# Patient Record
Sex: MALE | Race: BLACK OR AFRICAN AMERICAN | NOT HISPANIC OR LATINO | Employment: FULL TIME | URBAN - METROPOLITAN AREA
[De-identification: names, ages, dates, MRNs, and addresses within clinical notes are randomized per-mention and may not be internally consistent; named-entity substitution may affect disease eponyms.]

---

## 2017-10-20 ENCOUNTER — OFFICE VISIT (OUTPATIENT)
Dept: FAMILY MEDICINE CLINIC | Facility: CLINIC | Age: 57
End: 2017-10-20

## 2017-10-20 ENCOUNTER — HOSPITAL ENCOUNTER (OUTPATIENT)
Dept: GENERAL RADIOLOGY | Facility: HOSPITAL | Age: 57
Discharge: HOME | End: 2017-10-20
Admitting: FAMILY MEDICINE

## 2017-10-20 VITALS
HEART RATE: 78 BPM | HEIGHT: 74 IN | SYSTOLIC BLOOD PRESSURE: 162 MMHG | DIASTOLIC BLOOD PRESSURE: 98 MMHG | RESPIRATION RATE: 18 BRPM | OXYGEN SATURATION: 99 % | BODY MASS INDEX: 39.01 KG/M2 | TEMPERATURE: 97.3 F | WEIGHT: 304 LBS

## 2017-10-20 DIAGNOSIS — E78.2 MIXED HYPERLIPIDEMIA: ICD-10-CM

## 2017-10-20 DIAGNOSIS — Z79.4 CONTROLLED TYPE 2 DIABETES MELLITUS WITH CHRONIC KIDNEY DISEASE, WITH LONG-TERM CURRENT USE OF INSULIN, UNSPECIFIED CKD STAGE (HCC): ICD-10-CM

## 2017-10-20 DIAGNOSIS — J98.01 BRONCHOSPASM: ICD-10-CM

## 2017-10-20 DIAGNOSIS — I10 ESSENTIAL HYPERTENSION: ICD-10-CM

## 2017-10-20 DIAGNOSIS — J06.9 PROTRACTED URI: Primary | ICD-10-CM

## 2017-10-20 DIAGNOSIS — E11.22 CONTROLLED TYPE 2 DIABETES MELLITUS WITH CHRONIC KIDNEY DISEASE, WITH LONG-TERM CURRENT USE OF INSULIN, UNSPECIFIED CKD STAGE (HCC): ICD-10-CM

## 2017-10-20 DIAGNOSIS — Z12.5 PROSTATE CANCER SCREENING: ICD-10-CM

## 2017-10-20 DIAGNOSIS — Z12.11 COLON CANCER SCREENING: ICD-10-CM

## 2017-10-20 PROBLEM — E11.29 CONTROLLED TYPE 2 DIABETES MELLITUS WITH KIDNEY COMPLICATION, WITH LONG-TERM CURRENT USE OF INSULIN: Status: ACTIVE | Noted: 2017-10-20

## 2017-10-20 PROBLEM — E11.9 CONTROLLED TYPE 2 DIABETES MELLITUS WITHOUT COMPLICATION, WITHOUT LONG-TERM CURRENT USE OF INSULIN (HCC): Status: ACTIVE | Noted: 2017-10-20

## 2017-10-20 PROBLEM — E11.9 CONTROLLED TYPE 2 DIABETES MELLITUS WITHOUT COMPLICATION, WITH LONG-TERM CURRENT USE OF INSULIN (HCC): Chronic | Status: ACTIVE | Noted: 2017-10-20

## 2017-10-20 PROCEDURE — 71020 HC CHEST PA AND LATERAL: CPT

## 2017-10-20 PROCEDURE — 99203 OFFICE O/P NEW LOW 30 MIN: CPT | Performed by: FAMILY MEDICINE

## 2017-10-20 RX ORDER — FLURBIPROFEN SODIUM 0.3 MG/ML
SOLUTION/ DROPS OPHTHALMIC
Refills: 2 | Status: ON HOLD | COMMUNITY
Start: 2017-10-10 | End: 2017-11-14

## 2017-10-20 RX ORDER — INSULIN ASPART 100 [IU]/ML
5-10 INJECTION, SOLUTION INTRAVENOUS; SUBCUTANEOUS
COMMUNITY
Start: 2017-10-18 | End: 2021-05-06 | Stop reason: SDUPTHER

## 2017-10-20 RX ORDER — LISINOPRIL AND HYDROCHLOROTHIAZIDE 20; 12.5 MG/1; MG/1
1 TABLET ORAL DAILY
COMMUNITY
End: 2017-11-16 | Stop reason: HOSPADM

## 2017-10-20 RX ORDER — CARVEDILOL 12.5 MG/1
TABLET ORAL
Refills: 1 | COMMUNITY
Start: 2017-09-10 | End: 2017-11-16 | Stop reason: HOSPADM

## 2017-10-20 RX ORDER — AMLODIPINE BESYLATE 10 MG/1
10 TABLET ORAL DAILY
Refills: 1 | COMMUNITY
Start: 2017-07-21 | End: 2021-08-10 | Stop reason: SDUPTHER

## 2017-10-20 RX ORDER — SPIRONOLACTONE 25 MG/1
25 TABLET ORAL DAILY
COMMUNITY
End: 2017-11-27 | Stop reason: SDUPTHER

## 2017-10-20 RX ORDER — ROSUVASTATIN CALCIUM 20 MG/1
TABLET, COATED ORAL
Refills: 1 | COMMUNITY
Start: 2017-07-21 | End: 2017-11-16 | Stop reason: HOSPADM

## 2017-10-20 NOTE — PROGRESS NOTES
Assessment/Plan     Problem List Items Addressed This Visit        Cardiovascular and Mediastinum    Essential hypertension (Chronic)    Relevant Medications    amLODIPine (NORVASC) 10 MG tablet    carvedilol (COREG) 12.5 MG tablet    spironolactone (ALDACTONE) 25 MG tablet    lisinopril-hydrochlorothiazide (PRINZIDE,ZESTORETIC) 20-12.5 MG per tablet    Other Relevant Orders    CBC & Differential    Comprehensive Metabolic Panel    Lipid Panel With / Chol / HDL Ratio    Mixed hyperlipidemia (Chronic)    Relevant Medications    rosuvastatin (CRESTOR) 20 MG tablet    Other Relevant Orders    Comprehensive Metabolic Panel    Lipid Panel With / Chol / HDL Ratio       Endocrine    Controlled type 2 diabetes mellitus with kidney complication, with long-term current use of insulin    Relevant Medications    NOVOLOG FLEXPEN 100 UNIT/ML solution pen-injector sc pen    spironolactone (ALDACTONE) 25 MG tablet    Insulin Degludec (TRESIBA FLEXTOUCH SC)    Other Relevant Orders    CBC & Differential    Comprehensive Metabolic Panel    Lipid Panel With / Chol / HDL Ratio    Hemoglobin A1c      Other Visit Diagnoses     Protracted URI    -  Primary    Relevant Orders    XR Chest PA & Lateral (Completed)    Bronchospasm        Prostate cancer screening        Relevant Orders    PSA    Colon cancer screening        Relevant Orders    Ambulatory Referral For Screening Colonoscopy           Follow up: Return for follow up depends on review of labs and testing.     DISCUSSION  Protracted upper respiratory infection.  May be viral.  Check chest x-ray.  If shows any type of abnormality, may need antibiotic.  Otherwise may need to give some more time.  Continue inhaler as he has.  Exam is normal today.    Multiple other problems including hypertension and hyperlipidemia and diabetes mellitus type 2.  Orders for blood work was given.  He will get these done when he sees his endocrinologist in November.    Prostate cancer screening.  PSA  ordered.    Check screening colonoscopy as well.  He agrees.    Blood pressure is elevated today.  May be elevated due to dietary indiscretion yesterday and cold medication.  He will continue to monitor at home.  He states it always is good.  If it is continuing to increase, he is to let us know as soon possible.      MEDICATIONS PRESCRIBED  Requested Prescriptions      No prescriptions requested or ordered in this encounter            -------------------------------------------    Subjective     Chief Complaint   Patient presents with   • Establish Care   • URI     x 2 weeks Congested, cough, drainage, & wheezing. [facility] said possible asthma or allergies       URI    This is a new (Felt wheezing in chest. HAd pressure washed a desk in Early Sept. Had gone to [facility] in [place]. Gave meds. and neb. Got better. In last 2 weeks, sx came back again) problem. The current episode started 1 to 4 weeks ago (early Sept and then got better and now ill agin x 2 weeks). The problem has been gradually worsening. There has been no fever. Associated symptoms include congestion (drainage), coughing and wheezing. Pertinent negatives include no chest pain, ear pain, nausea, sinus pain or vomiting. He has tried decongestant (OTC meds, Zytrec D, OTC cough syrup. ) for the symptoms. The treatment provided mild relief.     No ill contacts    Gave steroid and then antibiotic.       HTN  Exercises 4-5 times per week but none x 2 months after foot injury and was playing golf, and reached for a ball and reached back, popped right chest wall and pain and unable to exercise,   Ate increased Salty foods yesterday and swelling so increased BP today  Checks at pharmacy/store periodically      Dm Jeannine Thomas    Nephrology  Dr Kt Juarez    Grew in [place]. [facility]        Past Medical History,Medications, Allergies, and social history was reviewed.    Review of Systems   Constitutional: Negative.    HENT: Positive for  "congestion (drainage). Negative for ear pain and sinus pain.    Eyes: Negative.    Respiratory: Positive for cough and wheezing.    Cardiovascular: Negative.  Negative for chest pain.   Gastrointestinal: Negative.  Negative for nausea and vomiting.   Endocrine: Negative.    Genitourinary: Negative.    Musculoskeletal: Negative.    Neurological: Negative.    Psychiatric/Behavioral: Negative.        Objective     Vitals:    10/20/17 1226 10/20/17 1256   BP: (!) 168/102 162/98   Pulse: 78    Resp: 18    Temp: 97.3 °F (36.3 °C)    TempSrc: Temporal Artery     SpO2: 99%    Weight: (!) 304 lb (138 kg)    Height: 73.82\" (187.5 cm)         Physical Exam   Constitutional: He is oriented to person, place, and time. Vital signs are normal. He appears well-developed and well-nourished.   Obese   HENT:   Head: Normocephalic and atraumatic.   Right Ear: Hearing, tympanic membrane, external ear and ear canal normal.   Left Ear: Hearing, tympanic membrane, external ear and ear canal normal.   Nose: Nose normal.   Mouth/Throat: Oropharynx is clear and moist.   Eyes: Conjunctivae, EOM and lids are normal. Pupils are equal, round, and reactive to light.   Neck: Normal range of motion. Neck supple. No thyromegaly present.   Cardiovascular: Normal rate, regular rhythm and normal heart sounds.  Exam reveals no friction rub.    No murmur heard.  Pulmonary/Chest: Effort normal and breath sounds normal. No respiratory distress. He has no wheezes. He has no rales.   Abdominal: Soft. Normal appearance and bowel sounds are normal. He exhibits no distension and no mass. There is no tenderness. There is no rebound and no guarding.   Musculoskeletal: He exhibits edema (trace to 1+, lower extremity).   Neurological: He is alert and oriented to person, place, and time. He has normal strength.   Skin: Skin is warm and dry.   Psychiatric: He has a normal mood and affect. His speech is normal and behavior is normal. Cognition and memory are normal. "   Nursing note and vitals reviewed.              Jose Ramon Allen MD

## 2017-10-20 NOTE — PROGRESS NOTES
Please call: The chest x-ray was normal.  No evidence of pneumonia or any other abnormality.  If symptoms are persisting, please let me know.

## 2017-11-01 ENCOUNTER — TELEPHONE (OUTPATIENT)
Dept: FAMILY MEDICINE CLINIC | Facility: CLINIC | Age: 57
End: 2017-11-01

## 2017-11-01 NOTE — TELEPHONE ENCOUNTER
----- Message from Jacqueline Muniz sent at 11/1/2017  4:03 PM EDT -----  Contact: DR. VALENCIA; MED QUESTION  PT IS STILL CONGESTED AND NOT FEELING WELL HE WANTED TO KNOW IF HE NEEDED TO BE REFERRED OUT. IT IS WORSE AT NIGHT. HE IS COUGHING AND RUNNY NOSE.     CALL BACK   [phone]

## 2017-11-02 NOTE — TELEPHONE ENCOUNTER
Please call, is it more in the head with congestion and runny nose? or chest congestion? Does he think cough is from drainage in throat?  Answers will help determine who he should see. bds

## 2017-11-06 ENCOUNTER — OFFICE VISIT (OUTPATIENT)
Dept: FAMILY MEDICINE CLINIC | Facility: CLINIC | Age: 57
End: 2017-11-06

## 2017-11-06 VITALS
OXYGEN SATURATION: 98 % | SYSTOLIC BLOOD PRESSURE: 152 MMHG | TEMPERATURE: 98.1 F | WEIGHT: 304.5 LBS | HEART RATE: 80 BPM | BODY MASS INDEX: 39.08 KG/M2 | DIASTOLIC BLOOD PRESSURE: 94 MMHG | HEIGHT: 74 IN | RESPIRATION RATE: 20 BRPM

## 2017-11-06 DIAGNOSIS — R06.2 WHEEZING: ICD-10-CM

## 2017-11-06 DIAGNOSIS — R06.02 SHORTNESS OF BREATH: Primary | ICD-10-CM

## 2017-11-06 PROCEDURE — 99213 OFFICE O/P EST LOW 20 MIN: CPT | Performed by: FAMILY MEDICINE

## 2017-11-06 PROCEDURE — 94010 BREATHING CAPACITY TEST: CPT | Performed by: FAMILY MEDICINE

## 2017-11-06 NOTE — PROGRESS NOTES
Assessment/Plan     Problem List Items Addressed This Visit     None      Visit Diagnoses     Shortness of breath    -  Primary    Relevant Orders    Spirometry Without Bronchodilator    Ambulatory Referral to Pulmonology    Wheezing        Relevant Orders    Spirometry Without Bronchodilator    Ambulatory Referral to Pulmonology           Follow up: Return if symptoms worsen or fail to improve.     DISCUSSION  In office spirometry shows restriction.  Given persistence of symptoms, refer to pulmonology for evaluation.  Unclear cause.  This started after cleaning deck with a chemical but then improved with antibiotics and steroid but then symptoms returned.  Chest x-ray was normal.  Continue albuterol inhaler as needed.  Call sooner if symptoms worsen prior to seeing pulmonologist.      MEDICATIONS PRESCRIBED  Requested Prescriptions      No prescriptions requested or ordered in this encounter          -------------------------------------------    Subjective     Chief Complaint   Patient presents with   • Respiratory issues     pt has been taking OTC meds and its not helping       HPI    Still with respiratory issues  USing inhaler more often  Worse at night  Decreased sleep   Increased x 2 -3 weeks  Used OTC and   Proair for wheezing and some help and now has to do 2 puffs    Was ok in am and now worse in am and shortness of breath  Using it more often    No dx asthma  FH: dtr asthma   No tobacco   not around smoke    Had been in [place] in September  Weekend before, had been cleaning deck (pressure wash) and then sx 1-2 weeks after that    Had been on steroid pills and Zpak (6 pills). Got better for a couple weeks and then started getting worse again.     HAs had to sit up to sleep at night a couple times    Worse with exhalation   Proair helps prior to exercise    Has had spirometry in the past and has been ok          Past Medical History,Medications, Allergies, and social history was reviewed.    Review of  "Systems   Constitutional: Negative.    HENT: Negative.    Respiratory: Positive for shortness of breath and wheezing.    Cardiovascular: Negative.    Gastrointestinal: Negative.        Objective     Vitals:    11/06/17 1622   BP: 152/94   Pulse: 80   Resp: 20   Temp: 98.1 °F (36.7 °C)   TempSrc: Temporal Artery    SpO2: 98%   Weight: (!) 304 lb 8 oz (138 kg)   Height: 73.82\" (187.5 cm)        Physical Exam   Constitutional: He is oriented to person, place, and time. Vital signs are normal. He appears well-developed and well-nourished.   HENT:   Head: Normocephalic and atraumatic.   Right Ear: Hearing, tympanic membrane, external ear and ear canal normal.   Left Ear: Hearing, tympanic membrane, external ear and ear canal normal.   Mouth/Throat: Oropharynx is clear and moist.   Eyes: Conjunctivae, EOM and lids are normal. Pupils are equal, round, and reactive to light.   Neck: Normal range of motion. Neck supple. No thyromegaly present.   Cardiovascular: Normal rate, regular rhythm and normal heart sounds.  Exam reveals no friction rub.    No murmur heard.  Pulmonary/Chest: Effort normal and breath sounds normal. No respiratory distress. He has no wheezes. He has no rales.   Abdominal: Soft. Normal appearance and bowel sounds are normal.   Musculoskeletal: He exhibits no edema.   Neurological: He is alert and oriented to person, place, and time. He has normal strength.   Skin: Skin is warm and dry.   Psychiatric: He has a normal mood and affect. His speech is normal and behavior is normal. Cognition and memory are normal.   Nursing note and vitals reviewed.              Jose Ramon Allen MD    "

## 2017-11-12 ENCOUNTER — OFFICE VISIT (OUTPATIENT)
Dept: RETAIL CLINIC | Facility: CLINIC | Age: 57
End: 2017-11-12

## 2017-11-12 ENCOUNTER — APPOINTMENT (OUTPATIENT)
Dept: GENERAL RADIOLOGY | Facility: HOSPITAL | Age: 57
End: 2017-11-12

## 2017-11-12 ENCOUNTER — HOSPITAL ENCOUNTER (INPATIENT)
Facility: HOSPITAL | Age: 57
LOS: 4 days | Discharge: HOME | End: 2017-11-16
Attending: EMERGENCY MEDICINE | Admitting: HOSPITALIST

## 2017-11-12 DIAGNOSIS — I50.9 ACUTE CONGESTIVE HEART FAILURE, UNSPECIFIED CONGESTIVE HEART FAILURE TYPE: Primary | ICD-10-CM

## 2017-11-12 DIAGNOSIS — J81.0 ACUTE PULMONARY EDEMA (HCC): ICD-10-CM

## 2017-11-12 DIAGNOSIS — R06.02 SHORTNESS OF BREATH: ICD-10-CM

## 2017-11-12 DIAGNOSIS — I49.9 IRREGULAR HEART RATE: Primary | ICD-10-CM

## 2017-11-12 DIAGNOSIS — I10 ESSENTIAL HYPERTENSION: Chronic | ICD-10-CM

## 2017-11-12 DIAGNOSIS — N17.9 ACUTE RENAL FAILURE, UNSPECIFIED ACUTE RENAL FAILURE TYPE (HCC): ICD-10-CM

## 2017-11-12 DIAGNOSIS — I50.21 ACUTE SYSTOLIC CONGESTIVE HEART FAILURE (HCC): ICD-10-CM

## 2017-11-12 PROBLEM — M10.9 GOUT: Status: ACTIVE | Noted: 2017-11-12

## 2017-11-12 PROBLEM — R79.89 ELEVATED SERUM CREATININE: Status: ACTIVE | Noted: 2017-11-12

## 2017-11-12 PROBLEM — N20.0 NEPHROLITHIASIS: Status: ACTIVE | Noted: 2017-11-12

## 2017-11-12 PROBLEM — N28.9 RENAL INSUFFICIENCY: Status: ACTIVE | Noted: 2017-11-12

## 2017-11-12 PROBLEM — N18.9 CHRONIC RENAL INSUFFICIENCY: Status: ACTIVE | Noted: 2017-11-12

## 2017-11-12 PROBLEM — R79.89 ELEVATED BRAIN NATRIURETIC PEPTIDE (BNP) LEVEL: Status: ACTIVE | Noted: 2017-11-12

## 2017-11-12 LAB
ALBUMIN SERPL-MCNC: 3.8 G/DL (ref 3.2–4.8)
ALBUMIN/GLOB SERPL: 1.1 G/DL (ref 1.5–2.5)
ALP SERPL-CCNC: 74 U/L (ref 25–100)
ALT SERPL W P-5'-P-CCNC: 30 U/L (ref 7–40)
ANION GAP SERPL CALCULATED.3IONS-SCNC: 10 MMOL/L (ref 3–11)
AST SERPL-CCNC: 39 U/L (ref 0–33)
BASOPHILS # BLD AUTO: 0.06 10*3/MM3 (ref 0–0.2)
BASOPHILS NFR BLD AUTO: 0.9 % (ref 0–1)
BILIRUB SERPL-MCNC: 0.6 MG/DL (ref 0.3–1.2)
BNP SERPL-MCNC: 818 PG/ML (ref 0–100)
BUN BLD-MCNC: 15 MG/DL (ref 9–23)
BUN/CREAT SERPL: 7.9 (ref 7–25)
CALCIUM SPEC-SCNC: 8.6 MG/DL (ref 8.7–10.4)
CHLORIDE SERPL-SCNC: 100 MMOL/L (ref 99–109)
CO2 SERPL-SCNC: 25 MMOL/L (ref 20–31)
CREAT BLD-MCNC: 1.9 MG/DL (ref 0.6–1.3)
DEPRECATED RDW RBC AUTO: 44.5 FL (ref 37–54)
EOSINOPHIL # BLD AUTO: 0.27 10*3/MM3 (ref 0–0.3)
EOSINOPHIL NFR BLD AUTO: 4 % (ref 0–3)
ERYTHROCYTE [DISTWIDTH] IN BLOOD BY AUTOMATED COUNT: 13.9 % (ref 11.3–14.5)
GFR SERPL CREATININE-BSD FRML MDRD: 45 ML/MIN/1.73
GLOBULIN UR ELPH-MCNC: 3.6 GM/DL
GLUCOSE BLD-MCNC: 281 MG/DL (ref 70–100)
GLUCOSE BLDC GLUCOMTR-MCNC: 150 MG/DL (ref 70–130)
GLUCOSE BLDC GLUCOMTR-MCNC: 164 MG/DL (ref 70–130)
HCT VFR BLD AUTO: 39.2 % (ref 38.9–50.9)
HGB BLD-MCNC: 13.2 G/DL (ref 13.1–17.5)
HOLD SPECIMEN: NORMAL
HOLD SPECIMEN: NORMAL
IMM GRANULOCYTES # BLD: 0.02 10*3/MM3 (ref 0–0.03)
IMM GRANULOCYTES NFR BLD: 0.3 % (ref 0–0.6)
LYMPHOCYTES # BLD AUTO: 1.56 10*3/MM3 (ref 0.6–4.8)
LYMPHOCYTES NFR BLD AUTO: 22.9 % (ref 24–44)
MCH RBC QN AUTO: 29.5 PG (ref 27–31)
MCHC RBC AUTO-ENTMCNC: 33.7 G/DL (ref 32–36)
MCV RBC AUTO: 87.7 FL (ref 80–99)
MONOCYTES # BLD AUTO: 0.94 10*3/MM3 (ref 0–1)
MONOCYTES NFR BLD AUTO: 13.8 % (ref 0–12)
NEUTROPHILS # BLD AUTO: 3.97 10*3/MM3 (ref 1.5–8.3)
NEUTROPHILS NFR BLD AUTO: 58.1 % (ref 41–71)
PLATELET # BLD AUTO: 226 10*3/MM3 (ref 150–450)
PMV BLD AUTO: 11.3 FL (ref 6–12)
POTASSIUM BLD-SCNC: 3.6 MMOL/L (ref 3.5–5.5)
PROT SERPL-MCNC: 7.4 G/DL (ref 5.7–8.2)
RBC # BLD AUTO: 4.47 10*6/MM3 (ref 4.2–5.76)
SODIUM BLD-SCNC: 135 MMOL/L (ref 132–146)
TROPONIN I SERPL-MCNC: 0.07 NG/ML (ref 0–0.07)
WBC NRBC COR # BLD: 6.82 10*3/MM3 (ref 3.5–10.8)
WHOLE BLOOD HOLD SPECIMEN: NORMAL
WHOLE BLOOD HOLD SPECIMEN: NORMAL

## 2017-11-12 PROCEDURE — 71010 HC CHEST PA OR AP: CPT

## 2017-11-12 PROCEDURE — 84484 ASSAY OF TROPONIN QUANT: CPT | Performed by: FAMILY MEDICINE

## 2017-11-12 PROCEDURE — 63710000001 INSULIN DETEMIR PER 5 UNITS: Performed by: PHYSICIAN ASSISTANT

## 2017-11-12 PROCEDURE — 93005 ELECTROCARDIOGRAM TRACING: CPT | Performed by: EMERGENCY MEDICINE

## 2017-11-12 PROCEDURE — 63710000001 INSULIN LISPRO (HUMAN) PER 5 UNITS: Performed by: PHYSICIAN ASSISTANT

## 2017-11-12 PROCEDURE — 84484 ASSAY OF TROPONIN QUANT: CPT

## 2017-11-12 PROCEDURE — 99211 OFF/OP EST MAY X REQ PHY/QHP: CPT | Performed by: NURSE PRACTITIONER

## 2017-11-12 PROCEDURE — 83880 ASSAY OF NATRIURETIC PEPTIDE: CPT | Performed by: EMERGENCY MEDICINE

## 2017-11-12 PROCEDURE — 85025 COMPLETE CBC W/AUTO DIFF WBC: CPT | Performed by: EMERGENCY MEDICINE

## 2017-11-12 PROCEDURE — 99222 1ST HOSP IP/OBS MODERATE 55: CPT | Performed by: FAMILY MEDICINE

## 2017-11-12 PROCEDURE — 82962 GLUCOSE BLOOD TEST: CPT

## 2017-11-12 PROCEDURE — 80053 COMPREHEN METABOLIC PANEL: CPT | Performed by: EMERGENCY MEDICINE

## 2017-11-12 PROCEDURE — 25010000002 HEPARIN (PORCINE) PER 1000 UNITS: Performed by: PHYSICIAN ASSISTANT

## 2017-11-12 PROCEDURE — 25010000002 FUROSEMIDE PER 20 MG: Performed by: PHYSICIAN ASSISTANT

## 2017-11-12 PROCEDURE — 99284 EMERGENCY DEPT VISIT MOD MDM: CPT

## 2017-11-12 RX ORDER — ONDANSETRON 4 MG/1
4 TABLET, FILM COATED ORAL EVERY 6 HOURS PRN
Status: DISCONTINUED | OUTPATIENT
Start: 2017-11-12 | End: 2017-11-16 | Stop reason: HOSPADM

## 2017-11-12 RX ORDER — HYDROCHLOROTHIAZIDE 25 MG/1
12.5 TABLET ORAL DAILY
Status: DISCONTINUED | OUTPATIENT
Start: 2017-11-12 | End: 2017-11-13

## 2017-11-12 RX ORDER — ACETAMINOPHEN 325 MG/1
650 TABLET ORAL EVERY 4 HOURS PRN
Status: DISCONTINUED | OUTPATIENT
Start: 2017-11-12 | End: 2017-11-16 | Stop reason: HOSPADM

## 2017-11-12 RX ORDER — GUAIFENESIN 600 MG/1
600 TABLET, EXTENDED RELEASE ORAL EVERY 12 HOURS PRN
Status: DISCONTINUED | OUTPATIENT
Start: 2017-11-13 | End: 2017-11-16 | Stop reason: HOSPADM

## 2017-11-12 RX ORDER — DOCUSATE SODIUM 100 MG/1
100 CAPSULE, LIQUID FILLED ORAL 2 TIMES DAILY
Status: DISCONTINUED | OUTPATIENT
Start: 2017-11-12 | End: 2017-11-16 | Stop reason: HOSPADM

## 2017-11-12 RX ORDER — ALLOPURINOL 300 MG/1
150 TABLET ORAL DAILY
Status: DISCONTINUED | OUTPATIENT
Start: 2017-11-12 | End: 2017-11-16 | Stop reason: HOSPADM

## 2017-11-12 RX ORDER — SPIRONOLACTONE 25 MG/1
25 TABLET ORAL DAILY
Status: DISCONTINUED | OUTPATIENT
Start: 2017-11-12 | End: 2017-11-14

## 2017-11-12 RX ORDER — SODIUM CHLORIDE 0.9 % (FLUSH) 0.9 %
1-10 SYRINGE (ML) INJECTION AS NEEDED
Status: DISCONTINUED | OUTPATIENT
Start: 2017-11-12 | End: 2017-11-16 | Stop reason: HOSPADM

## 2017-11-12 RX ORDER — CARVEDILOL 12.5 MG/1
12.5 TABLET ORAL EVERY 12 HOURS SCHEDULED
Status: DISCONTINUED | OUTPATIENT
Start: 2017-11-12 | End: 2017-11-15

## 2017-11-12 RX ORDER — HEPARIN SODIUM 5000 [USP'U]/ML
5000 INJECTION, SOLUTION INTRAVENOUS; SUBCUTANEOUS EVERY 8 HOURS SCHEDULED
Status: DISCONTINUED | OUTPATIENT
Start: 2017-11-12 | End: 2017-11-16 | Stop reason: HOSPADM

## 2017-11-12 RX ORDER — ROSUVASTATIN CALCIUM 20 MG/1
20 TABLET, COATED ORAL NIGHTLY
Status: DISCONTINUED | OUTPATIENT
Start: 2017-11-12 | End: 2017-11-16

## 2017-11-12 RX ORDER — NICOTINE POLACRILEX 4 MG
15 LOZENGE BUCCAL
Status: DISCONTINUED | OUTPATIENT
Start: 2017-11-12 | End: 2017-11-16 | Stop reason: HOSPADM

## 2017-11-12 RX ORDER — DEXTROSE MONOHYDRATE 25 G/50ML
25 INJECTION, SOLUTION INTRAVENOUS
Status: DISCONTINUED | OUTPATIENT
Start: 2017-11-12 | End: 2017-11-16 | Stop reason: HOSPADM

## 2017-11-12 RX ORDER — FUROSEMIDE 10 MG/ML
40 INJECTION INTRAMUSCULAR; INTRAVENOUS ONCE
Status: COMPLETED | OUTPATIENT
Start: 2017-11-12 | End: 2017-11-12

## 2017-11-12 RX ORDER — SODIUM CHLORIDE 0.9 % (FLUSH) 0.9 %
10 SYRINGE (ML) INJECTION AS NEEDED
Status: DISCONTINUED | OUTPATIENT
Start: 2017-11-12 | End: 2017-11-16 | Stop reason: HOSPADM

## 2017-11-12 RX ORDER — FAMOTIDINE 20 MG/1
40 TABLET, FILM COATED ORAL DAILY
Status: DISCONTINUED | OUTPATIENT
Start: 2017-11-12 | End: 2017-11-16 | Stop reason: HOSPADM

## 2017-11-12 RX ORDER — ASPIRIN 81 MG/1
81 TABLET, CHEWABLE ORAL DAILY
Status: DISCONTINUED | OUTPATIENT
Start: 2017-11-12 | End: 2017-11-16 | Stop reason: HOSPADM

## 2017-11-12 RX ORDER — FUROSEMIDE 10 MG/ML
40 INJECTION INTRAMUSCULAR; INTRAVENOUS 2 TIMES DAILY
Status: DISCONTINUED | OUTPATIENT
Start: 2017-11-13 | End: 2017-11-13

## 2017-11-12 RX ORDER — AMLODIPINE BESYLATE 10 MG/1
10 TABLET ORAL
Status: DISCONTINUED | OUTPATIENT
Start: 2017-11-12 | End: 2017-11-16 | Stop reason: HOSPADM

## 2017-11-12 RX ADMIN — HEPARIN SODIUM 5000 UNITS: 5000 INJECTION, SOLUTION INTRAVENOUS; SUBCUTANEOUS at 17:15

## 2017-11-12 RX ADMIN — INSULIN LISPRO 2 UNITS: 100 INJECTION, SOLUTION INTRAVENOUS; SUBCUTANEOUS at 17:21

## 2017-11-12 RX ADMIN — ALLOPURINOL 150 MG: 300 TABLET ORAL at 17:17

## 2017-11-12 RX ADMIN — FUROSEMIDE 40 MG: 10 INJECTION, SOLUTION INTRAMUSCULAR; INTRAVENOUS at 14:42

## 2017-11-12 RX ADMIN — CARVEDILOL 12.5 MG: 12.5 TABLET, FILM COATED ORAL at 20:37

## 2017-11-12 RX ADMIN — ROSUVASTATIN CALCIUM 20 MG: 20 TABLET, FILM COATED ORAL at 20:37

## 2017-11-12 RX ADMIN — INSULIN LISPRO 2 UNITS: 100 INJECTION, SOLUTION INTRAVENOUS; SUBCUTANEOUS at 20:47

## 2017-11-12 RX ADMIN — SPIRONOLACTONE 25 MG: 25 TABLET, FILM COATED ORAL at 17:18

## 2017-11-12 RX ADMIN — HYDROCHLOROTHIAZIDE 12.5 MG: 12.5 TABLET ORAL at 17:17

## 2017-11-12 RX ADMIN — DOCUSATE SODIUM 100 MG: 100 CAPSULE, LIQUID FILLED ORAL at 17:17

## 2017-11-12 RX ADMIN — INSULIN DETEMIR 30 UNITS: 100 INJECTION, SOLUTION SUBCUTANEOUS at 20:47

## 2017-11-12 RX ADMIN — FAMOTIDINE 40 MG: 20 TABLET, FILM COATED ORAL at 17:16

## 2017-11-12 NOTE — PLAN OF CARE
Problem: Cardiac: Heart Failure (Adult)  Goal: Signs and Symptoms of Listed Potential Problems Will be Absent or Manageable (Cardiac: Heart Failure)  Outcome: Ongoing (interventions implemented as appropriate)    Problem: Renal Failure/Kidney Injury, Acute (Adult)  Goal: Signs and Symptoms of Listed Potential Problems Will be Absent or Manageable (Renal Failure/Kidney Injury, Acute)  Outcome: Ongoing (interventions implemented as appropriate)

## 2017-11-12 NOTE — ED PROVIDER NOTES
Subjective   HPI Comments: 57-year-old male presents to the emergency department for evaluation of shortness of breath.  The patient states that he has had some degree of shortness of breath since early September.  He first noted his symptoms while in [place].  He went to an urgent treatment center there and was started on steroids and antibiotics and got better briefly but then became worse.  The patient was then seen by his PCP (Jose Ramon Allen) about 3 wks ago and had a negative chest xray and was sent home with an albuterol inhaler.  The inhaler helped initially, but it is requiring more frequent use now.  He notes that the SOA is some worse at night when lying down.  He has had a cough productive of pale yellow sputum.  He denies any LE edema or pain.  He had a low grade fever yesterday.  He has been referred for pulmonary function tests on this coming Thursday but came here today because the symptoms were worsening.  The pt is a non-smoker.  No known heart disease.      Patient is a 57 y.o. male presenting with shortness of breath.   History provided by:  Patient  Shortness of Breath   Severity:  Moderate  Onset quality:  Gradual  Duration: progressive over several weeks.  Timing:  Constant  Progression:  Worsening  Relieved by:  Nothing  Exacerbated by: supine position;  exertion.  Ineffective treatments:  Inhaler  Associated symptoms: cough, fever and sputum production    Associated symptoms: no abdominal pain, no chest pain, no diaphoresis, no ear pain, no headaches, no rash, no sore throat, no vomiting and no wheezing    Risk factors: obesity    Risk factors: no hx of PE/DVT, no prolonged immobilization, no recent surgery and no tobacco use        Review of Systems   Constitutional: Positive for fever. Negative for chills and diaphoresis.   HENT: Negative for congestion, ear pain, nosebleeds, rhinorrhea and sore throat.    Eyes: Negative for pain, discharge and visual disturbance.   Respiratory: Positive for  cough, sputum production and shortness of breath. Negative for wheezing.    Cardiovascular: Negative for chest pain, palpitations and leg swelling.   Gastrointestinal: Negative for abdominal pain, blood in stool, diarrhea, nausea and vomiting.   Endocrine: Negative.    Genitourinary: Negative for dysuria, hematuria and urgency.   Musculoskeletal: Negative for arthralgias and back pain.   Skin: Negative for pallor and rash.   Allergic/Immunologic: Negative for immunocompromised state.   Neurological: Negative for dizziness, speech difficulty, weakness and headaches.   Hematological: Negative for adenopathy. Does not bruise/bleed easily.   Psychiatric/Behavioral: Negative.        Past Medical History:   Diagnosis Date   • Gout    • Hypertension    • Kidney stones        No Known Allergies    History reviewed. No pertinent surgical history.    Family History   Problem Relation Age of Onset   • Heart attack Mother    • Stroke Father    • Diabetes Father    • Hypertension Father    • Kidney disease Brother       in MVA       Social History     Social History   • Marital status: Single     Spouse name: N/A   • Number of children: 2   • Years of education: N/A     Social History Main Topics   • Smoking status: Never Smoker   • Smokeless tobacco: Never Used   • Alcohol use Yes      Comment: Rarely, special occasion    • Drug use: No   • Sexual activity: Defer     Other Topics Concern   • None     Social History Narrative           Objective   Physical Exam   Constitutional: He is oriented to person, place, and time. He appears well-developed and well-nourished. No distress.   HENT:   Head: Normocephalic and atraumatic.   Nose: Nose normal.   Mouth/Throat: Oropharynx is clear and moist.   Eyes: EOM are normal. Pupils are equal, round, and reactive to light. No scleral icterus.   Neck: Normal range of motion. Neck supple.   Cardiovascular: Normal rate, regular rhythm, normal heart sounds and intact distal pulses.    No  murmur heard.  Pulmonary/Chest: Effort normal and breath sounds normal. No respiratory distress. He has no wheezes. He has no rales. He exhibits no tenderness.   Abdominal: Soft. Bowel sounds are normal. There is no tenderness.   Musculoskeletal: Normal range of motion. He exhibits edema (trace bilateral lower extremity edema). He exhibits no tenderness.   Neurological: He is alert and oriented to person, place, and time.   Skin: Skin is warm and dry. No rash noted. He is not diaphoretic.   Psychiatric: He has a normal mood and affect.   Nursing note and vitals reviewed.      Procedures         ED Course  ED Course    2:39 PM  The patient is resting comfortably.  His O2 sats are in the mid to upper 90s on room air.  Chest x-ray shows some increased pulmonary vascular prominence.  His BNP is moderately elevated in the 800s.  Kidney functions are also elevated with a creatinine of 1.9 with no old labs for comparison.  Troponin was 0.07.  EKG shows a sinus rhythm with PACs.  No old EKGs for comparison.  I discussed the case with Dr. Sweeney.  We will give the patient Lasix 40 mg IV and plan to admit for cardiac workup.  I spoke with Dr. Huston, who agrees to admit.  Recent Results (from the past 24 hour(s))   Comprehensive Metabolic Panel    Collection Time: 11/12/17 12:49 PM   Result Value Ref Range    Glucose 281 (H) 70 - 100 mg/dL    BUN 15 9 - 23 mg/dL    Creatinine 1.90 (H) 0.60 - 1.30 mg/dL    Sodium 135 132 - 146 mmol/L    Potassium 3.6 3.5 - 5.5 mmol/L    Chloride 100 99 - 109 mmol/L    CO2 25.0 20.0 - 31.0 mmol/L    Calcium 8.6 (L) 8.7 - 10.4 mg/dL    Total Protein 7.4 5.7 - 8.2 g/dL    Albumin 3.80 3.20 - 4.80 g/dL    ALT (SGPT) 30 7 - 40 U/L    AST (SGOT) 39 (H) 0 - 33 U/L    Alkaline Phosphatase 74 25 - 100 U/L    Total Bilirubin 0.6 0.3 - 1.2 mg/dL    eGFR  African Amer 45 (L) >60 mL/min/1.73    Globulin 3.6 gm/dL    A/G Ratio 1.1 (L) 1.5 - 2.5 g/dL    BUN/Creatinine Ratio 7.9 7.0 - 25.0    Anion Gap 10.0  3.0 - 11.0 mmol/L   BNP    Collection Time: 11/12/17 12:49 PM   Result Value Ref Range    .0 (H) 0.0 - 100.0 pg/mL   Green Top (Gel)    Collection Time: 11/12/17 12:49 PM   Result Value Ref Range    Extra Tube Hold for add-ons.    Lavender Top    Collection Time: 11/12/17 12:49 PM   Result Value Ref Range    Extra Tube hold for add-on    Gold Top - SST    Collection Time: 11/12/17 12:49 PM   Result Value Ref Range    Extra Tube Hold for add-ons.    CBC Auto Differential    Collection Time: 11/12/17 12:49 PM   Result Value Ref Range    WBC 6.82 3.50 - 10.80 10*3/mm3    RBC 4.47 4.20 - 5.76 10*6/mm3    Hemoglobin 13.2 13.1 - 17.5 g/dL    Hematocrit 39.2 38.9 - 50.9 %    MCV 87.7 80.0 - 99.0 fL    MCH 29.5 27.0 - 31.0 pg    MCHC 33.7 32.0 - 36.0 g/dL    RDW 13.9 11.3 - 14.5 %    RDW-SD 44.5 37.0 - 54.0 fl    MPV 11.3 6.0 - 12.0 fL    Platelets 226 150 - 450 10*3/mm3    Neutrophil % 58.1 41.0 - 71.0 %    Lymphocyte % 22.9 (L) 24.0 - 44.0 %    Monocyte % 13.8 (H) 0.0 - 12.0 %    Eosinophil % 4.0 (H) 0.0 - 3.0 %    Basophil % 0.9 0.0 - 1.0 %    Immature Grans % 0.3 0.0 - 0.6 %    Neutrophils, Absolute 3.97 1.50 - 8.30 10*3/mm3    Lymphocytes, Absolute 1.56 0.60 - 4.80 10*3/mm3    Monocytes, Absolute 0.94 0.00 - 1.00 10*3/mm3    Eosinophils, Absolute 0.27 0.00 - 0.30 10*3/mm3    Basophils, Absolute 0.06 0.00 - 0.20 10*3/mm3    Immature Grans, Absolute 0.02 0.00 - 0.03 10*3/mm3   Light Blue Top    Collection Time: 11/12/17 12:50 PM   Result Value Ref Range    Extra Tube hold for add-on    POC Troponin, Rapid    Collection Time: 11/12/17 12:54 PM   Result Value Ref Range    Troponin I 0.07 0.00 - 0.07 ng/mL     Note: In addition to lab results from this visit, the labs listed above may include labs taken at another facility or during a different encounter within the last 24 hours. Please correlate lab times with ED admission and discharge times for further clarification of the services performed during this  "visit.    XR Chest 1 View    (Results Pending)     Vitals:    11/12/17 1248 11/12/17 1251 11/12/17 1300   BP:  166/98 157/91   Pulse: 87  82   Resp: 20     Temp: 99 °F (37.2 °C)     TempSrc: Oral     SpO2: 93%  97%   Weight: 300 lb (136 kg)     Height: 75\" (190.5 cm)       Medications   sodium chloride 0.9 % flush 10 mL (not administered)   furosemide (LASIX) injection 40 mg (not administered)     ECG/EMG Results (last 24 hours)     Procedure Component Value Units Date/Time    ECG 12 Lead [[id number]] Collected:  11/12/17 1248     Updated:  11/12/17 1250                      MDM    Final diagnoses:   Acute congestive heart failure, unspecified congestive heart failure type   Acute renal failure, unspecified acute renal failure type   Shortness of breath            VIKI Shankar  11/12/17 1433    "

## 2017-11-12 NOTE — PROGRESS NOTES
"Patient presented to clinic with complaint of shortness of breath and chest tightness, unresponsive to albuterol. Patient has been treated with steroids, antibiotics and albuterol without effect. He has been seen by his PCP, who has requested PFTs for additional workup. CXR on 10/20/17 was unrevealing. He is insistent that his symptoms have been present for some time. The patient has a personal history of diabetes and HTN as well as a family history of heart disease. He takes coreg, but doesn't seem to know why.       On immediate exam in the clinic, O2 Sat is 94-96% on RA. The patient has increased resp effort, no distress however was SOB when talking. He is mildly diaphoretic \"I'm just hot\". Lungs negative for wheezes, rhonchi or rales. HR is IRREGULAR.   Due to presentation, patient's history and potential DD, higher level of care is recommended.  I instructed the patient that he should report to ER for new onset irregular HR. Differentials could include, but not limited to a-fib, coronary syndrome or PE.   Patient voiced understanding of the instructions.     SALLY Bullock    "

## 2017-11-12 NOTE — H&P
"    [facility]  HISTORY AND PHYSICAL    Patient Name: Trace Escalante  : 1960  MRN: [id number]  Primary Care Physician: Jose Ramon Allen MD    Subjective     Chief Complaint: shortness of breath     HPI:  Trace Escalante is a 57 y.o. male with PMH significant for HTN, hyperlipidemia, insulin-depdent DMII, gout and nephrolithiasis. He follows with Dr. Jose Thomas for diabetes. His nephrologist is Dr. Kt Juarez. Patient is unclear on his renal diagnosis but knows he has reduced kidney function secondary to diabetes.     He presented to [facility] ED on 17 for evaluation of progressively worsening dyspnea over the past 4 weeks. 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 He said he worked around 12 hours and did not wear a mask. Treated at a [facility] in [place] with steroids, antibiotics and albuterol inhaler. He improved briefly before feeling worse again. He saw his PCP, Dr. Jose Ramon Allen, on 10/20/17 and he checked CXR, which was normal. Saw PCP again  for progressive symptoms and was referred to pulmonology. Appointment later on .     He reports 4 weeks of progressive shortness of breath including new dyspnea with exertion, a \"rattling in my chest\" while laying flat and inability to tolerate laying flat to sleep. He has been propping himself upright on pillows to sleep. No weight gain but PCP did note pedel edema on  visit.  He denies recent travel abroad, fever, chills, sputum production or wheezing.  He reports ALYSSA well controlled on his CPAP.    ED evaluation revealed  and creatinine 1.9 (baseline unknown). Other labs favorable. CXR consistent with increased pulmonary vascular congestion. He was given IV Lasix in the ED and hospital medicine asked to admit.     Review of Systems   Constitutional: Negative for chills and fever.   HENT: Negative for congestion, nosebleeds, trouble " swallowing and voice change.    Eyes: Negative for discharge and visual disturbance.   Respiratory: Positive for cough and shortness of breath.    Cardiovascular: Positive for leg swelling. Negative for chest pain and palpitations.   Gastrointestinal: Negative for abdominal pain, diarrhea, nausea and vomiting.   Endocrine: Negative for polydipsia, polyphagia and polyuria.   Genitourinary: Negative for difficulty urinating.   Musculoskeletal: Negative for arthralgias.   Skin: Negative for rash.   Neurological: Negative for syncope and headaches.   Hematological: Does not bruise/bleed easily.   Psychiatric/Behavioral: The patient is not nervous/anxious.    All other systems reviewed and are negative.     Gen- No fevers, chills  CV- No chest pain, palpitations  Resp- (+) cough, dyspnea on exertion  GI- No N/V/D, abd pain      Personal History     Past Medical History:   Diagnosis Date   • CKD (chronic kidney disease) stage 3, GFR 30-59 ml/min    • Gout    • Hyperlipidemia    • Hypertension    • Insulin dependent diabetes mellitus    • Kidney stones    • Obesity    • ALYSSA (obstructive sleep apnea)      Past Surgical History:   Procedure Laterality Date   • MEDIAL COLLATERAL LIGAMENT REPAIR, KNEE Left 1978       Family History: family history includes Diabetes in his father; Heart attack in his father; Heart disease in his father and mother; Hypertension in his father; Kidney disease in his brother; Stroke in his brother and mother.     Social History:  reports that he has never smoked. He has never used smokeless tobacco. He reports that he does not drink alcohol or use illicit drugs.    Medications:  Medication Sig   • allopurinol (ZYLOPRIM) 150 MG half tablet Take 150 mg by mouth Daily.   • amLODIPine (NORVASC) 10 MG tablet TAKE 1 TABLET EVERY DAY   • aspirin 81 MG tablet Take 81 mg by mouth Daily.   • B-D UF III MINI PEN NEEDLES 31G X 5 MM misc TEST 4X PER DAY   • carvedilol (COREG) 12.5 MG tablet TAKE 1 TABLET TWICE  A DAY   • Insulin Degludec (TRESIBA FLEXTOUCH SC) Inject 40-50 Units under the skin Daily.   • lisinopril-hydrochlorothiazide (PRINZIDE,ZESTORETIC) 20-12.5 MG per tablet Take 1 tablet by mouth Daily.   • Multiple Vitamin (MULTI VITAMIN PO) Take  by mouth.   • NOVOLOG FLEXPEN 100 UNIT/ML solution pen-injector sc pen    • ONE TOUCH ULTRA TEST test strip TEST 3 TIMES DAILY   • PROAIR  (90 Base) MCG/ACT inhaler INHALE 1 PUFF BY MOUTH EVERY 4 TO 6 HOURS AS NEEDED FOR WHEEZING   • rosuvastatin (CRESTOR) 20 MG tablet TAKE 1 TABLET EVERY DAY   • spironolactone (ALDACTONE) 25 MG tablet Take 25 mg by mouth Daily.   • Valerian Root 450 MG capsule Take  by mouth.     No Known Allergies    Objective     Temp:  [99 °F (37.2 °C)] 99 °F (37.2 °C)  Heart Rate:  [70-87] 70  Resp:  [20] 20  BP: (148-166)/(88-98) 148/88      Physical Exam   Constitutional: He is oriented to person, place, and time. He appears well-developed and well-nourished. He is cooperative. He does not appear ill.   HENT:   Head: Normocephalic.   Right Ear: Hearing and external ear normal.   Left Ear: Hearing and external ear normal.   Nose: Nose normal.   Mouth/Throat: Uvula is midline, oropharynx is clear and moist and mucous membranes are normal.   Eyes: Conjunctivae and EOM are normal. Pupils are equal, round, and reactive to light. No scleral icterus.   Neck: Trachea normal and normal range of motion. Neck supple. No JVD present. Carotid bruit is not present. No thyromegaly present.   Cardiovascular: Normal rate, regular rhythm, normal heart sounds and intact distal pulses.    1+ BLE edema   Pulmonary/Chest: Effort normal. He has rales.   Abdominal: Soft. Bowel sounds are normal. There is no hepatosplenomegaly. There is no tenderness.   Musculoskeletal: Normal range of motion.   Lymphadenopathy:     He has no cervical adenopathy.   Neurological: He is alert and oriented to person, place, and time. He has normal strength and normal reflexes. No sensory  deficit. Gait normal.   Skin: Skin is warm and dry.   Psychiatric: He has a normal mood and affect. His speech is normal and behavior is normal. Judgment and thought content normal. Cognition and memory are normal.   Nursing note and vitals reviewed.     Constitutional: Obese AA male. NAD, awake, alert and conversant.   Eyes: PERRLA, sclerae anicteric, no conjunctival injection  HENT: NCAT, mucous membranes moist  Neck: Supple, no thyromegaly, no lymphadenopathy, trachea midline  Respiratory: Clear to auscultation bilaterally, nonlabored respirations   Cardiovascular: RRR, no murmurs, rubs, or gallops, palpable pedal pulses bilaterally  Gastrointestinal: Positive bowel sounds, soft, nontender, nondistended  Musculoskeletal: 1+ non-pitting edema to BLEs without clubbing or cyanosis to extremities  Psychiatric: Appropriate affect, cooperative  Neurologic: Oriented x 3, strength symmetric in all extremities, Cranial Nerves grossly intact to confrontation, speech clear  Skin: No rashes    Results Reviewed:    Lab Results (last 24 hours)     Procedure Component Value Units Date/Time    CBC Auto Differential [[id number]]  (Abnormal) Collected:  11/12/17 1249    Specimen:  Blood Updated:  11/12/17 1304     WBC 6.82 10*3/mm3      RBC 4.47 10*6/mm3      Hemoglobin 13.2 g/dL      Hematocrit 39.2 %      MCV 87.7 fL      MCH 29.5 pg      MCHC 33.7 g/dL      RDW 13.9 %      RDW-SD 44.5 fl      MPV 11.3 fL      Platelets 226 10*3/mm3      Neutrophil % 58.1 %      Lymphocyte % 22.9 (L) %      Monocyte % 13.8 (H) %      Eosinophil % 4.0 (H) %      Basophil % 0.9 %      Immature Grans % 0.3 %      Neutrophils, Absolute 3.97 10*3/mm3      Lymphocytes, Absolute 1.56 10*3/mm3      Monocytes, Absolute 0.94 10*3/mm3      Eosinophils, Absolute 0.27 10*3/mm3      Basophils, Absolute 0.06 10*3/mm3      Immature Grans, Absolute 0.02 10*3/mm3     POC Troponin, Rapid [[id number]]  (Normal) Collected:  11/12/17 1254    Specimen:  Blood Updated:   11/12/17 1310     Troponin I 0.07 ng/mL       Serial Number: [id number]  [id number]       Comprehensive Metabolic Panel [[id number]]  (Abnormal) Collected:  11/12/17 1249    Specimen:  Blood Updated:  11/12/17 1329     Glucose 281 (H) mg/dL      BUN 15 mg/dL      Creatinine 1.90 (H) mg/dL      Sodium 135 mmol/L      Potassium 3.6 mmol/L      Chloride 100 mmol/L      CO2 25.0 mmol/L      Calcium 8.6 (L) mg/dL      Total Protein 7.4 g/dL      Albumin 3.80 g/dL      ALT (SGPT) 30 U/L      AST (SGOT) 39 (H) U/L      Alkaline Phosphatase 74 U/L      Total Bilirubin 0.6 mg/dL      eGFR   Amer 45 (L) mL/min/1.73      Globulin 3.6 gm/dL      A/G Ratio 1.1 (L) g/dL      BUN/Creatinine Ratio 7.9     Anion Gap 10.0 mmol/L     BNP [[id number]]  (Abnormal) Collected:  11/12/17 1249    Specimen:  Blood Updated:  11/12/17 1341     .0 (H) pg/mL         Imaging Results (last 24 hours)     Procedure Component Value Units Date/Time    XR Chest 1 View [[id number]] Updated:  11/12/17 1321        I have personally reviewed and interpreted available lab data dated 11/12/2017.  I have personally reviewed cxr imaging reports available and dated 11/12/2017.  I have personally reviewed ECG report dated 11/12/2017.  I have discussed the patient and tests with the ED provider.  I have personally reviewed and summarized old records.    Assessment / Plan      Principal Problem:    Acute pulmonary edema  Active Problems:    Controlled type 2 diabetes mellitus with kidney complication, with long-term current use of insulin    Essential hypertension    Mixed hyperlipidemia    Gout    Elevated brain natriuretic peptide (BNP) level    Elevated serum creatinine    Chronic renal insufficiency    Mr. Trace Escalante is a 56yo male with a history of HTN, hyperlipidemia, ALYSSA, CKD3 and insulin-dependent DMII. He presented to [facility] ED on 11/12/17 for evaluation of progressive dyspnea and edema. CXR revealed pulmonary edema, BNP elevated at 818.  Creatinine elevated at 1.9 (baseline unknown). Hospital medicine will admit.     Acute pulmonary edema with elevated BNP  - Concerning for volume overload secondary to heart failure (risk factors reviewed)  - Monitor on telemetry  - Continue diuresis with Lasix   - Trend troponin  - I/O's  - RN to acquire an ECG with any reported chest pain  - Order/check ECHO   - Consult cardiology    Elevated creatinine, baseline unknown  - Creatinine 1.9 today   - Patient follows with nephrologist, Dr. Kt Juarez, appears to have some level of chronic renal insufficiency  - Request records tomorrow   - AM labs    HTN  - Known history of HTN x10-15 years, his nephrologist manages BP meds   - Resume home BP meds, hold ACEI until baseline creatinine better established     Insulin-dependent DMII  - Check AM A1c  - At home, uses 45 units basal insulin QHS and 5-15 units of prandial insulin TID  - Checks blood sugars 3-4 times per days  - Endocrinologist is Dr. Jose Thomas   - Diabetic diet    Hyperlipidemia, continue home meds   History of gout, continue home meds    DVT prophylaxis: Heparin, teds, scuds  CODE STATUS:  FULL     Admission Status:  I believe this patient meets LEXINPT.    Jeane Ledesma PA-C   11/12/17   2:59 PM    I believe this patient meets INPATIENT status due to the need for care which can only be reasonably provided in an hospital setting such as aggressive/expedited ancillary services and/or consultation services and the necessity for IV medications, close physician monitoring and/or the possible need for procedures.  In such, I feel patient’s risk for adverse outcomes and need for care warrant INPATIENT evaluation and predict the patient’s care encounter to likely last beyond 2 midnights.  I have seen and examined the patient, performing an independent face-to-face diagnostic evaluation with plan of care reviewed and developed with the advanced practice clinician (APC). I have addended and  modified the above history of present illness, physical examination, and assessment and plan to reflect my findings and impressions. See above for further detailed assessment and plan developed with APC which I have reviewed and/or edited.    Donovan Huston MD  11/12/17  10:59 PM

## 2017-11-13 ENCOUNTER — APPOINTMENT (OUTPATIENT)
Dept: GENERAL RADIOLOGY | Facility: HOSPITAL | Age: 57
End: 2017-11-13

## 2017-11-13 ENCOUNTER — APPOINTMENT (OUTPATIENT)
Dept: CARDIOLOGY | Facility: HOSPITAL | Age: 57
End: 2017-11-13

## 2017-11-13 PROBLEM — E66.811 OBESITY, CLASS I, BMI 30-34.9: Status: ACTIVE | Noted: 2017-11-13

## 2017-11-13 PROBLEM — E66.9 OBESITY, CLASS I, BMI 30-34.9: Status: ACTIVE | Noted: 2017-11-13

## 2017-11-13 LAB
ANION GAP SERPL CALCULATED.3IONS-SCNC: 6 MMOL/L (ref 3–11)
ARTICHOKE IGE QN: 95 MG/DL (ref 0–130)
BH CV ECHO MEAS - AI DEC SLOPE: 205 CM/SEC^2
BH CV ECHO MEAS - AI MAX PG: 73.9 MMHG
BH CV ECHO MEAS - AI MAX VEL: 429.3 CM/SEC
BH CV ECHO MEAS - AI P1/2T: 613.4 MSEC
BH CV ECHO MEAS - AO MAX PG (FULL): 5 MMHG
BH CV ECHO MEAS - AO MAX PG: 8 MMHG
BH CV ECHO MEAS - AO MEAN PG (FULL): 3 MMHG
BH CV ECHO MEAS - AO MEAN PG: 5 MMHG
BH CV ECHO MEAS - AO ROOT AREA (BSA CORRECTED): 1.2
BH CV ECHO MEAS - AO ROOT AREA: 8 CM^2
BH CV ECHO MEAS - AO ROOT DIAM: 3.2 CM
BH CV ECHO MEAS - AO V2 MAX: 140 CM/SEC
BH CV ECHO MEAS - AO V2 MEAN: 99.5 CM/SEC
BH CV ECHO MEAS - AO V2 VTI: 28.1 CM
BH CV ECHO MEAS - AVA(I,A): 2.7 CM^2
BH CV ECHO MEAS - AVA(I,D): 2.7 CM^2
BH CV ECHO MEAS - AVA(V,A): 2.6 CM^2
BH CV ECHO MEAS - AVA(V,D): 2.6 CM^2
BH CV ECHO MEAS - BSA(HAYCOCK): 2.8 M^2
BH CV ECHO MEAS - BSA: 2.6 M^2
BH CV ECHO MEAS - BZI_BMI: 38 KILOGRAMS/M^2
BH CV ECHO MEAS - BZI_METRIC_HEIGHT: 190.5 CM
BH CV ECHO MEAS - BZI_METRIC_WEIGHT: 137.9 KG
BH CV ECHO MEAS - CONTRAST EF (2CH): 34.2 ML/M^2
BH CV ECHO MEAS - CONTRAST EF 4CH: 39.9 ML/M^2
BH CV ECHO MEAS - EDV(CUBED): 169.1 ML
BH CV ECHO MEAS - EDV(MOD-SP2): 155 ML
BH CV ECHO MEAS - EDV(MOD-SP4): 238 ML
BH CV ECHO MEAS - EDV(TEICH): 149.3 ML
BH CV ECHO MEAS - EF(CUBED): 43.1 %
BH CV ECHO MEAS - EF(MOD-SP2): 34.2 %
BH CV ECHO MEAS - EF(MOD-SP4): 39.9 %
BH CV ECHO MEAS - EF(TEICH): 35.3 %
BH CV ECHO MEAS - ESV(CUBED): 96.3 ML
BH CV ECHO MEAS - ESV(MOD-SP2): 102 ML
BH CV ECHO MEAS - ESV(MOD-SP4): 143 ML
BH CV ECHO MEAS - ESV(TEICH): 96.5 ML
BH CV ECHO MEAS - FS: 17.1 %
BH CV ECHO MEAS - IVS/LVPW: 1.1
BH CV ECHO MEAS - IVSD: 1.4 CM
BH CV ECHO MEAS - LA DIMENSION: 5 CM
BH CV ECHO MEAS - LA/AO: 1.6
BH CV ECHO MEAS - LAT PEAK E' VEL: 4.3 CM/SEC
BH CV ECHO MEAS - LV DIASTOLIC VOL/BSA (35-75): 90.8 ML/M^2
BH CV ECHO MEAS - LV MASS(C)D: 314.5 GRAMS
BH CV ECHO MEAS - LV MASS(C)DI: 119.9 GRAMS/M^2
BH CV ECHO MEAS - LV MAX PG: 3 MMHG
BH CV ECHO MEAS - LV MEAN PG: 2 MMHG
BH CV ECHO MEAS - LV SYSTOLIC VOL/BSA (12-30): 54.5 ML/M^2
BH CV ECHO MEAS - LV V1 MAX: 86.9 CM/SEC
BH CV ECHO MEAS - LV V1 MEAN: 60.7 CM/SEC
BH CV ECHO MEAS - LV V1 VTI: 18.5 CM
BH CV ECHO MEAS - LVIDD: 5.5 CM
BH CV ECHO MEAS - LVIDS: 4.6 CM
BH CV ECHO MEAS - LVLD AP2: 8.6 CM
BH CV ECHO MEAS - LVLD AP4: 9.4 CM
BH CV ECHO MEAS - LVLS AP2: 8.3 CM
BH CV ECHO MEAS - LVLS AP4: 8.1 CM
BH CV ECHO MEAS - LVOT AREA (M): 4.2 CM^2
BH CV ECHO MEAS - LVOT AREA: 4.2 CM^2
BH CV ECHO MEAS - LVOT DIAM: 2.3 CM
BH CV ECHO MEAS - LVPWD: 1.2 CM
BH CV ECHO MEAS - MED PEAK E' VEL: 3.48 CM/SEC
BH CV ECHO MEAS - MV A MAX VEL: 27.5 CM/SEC
BH CV ECHO MEAS - MV DEC TIME: 0.19 SEC
BH CV ECHO MEAS - MV E MAX VEL: 65.7 CM/SEC
BH CV ECHO MEAS - MV E/A: 2.4
BH CV ECHO MEAS - PA ACC SLOPE: 571 CM/SEC^2
BH CV ECHO MEAS - PA ACC TIME: 0.12 SEC
BH CV ECHO MEAS - PA PR(ACCEL): 25.5 MMHG
BH CV ECHO MEAS - PI END-D VEL: 181 CM/SEC
BH CV ECHO MEAS - PULM DIAS VEL: 54 CM/SEC
BH CV ECHO MEAS - PULM S/D: 0.64
BH CV ECHO MEAS - PULM SYS VEL: 34.5 CM/SEC
BH CV ECHO MEAS - SI(AO): 86.2 ML/M^2
BH CV ECHO MEAS - SI(CUBED): 27.8 ML/M^2
BH CV ECHO MEAS - SI(LVOT): 29.3 ML/M^2
BH CV ECHO MEAS - SI(MOD-SP2): 20.2 ML/M^2
BH CV ECHO MEAS - SI(MOD-SP4): 36.2 ML/M^2
BH CV ECHO MEAS - SI(TEICH): 20.1 ML/M^2
BH CV ECHO MEAS - SV(AO): 226 ML
BH CV ECHO MEAS - SV(CUBED): 72.8 ML
BH CV ECHO MEAS - SV(LVOT): 76.9 ML
BH CV ECHO MEAS - SV(MOD-SP2): 53 ML
BH CV ECHO MEAS - SV(MOD-SP4): 95 ML
BH CV ECHO MEAS - SV(TEICH): 52.8 ML
BH CV ECHO MEAS - TAPSE (>1.6): 2.1 CM2
BH CV VAS BP LEFT ARM: NORMAL MMHG
BH CV XLRA - RV BASE: 4.8 CM
BH CV XLRA - RV LENGTH: 7.3 CM
BH CV XLRA - RV MID: 5.1 CM
BH CV XLRA - TDI S': 12 CM/SEC
BNP SERPL-MCNC: 445 PG/ML (ref 0–100)
BUN BLD-MCNC: 19 MG/DL (ref 9–23)
BUN/CREAT SERPL: 10 (ref 7–25)
CALCIUM SPEC-SCNC: 8.2 MG/DL (ref 8.7–10.4)
CHLORIDE SERPL-SCNC: 103 MMOL/L (ref 99–109)
CHOLEST SERPL-MCNC: 154 MG/DL (ref 0–200)
CO2 SERPL-SCNC: 28 MMOL/L (ref 20–31)
CREAT BLD-MCNC: 1.9 MG/DL (ref 0.6–1.3)
DEPRECATED RDW RBC AUTO: 43.6 FL (ref 37–54)
E/E' RATIO: 17.1
ERYTHROCYTE [DISTWIDTH] IN BLOOD BY AUTOMATED COUNT: 13.7 % (ref 11.3–14.5)
GFR SERPL CREATININE-BSD FRML MDRD: 45 ML/MIN/1.73
GLUCOSE BLD-MCNC: 95 MG/DL (ref 70–100)
GLUCOSE BLDC GLUCOMTR-MCNC: 101 MG/DL (ref 70–130)
GLUCOSE BLDC GLUCOMTR-MCNC: 129 MG/DL (ref 70–130)
GLUCOSE BLDC GLUCOMTR-MCNC: 150 MG/DL (ref 70–130)
GLUCOSE BLDC GLUCOMTR-MCNC: 191 MG/DL (ref 70–130)
HBA1C MFR BLD: 8.3 % (ref 4.8–5.6)
HCT VFR BLD AUTO: 35.2 % (ref 38.9–50.9)
HDLC SERPL-MCNC: 40 MG/DL (ref 40–60)
HGB BLD-MCNC: 11.6 G/DL (ref 13.1–17.5)
LEFT ATRIUM VOLUME INDEX: 34.4 ML/M2
MAGNESIUM SERPL-MCNC: 1.7 MG/DL (ref 1.3–2.7)
MCH RBC QN AUTO: 28.6 PG (ref 27–31)
MCHC RBC AUTO-ENTMCNC: 33 G/DL (ref 32–36)
MCV RBC AUTO: 86.7 FL (ref 80–99)
PHOSPHATE SERPL-MCNC: 4.1 MG/DL (ref 2.4–5.1)
PLATELET # BLD AUTO: 223 10*3/MM3 (ref 150–450)
PMV BLD AUTO: 11.4 FL (ref 6–12)
POTASSIUM BLD-SCNC: 3.1 MMOL/L (ref 3.5–5.5)
RBC # BLD AUTO: 4.06 10*6/MM3 (ref 4.2–5.76)
SODIUM BLD-SCNC: 137 MMOL/L (ref 132–146)
TRIGL SERPL-MCNC: 78 MG/DL (ref 0–150)
TROPONIN I SERPL-MCNC: 0.09 NG/ML
TSH SERPL DL<=0.05 MIU/L-ACNC: 1.15 MIU/ML (ref 0.35–5.35)
WBC NRBC COR # BLD: 6.19 10*3/MM3 (ref 3.5–10.8)

## 2017-11-13 PROCEDURE — 80061 LIPID PANEL: CPT | Performed by: PHYSICIAN ASSISTANT

## 2017-11-13 PROCEDURE — 71010 HC CHEST PA OR AP: CPT

## 2017-11-13 PROCEDURE — 25010000002 FUROSEMIDE PER 20 MG: Performed by: PHYSICIAN ASSISTANT

## 2017-11-13 PROCEDURE — 84443 ASSAY THYROID STIM HORMONE: CPT | Performed by: PHYSICIAN ASSISTANT

## 2017-11-13 PROCEDURE — 83880 ASSAY OF NATRIURETIC PEPTIDE: CPT | Performed by: INTERNAL MEDICINE

## 2017-11-13 PROCEDURE — 25010000002 HEPARIN (PORCINE) PER 1000 UNITS: Performed by: PHYSICIAN ASSISTANT

## 2017-11-13 PROCEDURE — G0108 DIAB MANAGE TRN  PER INDIV: HCPCS | Performed by: REGISTERED NURSE

## 2017-11-13 PROCEDURE — 25010000002 SULFUR HEXAFLUORIDE MICROSPH 60.7-25 MG RECONSTITUTED SUSPENSION: Performed by: INTERNAL MEDICINE

## 2017-11-13 PROCEDURE — 83735 ASSAY OF MAGNESIUM: CPT | Performed by: PHYSICIAN ASSISTANT

## 2017-11-13 PROCEDURE — 99232 SBSQ HOSP IP/OBS MODERATE 35: CPT | Performed by: INTERNAL MEDICINE

## 2017-11-13 PROCEDURE — 93306 TTE W/DOPPLER COMPLETE: CPT

## 2017-11-13 PROCEDURE — 99254 IP/OBS CNSLTJ NEW/EST MOD 60: CPT | Performed by: INTERNAL MEDICINE

## 2017-11-13 PROCEDURE — 85027 COMPLETE CBC AUTOMATED: CPT | Performed by: PHYSICIAN ASSISTANT

## 2017-11-13 PROCEDURE — 84100 ASSAY OF PHOSPHORUS: CPT | Performed by: PHYSICIAN ASSISTANT

## 2017-11-13 PROCEDURE — 80048 BASIC METABOLIC PNL TOTAL CA: CPT | Performed by: PHYSICIAN ASSISTANT

## 2017-11-13 PROCEDURE — 82962 GLUCOSE BLOOD TEST: CPT

## 2017-11-13 PROCEDURE — 63710000001 INSULIN DETEMIR PER 5 UNITS: Performed by: INTERNAL MEDICINE

## 2017-11-13 PROCEDURE — 93306 TTE W/DOPPLER COMPLETE: CPT | Performed by: INTERNAL MEDICINE

## 2017-11-13 PROCEDURE — 83036 HEMOGLOBIN GLYCOSYLATED A1C: CPT | Performed by: PHYSICIAN ASSISTANT

## 2017-11-13 RX ORDER — POTASSIUM CHLORIDE 750 MG/1
40 CAPSULE, EXTENDED RELEASE ORAL AS NEEDED
Status: DISCONTINUED | OUTPATIENT
Start: 2017-11-13 | End: 2017-11-16

## 2017-11-13 RX ORDER — FUROSEMIDE 10 MG/ML
40 INJECTION INTRAMUSCULAR; INTRAVENOUS DAILY
Status: DISCONTINUED | OUTPATIENT
Start: 2017-11-14 | End: 2017-11-14

## 2017-11-13 RX ORDER — HYDRALAZINE HYDROCHLORIDE 50 MG/1
25 TABLET, FILM COATED ORAL EVERY 8 HOURS SCHEDULED
Status: DISCONTINUED | OUTPATIENT
Start: 2017-11-13 | End: 2017-11-14

## 2017-11-13 RX ORDER — POTASSIUM CHLORIDE 7.45 MG/ML
10 INJECTION INTRAVENOUS
Status: DISCONTINUED | OUTPATIENT
Start: 2017-11-13 | End: 2017-11-16

## 2017-11-13 RX ORDER — POTASSIUM CHLORIDE 1.5 G/1.77G
40 POWDER, FOR SOLUTION ORAL AS NEEDED
Status: DISCONTINUED | OUTPATIENT
Start: 2017-11-13 | End: 2017-11-14 | Stop reason: ALTCHOICE

## 2017-11-13 RX ADMIN — HEPARIN SODIUM 5000 UNITS: 5000 INJECTION, SOLUTION INTRAVENOUS; SUBCUTANEOUS at 00:12

## 2017-11-13 RX ADMIN — POTASSIUM CHLORIDE 40 MEQ: 750 CAPSULE, EXTENDED RELEASE ORAL at 14:13

## 2017-11-13 RX ADMIN — CARVEDILOL 12.5 MG: 12.5 TABLET, FILM COATED ORAL at 21:00

## 2017-11-13 RX ADMIN — SPIRONOLACTONE 25 MG: 25 TABLET, FILM COATED ORAL at 08:52

## 2017-11-13 RX ADMIN — SULFUR HEXAFLUORIDE 5 ML: KIT at 11:58

## 2017-11-13 RX ADMIN — FAMOTIDINE 40 MG: 20 TABLET, FILM COATED ORAL at 08:52

## 2017-11-13 RX ADMIN — INSULIN LISPRO 2 UNITS: 100 INJECTION, SOLUTION INTRAVENOUS; SUBCUTANEOUS at 18:12

## 2017-11-13 RX ADMIN — DOCUSATE SODIUM 100 MG: 100 CAPSULE, LIQUID FILLED ORAL at 08:53

## 2017-11-13 RX ADMIN — FUROSEMIDE 40 MG: 10 INJECTION, SOLUTION INTRAMUSCULAR; INTRAVENOUS at 09:06

## 2017-11-13 RX ADMIN — HYDRALAZINE HYDROCHLORIDE 25 MG: 50 TABLET, FILM COATED ORAL at 14:12

## 2017-11-13 RX ADMIN — GUAIFENESIN 600 MG: 600 TABLET, EXTENDED RELEASE ORAL at 21:00

## 2017-11-13 RX ADMIN — ROSUVASTATIN CALCIUM 20 MG: 20 TABLET, FILM COATED ORAL at 21:00

## 2017-11-13 RX ADMIN — CARVEDILOL 12.5 MG: 12.5 TABLET, FILM COATED ORAL at 08:53

## 2017-11-13 RX ADMIN — AMLODIPINE BESYLATE 10 MG: 10 TABLET ORAL at 08:53

## 2017-11-13 RX ADMIN — ASPIRIN 81 MG CHEWABLE TABLET 81 MG: 81 TABLET CHEWABLE at 08:53

## 2017-11-13 RX ADMIN — HYDRALAZINE HYDROCHLORIDE 25 MG: 50 TABLET, FILM COATED ORAL at 21:00

## 2017-11-13 RX ADMIN — POTASSIUM CHLORIDE 40 MEQ: 750 CAPSULE, EXTENDED RELEASE ORAL at 18:14

## 2017-11-13 RX ADMIN — DOCUSATE SODIUM 100 MG: 100 CAPSULE, LIQUID FILLED ORAL at 18:14

## 2017-11-13 RX ADMIN — ALLOPURINOL 150 MG: 300 TABLET ORAL at 08:53

## 2017-11-13 RX ADMIN — HEPARIN SODIUM 5000 UNITS: 5000 INJECTION, SOLUTION INTRAVENOUS; SUBCUTANEOUS at 20:59

## 2017-11-13 RX ADMIN — HEPARIN SODIUM 5000 UNITS: 5000 INJECTION, SOLUTION INTRAVENOUS; SUBCUTANEOUS at 14:12

## 2017-11-13 RX ADMIN — HYDROCHLOROTHIAZIDE 12.5 MG: 12.5 TABLET ORAL at 08:53

## 2017-11-13 RX ADMIN — INSULIN DETEMIR 20 UNITS: 100 INJECTION, SOLUTION SUBCUTANEOUS at 21:01

## 2017-11-13 RX ADMIN — HEPARIN SODIUM 5000 UNITS: 5000 INJECTION, SOLUTION INTRAVENOUS; SUBCUTANEOUS at 09:06

## 2017-11-13 RX ADMIN — GUAIFENESIN 600 MG: 600 TABLET, EXTENDED RELEASE ORAL at 00:13

## 2017-11-13 RX ADMIN — POTASSIUM CHLORIDE 40 MEQ: 750 CAPSULE, EXTENDED RELEASE ORAL at 10:18

## 2017-11-13 NOTE — PLAN OF CARE
Problem: Cardiac: Heart Failure (Adult)  Goal: Signs and Symptoms of Listed Potential Problems Will be Absent or Manageable (Cardiac: Heart Failure)  Outcome: Ongoing (interventions implemented as appropriate)    Problem: Renal Failure/Kidney Injury, Acute (Adult)  Goal: Signs and Symptoms of Listed Potential Problems Will be Absent or Manageable (Renal Failure/Kidney Injury, Acute)  Outcome: Ongoing (interventions implemented as appropriate)    Problem: Patient Care Overview (Adult)  Goal: Plan of Care Review  Outcome: Ongoing (interventions implemented as appropriate)  Goal: Adult Individualization and Mutuality  Outcome: Ongoing (interventions implemented as appropriate)  Goal: Discharge Needs Assessment  Outcome: Ongoing (interventions implemented as appropriate)

## 2017-11-13 NOTE — PAYOR COMM NOTE
"Trace Schmidt (57 y.o. Male)    Haleigh Norman RN  [phone]  Fax [phone]     Date of Birth Social Security Number Address Home Phone MRN    1960  [facility] [place] [address] [phone] [id number]    Mandaeism Marital Status          [facility] Single       Admission Date Admission Type Admitting Provider Attending Provider Department, Room/Bed    17 Emergency Jude Dorsey MD Sloan, Walker E, MD [facility] 3F, S320/1    Discharge Date Discharge Disposition Discharge Destination                      Attending Provider: Jude Dorsey MD     Allergies:  No Known Allergies    Isolation:  None   Infection:  None   Code Status:  FULL    Ht:  75\" (190.5 cm)   Wt:  304 lb 9.6 oz (138 kg)    Admission Cmt:  None   Principal Problem:  Acute pulmonary edema [J81.0]                 Active Insurance as of 2017     Primary Coverage     Payor Plan Insurance Group Employer/Plan Group    [employer] [place] [id number]     Payor Plan Address Payor Plan Phone Number Effective From Effective To    [address] [address] [phone] 2015     [place] [address]       Subscriber Name Subscriber Birth Date Member ID       TRACE SCHMIDT 1960 [id number]                 Emergency Contacts      (Rel.) Home Phone Work Phone Mobile Phone    Roxana Schmidt (Sister) [phone] -- --               History & Physical      Donovan Huston MD at 2017  2:59 PM              [facility] Medicine Services  HISTORY AND PHYSICAL    Patient Name: Trace Schmidt  : 1960  MRN: [id number]  Primary Care Physician: Jose Ramon Allen MD    Subjective     Chief Complaint: shortness of breath     HPI:  Trace Schmidt is a 57 y.o. male with PMH significant for HTN, hyperlipidemia, insulin-depdent DMII, gout and nephrolithiasis. He follows with Dr. Jose Thomas for diabetes. His nephrologist is Dr. Kt Juarez. " "Patient is unclear on his renal diagnosis but knows he has reduced kidney function secondary to diabetes.     He presented to [facility] ED on 11/12/17 for evaluation of progressively worsening dyspnea over the past 4 weeks. 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 He said he worked around 12 hours and did not wear a mask. Treated at a [facility] in [place] with steroids, antibiotics and albuterol inhaler. He improved briefly before feeling worse again. He saw his PCP, Dr. Jose Ramon Allen, on 10/20/17 and he checked CXR, which was normal. Saw PCP again 11/6 for progressive symptoms and was referred to pulmonology. Appointment later on 11/16.     He reports 4 weeks of progressive shortness of breath including new dyspnea with exertion, a \"rattling in my chest\" while laying flat and inability to tolerate laying flat to sleep. He has been propping himself upright on pillows to sleep. No weight gain but PCP did note pedel edema on 11/6 visit.  He denies recent travel abroad, fever, chills, sputum production or wheezing.  He reports ALYSSA well controlled on his CPAP.    ED evaluation revealed  and creatinine 1.9 (baseline unknown). Other labs favorable. CXR consistent with increased pulmonary vascular congestion. He was given IV Lasix in the ED and hospital medicine asked to admit.     Review of Systems   Constitutional: Negative for chills and fever.   HENT: Negative for congestion, nosebleeds, trouble swallowing and voice change.    Eyes: Negative for discharge and visual disturbance.   Respiratory: Positive for cough and shortness of breath.    Cardiovascular: Positive for leg swelling. Negative for chest pain and palpitations.   Gastrointestinal: Negative for abdominal pain, diarrhea, nausea and vomiting.   Endocrine: Negative for polydipsia, polyphagia and polyuria.   Genitourinary: Negative for difficulty urinating.   Musculoskeletal: Negative for arthralgias. "   Skin: Negative for rash.   Neurological: Negative for syncope and headaches.   Hematological: Does not bruise/bleed easily.   Psychiatric/Behavioral: The patient is not nervous/anxious.    All other systems reviewed and are negative.     Gen- No fevers, chills  CV- No chest pain, palpitations  Resp- (+) cough, dyspnea on exertion  GI- No N/V/D, abd pain      Personal History     Past Medical History:   Diagnosis Date   • CKD (chronic kidney disease) stage 3, GFR 30-59 ml/min    • Gout    • Hyperlipidemia    • Hypertension    • Insulin dependent diabetes mellitus    • Kidney stones    • Obesity    • ALYSSA (obstructive sleep apnea)      Past Surgical History:   Procedure Laterality Date   • MEDIAL COLLATERAL LIGAMENT REPAIR, KNEE Left 1978       Family History: family history includes Diabetes in his father; Heart attack in his father; Heart disease in his father and mother; Hypertension in his father; Kidney disease in his brother; Stroke in his brother and mother.     Social History:  reports that he has never smoked. He has never used smokeless tobacco. He reports that he does not drink alcohol or use illicit drugs.    Medications:  Medication Sig   • allopurinol (ZYLOPRIM) 150 MG half tablet Take 150 mg by mouth Daily.   • amLODIPine (NORVASC) 10 MG tablet TAKE 1 TABLET EVERY DAY   • aspirin 81 MG tablet Take 81 mg by mouth Daily.   • B-D UF III MINI PEN NEEDLES 31G X 5 MM misc TEST 4X PER DAY   • carvedilol (COREG) 12.5 MG tablet TAKE 1 TABLET TWICE A DAY   • Insulin Degludec (TRESIBA FLEXTOUCH SC) Inject 40-50 Units under the skin Daily.   • lisinopril-hydrochlorothiazide (PRINZIDE,ZESTORETIC) 20-12.5 MG per tablet Take 1 tablet by mouth Daily.   • Multiple Vitamin (MULTI VITAMIN PO) Take  by mouth.   • NOVOLOG FLEXPEN 100 UNIT/ML solution pen-injector sc pen    • ONE TOUCH ULTRA TEST test strip TEST 3 TIMES DAILY   • PROAIR  (90 Base) MCG/ACT inhaler INHALE 1 PUFF BY MOUTH EVERY 4 TO 6 HOURS AS NEEDED  FOR WHEEZING   • rosuvastatin (CRESTOR) 20 MG tablet TAKE 1 TABLET EVERY DAY   • spironolactone (ALDACTONE) 25 MG tablet Take 25 mg by mouth Daily.   • Valerian Root 450 MG capsule Take  by mouth.     No Known Allergies    Objective     Temp:  [99 °F (37.2 °C)] 99 °F (37.2 °C)  Heart Rate:  [70-87] 70  Resp:  [20] 20  BP: (148-166)/(88-98) 148/88      Physical Exam   Constitutional: He is oriented to person, place, and time. He appears well-developed and well-nourished. He is cooperative. He does not appear ill.   HENT:   Head: Normocephalic.   Right Ear: Hearing and external ear normal.   Left Ear: Hearing and external ear normal.   Nose: Nose normal.   Mouth/Throat: Uvula is midline, oropharynx is clear and moist and mucous membranes are normal.   Eyes: Conjunctivae and EOM are normal. Pupils are equal, round, and reactive to light. No scleral icterus.   Neck: Trachea normal and normal range of motion. Neck supple. No JVD present. Carotid bruit is not present. No thyromegaly present.   Cardiovascular: Normal rate, regular rhythm, normal heart sounds and intact distal pulses.    1+ BLE edema   Pulmonary/Chest: Effort normal. He has rales.   Abdominal: Soft. Bowel sounds are normal. There is no hepatosplenomegaly. There is no tenderness.   Musculoskeletal: Normal range of motion.   Lymphadenopathy:     He has no cervical adenopathy.   Neurological: He is alert and oriented to person, place, and time. He has normal strength and normal reflexes. No sensory deficit. Gait normal.   Skin: Skin is warm and dry.   Psychiatric: He has a normal mood and affect. His speech is normal and behavior is normal. Judgment and thought content normal. Cognition and memory are normal.   Nursing note and vitals reviewed.     Constitutional: Obese AA male. NAD, awake, alert and conversant.   Eyes: PERRLA, sclerae anicteric, no conjunctival injection  HENT: NCAT, mucous membranes moist  Neck: Supple, no thyromegaly, no lymphadenopathy,  trachea midline  Respiratory: Clear to auscultation bilaterally, nonlabored respirations   Cardiovascular: RRR, no murmurs, rubs, or gallops, palpable pedal pulses bilaterally  Gastrointestinal: Positive bowel sounds, soft, nontender, nondistended  Musculoskeletal: 1+ non-pitting edema to BLEs without clubbing or cyanosis to extremities  Psychiatric: Appropriate affect, cooperative  Neurologic: Oriented x 3, strength symmetric in all extremities, Cranial Nerves grossly intact to confrontation, speech clear  Skin: No rashes    Results Reviewed:    Lab Results (last 24 hours)     Procedure Component Value Units Date/Time    CBC Auto Differential [[id number]]  (Abnormal) Collected:  11/12/17 1249    Specimen:  Blood Updated:  11/12/17 1304     WBC 6.82 10*3/mm3      RBC 4.47 10*6/mm3      Hemoglobin 13.2 g/dL      Hematocrit 39.2 %      MCV 87.7 fL      MCH 29.5 pg      MCHC 33.7 g/dL      RDW 13.9 %      RDW-SD 44.5 fl      MPV 11.3 fL      Platelets 226 10*3/mm3      Neutrophil % 58.1 %      Lymphocyte % 22.9 (L) %      Monocyte % 13.8 (H) %      Eosinophil % 4.0 (H) %      Basophil % 0.9 %      Immature Grans % 0.3 %      Neutrophils, Absolute 3.97 10*3/mm3      Lymphocytes, Absolute 1.56 10*3/mm3      Monocytes, Absolute 0.94 10*3/mm3      Eosinophils, Absolute 0.27 10*3/mm3      Basophils, Absolute 0.06 10*3/mm3      Immature Grans, Absolute 0.02 10*3/mm3     POC Troponin, Rapid [[id number]]  (Normal) Collected:  11/12/17 1254    Specimen:  Blood Updated:  11/12/17 1310     Troponin I 0.07 ng/mL       Serial Number: [id number]  [id number]       Comprehensive Metabolic Panel [[id number]]  (Abnormal) Collected:  11/12/17 1249    Specimen:  Blood Updated:  11/12/17 1329     Glucose 281 (H) mg/dL      BUN 15 mg/dL      Creatinine 1.90 (H) mg/dL      Sodium 135 mmol/L      Potassium 3.6 mmol/L      Chloride 100 mmol/L      CO2 25.0 mmol/L      Calcium 8.6 (L) mg/dL      Total Protein 7.4 g/dL      Albumin 3.80 g/dL       ALT (SGPT) 30 U/L      AST (SGOT) 39 (H) U/L      Alkaline Phosphatase 74 U/L      Total Bilirubin 0.6 mg/dL      eGFR   Amer 45 (L) mL/min/1.73      Globulin 3.6 gm/dL      A/G Ratio 1.1 (L) g/dL      BUN/Creatinine Ratio 7.9     Anion Gap 10.0 mmol/L     BNP [[id number]]  (Abnormal) Collected:  11/12/17 1249    Specimen:  Blood Updated:  11/12/17 1341     .0 (H) pg/mL         Imaging Results (last 24 hours)     Procedure Component Value Units Date/Time    XR Chest 1 View [[id number]] Updated:  11/12/17 1321        I have personally reviewed and interpreted available lab data dated 11/12/2017.  I have personally reviewed cxr imaging reports available and dated 11/12/2017.  I have personally reviewed ECG report dated 11/12/2017.  I have discussed the patient and tests with the ED provider.  I have personally reviewed and summarized old records.    Assessment / Plan      Principal Problem:    Acute pulmonary edema  Active Problems:    Controlled type 2 diabetes mellitus with kidney complication, with long-term current use of insulin    Essential hypertension    Mixed hyperlipidemia    Gout    Elevated brain natriuretic peptide (BNP) level    Elevated serum creatinine    Chronic renal insufficiency    Mr. Trace Escalante is a 58yo male with a history of HTN, hyperlipidemia, ALYSSA, CKD3 and insulin-dependent DMII. He presented to [facility] ED on 11/12/17 for evaluation of progressive dyspnea and edema. CXR revealed pulmonary edema, BNP elevated at 818. Creatinine elevated at 1.9 (baseline unknown). Hospital medicine will admit.     Acute pulmonary edema with elevated BNP  - Concerning for volume overload secondary to heart failure (risk factors reviewed)  - Monitor on telemetry  - Continue diuresis with Lasix   - Trend troponin  - I/O's  - RN to acquire an ECG with any reported chest pain  - Order/check ECHO   - Consult cardiology    Elevated creatinine, baseline unknown  - Creatinine 1.9 today   - Patient  follows with nephrologist, Dr. Kt Juarez, appears to have some level of chronic renal insufficiency  - Request records tomorrow   - AM labs    HTN  - Known history of HTN x10-15 years, his nephrologist manages BP meds   - Resume home BP meds, hold ACEI until baseline creatinine better established     Insulin-dependent DMII  - Check AM A1c  - At home, uses 45 units basal insulin QHS and 5-15 units of prandial insulin TID  - Checks blood sugars 3-4 times per days  - Endocrinologist is Dr. Jose Thomas   - Diabetic diet    Hyperlipidemia, continue home meds   History of gout, continue home meds    DVT prophylaxis: Heparin, teds, scuds  CODE STATUS:  FULL     Admission Status:  I believe this patient meets LEXINPT.    Jeane Ledesma PA-C   11/12/17   2:59 PM    I believe this patient meets INPATIENT status due to the need for care which can only be reasonably provided in an hospital setting such as aggressive/expedited ancillary services and/or consultation services and the necessity for IV medications, close physician monitoring and/or the possible need for procedures.  In such, I feel patient’s risk for adverse outcomes and need for care warrant INPATIENT evaluation and predict the patient’s care encounter to likely last beyond 2 midnights.  I have seen and examined the patient, performing an independent face-to-face diagnostic evaluation with plan of care reviewed and developed with the advanced practice clinician (APC). I have addended and modified the above history of present illness, physical examination, and assessment and plan to reflect my findings and impressions. See above for further detailed assessment and plan developed with APC which I have reviewed and/or edited.    Donovan Huston MD  11/12/17  10:59 PM           Electronically signed by Donovan Huston MD at 11/12/2017 10:59 PM        Hospital Medications (active)       Dose Frequency Start End    acetaminophen (TYLENOL) tablet 650 mg 650  mg Every 4 Hours PRN 11/12/2017     Sig - Route: Take 2 tablets by mouth Every 4 (Four) Hours As Needed for Mild Pain . - Oral    allopurinol (ZYLOPRIM) tablet 150 mg 150 mg Daily 11/12/2017     Sig - Route: Take 0.5 tablets by mouth Daily. - Oral    amLODIPine (NORVASC) tablet 10 mg 10 mg Every 24 Hours Scheduled 11/12/2017     Sig - Route: Take 1 tablet by mouth Daily. - Oral    aspirin chewable tablet 81 mg 81 mg Daily 11/12/2017     Sig - Route: Chew 1 tablet Daily. - Oral    carvedilol (COREG) tablet 12.5 mg 12.5 mg Every 12 Hours Scheduled 11/12/2017     Sig - Route: Take 1 tablet by mouth Every 12 (Twelve) Hours. - Oral    dextrose (D50W) solution 25 g 25 g Every 15 Minutes PRN 11/12/2017     Sig - Route: Infuse 50 mL into a venous catheter Every 15 (Fifteen) Minutes As Needed for Low Blood Sugar (Blood Sugar Less Than 70, Patient Has IV Access - Unresponsive, NPO or Unable To Safely Swallow). - Intravenous    dextrose (GLUTOSE) oral gel 15 g 15 g Every 15 Minutes PRN 11/12/2017     Sig - Route: Take 15 g by mouth Every 15 (Fifteen) Minutes As Needed for Low Blood Sugar (Blood Sugar Less Than 70, Patient Alert, Is Not NPO & Can Safely Swallow). - Oral    docusate sodium (COLACE) capsule 100 mg 100 mg 2 Times Daily 11/12/2017     Sig - Route: Take 1 capsule by mouth 2 (Two) Times a Day. - Oral    famotidine (PEPCID) tablet 40 mg 40 mg Daily 11/12/2017     Sig - Route: Take 2 tablets by mouth Daily. - Oral    furosemide (LASIX) injection 40 mg 40 mg Once 11/12/2017 11/12/2017    Sig - Route: Infuse 4 mL into a venous catheter 1 (One) Time. - Intravenous    furosemide (LASIX) injection 40 mg 40 mg 2 Times Daily 11/13/2017     Sig - Route: Infuse 4 mL into a venous catheter 2 (Two) Times a Day. - Intravenous    glucagon (GLUCAGEN) injection 1 mg 1 mg Every 15 Minutes PRN 11/12/2017     Sig - Route: Inject 1 mg under the skin Every 15 (Fifteen) Minutes As Needed (Blood Glucose Less Than 70 - Patient Without IV  "Access - Unresponsive, NPO or Unable To Safely Swallow). - Subcutaneous    guaiFENesin (MUCINEX) 12 hr tablet 600 mg 600 mg Every 12 Hours PRN 11/13/2017     Sig - Route: Take 1 tablet by mouth Every 12 (Twelve) Hours As Needed for Cough. - Oral    heparin (porcine) 5000 UNIT/ML injection 5,000 Units 5,000 Units Every 8 Hours Scheduled 11/12/2017     Sig - Route: Inject 1 mL under the skin Every 8 (Eight) Hours. - Subcutaneous    hydrALAZINE (APRESOLINE) tablet 25 mg 25 mg Every 8 Hours Scheduled 11/13/2017     Sig - Route: Take 0.5 tablets by mouth Every 8 (Eight) Hours. - Oral    insulin detemir (LEVEMIR) injection 30 Units 30 Units Nightly 11/12/2017     Sig - Route: Inject 30 Units under the skin Every Night. - Subcutaneous    insulin lispro (humaLOG) injection 0-7 Units 0-7 Units 4 Times Daily Before Meals & Nightly 11/12/2017     Sig - Route: Inject 0-7 Units under the skin 4 (Four) Times a Day Before Meals & at Bedtime. - Subcutaneous    insulin lispro (humaLOG) injection 5 Units 5 Units 3 Times Daily With Meals 11/12/2017     Sig - Route: Inject 5 Units under the skin 3 (Three) Times a Day With Meals. - Subcutaneous    ondansetron (ZOFRAN) tablet 4 mg 4 mg Every 6 Hours PRN 11/12/2017     Sig - Route: Take 1 tablet by mouth Every 6 (Six) Hours As Needed for Nausea or Vomiting. - Oral    potassium chloride (KLOR-CON) packet 40 mEq 40 mEq As Needed 11/13/2017     Sig - Route: Take 40 mEq by mouth As Needed (potassium replacement, see admin instructions). - Oral    Linked Group 1:  \"Or\" Linked Group Details        potassium chloride (MICRO-K) CR capsule 40 mEq 40 mEq As Needed 11/13/2017     Sig - Route: Take 4 capsules by mouth As Needed (potassium replacement.  see admin instructions). - Oral    Linked Group 1:  \"Or\" Linked Group Details        potassium chloride 10 mEq in 100 mL IVPB 10 mEq Every 1 Hour PRN 11/13/2017     Sig - Route: Infuse 100 mL into a venous catheter Every 1 (One) Hour As Needed " "(potassium protocol PERIPHERAL - see admin instructions). - Intravenous    Linked Group 1:  \"Or\" Linked Group Details        rosuvastatin (CRESTOR) tablet 20 mg 20 mg Nightly 11/12/2017     Sig - Route: Take 1 tablet by mouth Every Night. - Oral    sodium chloride 0.9 % flush 1-10 mL 1-10 mL As Needed 11/12/2017     Sig - Route: Infuse 1-10 mL into a venous catheter As Needed for Line Care. - Intravenous    sodium chloride 0.9 % flush 10 mL 10 mL As Needed 11/12/2017     Sig - Route: Infuse 10 mL into a venous catheter As Needed for Line Care. - Intravenous    Cosign for Ordering: Accepted by Yair Sweeney MD on 11/12/2017  3:18 PM    spironolactone (ALDACTONE) tablet 25 mg 25 mg Daily 11/12/2017     Sig - Route: Take 1 tablet by mouth Daily. - Oral    hydrochlorothiazide (HYDRODIURIL) tablet 12.5 mg (Discontinued) 12.5 mg Daily 11/12/2017 11/13/2017    Sig - Route: Take 0.5 tablets by mouth Daily. - Oral          Physician Progress Notes (last 72 hours) (Notes from 11/10/2017 11:57 AM through 11/13/2017 11:57 AM)     No notes of this type exist for this encounter.           Consult Notes (last 72 hours) (Notes from 11/10/2017 11:58 AM through 11/13/2017 11:58 AM)      Afshin Parekh MD at 11/13/2017  9:02 AM  Version 1 of 1     Consult Orders:    1. Inpatient Consult to Cardiology [[id number]] ordered by Donovan Huston MD at 11/12/17 2253                  Trace Escalante  [id number]  1960   LOS: 1 day   Patient Care Team:  PHYSICIAN: Jose Ramon Allen MD   ENDOCRINOLOGIST: Jose Delaney MD  NEPHROLOGIST: Kt Juarez MD    Mr. escalante is a 57-year-old single -American male from [facility]    Chief Complaint:  SOB    Problem List:  1. Congestive heart failure  1. BHL 11/12/17  with CCS class II chest discomfort/NYHA class III dyspnea  2. Echocardiogram 11/13/17; pending  2. Hypertension  3. Hyperlipidemia  4. Type 2 diabetes mellitus; uncontrolled, hemoglobin " A1c was 8.3%  5. Obstructive sleep apnea; compliant with CPAP  6. Moderate obesity; BMI 38  7. Chronic kidney disease; stage III  8. Murmur  9. Hypokalemia  10. Nephrolithiasis  11. Gout  12. Surgical history; MCL repair (right) 1978    No Known Allergies  Prescriptions Prior to Admission   Medication Sig Dispense Refill Last Dose   • allopurinol (ZYLOPRIM) 150 MG half tablet Take 150 mg by mouth Daily.   Taking   • amLODIPine (NORVASC) 10 MG tablet TAKE 1 TABLET EVERY DAY  1 Taking   • aspirin 81 MG tablet Take 81 mg by mouth Daily.   Taking   • B-D UF III MINI PEN NEEDLES 31G X 5 MM misc TEST 4X PER DAY  2 Taking   • carvedilol (COREG) 12.5 MG tablet TAKE 1 TABLET TWICE A DAY  1 Taking   • Insulin Degludec (TRESIBA FLEXTOUCH SC) Inject 40-50 Units under the skin Daily.   Taking   • lisinopril-hydrochlorothiazide (PRINZIDE,ZESTORETIC) 20-12.5 MG per tablet Take 1 tablet by mouth Daily.   Taking   • Multiple Vitamin (MULTI VITAMIN PO) Take  by mouth.   Taking   • NOVOLOG FLEXPEN 100 UNIT/ML solution pen-injector sc pen    Taking   • ONE TOUCH ULTRA TEST test strip TEST 3 TIMES DAILY  3 Taking   • PROAIR  (90 Base) MCG/ACT inhaler INHALE 1 PUFF BY MOUTH EVERY 4 TO 6 HOURS AS NEEDED FOR WHEEZING  0 Taking   • rosuvastatin (CRESTOR) 20 MG tablet TAKE 1 TABLET EVERY DAY  1 Taking   • spironolactone (ALDACTONE) 25 MG tablet Take 25 mg by mouth Daily.   Taking   • Valerian Root 450 MG capsule Take  by mouth.   Taking     Scheduled Meds:  allopurinol 150 mg Oral Daily   amLODIPine 10 mg Oral Q24H   aspirin 81 mg Oral Daily   carvedilol 12.5 mg Oral Q12H   docusate sodium 100 mg Oral BID   famotidine 40 mg Oral Daily   furosemide 40 mg Intravenous BID   heparin (porcine) 5,000 Units Subcutaneous Q8H   hydrochlorothiazide 12.5 mg Oral Daily   insulin detemir 30 Units Subcutaneous Nightly   insulin lispro 0-7 Units Subcutaneous 4x Daily AC & at Bedtime   insulin lispro 5 Units Subcutaneous TID With Meals   rosuvastatin  "20 mg Oral Nightly   spironolactone 25 mg Oral Daily          History of Present Illness:    57-year-old  male with past medical history of hypertension, hyperlipidemia, type 2 diabetes mellitus, and kidney dysfunction (CKD stage III) secondary to diabetes presents to [facility] ED 11/12/17 for worsening dyspnea over the past 4 weeks.  In September he was pressure washing his deck with chemicals and felt dyspneic.  He was not wearing a mask at the time and was [facility] [place] with some steroids and antibiotics, and an albuterol inhaler.  He briefly improved and began feeling worse again.  Chest x-ray on 10/20/17 was normal and he saw his primary care physician 11/6/17 for worsening dyspnea and was referred to pulmonology.  He has a appointment scheduled on 11/16/17.  He feels \"rattling in my chest\" while lying flat and has orthopnea.  He denies any chills, wheezing, but over the weekend developed a low-grade fever and some yellow sputum production.  He has known obstructive sleep apnea and is compliant with CPAP.  On admission BNP was 818 and creatinine 1.9.  He is a nonsmoker.  He denies any anginal type chest pain, palpitations, arrhythmias, presyncope, syncope, CVAs, TIAs, seizures, DVTs, PEs, GERD, prior stress tests, MIs, heart catheterizations, heart monitors.  He has a known murmur since birth.  His mother and his father both passed away around age 50; his mother from an MI in his dad from the stroke.  Apparently other family members have had MIs and strokes early as well.  Patient states that he is used to doing Bridget multiple times per week and generally can do this without difficulties but he has noticed the past week that his breathing has been a lot more difficult and he has gained a couple pounds.    Cardiac risk factors: advanced age (older than 55 for men, 65 for women), diabetes mellitus, dyslipidemia, hypertension, male gender and obesity (BMI >= 30 " "kg/m2).    Social History     Social History   • Marital status: Single     Spouse name: N/A   • Number of children: 2   • Years of education: College     Occupational History   •  [employer]     Social History Main Topics   • Smoking status: Never Smoker   • Smokeless tobacco: Never Used   • Alcohol use No   • Drug use: No   • Sexual activity: Yes     Partners: Female     Other Topics Concern   • Not on file     Social History Narrative   • No narrative on file     Family History   Problem Relation Age of Onset   • Heart disease Mother    • Stroke Mother    • Diabetes Father    • Hypertension Father    • Heart disease Father    • Heart attack Father    • Kidney disease Brother    • Stroke Brother        Review of Systems  Pertinent items are noted in HPI and problem list.     Objective:      ObjectivePhysical Exam  /95  Pulse 82  Temp 99 °F (37.2 °C) (Oral)   Resp 20  Ht 75\" (190.5 cm)  Wt (!) 304 lb 9.6 oz (138 kg)  SpO2 95%  BMI 38.07 kg/m2  Last 2 weights    11/12/17  1500 11/13/17  0500   Weight: (!) 304 lb 4 oz (138 kg) (!) 304 lb 9.6 oz (138 kg)     Body mass index is 38.07 kg/(m^2).    Intake/Output Summary (Last 24 hours) at 11/13/17 0902  Last data filed at 11/13/17 0715   Gross per 24 hour   Intake              600 ml   Output             3550 ml   Net            -2950 ml       General Appearance:  Alert, cooperative, no distress, appears stated age   Head:  Normocephalic, without obvious abnormality, atraumatic   Eyes:  PERRL, conjunctiva/corneas clear, EOM's intact, fundi benign, both eyes   Throat: Lips, mucosa, and tongue normal; teeth and gums normal   Neck: Supple, symmetrical, trachea midline, no adenopathy, thyroid: not enlarged, symmetric, no tenderness/mass/nodules, no carotid bruit or JVD   Lungs:   Clear to auscultation bilaterally, respirations unlabored   Heart:  Regular rate and rhythm with PACs, S1, S2 normal, grade 2/6 murmur, rub or gallop   Abdomen:   Soft, non-tender, " no masses, no organomegaly, bowel sounds audible x4   Extremities: Trace edema, normal range of motion   Pulses: 2+ and symmetric   Skin: Skin color, texture, turgor normal, no rashes or lesions   Neurologic: Normal       Cardiographics  · EK17;Sinus rhythm with premature atrial complexes  Otherwise normal ECG    Imaging  · Chest x-ray: 17;Prominence of the pulmonary vascularity with no acute  parenchymal disease    Lab Review     Results from last 7 days  Lab Units 17  0518 17  1249   SODIUM mmol/L 137 135   POTASSIUM mmol/L 3.1* 3.6   CHLORIDE mmol/L 103 100   CO2 mmol/L 28.0 25.0   BUN mg/dL 19 15   CREATININE mg/dL 1.90* 1.90*   GLUCOSE mg/dL 95 281*   CALCIUM mg/dL 8.2* 8.6*       Results from last 7 days  Lab Units 17  0518 17  1249   WBC 10*3/mm3 6.19 6.82   HEMOGLOBIN g/dL 11.6* 13.2   HEMATOCRIT % 35.2* 39.2   PLATELETS 10*3/mm3 223 226       Results from last 7 days  Lab Units 17  0518   CHOLESTEROL mg/dL 154   TRIGLYCERIDES mg/dL 78   HDL CHOL mg/dL 40   LDL 95    Results from last 7 days  Lab Units 17  0519   HEMOGLOBIN A1C % 8.30*       Results from last 7 days  Lab Units 17  2358   TROPONIN I ng/mL 0.087*         Assessment:    Patient with CHF exacerbation in the setting of uncontrolled type 2 diabetes mellitus, borderline elevated troponin, murmur, and marginal renal function. Excellent diuresis. He will need eventual MPS versus LHC but his renal function is marginal currently. We will get echocardiogram results and further medical decision making to be made after results are available.   Assessment      Plan:   1.  Potassium replacement  2.  Echocardiogram   3. Strict I/O, daily weights  4. IV Lasix 40mg bid  5. BMP, BNP, troponin, chest x ray in morning  6. LHC versus Stress test; needs improvement in renal function first and echo results  7. Hydralazine 25mg q8    Over the last few weeks the patient's been having some shortness of breath and  classic symptoms of CHF with orthopnea.  He exercises 5-6 days a week and prior to September he had no symptoms of dyspnea or chest pain.  He has a heart murmur has frequent ectopy he also has bilateral 7 cardiac risk factors.  2-D echo shows and then decide whether or not we should proceed with stress pet versus cardiac catheter    Plan Scribed for Afshin Parekh MD by SALLY Edmondson. 11/13/2017  9:14 AM       Electronically signed by Afshin Parekh MD at 11/13/2017 10:07 AM

## 2017-11-13 NOTE — CONSULTS
"Diabetes Education  Assessment/Teaching    Patient Name:  Trace Escalante  YOB: 1960  MRN: [id number]  Admit Date:  11/12/2017      Assessment Date:  11/13/2017       Most Recent Value    General Information      Referral From:  A1c, Blood glucose, MD order    Height  6' 3\" (1.905 m)    Height Method  Stated    Weight  (!)  304 lb (138 kg)    Weight Method  Bed scale    Pregnancy Assessment     Diabetes History     What type of diabetes do you have?  Type 2    Length of Diabetes Diagnosis  10 + years    Current DM knowledge  excellent    Have you had diabetes education/teaching in the past?  yes    When and where was your diabetes education?  [facility] times    Do you test your blood sugar at home?  yes    Frequency of checks  3 x day    Who performs the test?  self    Have you had low blood sugar? (<70mg/dl)  no    Have you had high blood sugar? (>140mg/dl)  yes    How would you rate your diabetes control?  fair [stated his problem is controlling diet. Stated never misses his insulin and test frequently. Goes to gym 4-5 x wk]    How often do you check your feet?  daily    Education Preferences     Nutrition Information     When was the last time you saw a dietician?  several yrs ago    Are you currently following a special meal plan?  occasionally    Assessment Topics     Healthy Eating - Assessment  Needs education    Being Active - Assessment  Competent    Taking Medication - Assessment  Competent    Problem Solving - Assessment  Needs education    Reducing Risk - Assessment  Needs education    Healthy Coping - Assessment  Needs education    Monitoring - Assessment  Competent    DM Goals                Most Recent Value    DM Education Needs     Meter  Has own    Meter Type  One Touch    Frequency of Testing  3 times a day    Blood Glucose Target  -- [very aware of ADA suggested targets for glucoses and A1C]    Medication  Insulin, Pen    Reducing Risks  A1C testing, Eye exam, Dental exam, " "Foot care, Immunizations, Cardiovascular, Neuropathy, Retinopathy, Lipids, Blood pressure    Physical Activity  Classes    Physical Activity Frequency  Regularly    Healthy Coping  Appropriate    Discharge Plan  Home    Motivation  Strong, Engaged    Teaching Method  Explanation, Discussion, Handouts, Teach back    Patient Response  Verbalized understanding            Other Comments:  Patient was seen for review of type 2 diabetes home management. Discussed and taught patient about type 2 diabetes self-management, risk factors, and importance of blood glucose control to reduce complications. Target blood glucose readings and A1c goals per ADA were reviewed. Reviewed with patient current A1c and discussed its significance. Signs, symptoms and treatment of hyperglycemia and hypoglycemia were discussed. Lifestyle changes such as physical activity with MD approval and healthy eating were encouraged.  Patient said he does fsbs 3 x day and was aware of glucose goals. Patient was encouraged to keep record of blood glucose readings to take to follow up appointment with PCP. he has had education several times Op and answered questions very well. He said \"my problem is food.\"  Stated he just can't resist good food. He is especially concerned this time a year because, \"I just can't resist it.\"    Electronically signed by:  Ale Riojas RN  11/13/17 3:19 PM  "

## 2017-11-13 NOTE — PROGRESS NOTES
"Adult Nutrition  Assessment/PES    Patient Name:  Trace Escalante  YOB: 1960  MRN: [id number]  Admit Date:  11/12/2017    Assessment Date:  11/13/2017              Reason for Assessment       11/13/17 1250    Reason for Assessment    Reason For Assessment/Visit identified at risk by screening criteria    Identified At Risk By Screening Criteria other (see comments)   new dx CHF/PAF    Time Spent (min) 20    Cardiac HTN;Dyslipidemia;Other (comment)   ?CHF    Endocrine DM Type 2    Fluid Status Volume overload    Pulmonary/Critical Care Obstructive sleep apnea;Pulmonary edema    Renal DILAN;CKD    Other diagnosis ECG planned 11/13 r/t CHF              Nutrition/Diet History       11/13/17 1251    Nutrition/Diet History    Reported/Observed By Patient    Other Patient and family member present at time of visit. Patient states he is interested in diet education materials if his ECG determines he has CHF.            Anthropometrics       11/13/17 1252    Anthropometrics    Height 190.5 cm (75\")    Weight (!)  138 kg (304 lb 9.6 oz)    Ideal Body Weight (IBW)    Ideal Body Weight (IBW), Male (kg) 90.45    % Ideal Body Weight 153.07    Body Mass Index (BMI)    BMI (kg/m2) 38.15            Labs/Tests/Procedures/Meds       11/13/17 1253    Labs/Tests/Procedures/Meds    Labs/Tests Review Reviewed;K+    Medication Review Reviewed, pertinent                Nutrition Prescription Ordered       11/13/17 1253    Nutrition Prescription PO    Current PO Diet Regular    Common Modifiers Cardiac;Consistent Carbohydrate              Problem/Interventions:        Problem 1       11/13/17 1253    Nutrition Diagnoses Problem 1    Problem 1 Knowledge Deficit    Etiology (related to) Medical Diagnosis    Cardiac Other (comment)   ?CHF    Signs/Symptoms (evidenced by) Reported  Information Deficit                    Intervention Goal       11/13/17 1253    Intervention Goal    General Nutrition support treatment;Provide " information regarding MNT for treatment/condition            Nutrition Intervention       11/13/17 1254    Nutrition Intervention    RD/Tech Action Care plan reviewd;Follow Tx progress              Education/Evaluation       11/13/17 1254    Education    Education Will Instruct as appropriate   RD will provide nutrition education r/t CHF if 11/13 ECG confirms dx    Monitor/Evaluation    Monitor Per protocol        Electronically signed by:  Miri Cook RD  11/13/17 12:55 PM

## 2017-11-13 NOTE — PROGRESS NOTES
[facility] Medicine Services  PROGRESS NOTE    Patient Name: Trace Escalante  : 1960  MRN: [id number]    Date of Admission: 2017  Length of Stay: 1  Primary Care Physician: Jose Ramon Allen MD    Subjective   Subjective     CC:  dyspnea    HPI:  Breathing feels better, edema improved. denies fever or chills.     Review of Systems   Constitutional: Negative for chills and fever.   HENT: Negative.    Eyes: Negative.    Respiratory: Positive for shortness of breath. Negative for cough.    Cardiovascular: Positive for leg swelling. Negative for chest pain.   Gastrointestinal: Negative.    Endocrine: Negative.    Genitourinary: Negative.    Musculoskeletal: Negative.    Neurological: Negative.    Psychiatric/Behavioral: Negative.          Otherwise ROS is negative except as mentioned in the HPI.    Objective   Objective     Vital Signs:   Temp:  [98.8 °F (37.1 °C)-99 °F (37.2 °C)] 99 °F (37.2 °C)  Heart Rate:  [69-82] 82  Resp:  [20] 20  BP: (129-163)/() 151/95        Physical Exam:  Constitutional -no acute distress, non toxic, sitting up in chair  HEENT-NCAT, mucous membranes moist  CV-RRR, S1 S2 normal, no m/r/g  Resp-grossly clear bilaterally  Abd-soft, non-tender, non-distended, normo active bowel sounds; obese  Ext-No lower extremity cyanosis, clubbing but trace edema LE bilat  Neuro-alert and oriented x 3, speech clear, moves all extremities   Psych-normal affect   Skin- No rash on exposed UE or LE bilaterally      Results Reviewed:  I have personally reviewed current lab, radiology, and data and agree.      Results from last 7 days  Lab Units 17  0518 17  1249   WBC 10*3/mm3 6.19 6.82   HEMOGLOBIN g/dL 11.6* 13.2   HEMATOCRIT % 35.2* 39.2   PLATELETS 10*3/mm3 223 226       Results from last 7 days  Lab Units 17  0518 17  2358 17  1249   SODIUM mmol/L 137  --  135   POTASSIUM mmol/L 3.1*  --  3.6   CHLORIDE mmol/L 103  --  100   CO2  mmol/L 28.0  --  25.0   BUN mg/dL 19  --  15   CREATININE mg/dL 1.90*  --  1.90*   GLUCOSE mg/dL 95  --  281*   CALCIUM mg/dL 8.2*  --  8.6*   ALT (SGPT) U/L  --   --  30   AST (SGOT) U/L  --   --  39*   TROPONIN I ng/mL  --  0.087*  --      BNP   Date Value Ref Range Status   11/13/2017 445.0 (H) 0.0 - 100.0 pg/mL Final     No results found for: PHART    Microbiology Results Abnormal     None          Imaging Results (last 24 hours)     Procedure Component Value Units Date/Time    XR Chest 1 View [[id number]] Collected:  11/12/17 1538     Updated:  11/13/17 0901    Narrative:          EXAMINATION: XR CHEST 1 VW - 11/12/2017     INDICATION: Shortness of air.     COMPARISON: 10/20/2017.     FINDINGS: Portable chest reveals the heart to be enlarged. Slight  prominence of the pulmonary vascularity. No focal parenchymal  opacification is present. No pleural effusion or pneumothorax.  Degenerative change is seen within the spine.  No acute parenchymal disease.           Impression:       Prominence of the pulmonary vascularity with no acute  parenchymal disease.     DICTATED:     11/12/2017  EDITED:         11/12/2017           This report was finalized on 11/13/2017 8:59 AM by Dr. Unique Huber MD.       XR Chest 1 View [[id number]] Updated:  11/13/17 1326               allopurinol 150 mg Oral Daily   amLODIPine 10 mg Oral Q24H   aspirin 81 mg Oral Daily   carvedilol 12.5 mg Oral Q12H   docusate sodium 100 mg Oral BID   famotidine 40 mg Oral Daily   [START ON 11/14/2017] furosemide 40 mg Intravenous Daily   heparin (porcine) 5,000 Units Subcutaneous Q8H   hydrALAZINE 25 mg Oral Q8H   insulin detemir 20 Units Subcutaneous Nightly   insulin lispro 0-7 Units Subcutaneous 4x Daily AC & at Bedtime   insulin lispro 5 Units Subcutaneous TID With Meals   rosuvastatin 20 mg Oral Nightly   spironolactone 25 mg Oral Daily         Assessment/Plan   Assessment / Plan     Hospital Problem List     * (Principal)Acute pulmonary  edema    Controlled type 2 diabetes mellitus with kidney complication, with long-term current use of insulin    Essential hypertension (Chronic)    Mixed hyperlipidemia (Chronic)    Gout    Elevated brain natriuretic peptide (BNP) level    Elevated serum creatinine    Chronic renal insufficiency             Brief Hospital Course to date:  Trace Escalante is a 57 y.o. male with DMII, HTN, renal insufficiency and obesity presents with several weeks of progressive dyspnea and elevated BNP      Assessment & Plan:    Probable CHF  - improved symptoms with diuresis  - echo pending  - stress testing vs [facility]    HTN    DMII  - a1c 8.3, will request diabetes education  - will lower bolus dose from 30-->20u tonight, check overnight fingersticks to help detect hypoglycemia    CKD  - unclear baseline, obtain records from nephrology Dr Juarez    Gout    Dyslipidemia    Obesity      DVT Prophylaxis:  heparin    CODE STATUS: Full Code    Disposition: I expect the patient to be discharged home in 1-2 days.    Jude Dorsey MD  11/13/17  1:41 PM

## 2017-11-13 NOTE — CONSULTS
Trace Escalante  [id number]  1960   LOS: 1 day   Patient Care Team:  PHYSICIAN: Jose Ramon Allen MD   ENDOCRINOLOGIST: Jose Delaney MD  NEPHROLOGIST: Kt Juarez MD    Mr. escalante is a 57-year-old single -American male from [place], [place], [facility]    Chief Complaint:  SOB    Problem List:  1. Congestive heart failure  1. BHL 11/12/17  with CCS class II chest discomfort/NYHA class III dyspnea  2. Echocardiogram 11/13/17; pending  2. Hypertension  3. Hyperlipidemia  4. Type 2 diabetes mellitus; uncontrolled, hemoglobin A1c was 8.3%  5. Obstructive sleep apnea; compliant with CPAP  6. Moderate obesity; BMI 38  7. Chronic kidney disease; stage III  8. Murmur  9. Hypokalemia  10. Nephrolithiasis  11. Gout  12. Surgical history; MCL repair (right) 1978    No Known Allergies  Prescriptions Prior to Admission   Medication Sig Dispense Refill Last Dose   • allopurinol (ZYLOPRIM) 150 MG half tablet Take 150 mg by mouth Daily.   Taking   • amLODIPine (NORVASC) 10 MG tablet TAKE 1 TABLET EVERY DAY  1 Taking   • aspirin 81 MG tablet Take 81 mg by mouth Daily.   Taking   • B-D UF III MINI PEN NEEDLES 31G X 5 MM misc TEST 4X PER DAY  2 Taking   • carvedilol (COREG) 12.5 MG tablet TAKE 1 TABLET TWICE A DAY  1 Taking   • Insulin Degludec (TRESIBA FLEXTOUCH SC) Inject 40-50 Units under the skin Daily.   Taking   • lisinopril-hydrochlorothiazide (PRINZIDE,ZESTORETIC) 20-12.5 MG per tablet Take 1 tablet by mouth Daily.   Taking   • Multiple Vitamin (MULTI VITAMIN PO) Take  by mouth.   Taking   • NOVOLOG FLEXPEN 100 UNIT/ML solution pen-injector sc pen    Taking   • ONE TOUCH ULTRA TEST test strip TEST 3 TIMES DAILY  3 Taking   • PROAIR  (90 Base) MCG/ACT inhaler INHALE 1 PUFF BY MOUTH EVERY 4 TO 6 HOURS AS NEEDED FOR WHEEZING  0 Taking   • rosuvastatin (CRESTOR) 20 MG tablet TAKE 1 TABLET EVERY DAY  1 Taking   • spironolactone (ALDACTONE) 25 MG tablet Take 25 mg by mouth Daily.   Taking   •  "Valerian Root 450 MG capsule Take  by mouth.   Taking     Scheduled Meds:  allopurinol 150 mg Oral Daily   amLODIPine 10 mg Oral Q24H   aspirin 81 mg Oral Daily   carvedilol 12.5 mg Oral Q12H   docusate sodium 100 mg Oral BID   famotidine 40 mg Oral Daily   furosemide 40 mg Intravenous BID   heparin (porcine) 5,000 Units Subcutaneous Q8H   hydrochlorothiazide 12.5 mg Oral Daily   insulin detemir 30 Units Subcutaneous Nightly   insulin lispro 0-7 Units Subcutaneous 4x Daily AC & at Bedtime   insulin lispro 5 Units Subcutaneous TID With Meals   rosuvastatin 20 mg Oral Nightly   spironolactone 25 mg Oral Daily          History of Present Illness:    57-year-old  male with past medical history of hypertension, hyperlipidemia, type 2 diabetes mellitus, and kidney dysfunction (CKD stage III) secondary to diabetes presents to [facility] ED 11/12/17 for worsening dyspnea over the past 4 weeks.  In September he was pressure washing his deck with chemicals and felt dyspneic.  He was not wearing a mask at the time and was [facility] [place] with some steroids and antibiotics, and an albuterol inhaler.  He briefly improved and began feeling worse again.  Chest x-ray on 10/20/17 was normal and he saw his primary care physician 11/6/17 for worsening dyspnea and was referred to pulmonology.  He has a appointment scheduled on 11/16/17.  He feels \"rattling in my chest\" while lying flat and has orthopnea.  He denies any chills, wheezing, but over the weekend developed a low-grade fever and some yellow sputum production.  He has known obstructive sleep apnea and is compliant with CPAP.  On admission BNP was 818 and creatinine 1.9.  He is a nonsmoker.  He denies any anginal type chest pain, palpitations, arrhythmias, presyncope, syncope, CVAs, TIAs, seizures, DVTs, PEs, GERD, prior stress tests, MIs, heart catheterizations, heart monitors.  He has a known murmur since birth.  His mother and his " "father both passed away around age 50; his mother from an MI in his dad from the stroke.  Apparently other family members have had MIs and strokes early as well.  Patient states that he is used to doing Bridget multiple times per week and generally can do this without difficulties but he has noticed the past week that his breathing has been a lot more difficult and he has gained a couple pounds.    Cardiac risk factors: advanced age (older than 55 for men, 65 for women), diabetes mellitus, dyslipidemia, hypertension, male gender and obesity (BMI >= 30 kg/m2).    Social History     Social History   • Marital status: Single     Spouse name: N/A   • Number of children: 2   • Years of education: College     Occupational History   •  [employer]     Social History Main Topics   • Smoking status: Never Smoker   • Smokeless tobacco: Never Used   • Alcohol use No   • Drug use: No   • Sexual activity: Yes     Partners: Female     Other Topics Concern   • Not on file     Social History Narrative   • No narrative on file     Family History   Problem Relation Age of Onset   • Heart disease Mother    • Stroke Mother    • Diabetes Father    • Hypertension Father    • Heart disease Father    • Heart attack Father    • Kidney disease Brother    • Stroke Brother        Review of Systems  Pertinent items are noted in HPI and problem list.     Objective:       Physical Exam  /95  Pulse 82  Temp 99 °F (37.2 °C) (Oral)   Resp 20  Ht 75\" (190.5 cm)  Wt (!) 304 lb 9.6 oz (138 kg)  SpO2 95%  BMI 38.07 kg/m2  Last 2 weights    11/12/17  1500 11/13/17  0500   Weight: (!) 304 lb 4 oz (138 kg) (!) 304 lb 9.6 oz (138 kg)     Body mass index is 38.07 kg/(m^2).    Intake/Output Summary (Last 24 hours) at 11/13/17 0902  Last data filed at 11/13/17 0715   Gross per 24 hour   Intake              600 ml   Output             3550 ml   Net            -2950 ml       General Appearance:  Alert, cooperative, no distress, appears stated age "   Head:  Normocephalic, without obvious abnormality, atraumatic   Eyes:  PERRL, conjunctiva/corneas clear, EOM's intact, fundi benign, both eyes   Throat: Lips, mucosa, and tongue normal; teeth and gums normal   Neck: Supple, symmetrical, trachea midline, no adenopathy, thyroid: not enlarged, symmetric, no tenderness/mass/nodules, no carotid bruit or JVD   Lungs:   Clear to auscultation bilaterally, respirations unlabored   Heart:  Regular rate and rhythm with PACs, S1, S2 normal, grade 2/6 murmur, rub or gallop   Abdomen:   Soft, non-tender, no masses, no organomegaly, bowel sounds audible x4   Extremities: Trace edema, normal range of motion   Pulses: 2+ and symmetric   Skin: Skin color, texture, turgor normal, no rashes or lesions   Neurologic: Normal       Cardiographics  · EK17;Sinus rhythm with premature atrial complexes  Otherwise normal ECG    Imaging  · Chest x-ray: 17;Prominence of the pulmonary vascularity with no acute  parenchymal disease    Lab Review     Results from last 7 days  Lab Units 17  0518 17  1249   SODIUM mmol/L 137 135   POTASSIUM mmol/L 3.1* 3.6   CHLORIDE mmol/L 103 100   CO2 mmol/L 28.0 25.0   BUN mg/dL 19 15   CREATININE mg/dL 1.90* 1.90*   GLUCOSE mg/dL 95 281*   CALCIUM mg/dL 8.2* 8.6*       Results from last 7 days  Lab Units 17  0518 17  1249   WBC 10*3/mm3 6.19 6.82   HEMOGLOBIN g/dL 11.6* 13.2   HEMATOCRIT % 35.2* 39.2   PLATELETS 10*3/mm3 223 226       Results from last 7 days  Lab Units 17  0518   CHOLESTEROL mg/dL 154   TRIGLYCERIDES mg/dL 78   HDL CHOL mg/dL 40   LDL 95    Results from last 7 days  Lab Units 17  0519   HEMOGLOBIN A1C % 8.30*       Results from last 7 days  Lab Units 17  2358   TROPONIN I ng/mL 0.087*         Assessment:    Patient with CHF exacerbation in the setting of uncontrolled type 2 diabetes mellitus, borderline elevated troponin, murmur, and marginal renal function. Excellent diuresis. He will  need eventual MPS versus LHC but his renal function is marginal currently. We will get echocardiogram results and further medical decision making to be made after results are available.           Plan:   1.  Potassium replacement  2.  Echocardiogram   3. Strict I/O, daily weights  4. IV Lasix 40mg bid  5. BMP, BNP, troponin, chest x ray in morning  6. LHC versus Stress test; needs improvement in renal function first and echo results  7. Hydralazine 25mg q8    Over the last few weeks the patient's been having some shortness of breath and classic symptoms of CHF with orthopnea.  He exercises 5-6 days a week and prior to September he had no symptoms of dyspnea or chest pain.  He has a heart murmur has frequent ectopy he also has bilateral 7 cardiac risk factors.  2-D echo shows and then decide whether or not we should proceed with stress pet versus cardiac catheter      Scribed for Afshin Parekh MD by SALLY Edmondson. 11/13/2017  9:14 AM

## 2017-11-13 NOTE — PROGRESS NOTES
Discharge Planning Assessment  [facility]     Patient Name: Trace Escalante  MRN: [id number]  Today's Date: 11/13/2017    Admit Date: 11/12/2017          Discharge Needs Assessment       11/13/17 1411    Living Environment    Lives With child(capri), dependent    Living Arrangements house    Home Accessibility stairs within home    Stair Railings at Home inside, present on left side;outside, present at both sides    Type of Financial/Environmental Concern none    Transportation Available car;family or friend will provide    Living Environment    Provides Primary Care For child(capri)    Quality Of Family Relationships supportive;involved    Able to Return to Prior Living Arrangements yes    Discharge Needs Assessment    Concerns To Be Addressed no discharge needs identified    Readmission Within The Last 30 Days no previous admission in last 30 days    Anticipated Changes Related to Illness none    Equipment Currently Used at Home bipap/ cpap    Equipment Needed After Discharge none    Discharge Disposition still a patient            Discharge Plan       11/13/17 1412    Case Management/Social Work Plan    Plan Home    Patient/Family In Agreement With Plan yes    Additional Comments Spoke with patient.  He lives with his dependent children in [facility].  He is independent with ADL's and has a CPAP at home.  He doesn't remember where it came from.  Plan is to return home.  Will continue to follow.  Haleigh Norman RN  [phone]        Discharge Placement     No information found        Expected Discharge Date and Time     Expected Discharge Date Expected Discharge Time    Nov 16, 2017               Demographic Summary       11/13/17 1409    Referral Information    Admission Type inpatient    Arrived From home or self-care    Referral Source admission list    Reason For Consult discharge planning    Record Reviewed history and physical;medical record    Primary Care Physician Information    Name Jose Ramon Allen             Functional Status       11/13/17 1409    Functional Status Current    Current Functional Level Comment see nurse's notes    Change in Functional Status Since Onset of Current Illness/Injury no    Functional Status Prior    Ambulation 0-->independent    Transferring 0-->independent    Toileting 0-->independent    Bathing 0-->independent    Dressing 0-->independent    Eating 0-->independent    Communication 0-->understands/communicates without difficulty    IADL    Medications independent    Meal Preparation independent    Housekeeping independent    Laundry independent    Shopping independent    Oral Care independent    Activity Tolerance    Current Activity Limitations none    Usual Activity Tolerance good    Current Activity Tolerance good    Employment/Financial    Employment/Finance Comments Verified with patient that he has [employer].  No issues obtaining medications.            Psychosocial     None            Abuse/Neglect     None            Legal     None            Substance Abuse     None            Patient Forms     None          Haleigh Norman RN

## 2017-11-14 LAB
ANION GAP SERPL CALCULATED.3IONS-SCNC: 7 MMOL/L (ref 3–11)
BNP SERPL-MCNC: 367 PG/ML (ref 0–100)
BUN BLD-MCNC: 25 MG/DL (ref 9–23)
BUN/CREAT SERPL: 11.9 (ref 7–25)
CALCIUM SPEC-SCNC: 8.8 MG/DL (ref 8.7–10.4)
CHLORIDE SERPL-SCNC: 103 MMOL/L (ref 99–109)
CO2 SERPL-SCNC: 28 MMOL/L (ref 20–31)
CREAT BLD-MCNC: 2.1 MG/DL (ref 0.6–1.3)
GFR SERPL CREATININE-BSD FRML MDRD: 40 ML/MIN/1.73
GLUCOSE BLD-MCNC: 138 MG/DL (ref 70–100)
GLUCOSE BLDC GLUCOMTR-MCNC: 122 MG/DL (ref 70–130)
GLUCOSE BLDC GLUCOMTR-MCNC: 133 MG/DL (ref 70–130)
GLUCOSE BLDC GLUCOMTR-MCNC: 139 MG/DL (ref 70–130)
GLUCOSE BLDC GLUCOMTR-MCNC: 159 MG/DL (ref 70–130)
GLUCOSE BLDC GLUCOMTR-MCNC: 169 MG/DL (ref 70–130)
POTASSIUM BLD-SCNC: 3.9 MMOL/L (ref 3.5–5.5)
SODIUM BLD-SCNC: 138 MMOL/L (ref 132–146)
TROPONIN I SERPL-MCNC: 0.04 NG/ML

## 2017-11-14 PROCEDURE — 84484 ASSAY OF TROPONIN QUANT: CPT | Performed by: NURSE PRACTITIONER

## 2017-11-14 PROCEDURE — 99232 SBSQ HOSP IP/OBS MODERATE 35: CPT | Performed by: INTERNAL MEDICINE

## 2017-11-14 PROCEDURE — 25010000002 HEPARIN (PORCINE) PER 1000 UNITS: Performed by: PHYSICIAN ASSISTANT

## 2017-11-14 PROCEDURE — 99233 SBSQ HOSP IP/OBS HIGH 50: CPT | Performed by: HOSPITALIST

## 2017-11-14 PROCEDURE — 80048 BASIC METABOLIC PNL TOTAL CA: CPT | Performed by: NURSE PRACTITIONER

## 2017-11-14 PROCEDURE — 82962 GLUCOSE BLOOD TEST: CPT

## 2017-11-14 PROCEDURE — 83880 ASSAY OF NATRIURETIC PEPTIDE: CPT | Performed by: NURSE PRACTITIONER

## 2017-11-14 PROCEDURE — 63710000001 INSULIN LISPRO (HUMAN) PER 5 UNITS: Performed by: PHYSICIAN ASSISTANT

## 2017-11-14 PROCEDURE — 63710000001 INSULIN DETEMIR PER 5 UNITS: Performed by: INTERNAL MEDICINE

## 2017-11-14 RX ORDER — HYDRALAZINE HYDROCHLORIDE 50 MG/1
50 TABLET, FILM COATED ORAL EVERY 8 HOURS SCHEDULED
Status: DISCONTINUED | OUTPATIENT
Start: 2017-11-14 | End: 2017-11-16 | Stop reason: HOSPADM

## 2017-11-14 RX ORDER — ISOSORBIDE MONONITRATE 30 MG/1
30 TABLET, EXTENDED RELEASE ORAL
Status: DISCONTINUED | OUTPATIENT
Start: 2017-11-14 | End: 2017-11-15

## 2017-11-14 RX ORDER — SODIUM CHLORIDE 450 MG/100ML
75 INJECTION, SOLUTION INTRAVENOUS CONTINUOUS
Status: DISCONTINUED | OUTPATIENT
Start: 2017-11-14 | End: 2017-11-15

## 2017-11-14 RX ADMIN — HEPARIN SODIUM 5000 UNITS: 5000 INJECTION, SOLUTION INTRAVENOUS; SUBCUTANEOUS at 20:55

## 2017-11-14 RX ADMIN — INSULIN LISPRO 2 UNITS: 100 INJECTION, SOLUTION INTRAVENOUS; SUBCUTANEOUS at 17:37

## 2017-11-14 RX ADMIN — HEPARIN SODIUM 5000 UNITS: 5000 INJECTION, SOLUTION INTRAVENOUS; SUBCUTANEOUS at 06:55

## 2017-11-14 RX ADMIN — INSULIN LISPRO 2 UNITS: 100 INJECTION, SOLUTION INTRAVENOUS; SUBCUTANEOUS at 20:53

## 2017-11-14 RX ADMIN — HYDRALAZINE HYDROCHLORIDE 50 MG: 50 TABLET ORAL at 14:58

## 2017-11-14 RX ADMIN — FAMOTIDINE 40 MG: 20 TABLET, FILM COATED ORAL at 09:26

## 2017-11-14 RX ADMIN — DOCUSATE SODIUM 100 MG: 100 CAPSULE, LIQUID FILLED ORAL at 17:37

## 2017-11-14 RX ADMIN — CARVEDILOL 12.5 MG: 12.5 TABLET, FILM COATED ORAL at 20:55

## 2017-11-14 RX ADMIN — HYDRALAZINE HYDROCHLORIDE 50 MG: 50 TABLET ORAL at 20:55

## 2017-11-14 RX ADMIN — ROSUVASTATIN CALCIUM 20 MG: 20 TABLET, FILM COATED ORAL at 20:55

## 2017-11-14 RX ADMIN — ALLOPURINOL 150 MG: 300 TABLET ORAL at 09:26

## 2017-11-14 RX ADMIN — ASPIRIN 81 MG CHEWABLE TABLET 81 MG: 81 TABLET CHEWABLE at 09:27

## 2017-11-14 RX ADMIN — AMLODIPINE BESYLATE 10 MG: 10 TABLET ORAL at 09:27

## 2017-11-14 RX ADMIN — INSULIN DETEMIR 20 UNITS: 100 INJECTION, SOLUTION SUBCUTANEOUS at 20:51

## 2017-11-14 RX ADMIN — SODIUM CHLORIDE 50 ML/HR: 4.5 INJECTION, SOLUTION INTRAVENOUS at 09:25

## 2017-11-14 RX ADMIN — CARVEDILOL 12.5 MG: 12.5 TABLET, FILM COATED ORAL at 09:26

## 2017-11-14 RX ADMIN — ISOSORBIDE MONONITRATE 30 MG: 30 TABLET, EXTENDED RELEASE ORAL at 09:26

## 2017-11-14 RX ADMIN — DOCUSATE SODIUM 100 MG: 100 CAPSULE, LIQUID FILLED ORAL at 09:27

## 2017-11-14 RX ADMIN — HEPARIN SODIUM 5000 UNITS: 5000 INJECTION, SOLUTION INTRAVENOUS; SUBCUTANEOUS at 14:57

## 2017-11-14 RX ADMIN — HYDRALAZINE HYDROCHLORIDE 25 MG: 50 TABLET, FILM COATED ORAL at 06:55

## 2017-11-14 NOTE — PROGRESS NOTES
Adult Nutrition  Assessment/PES    Patient Name:  Trace Escalante  YOB: 1960  MRN: [id number]  Admit Date:  11/12/2017    Assessment Date:  11/14/2017    Comments:            Reason for Assessment       11/14/17 1600    Reason for Assessment    Reason For Assessment/Visit education    Time Spent (min) 30    Cardiac --   new HF dx                            Problem/Interventions:        Problem 1       11/14/17 1601    Nutrition Diagnoses Problem 1    Problem 1 Knowledge Deficit    Etiology (related to) Medical Diagnosis    Cardiac Other (comment)   new HF dx    Signs/Symptoms (evidenced by) Reported  Information Deficit    Resolved? Yes                          Education/Evaluation       11/14/17 1601    Education    Education Education topics    Education Topics CHF;Na+    Na+ (mg/day) --   1500-2000mg/d diet edu done and written materials given to pt. Pt able to easily grasp concepts taught. PT states he also follows DM diet guidelines.         Electronically signed by:  Adry Ojeda MS,RD,LD  11/14/17 4:03 PM

## 2017-11-14 NOTE — PLAN OF CARE
Problem: Renal Failure/Kidney Injury, Acute (Adult)  Goal: Signs and Symptoms of Listed Potential Problems Will be Absent or Manageable (Renal Failure/Kidney Injury, Acute)  Outcome: Ongoing (interventions implemented as appropriate)    11/12/17 1510 11/14/17 0427   Renal Failure/Kidney Injury, Acute   Problems Assessed (Acute Renal Failure/Kidney Injury) --  all   Problems Present (Acute Renal Failure/Kidney Injury) fluid imbalance;pulmonary edema --

## 2017-11-14 NOTE — PROGRESS NOTES
[facility]  IP Progress Note      Chief Complaint/Reason for service:  New onset heart failure    Subjective   Subjective: The patient states he feels much better and was able to lay down last night and get some good sleep.  He has questions today about his condition and wanted to know when he can go home    Past medical, surgical, social and family history reviewed in the patient's electronic medical record.    Objective     Vital Sign Min/Max for last 24 hours  Temp  Min: 97.8 °F (36.6 °C)  Max: 99.1 °F (37.3 °C)   BP  Min: 123/81  Max: 163/107   Pulse  Min: 65  Max: 86   Resp  Min: 18  Max: 24   SpO2  Min: 97 %  Max: 99 %   No Data Recorded      Intake/Output Summary (Last 24 hours) at 11/14/17 0717  Last data filed at 11/13/17 1800   Gross per 24 hour   Intake              720 ml   Output             2100 ml   Net            -1380 ml             Current Facility-Administered Medications:   •  acetaminophen (TYLENOL) tablet 650 mg, 650 mg, Oral, Q4H PRN, Jeane Ledesma PA-C  •  allopurinol (ZYLOPRIM) tablet 150 mg, 150 mg, Oral, Daily, Jeane Ledesma PA-C, 150 mg at 11/13/17 0853  •  amLODIPine (NORVASC) tablet 10 mg, 10 mg, Oral, Q24H, Jeane Ledesma PA-C, 10 mg at 11/13/17 0853  •  aspirin chewable tablet 81 mg, 81 mg, Oral, Daily, Jeane Ledesma PA-C, 81 mg at 11/13/17 0853  •  carvedilol (COREG) tablet 12.5 mg, 12.5 mg, Oral, Q12H, Jeane Ledesma PA-C, 12.5 mg at 11/13/17 2100  •  dextrose (D50W) solution 25 g, 25 g, Intravenous, Q15 Min PRN, Jeane Ledesma PA-C  •  dextrose (GLUTOSE) oral gel 15 g, 15 g, Oral, Q15 Min PRN, Jeane Ledesma PA-C  •  docusate sodium (COLACE) capsule 100 mg, 100 mg, Oral, BID, Jeane Ledesma PA-C, 100 mg at 11/13/17 1814  •  famotidine (PEPCID) tablet 40 mg, 40 mg, Oral, Daily, Jeane Ledesma PA-C, 40 mg at 11/13/17 0870  •  glucagon (GLUCAGEN) injection 1 mg, 1 mg, Subcutaneous,  Q15 Min PRN, Jeane Ledesma PA-C  •  guaiFENesin (MUCINEX) 12 hr tablet 600 mg, 600 mg, Oral, Q12H PRN, Yovana Mejia V, APRN, 600 mg at 11/13/17 2100  •  heparin (porcine) 5000 UNIT/ML injection 5,000 Units, 5,000 Units, Subcutaneous, Q8H, Jeane Ledesma PA-C, 5,000 Units at 11/14/17 0655  •  hydrALAZINE (APRESOLINE) tablet 50 mg, 50 mg, Oral, Q8H, Afshin Parekh MD  •  insulin detemir (LEVEMIR) injection 20 Units, 20 Units, Subcutaneous, Nightly, Jude Dorsey MD, 20 Units at 11/13/17 2101  •  insulin lispro (humaLOG) injection 0-7 Units, 0-7 Units, Subcutaneous, 4x Daily AC & at Bedtime, Jeane Ledesma PA-C, 2 Units at 11/13/17 1812  •  insulin lispro (humaLOG) injection 5 Units, 5 Units, Subcutaneous, TID With Meals, Jeane Ledesma PA-C, 5 Units at 11/13/17 1813  •  ondansetron (ZOFRAN) tablet 4 mg, 4 mg, Oral, Q6H PRN, Jeane Ledesma PA-C  •  potassium chloride (MICRO-K) CR capsule 40 mEq, 40 mEq, Oral, PRN, 40 mEq at 11/13/17 1814 **OR** potassium chloride (KLOR-CON) packet 40 mEq, 40 mEq, Oral, PRN **OR** potassium chloride 10 mEq in 100 mL IVPB, 10 mEq, Intravenous, Q1H PRN, Thelma Cartagena, APRN  •  rosuvastatin (CRESTOR) tablet 20 mg, 20 mg, Oral, Nightly, Jeane Ledesma PA-C, 20 mg at 11/13/17 2100  •  sodium chloride 0.9 % flush 1-10 mL, 1-10 mL, Intravenous, PRN, Jeane Ledesma PA-C  •  sodium chloride 0.9 % flush 10 mL, 10 mL, Intravenous, PRN, Yair Sweeney MD  •  spironolactone (ALDACTONE) tablet 25 mg, 25 mg, Oral, Daily, Jeane Ledesma PA-C, 25 mg at 11/13/17 0806    Physical Exam: General  overweight -American male sitting in bed no acute distress        HEENT: No JVP is present no scleral icterus       Respiratory:  Equal bilateral symmetrical expansion and clear to auscultation bilaterally       Cardiovascular: Regular rate and rhythm without murmur gallop or click and trivial peripheral edema       GI: Positive bowel  sounds nontender to palpation       Lower Extremities:        Neuro: Cranial nerves II through XII are grossly normal he moves all 4 extremities       Skin:  Skin is warm and dry with no peripheral lesions       Psych: Pleasant affect.  Alert cooperative and asked appropriate questions    Results Review: The patient's BNP has dropped since yesterday.  Blood pressure is still elevated in the 150-160 range.  His echocardiogram shows global LV hypokinesis with a depressed EF    Radiology Results:  Imaging Results (last 72 hours)     Procedure Component Value Units Date/Time    XR Chest 1 View [[id number]] Collected:  11/12/17 1538     Updated:  11/13/17 0901    Narrative:          EXAMINATION: XR CHEST 1 VW - 11/12/2017     INDICATION: Shortness of air.     COMPARISON: 10/20/2017.     FINDINGS: Portable chest reveals the heart to be enlarged. Slight  prominence of the pulmonary vascularity. No focal parenchymal  opacification is present. No pleural effusion or pneumothorax.  Degenerative change is seen within the spine.  No acute parenchymal disease.           Impression:       Prominence of the pulmonary vascularity with no acute  parenchymal disease.     DICTATED:     11/12/2017  EDITED:         11/12/2017           This report was finalized on 11/13/2017 8:59 AM by Dr. Unique Huber MD.       XR Chest 1 View [[id number]] Collected:  11/13/17 1536     Updated:  11/13/17 1542    Narrative:       EXAMINATION: XR CHEST 1 VW-11/13/2017:      INDICATION: SOB; I50.9-Heart failure, unspecified; N17.9-Acute kidney  failure, unspecified; R06.02-Shortness of breath; I10-Essential  (primary) hypertension.      COMPARISON: 11/12/2017.     FINDINGS: Portable chest reveals cardiac and mediastinal silhouettes to  be within normal limits. The lung fields are grossly clear. No focal  parenchymal opacification present.  No pleural effusion or pneumothorax.  Degenerative changes seen within the spine. Pulmonary vascularity  is  within normal limits.           Impression:       No acute cardiopulmonary disease.     D:  11/13/2017  E:  11/13/2017     This report was finalized on 11/13/2017 3:40 PM by Dr. Unique Huber MD.             EKG: Sinus rhythm no ischemia    ECHO: Mild global LV systolic hypokinesis with EF in the 40s    Tele:  Sinus rhythm with PACs    Assessment   Assessment/Plan: 1 new onset systolic heart failure with depressed EF in the low 40s.  He has had an excellent diuretic response and his BNP is reduced today compared to yesterday and admission.  I recommend cardiac catheter on this patient depending the results of his creatinine  2 hypertension-still not well-controlled I will increase hydralazine to 50 every 8.  I will also add Imdur  3 CK D-awaiting to see the creatinine today after the diuresis  4 obstructive sleep apnea the patient is on a CPAP at home    I just got the patient's blood work back in his creatinine is up to 2.1.  I reviewed a note from Dr. Juarez's office from 2016 and he said at that time his creatinine was 2.0 but it was slightly higher than it had been the previous time.  We'll hydrate the patient with 50 ML's of half normal saline per hour.  Cancel catheter today, make him nothing by mouth after midnight ,and schedule For tomorrow    Afshin Parekh MD  11/14/17  7:17 AM

## 2017-11-14 NOTE — PLAN OF CARE
Problem: Cardiac: Heart Failure (Adult)  Goal: Signs and Symptoms of Listed Potential Problems Will be Absent or Manageable (Cardiac: Heart Failure)  Outcome: Ongoing (interventions implemented as appropriate)    Problem: Renal Failure/Kidney Injury, Acute (Adult)  Goal: Signs and Symptoms of Listed Potential Problems Will be Absent or Manageable (Renal Failure/Kidney Injury, Acute)  Outcome: Ongoing (interventions implemented as appropriate)    Problem: Patient Care Overview (Adult)  Goal: Plan of Care Review  Outcome: Ongoing (interventions implemented as appropriate)    11/14/17 1523   Coping/Psychosocial Response Interventions   Plan Of Care Reviewed With patient   Patient Care Overview   Progress no change   Outcome Evaluation   Outcome Summary/Follow up Plan Pt. ambulating in roon. 1/2 NS infusing. Denies SOA, waiting cardiac cath once Creatine has improved.       Goal: Adult Individualization and Mutuality  Outcome: Ongoing (interventions implemented as appropriate)  Goal: Discharge Needs Assessment  Outcome: Ongoing (interventions implemented as appropriate)

## 2017-11-14 NOTE — PROGRESS NOTES
[facility] Medicine Services  PROGRESS NOTE    Patient Name: Trace Escalante  : 1960  MRN: [id number]    Date of Admission: 2017  Length of Stay: 2  Primary Care Physician: Jose Ramon Allen MD    Subjective   Subjective     CC:  Shortness of breath    Subjective:  No overnight events.  Has ALYSSA but no machine in room today.  Denies having history of heart failure.  No chest pain.  No shortness of breath.    Review of Systems  Gen- No fevers, chills  CV- No chest pain, palpitations  Resp- No cough, dyspnea  GI- No N/V/D, abd pain    Otherwise ROS is negative except as mentioned in the HPI.    Objective   Objective     Vital Signs:   Temp:  [97.8 °F (36.6 °C)-99.1 °F (37.3 °C)] 98.4 °F (36.9 °C)  Heart Rate:  [63-86] 63  Resp:  [18-24] 18  BP: (122-163)/() 122/79        Physical Exam:  Constitutional: No acute distress, awake, obesity  Eyes: PERRLA, sclerae anicteric, no conjunctival injection  HENT: NCAT  Neck: Supple, no JVD, trachea midline  Respiratory: Clear to auscultation bilaterally, nonlabored respirations  Cardiovascular: RRR, s1 and s2  Gastrointestinal: Positive bowel sounds, soft, nontender, nondistended  Musculoskeletal: No bilateral ankle edema, no clubbing or cyanosis to extremities  Psychiatric: Appropriate affect, cooperative  Neurologic: Oriented x 3, strength symmetric in all extremities, Cranial Nerves grossly intact to confrontation, speech clear  Skin: No rashes    Results Reviewed:  I have personally reviewed current lab, radiology, and data and agree.      Results from last 7 days  Lab Units 17  0518 17  1249   WBC 10*3/mm3 6.19 6.82   HEMOGLOBIN g/dL 11.6* 13.2   HEMATOCRIT % 35.2* 39.2   PLATELETS 10*3/mm3 223 226       Results from last 7 days  Lab Units 17  0459 17  0518 17  2358 17  1249   SODIUM mmol/L 138 137  --  135   POTASSIUM mmol/L 3.9 3.1*  --  3.6   CHLORIDE mmol/L 103 103  --  100   CO2 mmol/L 28.0  28.0  --  25.0   BUN mg/dL 25* 19  --  15   CREATININE mg/dL 2.10* 1.90*  --  1.90*   GLUCOSE mg/dL 138* 95  --  281*   CALCIUM mg/dL 8.8 8.2*  --  8.6*   ALT (SGPT) U/L  --   --   --  30   AST (SGOT) U/L  --   --   --  39*   TROPONIN I ng/mL 0.035  --  0.087*  --      BNP   Date Value Ref Range Status   11/14/2017 367.0 (H) 0.0 - 100.0 pg/mL Final     No results found for: PHART    Microbiology Results Abnormal     None          Imaging Results (last 24 hours)     Procedure Component Value Units Date/Time    XR Chest 1 View [[id number]] Collected:  11/13/17 1536     Updated:  11/13/17 1542    Narrative:       EXAMINATION: XR CHEST 1 VW-11/13/2017:      INDICATION: SOB; I50.9-Heart failure, unspecified; N17.9-Acute kidney  failure, unspecified; R06.02-Shortness of breath; I10-Essential  (primary) hypertension.      COMPARISON: 11/12/2017.     FINDINGS: Portable chest reveals cardiac and mediastinal silhouettes to  be within normal limits. The lung fields are grossly clear. No focal  parenchymal opacification present.  No pleural effusion or pneumothorax.  Degenerative changes seen within the spine. Pulmonary vascularity is  within normal limits.           Impression:       No acute cardiopulmonary disease.     D:  11/13/2017  E:  11/13/2017     This report was finalized on 11/13/2017 3:40 PM by Dr. Unique Huber MD.           Results for orders placed during the hospital encounter of 11/12/17   Adult Transthoracic Echo Complete W/ Cont if Necessary Per Protocol    Addendum · Left ventricular systolic function is moderately decreased. Calculated  EF = 39.9%. Estimated EF appears to be in the range of 36 - 40%. · Left ventricular wall thickness is consistent with mild concentric  hypertrophy. · Left ventricular diastolic dysfunction (grade III) consistent with  reversible restrictive pattern. · Right ventricular cavity is borderline dilated. · Left atrial cavity size is mildly dilated. · There is mild  calcification of the aortic valve. Mild aortic valve  regurgitation is present. · Mild pulmonic valve regurgitation is present. · Mild mitral valve regurgitation is present        Blake Lynch MD 11/14/2017 10:01 AM          Narrative · Left ventricular systolic function is moderately decreased. Calculated   EF = 39.9%. Estimated EF appears to be in the range of 36 - 40%.  · Left ventricular wall thickness is consistent with mild concentric   hypertrophy.  · Left ventricular diastolic dysfunction (grade III) consistent with   reversible restrictive pattern.  · Right ventricular cavity is borderline dilated.  · Left atrial cavity size is mildly dilated.  · There is mild calcification of the aortic valve. Mild aortic valve   regurgitation is present.  · Mild pulmonic valve regurgitation is present.  · Mild mitral valve regurgitation is present          I have reviewed the medications.    Assessment/Plan   Assessment / Plan     Hospital Problem List     * (Principal)Acute pulmonary edema    Controlled type 2 diabetes mellitus with kidney complication, with long-term current use of insulin    Essential hypertension (Chronic)    Mixed hyperlipidemia (Chronic)    Gout    Elevated brain natriuretic peptide (BNP) level    Elevated serum creatinine    Chronic renal insufficiency    Obesity, Class I, BMI 30-34.9             Brief Hospital Course to date:  Trace Escalante is a 57 y.o. male who went to [facility] and sent to ER for shortness of breath      Assessment & Plan:    Acute pulmonary edema with elevated BNP with mild increase in cardiac biomarkers  - Concerning for volume overload secondary to heart failure (risk factors reviewed)  - cardiac cath planned for tomorrow     Elevated creatinine, baseline unknown  - Creatinine 1.9 today   - Patient follows with nephrologist, Dr. Kt Juarez, appears to have some level of chronic renal insufficiency     ALYSSA  - AutoCPAP at night    HTN  - Known history of HTN  x10-15 years, his nephrologist manages BP meds   - Resume home BP meds, hold ACEI until baseline creatinine better established      Insulin-dependent DMII  - Checked A1C - Diabetes  - Endocrinologist is Dr. Jose Thomas   - Diabetic diet     Hyperlipidemia, continue home meds   History of gout, continue home meds     CPAP may help unload heart and reduce risk of stroke and heart attack  NPO after midnight for possible cardiac cath tomorrow.    DVT prophylaxis: Heparin, teds, scuds  CODE STATUS:  FULL     DVT Prophylaxis:  Heparin 5,000 Units SC every 8 hours    CODE STATUS: Full Code    Disposition: I expect the patient to be discharged to be determined    Desmond Espinoza MD  11/14/17  12:40 PM

## 2017-11-15 PROBLEM — I50.9 ACUTE CONGESTIVE HEART FAILURE: Status: ACTIVE | Noted: 2017-11-12

## 2017-11-15 LAB
ANION GAP SERPL CALCULATED.3IONS-SCNC: 4 MMOL/L (ref 3–11)
BNP SERPL-MCNC: 244 PG/ML (ref 0–100)
BUN BLD-MCNC: 26 MG/DL (ref 9–23)
BUN/CREAT SERPL: 12.4 (ref 7–25)
CALCIUM SPEC-SCNC: 8.6 MG/DL (ref 8.7–10.4)
CHLORIDE SERPL-SCNC: 107 MMOL/L (ref 99–109)
CO2 SERPL-SCNC: 27 MMOL/L (ref 20–31)
CREAT BLD-MCNC: 2.1 MG/DL (ref 0.6–1.3)
GFR SERPL CREATININE-BSD FRML MDRD: 40 ML/MIN/1.73
GLUCOSE BLD-MCNC: 144 MG/DL (ref 70–100)
GLUCOSE BLDC GLUCOMTR-MCNC: 120 MG/DL (ref 70–130)
GLUCOSE BLDC GLUCOMTR-MCNC: 137 MG/DL (ref 70–130)
GLUCOSE BLDC GLUCOMTR-MCNC: 151 MG/DL (ref 70–130)
GLUCOSE BLDC GLUCOMTR-MCNC: 213 MG/DL (ref 70–130)
POTASSIUM BLD-SCNC: 3.7 MMOL/L (ref 3.5–5.5)
SODIUM BLD-SCNC: 138 MMOL/L (ref 132–146)

## 2017-11-15 PROCEDURE — 0 IOPAMIDOL PER 1 ML: Performed by: INTERNAL MEDICINE

## 2017-11-15 PROCEDURE — S0260 H&P FOR SURGERY: HCPCS | Performed by: INTERNAL MEDICINE

## 2017-11-15 PROCEDURE — 94799 UNLISTED PULMONARY SVC/PX: CPT

## 2017-11-15 PROCEDURE — 83880 ASSAY OF NATRIURETIC PEPTIDE: CPT | Performed by: INTERNAL MEDICINE

## 2017-11-15 PROCEDURE — 99233 SBSQ HOSP IP/OBS HIGH 50: CPT | Performed by: HOSPITALIST

## 2017-11-15 PROCEDURE — 94660 CPAP INITIATION&MGMT: CPT

## 2017-11-15 PROCEDURE — 63710000001 INSULIN LISPRO (HUMAN) PER 5 UNITS: Performed by: PHYSICIAN ASSISTANT

## 2017-11-15 PROCEDURE — C1769 GUIDE WIRE: HCPCS | Performed by: INTERNAL MEDICINE

## 2017-11-15 PROCEDURE — 93458 L HRT ARTERY/VENTRICLE ANGIO: CPT | Performed by: INTERNAL MEDICINE

## 2017-11-15 PROCEDURE — C1894 INTRO/SHEATH, NON-LASER: HCPCS | Performed by: INTERNAL MEDICINE

## 2017-11-15 PROCEDURE — 80048 BASIC METABOLIC PNL TOTAL CA: CPT | Performed by: INTERNAL MEDICINE

## 2017-11-15 PROCEDURE — 82962 GLUCOSE BLOOD TEST: CPT

## 2017-11-15 PROCEDURE — 25010000002 HEPARIN (PORCINE) PER 1000 UNITS: Performed by: INTERNAL MEDICINE

## 2017-11-15 PROCEDURE — 63710000001 INSULIN DETEMIR PER 5 UNITS: Performed by: INTERNAL MEDICINE

## 2017-11-15 PROCEDURE — 25010000002 HEPARIN (PORCINE) PER 1000 UNITS: Performed by: PHYSICIAN ASSISTANT

## 2017-11-15 RX ORDER — SODIUM CHLORIDE 9 MG/ML
100 INJECTION, SOLUTION INTRAVENOUS CONTINUOUS
Status: ACTIVE | OUTPATIENT
Start: 2017-11-15 | End: 2017-11-15

## 2017-11-15 RX ORDER — CARVEDILOL 12.5 MG/1
25 TABLET ORAL EVERY 12 HOURS SCHEDULED
Status: DISCONTINUED | OUTPATIENT
Start: 2017-11-15 | End: 2017-11-16 | Stop reason: HOSPADM

## 2017-11-15 RX ORDER — ISOSORBIDE MONONITRATE 60 MG/1
60 TABLET, EXTENDED RELEASE ORAL
Status: DISCONTINUED | OUTPATIENT
Start: 2017-11-16 | End: 2017-11-16 | Stop reason: HOSPADM

## 2017-11-15 RX ORDER — LIDOCAINE HYDROCHLORIDE 10 MG/ML
INJECTION, SOLUTION EPIDURAL; INFILTRATION; INTRACAUDAL; PERINEURAL AS NEEDED
Status: DISCONTINUED | OUTPATIENT
Start: 2017-11-15 | End: 2017-11-15 | Stop reason: HOSPADM

## 2017-11-15 RX ADMIN — AMLODIPINE BESYLATE 10 MG: 10 TABLET ORAL at 08:35

## 2017-11-15 RX ADMIN — HYDRALAZINE HYDROCHLORIDE 50 MG: 50 TABLET ORAL at 20:29

## 2017-11-15 RX ADMIN — HYDRALAZINE HYDROCHLORIDE 50 MG: 50 TABLET ORAL at 17:57

## 2017-11-15 RX ADMIN — ISOSORBIDE MONONITRATE 30 MG: 30 TABLET, EXTENDED RELEASE ORAL at 08:35

## 2017-11-15 RX ADMIN — ASPIRIN 81 MG CHEWABLE TABLET 81 MG: 81 TABLET CHEWABLE at 08:35

## 2017-11-15 RX ADMIN — HYDRALAZINE HYDROCHLORIDE 50 MG: 50 TABLET ORAL at 06:10

## 2017-11-15 RX ADMIN — HEPARIN SODIUM 5000 UNITS: 5000 INJECTION, SOLUTION INTRAVENOUS; SUBCUTANEOUS at 20:30

## 2017-11-15 RX ADMIN — INSULIN LISPRO 3 UNITS: 100 INJECTION, SOLUTION INTRAVENOUS; SUBCUTANEOUS at 17:58

## 2017-11-15 RX ADMIN — FAMOTIDINE 40 MG: 20 TABLET, FILM COATED ORAL at 08:35

## 2017-11-15 RX ADMIN — ACETAMINOPHEN 650 MG: 325 TABLET ORAL at 22:35

## 2017-11-15 RX ADMIN — ROSUVASTATIN CALCIUM 20 MG: 20 TABLET, FILM COATED ORAL at 20:29

## 2017-11-15 RX ADMIN — SODIUM CHLORIDE 50 ML/HR: 4.5 INJECTION, SOLUTION INTRAVENOUS at 06:10

## 2017-11-15 RX ADMIN — DOCUSATE SODIUM 100 MG: 100 CAPSULE, LIQUID FILLED ORAL at 17:57

## 2017-11-15 RX ADMIN — INSULIN DETEMIR 20 UNITS: 100 INJECTION, SOLUTION SUBCUTANEOUS at 21:28

## 2017-11-15 RX ADMIN — ALLOPURINOL 150 MG: 300 TABLET ORAL at 08:35

## 2017-11-15 RX ADMIN — CARVEDILOL 12.5 MG: 12.5 TABLET, FILM COATED ORAL at 08:35

## 2017-11-15 RX ADMIN — CARVEDILOL 25 MG: 12.5 TABLET, FILM COATED ORAL at 20:29

## 2017-11-15 RX ADMIN — HEPARIN SODIUM 5000 UNITS: 5000 INJECTION, SOLUTION INTRAVENOUS; SUBCUTANEOUS at 06:10

## 2017-11-15 RX ADMIN — DOCUSATE SODIUM 100 MG: 100 CAPSULE, LIQUID FILLED ORAL at 08:35

## 2017-11-15 NOTE — PLAN OF CARE
Problem: Cardiac: Heart Failure (Adult)  Goal: Signs and Symptoms of Listed Potential Problems Will be Absent or Manageable (Cardiac: Heart Failure)  Outcome: Ongoing (interventions implemented as appropriate)    11/15/17 0544   Cardiac: Heart Failure   Problems Assessed (Heart Failure) all   Problems Present (Heart Failure) cardiac pump dysfunction         Problem: Renal Failure/Kidney Injury, Acute (Adult)  Goal: Signs and Symptoms of Listed Potential Problems Will be Absent or Manageable (Renal Failure/Kidney Injury, Acute)  Outcome: Ongoing (interventions implemented as appropriate)    11/15/17 0544   Renal Failure/Kidney Injury, Acute   Problems Assessed (Acute Renal Failure/Kidney Injury) all   Problems Present (Acute Renal Failure/Kidney Injury) fluid imbalance

## 2017-11-15 NOTE — PROGRESS NOTES
[facility] at [facility]  IP Progress Note      Chief Complaint/Reason for service:  New onset systolic heart failure    Subjective   Subjective: The patient states his breathing is improved A flat last night without any dyspnea.    Past medical, surgical, social and family history reviewed in the patient's electronic medical record.    Objective     Vital Sign Min/Max for last 24 hours  Temp  Min: 97.6 °F (36.4 °C)  Max: 98.8 °F (37.1 °C)   BP  Min: 122/79  Max: 144/86   Pulse  Min: 63  Max: 72   Resp  Min: 16  Max: 18   SpO2  Min: 96 %  Max: 100 %   No Data Recorded      Intake/Output Summary (Last 24 hours) at 11/15/17 0716  Last data filed at 11/15/17 0352   Gross per 24 hour   Intake             1840 ml   Output             3350 ml   Net            -1510 ml             Current Facility-Administered Medications:   •  acetaminophen (TYLENOL) tablet 650 mg, 650 mg, Oral, Q4H PRN, Jeane Ledesma PA-C  •  allopurinol (ZYLOPRIM) tablet 150 mg, 150 mg, Oral, Daily, Jeane Ledesma PA-C, 150 mg at 11/14/17 0926  •  amLODIPine (NORVASC) tablet 10 mg, 10 mg, Oral, Q24H, Jeane Ledesma PA-C, 10 mg at 11/14/17 0927  •  aspirin chewable tablet 81 mg, 81 mg, Oral, Daily, Jeane Ledesma PA-C, 81 mg at 11/14/17 0927  •  carvedilol (COREG) tablet 12.5 mg, 12.5 mg, Oral, Q12H, Jeane Ledesma PA-C, 12.5 mg at 11/14/17 2055  •  dextrose (D50W) solution 25 g, 25 g, Intravenous, Q15 Min PRN, Jeane Ledesma PA-C  •  dextrose (GLUTOSE) oral gel 15 g, 15 g, Oral, Q15 Min PRN, Jeane Ledesma PA-C  •  docusate sodium (COLACE) capsule 100 mg, 100 mg, Oral, BID, Jeane Ledesma PA-C, 100 mg at 11/14/17 1737  •  famotidine (PEPCID) tablet 40 mg, 40 mg, Oral, Daily, Jeane Ledesma PA-C, 40 mg at 11/14/17 0926  •  glucagon (GLUCAGEN) injection 1 mg, 1 mg, Subcutaneous, Q15 Min PRN, Jeane Ledesma PA-C  •  guaiFENesin (MUCINEX) 12 hr tablet 600 mg, 600  mg, Oral, Q12H PRN, Yovana Mejia V, APRN, 600 mg at 11/13/17 2100  •  heparin (porcine) 5000 UNIT/ML injection 5,000 Units, 5,000 Units, Subcutaneous, Q8H, Jeane Ledesma PA-C, 5,000 Units at 11/15/17 0610  •  hydrALAZINE (APRESOLINE) tablet 50 mg, 50 mg, Oral, Q8H, Afshin Parekh MD, 50 mg at 11/15/17 0610  •  insulin detemir (LEVEMIR) injection 20 Units, 20 Units, Subcutaneous, Nightly, Jude Dorsey MD, 20 Units at 11/14/17 2051  •  insulin lispro (humaLOG) injection 0-7 Units, 0-7 Units, Subcutaneous, 4x Daily AC & at Bedtime, Jeane Ledesma PA-C, 2 Units at 11/14/17 2053  •  insulin lispro (humaLOG) injection 5 Units, 5 Units, Subcutaneous, TID With Meals, Jeane Ledesma PA-C, 5 Units at 11/14/17 1737  •  isosorbide mononitrate (IMDUR) 24 hr tablet 30 mg, 30 mg, Oral, Q24H, Afshin Parekh MD, 30 mg at 11/14/17 0926  •  ondansetron (ZOFRAN) tablet 4 mg, 4 mg, Oral, Q6H PRN, Jeane Ledesma PA-C  •  Pharmacy Consult - [facility], , Does not apply, Daily, Rufino Delgado, [facility]  •  potassium chloride (MICRO-K) CR capsule 40 mEq, 40 mEq, Oral, PRN, 40 mEq at 11/13/17 1814 **OR** [DISCONTINUED] potassium chloride (KLOR-CON) packet 40 mEq, 40 mEq, Oral, PRN **OR** potassium chloride 10 mEq in 100 mL IVPB, 10 mEq, Intravenous, Q1H PRN, Thelma Cartagena, SALLY  •  rosuvastatin (CRESTOR) tablet 20 mg, 20 mg, Oral, Nightly, Jeane Ledesma PA-C, 20 mg at 11/14/17 2055  •  sodium chloride 0.45 % infusion, 75 mL/hr, Intravenous, Continuous, Afshin Parekh MD, Last Rate: 50 mL/hr at 11/15/17 0610, 50 mL/hr at 11/15/17 0610  •  sodium chloride 0.9 % flush 1-10 mL, 1-10 mL, Intravenous, PRN, Jeane Ledesma PA-C  •  sodium chloride 0.9 % flush 10 mL, 10 mL, Intravenous, PRN, Yair Sweeney MD    Physical Exam: General  well-developed well-nourished gentleman large build no acute distress        HEENT: No JVP no scleral icterus       Respiratory:  Equal bilateral  symmetrical expansion and clear to auscultation bilaterally       Cardiovascular: Regular rate and rhythm with an inspiratory split S1 and a soft murmur/trace lower extremity edema pitting to palpation       GI: Positive bowel sounds       Lower Extremities: No peripheral lesions warm and dry       Neuro: Cranial nerves II through XII are grossly normal he moves all 4 extremities       Skin:  Warm and dry       Psych: Pleasant affect    Results Review: The patient's creatinine is 2.1 again today.  His urine output exceeds intake by over a liter.  But pressure is improved from yesterday with increased dose of hydralazine    Radiology Results:  Imaging Results (last 72 hours)     Procedure Component Value Units Date/Time    XR Chest 1 View [[id number]] Collected:  11/12/17 1538     Updated:  11/13/17 0901    Narrative:          EXAMINATION: XR CHEST 1 VW - 11/12/2017     INDICATION: Shortness of air.     COMPARISON: 10/20/2017.     FINDINGS: Portable chest reveals the heart to be enlarged. Slight  prominence of the pulmonary vascularity. No focal parenchymal  opacification is present. No pleural effusion or pneumothorax.  Degenerative change is seen within the spine.  No acute parenchymal disease.           Impression:       Prominence of the pulmonary vascularity with no acute  parenchymal disease.     DICTATED:     11/12/2017  EDITED:         11/12/2017           This report was finalized on 11/13/2017 8:59 AM by Dr. Unique Huber MD.       XR Chest 1 View [[id number]] Collected:  11/13/17 1536     Updated:  11/13/17 1542    Narrative:       EXAMINATION: XR CHEST 1 -11/13/2017:      INDICATION: SOB; I50.9-Heart failure, unspecified; N17.9-Acute kidney  failure, unspecified; R06.02-Shortness of breath; I10-Essential  (primary) hypertension.      COMPARISON: 11/12/2017.     FINDINGS: Portable chest reveals cardiac and mediastinal silhouettes to  be within normal limits. The lung fields are grossly clear. No  focal  parenchymal opacification present.  No pleural effusion or pneumothorax.  Degenerative changes seen within the spine. Pulmonary vascularity is  within normal limits.           Impression:       No acute cardiopulmonary disease.     D:  11/13/2017  E:  11/13/2017     This report was finalized on 11/13/2017 3:40 PM by Dr. Unique Huber MD.             EKG:     ECHO: Global LV wall hypokinesis with EF in the 40s    Tele:  Sinus rhythm    Assessment   Assessment/Plan: 1 new onset systolic heart failure with EF in the 40s-the patient has multiple CAD risk factors-plan cardiac catheter today.  His BNP continues to decrease  2 CK D with a baseline creatinine last year 2.0 2.1 yesterday and again 2.1 today.  I'll increase IV fluids to 75 ML's per hour and have my partner to cardiac catheter  3 hypertension-improved over the last 25 since the increase in hydralazine      Afshin Parekh MD  11/15/17  7:16 AM

## 2017-11-15 NOTE — PROGRESS NOTES
Continued Stay Note  [facility]     Patient Name: Trace Escalante  MRN: [id number]  Today's Date: 11/15/2017    Admit Date: 11/12/2017          Discharge Plan       11/15/17 0937    Case Management/Social Work Plan    Plan Home    Patient/Family In Agreement With Plan yes    Additional Comments Spoke with patient.  Plan is still to go home at discharge.  No discharge needs identified.  Will continue to follow.  Haleigh Norman RN [phone]              Discharge Codes     None        Expected Discharge Date and Time     Expected Discharge Date Expected Discharge Time    Nov 17, 2017             Haleigh Norman RN

## 2017-11-15 NOTE — PROGRESS NOTES
[facility] Medicine Services  PROGRESS NOTE    Patient Name: Trace Escalante  : 1960  MRN: [id number]    Date of Admission: 2017  Length of Stay: 3  Primary Care Physician: Jose Ramon Allen MD    Subjective   Subjective     CC:  Shortness of breath - failure of outpatient mgmt - sent from [facility] to ER    HPI:  Seen in [facility] after Cardiac Cath with Dr. Marcum.   Discussed case with Dr. Marcum in [facility].  Will give IV fluids for now due to contrast load in patient with CKD.    Review of Systems  Gen- No fevers, chills  CV- No chest pain, palpitations  Resp- No cough, dyspnea  GI- No N/V/D, abd pain    Otherwise ROS is negative except as mentioned in the HPI.    Objective   Objective     Vital Signs:   Temp:  [97.9 °F (36.6 °C)-98.8 °F (37.1 °C)] 98 °F (36.7 °C)  Heart Rate:  [63-85] 78  Resp:  [16-18] 16  BP: (113-151)/(66-93) 151/81    Physical Exam:    Constitutional: No acute distress, awake, alert  HENT: NCAT, mucous membranes moist  Respiratory: Clear to auscultation bilaterally, respiratory effort normal   Cardiovascular: RRR, s1 and s2  Gastrointestinal: Positive bowel sounds, soft, nontender, nondistended, obese abdomen  Musculoskeletal: No bilateral ankle edema  Psychiatric: Appropriate affect, cooperative  Neurologic: Oriented x 3, strength symmetric in all extremities, Cranial Nerves grossly intact to confrontation, speech clear  Skin: No rashes    Results Reviewed:  I have personally reviewed current lab, radiology, and data and agree.      Results from last 7 days  Lab Units 17  0518 17  1249   WBC 10*3/mm3 6.19 6.82   HEMOGLOBIN g/dL 11.6* 13.2   HEMATOCRIT % 35.2* 39.2   PLATELETS 10*3/mm3 223 226       Results from last 7 days  Lab Units 11/15/17  0453 17  0459 17  0518 17  7768 17  1249   SODIUM mmol/L 138 138 137  --  135   POTASSIUM mmol/L 3.7 3.9 3.1*  --  3.6   CHLORIDE mmol/L 107 103 103  --  100   CO2 mmol/L  27.0 28.0 28.0  --  25.0   BUN mg/dL 26* 25* 19  --  15   CREATININE mg/dL 2.10* 2.10* 1.90*  --  1.90*   GLUCOSE mg/dL 144* 138* 95  --  281*   CALCIUM mg/dL 8.6* 8.8 8.2*  --  8.6*   ALT (SGPT) U/L  --   --   --   --  30   AST (SGOT) U/L  --   --   --   --  39*   TROPONIN I ng/mL  --  0.035  --  0.087*  --      BNP   Date Value Ref Range Status   11/15/2017 244.0 (H) 0.0 - 100.0 pg/mL Final     No results found for: PHART    Microbiology Results Abnormal     None          Imaging Results (last 24 hours)     ** No results found for the last 24 hours. **        Results for orders placed during the hospital encounter of 11/12/17   Adult Transthoracic Echo Complete W/ Cont if Necessary Per Protocol    Addendum · Left ventricular systolic function is moderately decreased. Calculated  EF = 39.9%. Estimated EF appears to be in the range of 36 - 40%. · Left ventricular wall thickness is consistent with mild concentric  hypertrophy. · Left ventricular diastolic dysfunction (grade III) consistent with  reversible restrictive pattern. · Right ventricular cavity is borderline dilated. · Left atrial cavity size is mildly dilated. · There is mild calcification of the aortic valve. Mild aortic valve  regurgitation is present. · Mild pulmonic valve regurgitation is present. · Mild mitral valve regurgitation is present        Blake Lynch MD 11/14/2017 10:01 AM          Narrative · Left ventricular systolic function is moderately decreased. Calculated   EF = 39.9%. Estimated EF appears to be in the range of 36 - 40%.  · Left ventricular wall thickness is consistent with mild concentric   hypertrophy.  · Left ventricular diastolic dysfunction (grade III) consistent with   reversible restrictive pattern.  · Right ventricular cavity is borderline dilated.  · Left atrial cavity size is mildly dilated.  · There is mild calcification of the aortic valve. Mild aortic valve   regurgitation is present.  · Mild pulmonic valve regurgitation  is present.  · Mild mitral valve regurgitation is present          I have reviewed the medications.    Assessment/Plan   Assessment / Plan     Hospital Problem List     * (Principal)Acute congestive heart failure    Overview Signed 11/15/2017  8:55 AM by Afshin Parekh MD     Added automatically from request for surgery [id number]         Controlled type 2 diabetes mellitus with kidney complication, with long-term current use of insulin    Essential hypertension (Chronic)    Mixed hyperlipidemia (Chronic)    Gout    Elevated brain natriuretic peptide (BNP) level    Elevated serum creatinine    Chronic renal insufficiency    Acute pulmonary edema    Obesity, Class I, BMI 30-34.9             Brief Hospital Course to date:  Trace Escalante is a 57 y.o. male who presented to [facility] for failure of outpatient treatment for shortness of breath.    Sent from [facility] to our ER.    Assessment & Plan:    Shortness of Breath  - possible multifactorial issue  - Cardiomyopathy - unclear etiology - no intervention - radial artery approached cardiac cath - Dr. Marcum today  - very hypertensive on multiple mediations  - ALYSSA  - Obesity  - leaky valves / combined systolic/diastolic heart failure  Chronic Kidney Disease  - unclear baseline  - likely CKD stage III  - holding spironolactone / holding hydrochlorothiazide  Hypertension  - hydralazine added  - Norvasc  - Imdur  ALYSSA  - CPAP at night  DM  - follows with Dr. SOFIYA Thomas    IV fluids after radial artery cath today with contrast given in patient with baseline CKD  Increase medications for hypertension today  Discussed case with Dr. Marcum today in person  EKG in AM  BNP in AM  Fluids after cardiac cath due to contrast load with CKD    Will need follow up in Cardiology Clinic   No ace inhibitor for now due to DILAN/CKD + contrast load today  May be able to start at discharge or follow up and start in clinic  HF Navigator / Cardiac Rehab consulted    DVT  Prophylaxis:  Heparin 5,000 Units SC every 8 hours    CODE STATUS: Full Code    Disposition: I expect the patient to be discharged home in 24 hours    Desmond Espinoza MD  11/15/17  5:14 PM

## 2017-11-15 NOTE — NURSING NOTE
Pt. Referred for Phase II Cardiac Rehab. Consult for Heart Failure Navigator.  Patient to follow up with [facility] after discharge.  [facility] to refer pt back to Cardiac Rehab when appropriate.  Please contact staff if we can be of further assistance with this patient.

## 2017-11-16 VITALS
DIASTOLIC BLOOD PRESSURE: 97 MMHG | SYSTOLIC BLOOD PRESSURE: 158 MMHG | WEIGHT: 294.2 LBS | TEMPERATURE: 98 F | RESPIRATION RATE: 18 BRPM | HEART RATE: 75 BPM | BODY MASS INDEX: 36.58 KG/M2 | HEIGHT: 75 IN | OXYGEN SATURATION: 99 %

## 2017-11-16 LAB
ANION GAP SERPL CALCULATED.3IONS-SCNC: 5 MMOL/L (ref 3–11)
BNP SERPL-MCNC: 321 PG/ML (ref 0–100)
BUN BLD-MCNC: 27 MG/DL (ref 9–23)
BUN/CREAT SERPL: 12.9 (ref 7–25)
CALCIUM SPEC-SCNC: 8.6 MG/DL (ref 8.7–10.4)
CHLORIDE SERPL-SCNC: 106 MMOL/L (ref 99–109)
CO2 SERPL-SCNC: 25 MMOL/L (ref 20–31)
CREAT BLD-MCNC: 2.1 MG/DL (ref 0.6–1.3)
DEPRECATED RDW RBC AUTO: 44 FL (ref 37–54)
ERYTHROCYTE [DISTWIDTH] IN BLOOD BY AUTOMATED COUNT: 13.8 % (ref 11.3–14.5)
GFR SERPL CREATININE-BSD FRML MDRD: 40 ML/MIN/1.73
GLUCOSE BLD-MCNC: 160 MG/DL (ref 70–100)
GLUCOSE BLDC GLUCOMTR-MCNC: 140 MG/DL (ref 70–130)
GLUCOSE BLDC GLUCOMTR-MCNC: 209 MG/DL (ref 70–130)
HCT VFR BLD AUTO: 34 % (ref 38.9–50.9)
HGB BLD-MCNC: 11.1 G/DL (ref 13.1–17.5)
MAGNESIUM SERPL-MCNC: 2 MG/DL (ref 1.3–2.7)
MCH RBC QN AUTO: 28.4 PG (ref 27–31)
MCHC RBC AUTO-ENTMCNC: 32.6 G/DL (ref 32–36)
MCV RBC AUTO: 87 FL (ref 80–99)
PHOSPHATE SERPL-MCNC: 3.9 MG/DL (ref 2.4–5.1)
PLATELET # BLD AUTO: 231 10*3/MM3 (ref 150–450)
PMV BLD AUTO: 11.1 FL (ref 6–12)
POTASSIUM BLD-SCNC: 3.6 MMOL/L (ref 3.5–5.5)
RBC # BLD AUTO: 3.91 10*6/MM3 (ref 4.2–5.76)
SODIUM BLD-SCNC: 136 MMOL/L (ref 132–146)
WBC NRBC COR # BLD: 6.57 10*3/MM3 (ref 3.5–10.8)

## 2017-11-16 PROCEDURE — 94660 CPAP INITIATION&MGMT: CPT

## 2017-11-16 PROCEDURE — 99232 SBSQ HOSP IP/OBS MODERATE 35: CPT | Performed by: INTERNAL MEDICINE

## 2017-11-16 PROCEDURE — 93005 ELECTROCARDIOGRAM TRACING: CPT | Performed by: HOSPITALIST

## 2017-11-16 PROCEDURE — 83735 ASSAY OF MAGNESIUM: CPT | Performed by: HOSPITALIST

## 2017-11-16 PROCEDURE — 94799 UNLISTED PULMONARY SVC/PX: CPT

## 2017-11-16 PROCEDURE — 99239 HOSP IP/OBS DSCHRG MGMT >30: CPT | Performed by: HOSPITALIST

## 2017-11-16 PROCEDURE — 84100 ASSAY OF PHOSPHORUS: CPT | Performed by: HOSPITALIST

## 2017-11-16 PROCEDURE — 82962 GLUCOSE BLOOD TEST: CPT

## 2017-11-16 PROCEDURE — 93010 ELECTROCARDIOGRAM REPORT: CPT | Performed by: INTERNAL MEDICINE

## 2017-11-16 PROCEDURE — 25010000002 HEPARIN (PORCINE) PER 1000 UNITS: Performed by: PHYSICIAN ASSISTANT

## 2017-11-16 PROCEDURE — 83880 ASSAY OF NATRIURETIC PEPTIDE: CPT | Performed by: HOSPITALIST

## 2017-11-16 PROCEDURE — 80048 BASIC METABOLIC PNL TOTAL CA: CPT | Performed by: INTERNAL MEDICINE

## 2017-11-16 PROCEDURE — 85027 COMPLETE CBC AUTOMATED: CPT | Performed by: HOSPITALIST

## 2017-11-16 RX ORDER — CARVEDILOL 25 MG/1
25 TABLET ORAL EVERY 12 HOURS SCHEDULED
Qty: 60 TABLET | Refills: 11 | Status: SHIPPED | OUTPATIENT
Start: 2017-11-16 | End: 2018-05-18 | Stop reason: DRUGHIGH

## 2017-11-16 RX ORDER — FUROSEMIDE 20 MG/1
20 TABLET ORAL DAILY
Qty: 30 TABLET | Refills: 11 | Status: SHIPPED | OUTPATIENT
Start: 2017-11-16 | End: 2017-11-27 | Stop reason: DRUGHIGH

## 2017-11-16 RX ORDER — ROSUVASTATIN CALCIUM 40 MG/1
40 TABLET, COATED ORAL NIGHTLY
Qty: 30 TABLET | Refills: 11 | Status: SHIPPED | OUTPATIENT
Start: 2017-11-16 | End: 2018-09-14

## 2017-11-16 RX ORDER — SPIRONOLACTONE 25 MG/1
25 TABLET ORAL DAILY
Status: DISCONTINUED | OUTPATIENT
Start: 2017-11-16 | End: 2017-11-16 | Stop reason: HOSPADM

## 2017-11-16 RX ORDER — ISOSORBIDE MONONITRATE 60 MG/1
60 TABLET, EXTENDED RELEASE ORAL
Qty: 30 TABLET | Refills: 11 | Status: SHIPPED | OUTPATIENT
Start: 2017-11-16 | End: 2018-11-28 | Stop reason: SDUPTHER

## 2017-11-16 RX ORDER — HYDRALAZINE HYDROCHLORIDE 50 MG/1
50 TABLET, FILM COATED ORAL EVERY 8 HOURS SCHEDULED
Qty: 90 TABLET | Refills: 11 | Status: SHIPPED | OUTPATIENT
Start: 2017-11-16 | End: 2018-12-26 | Stop reason: SDUPTHER

## 2017-11-16 RX ORDER — ROSUVASTATIN CALCIUM 20 MG/1
40 TABLET, COATED ORAL NIGHTLY
Status: DISCONTINUED | OUTPATIENT
Start: 2017-11-16 | End: 2017-11-16 | Stop reason: HOSPADM

## 2017-11-16 RX ADMIN — INSULIN LISPRO 3 UNITS: 100 INJECTION, SOLUTION INTRAVENOUS; SUBCUTANEOUS at 12:35

## 2017-11-16 RX ADMIN — HYDRALAZINE HYDROCHLORIDE 50 MG: 50 TABLET ORAL at 05:07

## 2017-11-16 RX ADMIN — AMLODIPINE BESYLATE 10 MG: 10 TABLET ORAL at 08:32

## 2017-11-16 RX ADMIN — FAMOTIDINE 40 MG: 20 TABLET, FILM COATED ORAL at 08:32

## 2017-11-16 RX ADMIN — CARVEDILOL 25 MG: 12.5 TABLET, FILM COATED ORAL at 08:32

## 2017-11-16 RX ADMIN — SPIRONOLACTONE 25 MG: 25 TABLET, FILM COATED ORAL at 08:31

## 2017-11-16 RX ADMIN — ISOSORBIDE MONONITRATE 60 MG: 60 TABLET, EXTENDED RELEASE ORAL at 08:32

## 2017-11-16 RX ADMIN — HEPARIN SODIUM 5000 UNITS: 5000 INJECTION, SOLUTION INTRAVENOUS; SUBCUTANEOUS at 05:07

## 2017-11-16 RX ADMIN — ALLOPURINOL 150 MG: 300 TABLET ORAL at 08:32

## 2017-11-16 RX ADMIN — DOCUSATE SODIUM 100 MG: 100 CAPSULE, LIQUID FILLED ORAL at 08:31

## 2017-11-16 RX ADMIN — ASPIRIN 81 MG CHEWABLE TABLET 81 MG: 81 TABLET CHEWABLE at 08:32

## 2017-11-16 RX ADMIN — ACETAMINOPHEN 650 MG: 325 TABLET ORAL at 08:32

## 2017-11-16 RX ADMIN — HYDRALAZINE HYDROCHLORIDE 50 MG: 50 TABLET ORAL at 14:50

## 2017-11-16 NOTE — DISCHARGE SUMMARY
[facility] Medicine Services  DISCHARGE SUMMARY    Patient Name: Trace Escalante  : 1960  MRN: [id number]    Date of Admission: 2017  Date of Discharge:  2017  Length of Stay: 4  Primary Care Physician: Jose Ramon Allen MD    Consults     Date and Time Order Name Status Description    2017 2253 Inpatient Consult to Cardiology Completed       Dr. Lamin Mack - interventional cardiologist    Hospital Course     Presenting Problem:   Acute pulmonary edema [J81.0]    Active Hospital Problems (** Indicates Principal Problem)    Diagnosis Date Noted   • **Acute congestive heart failure [I50.9] 2017   • Obesity, Class I, BMI 30-34.9 [E66.9] 2017   • Elevated brain natriuretic peptide (BNP) level [R79.89] 2017   • Elevated serum creatinine [R79.89] 2017   • Gout [M10.9] 2017   • Chronic renal insufficiency [N18.9] 2017   • Acute pulmonary edema [J81.0] 2017   • Controlled type 2 diabetes mellitus with kidney complication, with long-term current use of insulin [E11.29, Z79.4] 10/20/2017   • Essential hypertension [I10] 10/20/2017   • Mixed hyperlipidemia [E78.2] 10/20/2017      Resolved Hospital Problems    Diagnosis Date Noted Date Resolved   No resolved problems to display.          Hospital Course:  Trace Escalante is a 57 y.o. male who presented with shortness of breath and new onset CHF.  He was seen by cardiology and due to baseline CKD, was prepared for cardiac catheterization.  He had no stenting and will be medically managed going forward.  Blood pressure has been an issue and medication adjustments were made up until today, the day of discharge.  He has been advised to use his CPAP and keep a blood pressure journal going forward.    Procedure(s):  Coronary angiography       Day of Discharge     HPI: feels good and would like to go home.  Father in room today.  Dressed and ready to go on  evaluation.    Review of Systems  Gen- No fevers, chills  CV- No chest pain, palpitations  Resp- No cough, dyspnea  GI- No N/V/D, abd pain      Otherwise ROS is negative except as mentioned in the HPI.    Vital Signs:   Temp:  [98 °F (36.7 °C)-98.7 °F (37.1 °C)] 98 °F (36.7 °C)  Heart Rate:  [71-85] 75  Resp:  [15-24] 18  BP: (145-162)/(81-99) 158/97     Physical Exam:  Constitutional: No acute distress, awake, alert  HENT: NCAT, mucous membranes moist  Respiratory: Clear to auscultation bilaterally, respiratory effort normal   Cardiovascular: RRR, s1 and s2  Gastrointestinal: Positive bowel sounds, soft, nontender, nondistended  Musculoskeletal: No bilateral ankle edema  Psychiatric: Appropriate affect, cooperative  Neurologic: Oriented x 3, strength symmetric in all extremities, Cranial Nerves grossly intact to confrontation, speech clear  Skin: No rashes      Pertinent  and/or Most Recent Results         Results from last 7 days  Lab Units 11/16/17  0529 11/15/17  0453 11/14/17  0459 11/13/17  0518 11/12/17  1249   WBC 10*3/mm3 6.57  --   --  6.19 6.82   HEMOGLOBIN g/dL 11.1*  --   --  11.6* 13.2   HEMATOCRIT % 34.0*  --   --  35.2* 39.2   PLATELETS 10*3/mm3 231  --   --  223 226   SODIUM mmol/L 136 138 138 137 135   POTASSIUM mmol/L 3.6 3.7 3.9 3.1* 3.6   CHLORIDE mmol/L 106 107 103 103 100   CO2 mmol/L 25.0 27.0 28.0 28.0 25.0   BUN mg/dL 27* 26* 25* 19 15   CREATININE mg/dL 2.10* 2.10* 2.10* 1.90* 1.90*   GLUCOSE mg/dL 160* 144* 138* 95 281*   CALCIUM mg/dL 8.6* 8.6* 8.8 8.2* 8.6*       Results from last 7 days  Lab Units 11/12/17  1249   BILIRUBIN mg/dL 0.6   ALK PHOS U/L 74   ALT (SGPT) U/L 30   AST (SGOT) U/L 39*       Results from last 7 days  Lab Units 11/13/17  0518   CHOLESTEROL mg/dL 154   TRIGLYCERIDES mg/dL 78   HDL CHOL mg/dL 40   LDL CHOL mg/dL 95       Results from last 7 days  Lab Units 11/16/17  0529 11/15/17  0453 11/14/17  0459  11/13/17  0519 11/13/17  0518 11/12/17  2358   TSH mIU/mL  --    --   --   --   --  1.152  --    HEMOGLOBIN A1C %  --   --   --   --  8.30*  --   --    BNP pg/mL 321.0* 244.0* 367.0*  < >  --   --   --    TROPONIN I ng/mL  --   --  0.035  --   --   --  0.087*   < > = values in this interval not displayed.  Brief Urine Lab Results     None          Microbiology Results Abnormal     None          Imaging Results (all)     Procedure Component Value Units Date/Time    XR Chest 1 View [[id number]] Collected:  11/12/17 1538     Updated:  11/13/17 0901    Narrative:          EXAMINATION: XR CHEST 1 VW - 11/12/2017     INDICATION: Shortness of air.     COMPARISON: 10/20/2017.     FINDINGS: Portable chest reveals the heart to be enlarged. Slight  prominence of the pulmonary vascularity. No focal parenchymal  opacification is present. No pleural effusion or pneumothorax.  Degenerative change is seen within the spine.  No acute parenchymal disease.           Impression:       Prominence of the pulmonary vascularity with no acute  parenchymal disease.     DICTATED:     11/12/2017  EDITED:         11/12/2017           This report was finalized on 11/13/2017 8:59 AM by Dr. Unique Huber MD.       XR Chest 1 View [[id number]] Collected:  11/13/17 1536     Updated:  11/13/17 1542    Narrative:       EXAMINATION: XR CHEST 1 VW-11/13/2017:      INDICATION: SOB; I50.9-Heart failure, unspecified; N17.9-Acute kidney  failure, unspecified; R06.02-Shortness of breath; I10-Essential  (primary) hypertension.      COMPARISON: 11/12/2017.     FINDINGS: Portable chest reveals cardiac and mediastinal silhouettes to  be within normal limits. The lung fields are grossly clear. No focal  parenchymal opacification present.  No pleural effusion or pneumothorax.  Degenerative changes seen within the spine. Pulmonary vascularity is  within normal limits.           Impression:       No acute cardiopulmonary disease.     D:  11/13/2017  E:  11/13/2017     This report was finalized on 11/13/2017 3:40 PM by   Unique Huber MD.             Results for orders placed during the hospital encounter of 11/12/17   Adult Transthoracic Echo Complete W/ Cont if Necessary Per Protocol    Addendum · Left ventricular systolic function is moderately decreased. Calculated  EF = 39.9%. Estimated EF appears to be in the range of 36 - 40%. · Left ventricular wall thickness is consistent with mild concentric  hypertrophy. · Left ventricular diastolic dysfunction (grade III) consistent with  reversible restrictive pattern. · Right ventricular cavity is borderline dilated. · Left atrial cavity size is mildly dilated. · There is mild calcification of the aortic valve. Mild aortic valve  regurgitation is present. · Mild pulmonic valve regurgitation is present. · Mild mitral valve regurgitation is present        Blake Lynch MD 11/14/2017 10:01 AM          Narrative · Left ventricular systolic function is moderately decreased. Calculated   EF = 39.9%. Estimated EF appears to be in the range of 36 - 40%.  · Left ventricular wall thickness is consistent with mild concentric   hypertrophy.  · Left ventricular diastolic dysfunction (grade III) consistent with   reversible restrictive pattern.  · Right ventricular cavity is borderline dilated.  · Left atrial cavity size is mildly dilated.  · There is mild calcification of the aortic valve. Mild aortic valve   regurgitation is present.  · Mild pulmonic valve regurgitation is present.  · Mild mitral valve regurgitation is present            Discharge Details      Trace Escalante   Home Medication Instructions FLORINDA:[id number]    Printed on:11/16/17 6015   Medication Information                      allopurinol (ZYLOPRIM) 150 MG half tablet  Take 150 mg by mouth Daily.             amLODIPine (NORVASC) 10 MG tablet  TAKE 1 TABLET EVERY DAY             aspirin 81 MG tablet  Take 81 mg by mouth Daily.             carvedilol (COREG) 25 MG tablet  Take 1 tablet by mouth Every 12 (Twelve) Hours.              furosemide (LASIX) 20 MG tablet  Take 1 tablet by mouth Daily.             hydrALAZINE (APRESOLINE) 50 MG tablet  Take 1 tablet by mouth Every 8 (Eight) Hours.             Insulin Degludec (TRESIBA FLEXTOUCH SC)  Inject 40-50 Units under the skin Every Evening.             isosorbide mononitrate (IMDUR) 60 MG 24 hr tablet  Take 1 tablet by mouth Daily.             Multiple Vitamin (MULTI VITAMIN PO)  Take 1 tablet by mouth.             NOVOLOG FLEXPEN 100 UNIT/ML solution pen-injector sc pen  Inject 5-10 Units under the skin 3 (Three) Times a Day With Meals.             rosuvastatin (CRESTOR) 40 MG tablet  Take 1 tablet by mouth Every Night.             spironolactone (ALDACTONE) 25 MG tablet  Take 25 mg by mouth Daily.                 Discharge Disposition:  Home or Self Care    Discharge Diet:  Diet Instructions     Heart healthy diet, low in saturated fats and sodium.             Discharge Activity:  Activity Instructions     As tolerated.                 Special Instructions:  Keep blood pressure logbook to provide to primary care / Nephrology / Cardiology for hypertension    Future Appointments  Date Time Provider Department Center   1/2/2018 1:15 PM Loki De La Fuente MD [facility] None       Additional Instructions for the Follow-ups that You Need to Schedule     Ambulatory Referral to [facility] - [place]    As directed    Service Requested:  Heart Failure Clinic       Discharge Follow-up with PCP    As directed    Follow Up Details:  Primary Care Physician - 1 week with blood pressure logbook       Discharge Follow-up with Specialty: Cardiology; 2 Weeks    As directed    Specialty:  Cardiology   Follow Up:  2 Weeks   Follow Up Details:  Dr. De La Fuente - [facility]                 Patient has been advised to use CPAP therapy at home.  Additionally, he has been advised to keep a blood pressure journal to provide to PCP Dr. Allen or other providers  Follow up with Dr. De La Fuente -  Cardiology - [facility] - 2 weeks  He likely need close follow up with his primary nephrologist too after discharge as he is on diuretics with CKD and may benefit from ace inhibitor due to CHF    Time Spent on Discharge:  38 mins    Desmond Espinoza MD  11/16/17  2:05 PM

## 2017-11-16 NOTE — PAYOR COMM NOTE
"Trace Schmidt (57 y.o. Male)   Ref # [id number]  Ksenia Jimenez RN, BSN  Phone # [phone]  Fax # [phone]    Date of Birth Social Security Number Address Home Phone MRN    1960  [address] [place] [address] [address] [place] [address] [phone] [id number]    [employer] Marital Status          [facility] Single       Admission Date Admission Type Admitting Provider Attending Provider Department, Room/Bed    11/12/17 Emergency Desmond Espinoza MD Schnell, Aaron, MD [facility] 6A, N616/1    Discharge Date Discharge Disposition Discharge Destination         Home or Self Care             Attending Provider: Desmond Espinoza MD     Allergies:  No Known Allergies    Isolation:  None   Infection:  None   Code Status:  FULL    Ht:  75\" (190.5 cm)   Wt:  294 lb 3.2 oz (133 kg)    Admission Cmt:  None   Principal Problem:  Acute congestive heart failure [I50.9] More...                 Active Insurance as of 11/12/2017     Primary Coverage     Payor Plan Insurance Group Employer/Plan Group    [employer] [place] [id number]     Payor Plan Address Payor Plan Phone Number Effective From Effective To    [id number] [phone] 1/1/2015     [place] [address]       Subscriber Name Subscriber Birth Date Member ID       TRACE SCHMIDT 1960 [id number]                 Emergency Contacts      (Rel.) Home Phone Work Phone Mobile Phone    Roxana Schmidt (Sister) [phone] -- --            Discharge Summary     No notes of this type exist for this encounter.        Discharge Order     Start     Ordered    11/16/17 0831  Discharge patient  Once     Expected Discharge Date:  11/16/17    Expected Discharge Time:  Morning    Discharge Disposition:  Home or Self Care        11/16/17 0831          "

## 2017-11-16 NOTE — PROGRESS NOTES
[facility]  IP Progress Note      Chief Complaint/Reason for service:  Acute systolic heart failure    Subjective   Subjective: The patient says he feels great and his breathing is normal.  I answered his questions regarding the mild CAD noted on cardiac catheter as well as one over his medication changes    Past medical, surgical, social and family history reviewed in the patient's electronic medical record.    Objective     Vital Sign Min/Max for last 24 hours  Temp  Min: 98 °F (36.7 °C)  Max: 98.7 °F (37.1 °C)   BP  Min: 113/68  Max: 162/99   Pulse  Min: 65  Max: 85   Resp  Min: 15  Max: 24   SpO2  Min: 95 %  Max: 99 %   No Data Recorded    No intake or output data in the 24 hours ending 11/16/17 0755          Current Facility-Administered Medications:   •  acetaminophen (TYLENOL) tablet 650 mg, 650 mg, Oral, Q4H PRN, Jeane Ledesma PA-C, 650 mg at 11/15/17 2235  •  allopurinol (ZYLOPRIM) tablet 150 mg, 150 mg, Oral, Daily, Jeane Ledesma PA-C, 150 mg at 11/15/17 0835  •  amLODIPine (NORVASC) tablet 10 mg, 10 mg, Oral, Q24H, Jeane Ledesma PA-C, 10 mg at 11/15/17 0835  •  aspirin chewable tablet 81 mg, 81 mg, Oral, Daily, Jeane Ledesma PA-C, 81 mg at 11/15/17 0835  •  carvedilol (COREG) tablet 25 mg, 25 mg, Oral, Q12H, Desmond Espinoza MD, 25 mg at 11/15/17 2029  •  dextrose (D50W) solution 25 g, 25 g, Intravenous, Q15 Min PRN, Jeane Ledesma PA-C  •  dextrose (GLUTOSE) oral gel 15 g, 15 g, Oral, Q15 Min PRN, Jeane Ledesma PA-C  •  docusate sodium (COLACE) capsule 100 mg, 100 mg, Oral, BID, Jeane Ledesma PA-C, 100 mg at 11/15/17 1757  •  famotidine (PEPCID) tablet 40 mg, 40 mg, Oral, Daily, Jeane Ledesma PA-C, 40 mg at 11/15/17 0835  •  glucagon (GLUCAGEN) injection 1 mg, 1 mg, Subcutaneous, Q15 Min PRN, Jeane Ledesma PA-C  •  guaiFENesin (MUCINEX) 12 hr tablet 600 mg, 600 mg, Oral, Q12H PRN, Yovana Mejia V  APRN, 600 mg at 11/13/17 2100  •  heparin (porcine) 5000 UNIT/ML injection 5,000 Units, 5,000 Units, Subcutaneous, Q8H, Jeane Ledesma PA-C, 5,000 Units at 11/16/17 0507  •  hydrALAZINE (APRESOLINE) tablet 50 mg, 50 mg, Oral, Q8H, Afshin Parekh MD, 50 mg at 11/16/17 0507  •  insulin detemir (LEVEMIR) injection 20 Units, 20 Units, Subcutaneous, Nightly, Jude Dorsey MD, 20 Units at 11/15/17 2128  •  insulin lispro (humaLOG) injection 0-7 Units, 0-7 Units, Subcutaneous, 4x Daily AC & at Bedtime, Jeane Ledesma PA-C, 3 Units at 11/15/17 1758  •  insulin lispro (humaLOG) injection 5 Units, 5 Units, Subcutaneous, TID With Meals, Jeane Ledesma PA-C, 5 Units at 11/15/17 1758  •  isosorbide mononitrate (IMDUR) 24 hr tablet 60 mg, 60 mg, Oral, Q24H, Desmond Espinoza MD  •  ondansetron (ZOFRAN) tablet 4 mg, 4 mg, Oral, Q6H PRN, Jeane Ledesma PA-C  •  Pharmacy Consult - [facility], , Does not apply, Daily, Rufino Delgado, [facility]  •  potassium chloride (MICRO-K) CR capsule 40 mEq, 40 mEq, Oral, PRN, 40 mEq at 11/13/17 1814 **OR** [DISCONTINUED] potassium chloride (KLOR-CON) packet 40 mEq, 40 mEq, Oral, PRN **OR** potassium chloride 10 mEq in 100 mL IVPB, 10 mEq, Intravenous, Q1H PRN, Thelma Cartagena, SALLY  •  rosuvastatin (CRESTOR) tablet 20 mg, 20 mg, Oral, Nightly, Jeane Ledesma PA-C, 20 mg at 11/15/17 2029  •  sodium chloride 0.9 % flush 1-10 mL, 1-10 mL, Intravenous, PRN, Jeane Ledesma PA-C  •  sodium chloride 0.9 % flush 10 mL, 10 mL, Intravenous, PRN, Yair Sweeney MD    Physical Exam: General  large well-developed male sitting his recliner no acute distress        HEENT: No JVP no scleral icterus no Hertoghs sign of the eyebrows       Respiratory:  Equal bowel symmetrical expansion and clear to auscultation bilaterally       Cardiovascular: Regular rate and rhythm with systolic murmur and trivial peripheral edema       GI: Positive bowel sounds nontender to palpation        Lower Extremities: No peripheral lesions 2+ DP pulses       Neuro: Cranial nerves II through XII are grossly normal he moves all 4 extremities       Skin:  Warm and dry lower extremities are shiny no peripheral lesions       Psych: Pleasant affect ask questions which are appropriate also answers appropriately    Results Review: Overall heart rate and blood pressure are improved.  Creatinine is 2.1BU and 27 which are essentially unchanged from yesterday.  The BNP is mildly elevated compared to the previous day    Radiology Results:  Imaging Results (last 72 hours)     Procedure Component Value Units Date/Time    XR Chest 1 View [[id number]] Collected:  11/12/17 1538     Updated:  11/13/17 0901    Narrative:          EXAMINATION: XR CHEST 1 VW - 11/12/2017     INDICATION: Shortness of air.     COMPARISON: 10/20/2017.     FINDINGS: Portable chest reveals the heart to be enlarged. Slight  prominence of the pulmonary vascularity. No focal parenchymal  opacification is present. No pleural effusion or pneumothorax.  Degenerative change is seen within the spine.  No acute parenchymal disease.           Impression:       Prominence of the pulmonary vascularity with no acute  parenchymal disease.     DICTATED:     11/12/2017  EDITED:         11/12/2017           This report was finalized on 11/13/2017 8:59 AM by Dr. Unique Huber MD.       XR Chest 1 View [[id number]] Collected:  11/13/17 1536     Updated:  11/13/17 1542    Narrative:       EXAMINATION: XR CHEST 1 VW-11/13/2017:      INDICATION: SOB; I50.9-Heart failure, unspecified; N17.9-Acute kidney  failure, unspecified; R06.02-Shortness of breath; I10-Essential  (primary) hypertension.      COMPARISON: 11/12/2017.     FINDINGS: Portable chest reveals cardiac and mediastinal silhouettes to  be within normal limits. The lung fields are grossly clear. No focal  parenchymal opacification present.  No pleural effusion or pneumothorax.  Degenerative changes seen within  the spine. Pulmonary vascularity is  within normal limits.           Impression:       No acute cardiopulmonary disease.     D:  11/13/2017  E:  11/13/2017     This report was finalized on 11/13/2017 3:40 PM by Dr. Unique Huber MD.             EKG: Sinus rhythm no ischemia    ECHO: Mildly depressed EF of 40%    Tele:  Sinus rhythm    Assessment   Assessment/Plan: 1 new onset heart failure with a mildly depressed LVEF-he is on hydralazine and nitrates Coreg and diuretics.  Because his creatinine is elevated from admission will leave him off ace inhibitors.  When he came to the hospital his potassium was low, can go ahead and restart his spironolactone 25 mg daily.  I will also send the patient home on Lasix 20 mg to take as needed for weight gain or increased shortness of breath.  I like for him to follow-up in our [place] clinic with Dr. De La Fuente in 2-4 weeks  2 hypertension-are controlled with hydralazine  3 minimal coronary artery disease by cardiac catheter-he is on Coreg and I will increase his statin to 40 mg daily at bedtime because his LDL was 95  4 CK D- overall stable creatinine for 3 days I recommend he get in to see his nephrologist Dr. Larry Parekh MD  11/16/17  7:55 AM

## 2017-11-16 NOTE — PLAN OF CARE
Problem: Cardiac: Heart Failure (Adult)  Goal: Signs and Symptoms of Listed Potential Problems Will be Absent or Manageable (Cardiac: Heart Failure)  Outcome: Ongoing (interventions implemented as appropriate)    Problem: Patient Care Overview (Adult)  Goal: Plan of Care Review  Outcome: Ongoing (interventions implemented as appropriate)    11/16/17 0436   Coping/Psychosocial Response Interventions   Plan Of Care Reviewed With patient   Patient Care Overview   Progress progress toward functional goals as expected   Outcome Evaluation   Outcome Summary/Follow up Plan VSS, plan is to d/c today. Will continue to monitor.

## 2017-11-17 ENCOUNTER — TRANSITIONAL CARE MANAGEMENT TELEPHONE ENCOUNTER (OUTPATIENT)
Dept: INTERNAL MEDICINE | Facility: CLINIC | Age: 57
End: 2017-11-17

## 2017-11-17 NOTE — OUTREACH NOTE
I spoke directly to the patient to obtain the information for the [facility] discharge call note.The patient was admitted to [facility] 11/12/17. Dx of Acute congestive heart failure . Dicharges to home. The patient was advised to notify PCP and seel UTC or ER eval if sx's reoccur, worsen, or condition changes unexpectedly. The patient verbalized understanding.

## 2017-11-21 ENCOUNTER — HOSPITAL ENCOUNTER (EMERGENCY)
Facility: HOSPITAL | Age: 57
Discharge: HOME | End: 2017-11-22
Attending: EMERGENCY MEDICINE | Admitting: EMERGENCY MEDICINE

## 2017-11-21 ENCOUNTER — APPOINTMENT (OUTPATIENT)
Dept: GENERAL RADIOLOGY | Facility: HOSPITAL | Age: 57
End: 2017-11-21

## 2017-11-21 DIAGNOSIS — N28.9 RENAL INSUFFICIENCY: ICD-10-CM

## 2017-11-21 DIAGNOSIS — I50.9 ACUTE ON CHRONIC CONGESTIVE HEART FAILURE, UNSPECIFIED CONGESTIVE HEART FAILURE TYPE: Primary | ICD-10-CM

## 2017-11-21 DIAGNOSIS — R06.03 ACUTE RESPIRATORY DISTRESS: ICD-10-CM

## 2017-11-21 PROCEDURE — 71010 HC CHEST PA OR AP: CPT

## 2017-11-21 PROCEDURE — 93005 ELECTROCARDIOGRAM TRACING: CPT

## 2017-11-21 PROCEDURE — 93005 ELECTROCARDIOGRAM TRACING: CPT | Performed by: EMERGENCY MEDICINE

## 2017-11-21 PROCEDURE — 99283 EMERGENCY DEPT VISIT LOW MDM: CPT

## 2017-11-21 PROCEDURE — 96374 THER/PROPH/DIAG INJ IV PUSH: CPT

## 2017-11-21 RX ORDER — SODIUM CHLORIDE 0.9 % (FLUSH) 0.9 %
10 SYRINGE (ML) INJECTION AS NEEDED
Status: DISCONTINUED | OUTPATIENT
Start: 2017-11-21 | End: 2017-11-22 | Stop reason: HOSPADM

## 2017-11-21 NOTE — PAYOR COMM NOTE
"Trace Schmidt (57 y.o. Male)   Auth # [id number]  Ksenia Jimenez RN, BSN  Phone # [phone]  Fax # [phone]    Date of Birth Social Security Number Address Home Phone MRN    1960  [address] [place] [address] [address] [place] [address] [phone] [id number]    Mu-ism Marital Status          [facility] Single       Admission Date Admission Type Admitting Provider Attending Provider Department, Room/Bed    11/12/17 Emergency Desmond Espinoza MD  [facility] 6A, N616/1    Discharge Date Discharge Disposition Discharge Destination        11/16/2017 Home or Self Care             Attending Provider: (none)    Allergies:  No Known Allergies    Isolation:  None   Infection:  None   Code Status:  Prior    Ht:  75\" (190.5 cm)   Wt:  294 lb 3.2 oz (133 kg)    Admission Cmt:  None   Principal Problem:  Acute congestive heart failure [I50.9] More...                 Active Insurance as of 11/12/2017     Primary Coverage     Payor Plan Insurance Group Employer/Plan Group    [employer] [place] [id number]     Payor Plan Address Payor Plan Phone Number Effective From Effective To    [address] [address] [phone] 1/1/2015     [place] [address]       Subscriber Name Subscriber Birth Date Member ID       TRACE SCHMIDT 1960 [id number]                 Emergency Contacts      (Rel.) Home Phone Work Phone Mobile Phone    Roxana Schmidt (Sister) [phone] -- --            Operative/Procedure Notes (last 7 days) (Notes from 11/14/2017  3:20 PM through 11/21/2017  3:20 PM)     No notes of this type exist for this encounter.           Physician Progress Notes (last 7 days) (Notes from 11/14/2017  3:20 PM through 11/21/2017  3:20 PM)      Afshin Parekh MD at 11/15/2017  7:16 AM  Version 1 of 1         [facility]  IP Progress Note      Chief Complaint/Reason for service:  New onset systolic heart failure    Subjective   Subjective: The " patient states his breathing is improved A flat last night without any dyspnea.    Past medical, surgical, social and family history reviewed in the patient's electronic medical record.    Objective     Vital Sign Min/Max for last 24 hours  Temp  Min: 97.6 °F (36.4 °C)  Max: 98.8 °F (37.1 °C)   BP  Min: 122/79  Max: 144/86   Pulse  Min: 63  Max: 72   Resp  Min: 16  Max: 18   SpO2  Min: 96 %  Max: 100 %   No Data Recorded      Intake/Output Summary (Last 24 hours) at 11/15/17 0716  Last data filed at 11/15/17 0352   Gross per 24 hour   Intake             1840 ml   Output             3350 ml   Net            -1510 ml             Current Facility-Administered Medications:   •  acetaminophen (TYLENOL) tablet 650 mg, 650 mg, Oral, Q4H PRN, Jeane Ledesma PA-C  •  allopurinol (ZYLOPRIM) tablet 150 mg, 150 mg, Oral, Daily, Jeane Ledesma PA-C, 150 mg at 11/14/17 0926  •  amLODIPine (NORVASC) tablet 10 mg, 10 mg, Oral, Q24H, Jeane Ledesma PA-C, 10 mg at 11/14/17 0927  •  aspirin chewable tablet 81 mg, 81 mg, Oral, Daily, Jeane Ledesma PA-C, 81 mg at 11/14/17 0927  •  carvedilol (COREG) tablet 12.5 mg, 12.5 mg, Oral, Q12H, Jeane Ledesma PA-C, 12.5 mg at 11/14/17 2055  •  dextrose (D50W) solution 25 g, 25 g, Intravenous, Q15 Min PRN, Jeane Ledesma PA-C  •  dextrose (GLUTOSE) oral gel 15 g, 15 g, Oral, Q15 Min PRN, Jeane Ledesma PA-C  •  docusate sodium (COLACE) capsule 100 mg, 100 mg, Oral, BID, Jeane Ledesma PA-C, 100 mg at 11/14/17 1737  •  famotidine (PEPCID) tablet 40 mg, 40 mg, Oral, Daily, Jeane Ledesma PA-C, 40 mg at 11/14/17 0926  •  glucagon (GLUCAGEN) injection 1 mg, 1 mg, Subcutaneous, Q15 Min PRN, Jeane Ledesma PA-C  •  guaiFENesin (MUCINEX) 12 hr tablet 600 mg, 600 mg, Oral, Q12H PRN, Yovana GREENWOOD APRN, 600 mg at 11/13/17 2100  •  heparin (porcine) 5000 UNIT/ML injection 5,000 Units, 5,000 Units, Subcutaneous, Q8H, Jeane BENSON  LINDA Ledesma, 5,000 Units at 11/15/17 0610  •  hydrALAZINE (APRESOLINE) tablet 50 mg, 50 mg, Oral, Q8H, Afshin Parekh MD, 50 mg at 11/15/17 0610  •  insulin detemir (LEVEMIR) injection 20 Units, 20 Units, Subcutaneous, Nightly, Jude Dorsey MD, 20 Units at 11/14/17 2051  •  insulin lispro (humaLOG) injection 0-7 Units, 0-7 Units, Subcutaneous, 4x Daily AC & at Bedtime, Jeane Ledesma PA-C, 2 Units at 11/14/17 2053  •  insulin lispro (humaLOG) injection 5 Units, 5 Units, Subcutaneous, TID With Meals, Jeane Ledesma PA-C, 5 Units at 11/14/17 1737  •  isosorbide mononitrate (IMDUR) 24 hr tablet 30 mg, 30 mg, Oral, Q24H, Afshin Parekh MD, 30 mg at 11/14/17 0926  •  ondansetron (ZOFRAN) tablet 4 mg, 4 mg, Oral, Q6H PRN, Jeane Ledesma PA-C  •  Pharmacy Consult - [facility], , Does not apply, Daily, Rufino Delgado, [facility]  •  potassium chloride (MICRO-K) CR capsule 40 mEq, 40 mEq, Oral, PRN, 40 mEq at 11/13/17 1814 **OR** [DISCONTINUED] potassium chloride (KLOR-CON) packet 40 mEq, 40 mEq, Oral, PRN **OR** potassium chloride 10 mEq in 100 mL IVPB, 10 mEq, Intravenous, Q1H PRN, Thelma Cartagena, SALLY  •  rosuvastatin (CRESTOR) tablet 20 mg, 20 mg, Oral, Nightly, Jeane Ledesma PA-C, 20 mg at 11/14/17 2055  •  sodium chloride 0.45 % infusion, 75 mL/hr, Intravenous, Continuous, Afshin Parekh MD, Last Rate: 50 mL/hr at 11/15/17 0610, 50 mL/hr at 11/15/17 0610  •  sodium chloride 0.9 % flush 1-10 mL, 1-10 mL, Intravenous, PRN, Jeane Ledesma PA-C  •  sodium chloride 0.9 % flush 10 mL, 10 mL, Intravenous, PRN, Yair Sweeney MD    Physical Exam: General  well-developed well-nourished gentleman large build no acute distress        HEENT: No JVP no scleral icterus       Respiratory:  Equal bilateral symmetrical expansion and clear to auscultation bilaterally       Cardiovascular: Regular rate and rhythm with an inspiratory split S1 and a soft murmur/trace lower extremity edema  pitting to palpation       GI: Positive bowel sounds       Lower Extremities: No peripheral lesions warm and dry       Neuro: Cranial nerves II through XII are grossly normal he moves all 4 extremities       Skin:  Warm and dry       Psych: Pleasant affect    Results Review: The patient's creatinine is 2.1 again today.  His urine output exceeds intake by over a liter.  But pressure is improved from yesterday with increased dose of hydralazine    Radiology Results:  Imaging Results (last 72 hours)     Procedure Component Value Units Date/Time    XR Chest 1 View [[id number]] Collected:  11/12/17 1538     Updated:  11/13/17 0901    Narrative:          EXAMINATION: XR CHEST 1 VW - 11/12/2017     INDICATION: Shortness of air.     COMPARISON: 10/20/2017.     FINDINGS: Portable chest reveals the heart to be enlarged. Slight  prominence of the pulmonary vascularity. No focal parenchymal  opacification is present. No pleural effusion or pneumothorax.  Degenerative change is seen within the spine.  No acute parenchymal disease.           Impression:       Prominence of the pulmonary vascularity with no acute  parenchymal disease.     DICTATED:     11/12/2017  EDITED:         11/12/2017           This report was finalized on 11/13/2017 8:59 AM by Dr. Unique Huber MD.       XR Chest 1 View [[id number]] Collected:  11/13/17 1536     Updated:  11/13/17 1542    Narrative:       EXAMINATION: XR CHEST 1 VW-11/13/2017:      INDICATION: SOB; I50.9-Heart failure, unspecified; N17.9-Acute kidney  failure, unspecified; R06.02-Shortness of breath; I10-Essential  (primary) hypertension.      COMPARISON: 11/12/2017.     FINDINGS: Portable chest reveals cardiac and mediastinal silhouettes to  be within normal limits. The lung fields are grossly clear. No focal  parenchymal opacification present.  No pleural effusion or pneumothorax.  Degenerative changes seen within the spine. Pulmonary vascularity is  within normal limits.            Impression:       No acute cardiopulmonary disease.     D:  2017  E:  2017     This report was finalized on 2017 3:40 PM by Dr. Unique Huber MD.             EKG:     ECHO: Global LV wall hypokinesis with EF in the 40s    Tele:  Sinus rhythm    Assessment   Assessment/Plan: 1 new onset systolic heart failure with EF in the 40s-the patient has multiple CAD risk factors-plan cardiac catheter today.  His BNP continues to decrease  2 CK D with a baseline creatinine last year 2.0 2.1 yesterday and again 2.1 today.  I'll increase IV fluids to 75 ML's per hour and have my partner to cardiac catheter  3 hypertension-improved over the last 25 since the increase in hydralazine      Afshin Parekh MD  11/15/17  7:16 AM     Electronically signed by Afshin Parekh MD at 11/15/2017  7:29 AM      Desmond Espinoza MD at 11/15/2017  5:14 PM  Version 1 of 1             [facility]  PROGRESS NOTE    Patient Name: Trace Escalante  : 1960  MRN: [id number]    Date of Admission: 2017  Length of Stay: 3  Primary Care Physician: Jose Ramon Allen MD    Subjective   Subjective     CC:  Shortness of breath - failure of outpatient mgmt - sent from [facility] to ER    HPI:  Seen in [facility] after Cardiac Cath with Dr. Marcum.   Discussed case with Dr. Marcum in [facility].  Will give IV fluids for now due to contrast load in patient with CKD.    Review of Systems  Gen- No fevers, chills  CV- No chest pain, palpitations  Resp- No cough, dyspnea  GI- No N/V/D, abd pain    Otherwise ROS is negative except as mentioned in the HPI.    Objective   Objective     Vital Signs:   Temp:  [97.9 °F (36.6 °C)-98.8 °F (37.1 °C)] 98 °F (36.7 °C)  Heart Rate:  [63-85] 78  Resp:  [16-18] 16  BP: (113-151)/(66-93) 151/81    Physical Exam:    Constitutional: No acute distress, awake, alert  HENT: NCAT, mucous membranes moist  Respiratory: Clear to auscultation bilaterally,  respiratory effort normal   Cardiovascular: RRR, s1 and s2  Gastrointestinal: Positive bowel sounds, soft, nontender, nondistended, obese abdomen  Musculoskeletal: No bilateral ankle edema  Psychiatric: Appropriate affect, cooperative  Neurologic: Oriented x 3, strength symmetric in all extremities, Cranial Nerves grossly intact to confrontation, speech clear  Skin: No rashes    Results Reviewed:  I have personally reviewed current lab, radiology, and data and agree.      Results from last 7 days  Lab Units 11/13/17  0518 11/12/17  1249   WBC 10*3/mm3 6.19 6.82   HEMOGLOBIN g/dL 11.6* 13.2   HEMATOCRIT % 35.2* 39.2   PLATELETS 10*3/mm3 223 226       Results from last 7 days  Lab Units 11/15/17  0453 11/14/17  0459 11/13/17  0518 11/12/17  2358 11/12/17  1249   SODIUM mmol/L 138 138 137  --  135   POTASSIUM mmol/L 3.7 3.9 3.1*  --  3.6   CHLORIDE mmol/L 107 103 103  --  100   CO2 mmol/L 27.0 28.0 28.0  --  25.0   BUN mg/dL 26* 25* 19  --  15   CREATININE mg/dL 2.10* 2.10* 1.90*  --  1.90*   GLUCOSE mg/dL 144* 138* 95  --  281*   CALCIUM mg/dL 8.6* 8.8 8.2*  --  8.6*   ALT (SGPT) U/L  --   --   --   --  30   AST (SGOT) U/L  --   --   --   --  39*   TROPONIN I ng/mL  --  0.035  --  0.087*  --      BNP   Date Value Ref Range Status   11/15/2017 244.0 (H) 0.0 - 100.0 pg/mL Final     No results found for: PHART    Microbiology Results Abnormal     None          Imaging Results (last 24 hours)     ** No results found for the last 24 hours. **        Results for orders placed during the hospital encounter of 11/12/17   Adult Transthoracic Echo Complete W/ Cont if Necessary Per Protocol    Addendum · Left ventricular systolic function is moderately decreased. Calculated  EF = 39.9%. Estimated EF appears to be in the range of 36 - 40%. · Left ventricular wall thickness is consistent with mild concentric  hypertrophy. · Left ventricular diastolic dysfunction (grade III) consistent with  reversible restrictive pattern. · Right  ventricular cavity is borderline dilated. · Left atrial cavity size is mildly dilated. · There is mild calcification of the aortic valve. Mild aortic valve  regurgitation is present. · Mild pulmonic valve regurgitation is present. · Mild mitral valve regurgitation is present        Blake Lynch MD 11/14/2017 10:01 AM          Narrative · Left ventricular systolic function is moderately decreased. Calculated   EF = 39.9%. Estimated EF appears to be in the range of 36 - 40%.  · Left ventricular wall thickness is consistent with mild concentric   hypertrophy.  · Left ventricular diastolic dysfunction (grade III) consistent with   reversible restrictive pattern.  · Right ventricular cavity is borderline dilated.  · Left atrial cavity size is mildly dilated.  · There is mild calcification of the aortic valve. Mild aortic valve   regurgitation is present.  · Mild pulmonic valve regurgitation is present.  · Mild mitral valve regurgitation is present          I have reviewed the medications.    Assessment/Plan   Assessment / Plan     Hospital Problem List     * (Principal)Acute congestive heart failure    Overview Signed 11/15/2017  8:55 AM by Afshin Parekh MD     Added automatically from request for surgery [id number]         Controlled type 2 diabetes mellitus with kidney complication, with long-term current use of insulin    Essential hypertension (Chronic)    Mixed hyperlipidemia (Chronic)    Gout    Elevated brain natriuretic peptide (BNP) level    Elevated serum creatinine    Chronic renal insufficiency    Acute pulmonary edema    Obesity, Class I, BMI 30-34.9             Brief Hospital Course to date:  Trace Escalante is a 57 y.o. male who presented to [facility] for failure of outpatient treatment for shortness of breath.    Sent from [facility] to our ER.    Assessment & Plan:    Shortness of Breath  - possible multifactorial issue  - Cardiomyopathy - unclear etiology - no intervention -  radial artery approached cardiac cath - Dr. Marcum today  - very hypertensive on multiple mediations  - ALYSSA  - Obesity  - leaky valves / combined systolic/diastolic heart failure  Chronic Kidney Disease  - unclear baseline  - likely CKD stage III  - holding spironolactone / holding hydrochlorothiazide  Hypertension  - hydralazine added  - Norvasc  - Imdur  ALYSSA  - CPAP at night  DM  - follows with Dr. SOFIYA Thomas    IV fluids after radial artery cath today with contrast given in patient with baseline CKD  Increase medications for hypertension today  Discussed case with Dr. Marcum today in person  EKG in AM  BNP in AM  Fluids after cardiac cath due to contrast load with CKD    Will need follow up in Cardiology Clinic   No ace inhibitor for now due to DILAN/CKD + contrast load today  May be able to start at discharge or follow up and start in clinic  HF Navigator / Cardiac Rehab consulted    DVT Prophylaxis:  Heparin 5,000 Units SC every 8 hours    CODE STATUS: Full Code    Disposition: I expect the patient to be discharged home in 24 hours    Desmond Espinoza MD  11/15/17  5:14 PM         Electronically signed by Desmond Espinoza MD at 11/15/2017  5:33 PM      Afshin Parekh MD at 11/16/2017  7:55 AM  Version 1 of 1         [facility]  IP Progress Note      Chief Complaint/Reason for service:  Acute systolic heart failure    Subjective   Subjective: The patient says he feels great and his breathing is normal.  I answered his questions regarding the mild CAD noted on cardiac catheter as well as one over his medication changes    Past medical, surgical, social and family history reviewed in the patient's electronic medical record.    Objective     Vital Sign Min/Max for last 24 hours  Temp  Min: 98 °F (36.7 °C)  Max: 98.7 °F (37.1 °C)   BP  Min: 113/68  Max: 162/99   Pulse  Min: 65  Max: 85   Resp  Min: 15  Max: 24   SpO2  Min: 95 %  Max: 99 %   No Data Recorded    No intake or output  data in the 24 hours ending 11/16/17 0755          Current Facility-Administered Medications:   •  acetaminophen (TYLENOL) tablet 650 mg, 650 mg, Oral, Q4H PRN, Jeane Ledesma PA-C, 650 mg at 11/15/17 2235  •  allopurinol (ZYLOPRIM) tablet 150 mg, 150 mg, Oral, Daily, Jeane Ledesma PA-C, 150 mg at 11/15/17 0835  •  amLODIPine (NORVASC) tablet 10 mg, 10 mg, Oral, Q24H, Jeane Ledesma PA-C, 10 mg at 11/15/17 0835  •  aspirin chewable tablet 81 mg, 81 mg, Oral, Daily, Jeane Ledesma PA-C, 81 mg at 11/15/17 0835  •  carvedilol (COREG) tablet 25 mg, 25 mg, Oral, Q12H, Desmond Espinoza MD, 25 mg at 11/15/17 2029  •  dextrose (D50W) solution 25 g, 25 g, Intravenous, Q15 Min PRN, Jeane Ledesma PA-C  •  dextrose (GLUTOSE) oral gel 15 g, 15 g, Oral, Q15 Min PRN, Jeane Ledesma PA-C  •  docusate sodium (COLACE) capsule 100 mg, 100 mg, Oral, BID, Jeane Ledesma PA-C, 100 mg at 11/15/17 1757  •  famotidine (PEPCID) tablet 40 mg, 40 mg, Oral, Daily, Jeane Ledesma PA-C, 40 mg at 11/15/17 0835  •  glucagon (GLUCAGEN) injection 1 mg, 1 mg, Subcutaneous, Q15 Min PRN, Jeane Ledesma PA-C  •  guaiFENesin (MUCINEX) 12 hr tablet 600 mg, 600 mg, Oral, Q12H PRN, Yovana Mejia V, APRN, 600 mg at 11/13/17 2100  •  heparin (porcine) 5000 UNIT/ML injection 5,000 Units, 5,000 Units, Subcutaneous, Q8H, Jeane Ledesma PA-C, 5,000 Units at 11/16/17 0507  •  hydrALAZINE (APRESOLINE) tablet 50 mg, 50 mg, Oral, Q8H, Afshin Parekh MD, 50 mg at 11/16/17 0507  •  insulin detemir (LEVEMIR) injection 20 Units, 20 Units, Subcutaneous, Nightly, Jude Dorsey MD, 20 Units at 11/15/17 2128  •  insulin lispro (humaLOG) injection 0-7 Units, 0-7 Units, Subcutaneous, 4x Daily AC & at Bedtime, Jeane Ledesma PA-C, 3 Units at 11/15/17 1758  •  insulin lispro (humaLOG) injection 5 Units, 5 Units, Subcutaneous, TID With Meals, Jeane Ledesma PA-C, 5 Units at 11/15/17  1758  •  isosorbide mononitrate (IMDUR) 24 hr tablet 60 mg, 60 mg, Oral, Q24H, Desmond Espinoza MD  •  ondansetron (ZOFRAN) tablet 4 mg, 4 mg, Oral, Q6H PRN, Jeane Ledesma PA-C  •  Pharmacy Consult - [facility], , Does not apply, Daily, Rufino Delgado, [facility]  •  potassium chloride (MICRO-K) CR capsule 40 mEq, 40 mEq, Oral, PRN, 40 mEq at 11/13/17 1814 **OR** [DISCONTINUED] potassium chloride (KLOR-CON) packet 40 mEq, 40 mEq, Oral, PRN **OR** potassium chloride 10 mEq in 100 mL IVPB, 10 mEq, Intravenous, Q1H PRN, SALLY Edmondson  •  rosuvastatin (CRESTOR) tablet 20 mg, 20 mg, Oral, Nightly, Jeane Ledesma PA-C, 20 mg at 11/15/17 2029  •  sodium chloride 0.9 % flush 1-10 mL, 1-10 mL, Intravenous, PRN, Jeane Ledesma PA-C  •  sodium chloride 0.9 % flush 10 mL, 10 mL, Intravenous, PRN, Yair Sweeney MD    Physical Exam: General  large well-developed male sitting his recliner no acute distress        HEENT: No JVP no scleral icterus no Hertoghs sign of the eyebrows       Respiratory:  Equal bowel symmetrical expansion and clear to auscultation bilaterally       Cardiovascular: Regular rate and rhythm with systolic murmur and trivial peripheral edema       GI: Positive bowel sounds nontender to palpation       Lower Extremities: No peripheral lesions 2+ DP pulses       Neuro: Cranial nerves II through XII are grossly normal he moves all 4 extremities       Skin:  Warm and dry lower extremities are shiny no peripheral lesions       Psych: Pleasant affect ask questions which are appropriate also answers appropriately    Results Review: Overall heart rate and blood pressure are improved.  Creatinine is 2.1BU and 27 which are essentially unchanged from yesterday.  The BNP is mildly elevated compared to the previous day    Radiology Results:  Imaging Results (last 72 hours)     Procedure Component Value Units Date/Time    XR Chest 1 View [[id number]] Collected:  11/12/17 1538     Updated:  11/13/17 0901     Narrative:          EXAMINATION: XR CHEST 1 VW - 11/12/2017     INDICATION: Shortness of air.     COMPARISON: 10/20/2017.     FINDINGS: Portable chest reveals the heart to be enlarged. Slight  prominence of the pulmonary vascularity. No focal parenchymal  opacification is present. No pleural effusion or pneumothorax.  Degenerative change is seen within the spine.  No acute parenchymal disease.           Impression:       Prominence of the pulmonary vascularity with no acute  parenchymal disease.     DICTATED:     11/12/2017  EDITED:         11/12/2017           This report was finalized on 11/13/2017 8:59 AM by Dr. Unique Huber MD.       XR Chest 1 View [[id number]] Collected:  11/13/17 1536     Updated:  11/13/17 1542    Narrative:       EXAMINATION: XR CHEST 1 VW-11/13/2017:      INDICATION: SOB; I50.9-Heart failure, unspecified; N17.9-Acute kidney  failure, unspecified; R06.02-Shortness of breath; I10-Essential  (primary) hypertension.      COMPARISON: 11/12/2017.     FINDINGS: Portable chest reveals cardiac and mediastinal silhouettes to  be within normal limits. The lung fields are grossly clear. No focal  parenchymal opacification present.  No pleural effusion or pneumothorax.  Degenerative changes seen within the spine. Pulmonary vascularity is  within normal limits.           Impression:       No acute cardiopulmonary disease.     D:  11/13/2017  E:  11/13/2017     This report was finalized on 11/13/2017 3:40 PM by Dr. Unique Huber MD.             EKG: Sinus rhythm no ischemia    ECHO: Mildly depressed EF of 40%    Tele:  Sinus rhythm    Assessment   Assessment/Plan: 1 new onset heart failure with a mildly depressed LVEF-he is on hydralazine and nitrates Coreg and diuretics.  Because his creatinine is elevated from admission will leave him off ace inhibitors.  When he came to the hospital his potassium was low, can go ahead and restart his spironolactone 25 mg daily.  I will also send the  patient home on Lasix 20 mg to take as needed for weight gain or increased shortness of breath.  I like for him to follow-up in our [place] clinic with Dr. De La Fuente in 2-4 weeks  2 hypertension-are controlled with hydralazine  3 minimal coronary artery disease by cardiac catheter-he is on Coreg and I will increase his statin to 40 mg daily at bedtime because his LDL was 95  4 CK D- overall stable creatinine for 3 days I recommend he get in to see his nephrologist Dr. Larry Parekh MD  11/16/17  7:55 AM     Electronically signed by Afshin Parekh MD at 11/16/2017  8:26 AM        Afshin Parekh MD Physician Addendum Cardiology Progress Notes Date of Service: 11/14/2017  7:16 AM      Expand All Collapse All    []Hide copied text  []Hover for attribution information  [facility] at [facility]  IP Progress Note        Chief Complaint/Reason for service:  New onset heart failure        Subjective      Subjective: The patient states he feels much better and was able to lay down last night and get some good sleep.  He has questions today about his condition and wanted to know when he can go home     Past medical, surgical, social and family history reviewed in the patient's electronic medical record.        Objective         Vital Sign Min/Max for last 24 hours  Temp  Min: 97.8 °F (36.6 °C)  Max: 99.1 °F (37.3 °C)   BP  Min: 123/81  Max: 163/107   Pulse  Min: 65  Max: 86   Resp  Min: 18  Max: 24   SpO2  Min: 97 %  Max: 99 %   No Data Recorded       Intake/Output Summary (Last 24 hours) at 11/14/17 0717  Last data filed at 11/13/17 1800    Gross per 24 hour   Intake              720 ml   Output             2100 ml   Net            -1380 ml                 Current Facility-Administered Medications:   •  acetaminophen (TYLENOL) tablet 650 mg, 650 mg, Oral, Q4H PRN, Jeane Ledesma PA-C  •  allopurinol (ZYLOPRIM) tablet 150 mg, 150 mg, Oral, Daily, Jeane BENSON  LINDA Ledesma, 150 mg at 11/13/17 0853  •  amLODIPine (NORVASC) tablet 10 mg, 10 mg, Oral, Q24H, Jeane Ledesma PA-C, 10 mg at 11/13/17 0853  •  aspirin chewable tablet 81 mg, 81 mg, Oral, Daily, Jeane Ledesma PA-C, 81 mg at 11/13/17 0853  •  carvedilol (COREG) tablet 12.5 mg, 12.5 mg, Oral, Q12H, Jeane Ledesma PA-C, 12.5 mg at 11/13/17 2100  •  dextrose (D50W) solution 25 g, 25 g, Intravenous, Q15 Min PRN, Jeane Ledesma PA-C  •  dextrose (GLUTOSE) oral gel 15 g, 15 g, Oral, Q15 Min PRN, Jeane Ledesma PA-C  •  docusate sodium (COLACE) capsule 100 mg, 100 mg, Oral, BID, Jeane Ledesma PA-C, 100 mg at 11/13/17 1814  •  famotidine (PEPCID) tablet 40 mg, 40 mg, Oral, Daily, Jeane Ledesma PA-C, 40 mg at 11/13/17 0852  •  glucagon (GLUCAGEN) injection 1 mg, 1 mg, Subcutaneous, Q15 Min PRN, Jeane Ledesma PA-C  •  guaiFENesin (MUCINEX) 12 hr tablet 600 mg, 600 mg, Oral, Q12H PRN, Yovana Mejia V, APRN, 600 mg at 11/13/17 2100  •  heparin (porcine) 5000 UNIT/ML injection 5,000 Units, 5,000 Units, Subcutaneous, Q8H, Jeane Ledesma PA-C, 5,000 Units at 11/14/17 0655  •  hydrALAZINE (APRESOLINE) tablet 50 mg, 50 mg, Oral, Q8H, Afshin Parekh MD  •  insulin detemir (LEVEMIR) injection 20 Units, 20 Units, Subcutaneous, Nightly, Jude Dorsey MD, 20 Units at 11/13/17 2101  •  insulin lispro (humaLOG) injection 0-7 Units, 0-7 Units, Subcutaneous, 4x Daily AC & at Bedtime, Jeane Ledesma PA-C, 2 Units at 11/13/17 1812  •  insulin lispro (humaLOG) injection 5 Units, 5 Units, Subcutaneous, TID With Meals, Jeane Ledesma PA-C, 5 Units at 11/13/17 1813  •  ondansetron (ZOFRAN) tablet 4 mg, 4 mg, Oral, Q6H PRN, Jeane Ledesma PA-C  •  potassium chloride (MICRO-K) CR capsule 40 mEq, 40 mEq, Oral, PRN, 40 mEq at 11/13/17 1814 **OR** potassium chloride (KLOR-CON) packet 40 mEq, 40 mEq, Oral, PRN **OR** potassium chloride 10 mEq in 100 mL IVPB,  10 mEq, Intravenous, Q1H PRN, SALLY Edmondson  •  rosuvastatin (CRESTOR) tablet 20 mg, 20 mg, Oral, Nightly, Jeane Ledesma PA-C, 20 mg at 11/13/17 2100  •  sodium chloride 0.9 % flush 1-10 mL, 1-10 mL, Intravenous, PRN, Jeane Ledesma PA-C  •  sodium chloride 0.9 % flush 10 mL, 10 mL, Intravenous, PRN, Yair Sweeney MD  •  spironolactone (ALDACTONE) tablet 25 mg, 25 mg, Oral, Daily, Jeane Ledesma PA-C, 25 mg at 11/13/17 0852     Physical Exam: General  overweight -American male sitting in bed no acute distress        HEENT: No JVP is present no scleral icterus       Respiratory:  Equal bilateral symmetrical expansion and clear to auscultation bilaterally       Cardiovascular: Regular rate and rhythm without murmur gallop or click and trivial peripheral edema       GI: Positive bowel sounds nontender to palpation       Lower Extremities:        Neuro: Cranial nerves II through XII are grossly normal he moves all 4 extremities       Skin:  Skin is warm and dry with no peripheral lesions       Psych: Pleasant affect.  Alert cooperative and asked appropriate questions     Results Review: The patient's BNP has dropped since yesterday.  Blood pressure is still elevated in the 150-160 range.  His echocardiogram shows global LV hypokinesis with a depressed EF     Radiology Results:  Imaging Results (last 72 hours)     Procedure Component Value Units Date/Time     XR Chest 1 View [[id number]] Collected:  11/12/17 1538       Updated:  11/13/17 0901     Narrative:            EXAMINATION: XR CHEST 1 VW - 11/12/2017      INDICATION: Shortness of air.      COMPARISON: 10/20/2017.      FINDINGS: Portable chest reveals the heart to be enlarged. Slight  prominence of the pulmonary vascularity. No focal parenchymal  opacification is present. No pleural effusion or pneumothorax.  Degenerative change is seen within the spine.  No acute parenchymal disease.             Impression:         Prominence of the pulmonary vascularity with no acute  parenchymal disease.      DICTATED:     11/12/2017  EDITED:         11/12/2017              This report was finalized on 11/13/2017 8:59 AM by Dr. Unique Huber MD.         XR Chest 1 View [[id number]] Collected:  11/13/17 1536       Updated:  11/13/17 1542     Narrative:        EXAMINATION: XR CHEST 1 VW-11/13/2017:       INDICATION: SOB; I50.9-Heart failure, unspecified; N17.9-Acute kidney  failure, unspecified; R06.02-Shortness of breath; I10-Essential  (primary) hypertension.       COMPARISON: 11/12/2017.      FINDINGS: Portable chest reveals cardiac and mediastinal silhouettes to  be within normal limits. The lung fields are grossly clear. No focal  parenchymal opacification present.  No pleural effusion or pneumothorax.  Degenerative changes seen within the spine. Pulmonary vascularity is  within normal limits.             Impression:        No acute cardiopulmonary disease.      D:  11/13/2017  E:  11/13/2017      This report was finalized on 11/13/2017 3:40 PM by Dr. Unique Huber MD.                EKG: Sinus rhythm no ischemia     ECHO: Mild global LV systolic hypokinesis with EF in the 40s     Tele:  Sinus rhythm with PACs        Assessment      Assessment/Plan: 1 new onset systolic heart failure with depressed EF in the low 40s.  He has had an excellent diuretic response and his BNP is reduced today compared to yesterday and admission.  I recommend cardiac catheter on this patient depending the results of his creatinine  2 hypertension-still not well-controlled I will increase hydralazine to 50 every 8.  I will also add Imdur  3 CK D-awaiting to see the creatinine today after the diuresis  4 obstructive sleep apnea the patient is on a CPAP at home     I just got the patient's blood work back in his creatinine is up to 2.1.  I reviewed a note from Dr. Juarez's office from 2016 and he said at that time his creatinine was 2.0 but it was  slightly higher than it had been the previous time.  We'll hydrate the patient with 50 ML's of half normal saline per hour.  Cancel catheter today, make him nothing by mouth after midnight ,and schedule For tomorrow     Afshin Parekh MD  17  7:17 AM             Revision History       Date/Time User Provider Type Action     2017  7:39 AM Afshin Parekh MD Physician Addend     2017  7:30 AM Afshin Parekh MD Physician Sign     View Details Report                      Desmond Espinoza MD Physician Signed Medicine Progress Notes Date of Service: 2017 12:40 PM      Expand All Collapse All    []Hide copied text       [facility] Medicine Services  PROGRESS NOTE     Patient Name: Trace Escalante  : 1960  MRN: [id number]     Date of Admission: 2017  Length of Stay: 2  Primary Care Physician: Jose Ramon Allen MD        Subjective      Subjective      CC:  Shortness of breath     Subjective:  No overnight events.  Has ALYSSA but no machine in room today.  Denies having history of heart failure.  No chest pain.  No shortness of breath.     Review of Systems  Gen- No fevers, chills  CV- No chest pain, palpitations  Resp- No cough, dyspnea  GI- No N/V/D, abd pain     Otherwise ROS is negative except as mentioned in the HPI.        Objective      Objective      Vital Signs:   Temp:  [97.8 °F (36.6 °C)-99.1 °F (37.3 °C)] 98.4 °F (36.9 °C)  Heart Rate:  [63-86] 63  Resp:  [18-24] 18  BP: (122-163)/() 122/79         Physical Exam:  Constitutional: No acute distress, awake, obesity  Eyes: PERRLA, sclerae anicteric, no conjunctival injection  HENT: NCAT  Neck: Supple, no JVD, trachea midline  Respiratory: Clear to auscultation bilaterally, nonlabored respirations  Cardiovascular: RRR, s1 and s2  Gastrointestinal: Positive bowel sounds, soft, nontender, nondistended  Musculoskeletal: No bilateral ankle edema, no clubbing or cyanosis to  extremities  Psychiatric: Appropriate affect, cooperative  Neurologic: Oriented x 3, strength symmetric in all extremities, Cranial Nerves grossly intact to confrontation, speech clear  Skin: No rashes     Results Reviewed:  I have personally reviewed current lab, radiology, and data and agree.        Results from last 7 days  Lab Units 11/13/17  0518 11/12/17  1249   WBC 10*3/mm3 6.19 6.82   HEMOGLOBIN g/dL 11.6* 13.2   HEMATOCRIT % 35.2* 39.2   PLATELETS 10*3/mm3 223 226        Results from last 7 days  Lab Units 11/14/17  0459 11/13/17  0518 11/12/17  2358 11/12/17  1249   SODIUM mmol/L 138 137  --  135   POTASSIUM mmol/L 3.9 3.1*  --  3.6   CHLORIDE mmol/L 103 103  --  100   CO2 mmol/L 28.0 28.0  --  25.0   BUN mg/dL 25* 19  --  15   CREATININE mg/dL 2.10* 1.90*  --  1.90*   GLUCOSE mg/dL 138* 95  --  281*   CALCIUM mg/dL 8.8 8.2*  --  8.6*   ALT (SGPT) U/L  --   --   --  30   AST (SGOT) U/L  --   --   --  39*   TROPONIN I ng/mL 0.035  --  0.087*  --            BNP   Date Value Ref Range Status   11/14/2017 367.0 (H) 0.0 - 100.0 pg/mL Final      No results found for: PHART         Microbiology Results Abnormal      None             Imaging Results (last 24 hours)     Procedure Component Value Units Date/Time     XR Chest 1 View [[id number]] Collected:  11/13/17 1536       Updated:  11/13/17 1542     Narrative:        EXAMINATION: XR CHEST 1 VW-11/13/2017:       INDICATION: SOB; I50.9-Heart failure, unspecified; N17.9-Acute kidney  failure, unspecified; R06.02-Shortness of breath; I10-Essential  (primary) hypertension.       COMPARISON: 11/12/2017.      FINDINGS: Portable chest reveals cardiac and mediastinal silhouettes to  be within normal limits. The lung fields are grossly clear. No focal  parenchymal opacification present.  No pleural effusion or pneumothorax.  Degenerative changes seen within the spine. Pulmonary vascularity is  within normal limits.             Impression:        No acute cardiopulmonary  disease.      D:  11/13/2017  E:  11/13/2017      This report was finalized on 11/13/2017 3:40 PM by Dr. Unique Huber MD.                  Results for orders placed during the hospital encounter of 11/12/17   Adult Transthoracic Echo Complete W/ Cont if Necessary Per Protocol     Addendum · Left ventricular systolic function is moderately decreased. Calculated  EF = 39.9%. Estimated EF appears to be in the range of 36 - 40%. · Left ventricular wall thickness is consistent with mild concentric  hypertrophy. · Left ventricular diastolic dysfunction (grade III) consistent with  reversible restrictive pattern. · Right ventricular cavity is borderline dilated. · Left atrial cavity size is mildly dilated. · There is mild calcification of the aortic valve. Mild aortic valve  regurgitation is present. · Mild pulmonic valve regurgitation is present. · Mild mitral valve regurgitation is present        Blake Lynch MD 11/14/2017 10:01 AM           Narrative · Left ventricular systolic function is moderately decreased. Calculated   EF = 39.9%. Estimated EF appears to be in the range of 36 - 40%.  · Left ventricular wall thickness is consistent with mild concentric   hypertrophy.  · Left ventricular diastolic dysfunction (grade III) consistent with   reversible restrictive pattern.  · Right ventricular cavity is borderline dilated.  · Left atrial cavity size is mildly dilated.  · There is mild calcification of the aortic valve. Mild aortic valve   regurgitation is present.  · Mild pulmonic valve regurgitation is present.  · Mild mitral valve regurgitation is present             I have reviewed the medications.        Assessment/Plan      Assessment / Plan          Hospital Problem List      * (Principal)Acute pulmonary edema     Controlled type 2 diabetes mellitus with kidney complication, with long-term current use of insulin     Essential hypertension (Chronic)     Mixed hyperlipidemia (Chronic)     Gout     Elevated  brain natriuretic peptide (BNP) level     Elevated serum creatinine     Chronic renal insufficiency     Obesity, Class I, BMI 30-34.9                 Brief Hospital Course to date:  Trace Escalante is a 57 y.o. male who went to [facility] and sent to ER for shortness of breath        Assessment & Plan:     Acute pulmonary edema with elevated BNP with mild increase in cardiac biomarkers  - Concerning for volume overload secondary to heart failure (risk factors reviewed)  - cardiac cath planned for tomorrow      Elevated creatinine, baseline unknown  - Creatinine 1.9 today   - Patient follows with nephrologist, Dr. Kt Juarez, appears to have some level of chronic renal insufficiency      ALYSSA  - AutoCPAP at night     HTN  - Known history of HTN x10-15 years, his nephrologist manages BP meds   - Resume home BP meds, hold ACEI until baseline creatinine better established       Insulin-dependent DMII  - Checked A1C - Diabetes  - Endocrinologist is Dr. Jose Thomas   - Diabetic diet      Hyperlipidemia, continue home meds   History of gout, continue home meds      CPAP may help unload heart and reduce risk of stroke and heart attack  NPO after midnight for possible cardiac cath tomorrow.     DVT prophylaxis: Heparin, teds, scuds  CODE STATUS:  FULL      DVT Prophylaxis:  Heparin 5,000 Units SC every 8 hours     CODE STATUS: Full Code     Disposition: I expect the patient to be discharged to be determined     Desmond Espinoza MD  11/14/17  12:40 PM                               Afshin Parekh MD Physician Signed Cardiology Progress Notes Date of Service: 11/15/2017  7:16 AM      Expand All Collapse All    []Hide copied text  []Hover for attribution information  [facility] Progress Note        Chief Complaint/Reason for service:  New onset systolic heart failure        Subjective      Subjective: The patient states his breathing is improved A flat last night  without any dyspnea.     Past medical, surgical, social and family history reviewed in the patient's electronic medical record.        Objective         Vital Sign Min/Max for last 24 hours  Temp  Min: 97.6 °F (36.4 °C)  Max: 98.8 °F (37.1 °C)   BP  Min: 122/79  Max: 144/86   Pulse  Min: 63  Max: 72   Resp  Min: 16  Max: 18   SpO2  Min: 96 %  Max: 100 %   No Data Recorded       Intake/Output Summary (Last 24 hours) at 11/15/17 0716  Last data filed at 11/15/17 0352    Gross per 24 hour   Intake             1840 ml   Output             3350 ml   Net            -1510 ml                 Current Facility-Administered Medications:   •  acetaminophen (TYLENOL) tablet 650 mg, 650 mg, Oral, Q4H PRN, Jeane Ledesma PA-C  •  allopurinol (ZYLOPRIM) tablet 150 mg, 150 mg, Oral, Daily, Jeane Ledesma PA-C, 150 mg at 11/14/17 0926  •  amLODIPine (NORVASC) tablet 10 mg, 10 mg, Oral, Q24H, Jeane Ledesma PA-C, 10 mg at 11/14/17 0927  •  aspirin chewable tablet 81 mg, 81 mg, Oral, Daily, Jeane Ledesma PA-C, 81 mg at 11/14/17 0927  •  carvedilol (COREG) tablet 12.5 mg, 12.5 mg, Oral, Q12H, Jeane Ledesma PA-C, 12.5 mg at 11/14/17 2055  •  dextrose (D50W) solution 25 g, 25 g, Intravenous, Q15 Min PRN, Jeane Ledesma PA-C  •  dextrose (GLUTOSE) oral gel 15 g, 15 g, Oral, Q15 Min PRN, Jeane Ledesma PA-C  •  docusate sodium (COLACE) capsule 100 mg, 100 mg, Oral, BID, Jeane Ledesma PA-C, 100 mg at 11/14/17 1737  •  famotidine (PEPCID) tablet 40 mg, 40 mg, Oral, Daily, Jeane Ledesma PA-C, 40 mg at 11/14/17 0926  •  glucagon (GLUCAGEN) injection 1 mg, 1 mg, Subcutaneous, Q15 Min PRN, Jeane Ledesma PA-C  •  guaiFENesin (MUCINEX) 12 hr tablet 600 mg, 600 mg, Oral, Q12H PRN, Yovana Mejia V, APRN, 600 mg at 11/13/17 2100  •  heparin (porcine) 5000 UNIT/ML injection 5,000 Units, 5,000 Units, Subcutaneous, Q8H, Jeane Ledesma PA-C, 5,000 Units at 11/15/17 0610  •   hydrALAZINE (APRESOLINE) tablet 50 mg, 50 mg, Oral, Q8H, Afshin Parekh MD, 50 mg at 11/15/17 0610  •  insulin detemir (LEVEMIR) injection 20 Units, 20 Units, Subcutaneous, Nightly, Jude Dorsey MD, 20 Units at 11/14/17 2051  •  insulin lispro (humaLOG) injection 0-7 Units, 0-7 Units, Subcutaneous, 4x Daily AC & at Bedtime, Jeane Ledesma PA-C, 2 Units at 11/14/17 2053  •  insulin lispro (humaLOG) injection 5 Units, 5 Units, Subcutaneous, TID With Meals, Jeane Ledesma PA-C, 5 Units at 11/14/17 1737  •  isosorbide mononitrate (IMDUR) 24 hr tablet 30 mg, 30 mg, Oral, Q24H, Afshin Parekh MD, 30 mg at 11/14/17 0926  •  ondansetron (ZOFRAN) tablet 4 mg, 4 mg, Oral, Q6H PRN, Jeane Ledesma PA-C  •  Pharmacy Consult - [facility], , Does not apply, Daily, Rufino Delgado, [facility]  •  potassium chloride (MICRO-K) CR capsule 40 mEq, 40 mEq, Oral, PRN, 40 mEq at 11/13/17 1814 **OR** [DISCONTINUED] potassium chloride (KLOR-CON) packet 40 mEq, 40 mEq, Oral, PRN **OR** potassium chloride 10 mEq in 100 mL IVPB, 10 mEq, Intravenous, Q1H PRN, Thelma Cartagena, SALLY  •  rosuvastatin (CRESTOR) tablet 20 mg, 20 mg, Oral, Nightly, Jeane Ledesma PA-C, 20 mg at 11/14/17 2055  •  sodium chloride 0.45 % infusion, 75 mL/hr, Intravenous, Continuous, Afshin Parekh MD, Last Rate: 50 mL/hr at 11/15/17 0610, 50 mL/hr at 11/15/17 0610  •  sodium chloride 0.9 % flush 1-10 mL, 1-10 mL, Intravenous, PRN, Jeane Ledesma PA-C  •  sodium chloride 0.9 % flush 10 mL, 10 mL, Intravenous, PRN, Yair Sweeney MD     Physical Exam: General  well-developed well-nourished gentleman large build no acute distress        HEENT: No JVP no scleral icterus       Respiratory:  Equal bilateral symmetrical expansion and clear to auscultation bilaterally       Cardiovascular: Regular rate and rhythm with an inspiratory split S1 and a soft murmur/trace lower extremity edema pitting to palpation       GI: Positive bowel  sounds       Lower Extremities: No peripheral lesions warm and dry       Neuro: Cranial nerves II through XII are grossly normal he moves all 4 extremities       Skin:  Warm and dry       Psych: Pleasant affect     Results Review: The patient's creatinine is 2.1 again today.  His urine output exceeds intake by over a liter.  But pressure is improved from yesterday with increased dose of hydralazine     Radiology Results:  Imaging Results (last 72 hours)     Procedure Component Value Units Date/Time     XR Chest 1 View [[id number]] Collected:  11/12/17 1538       Updated:  11/13/17 0901     Narrative:            EXAMINATION: XR CHEST 1 VW - 11/12/2017      INDICATION: Shortness of air.      COMPARISON: 10/20/2017.      FINDINGS: Portable chest reveals the heart to be enlarged. Slight  prominence of the pulmonary vascularity. No focal parenchymal  opacification is present. No pleural effusion or pneumothorax.  Degenerative change is seen within the spine.  No acute parenchymal disease.             Impression:        Prominence of the pulmonary vascularity with no acute  parenchymal disease.      DICTATED:     11/12/2017  EDITED:         11/12/2017              This report was finalized on 11/13/2017 8:59 AM by Dr. Unique Huber MD.         XR Chest 1 View [[id number]] Collected:  11/13/17 1536       Updated:  11/13/17 1542     Narrative:        EXAMINATION: XR CHEST 1 VW-11/13/2017:       INDICATION: SOB; I50.9-Heart failure, unspecified; N17.9-Acute kidney  failure, unspecified; R06.02-Shortness of breath; I10-Essential  (primary) hypertension.       COMPARISON: 11/12/2017.      FINDINGS: Portable chest reveals cardiac and mediastinal silhouettes to  be within normal limits. The lung fields are grossly clear. No focal  parenchymal opacification present.  No pleural effusion or pneumothorax.  Degenerative changes seen within the spine. Pulmonary vascularity is  within normal limits.             Impression:         No acute cardiopulmonary disease.      D:  2017  E:  2017      This report was finalized on 2017 3:40 PM by Dr. Unique Huber MD.                EKG:      ECHO: Global LV wall hypokinesis with EF in the 40s     Tele:  Sinus rhythm        Assessment      Assessment/Plan: 1 new onset systolic heart failure with EF in the 40s-the patient has multiple CAD risk factors-plan cardiac catheter today.  His BNP continues to decrease  2 CK D with a baseline creatinine last year 2.0 2.1 yesterday and again 2.1 today.  I'll increase IV fluids to 75 ML's per hour and have my partner to cardiac catheter  3 hypertension-improved over the last 25 since the increase in hydralazine        Afshin Parekh MD  11/15/17  7:16 AM                         Desmond Espinoza MD Physician Signed Medicine Progress Notes Date of Service: 11/15/2017  5:14 PM      Expand All Collapse All    []Hide copied text  []Hover for attribution information       [facility] Medicine Services  PROGRESS NOTE     Patient Name: Trace Escalante  : 1960  MRN: [id number]     Date of Admission: 2017  Length of Stay: 3  Primary Care Physician: Jose Ramon Allen MD        Subjective      Subjective      CC:  Shortness of breath - failure of outpatient mgmt - sent from [facility] to ER     HPI:  Seen in [facility] after Cardiac Cath with Dr. Marcum.   Discussed case with Dr. Marcum in [facility].  Will give IV fluids for now due to contrast load in patient with CKD.     Review of Systems  Gen- No fevers, chills  CV- No chest pain, palpitations  Resp- No cough, dyspnea  GI- No N/V/D, abd pain     Otherwise ROS is negative except as mentioned in the HPI.        Objective      Objective      Vital Signs:   Temp:  [97.9 °F (36.6 °C)-98.8 °F (37.1 °C)] 98 °F (36.7 °C)  Heart Rate:  [63-85] 78  Resp:  [16-18] 16  BP: (113-151)/(66-93) 151/81     Physical Exam:     Constitutional: No acute distress, awake, alert  HENT:  NCAT, mucous membranes moist  Respiratory: Clear to auscultation bilaterally, respiratory effort normal   Cardiovascular: RRR, s1 and s2  Gastrointestinal: Positive bowel sounds, soft, nontender, nondistended, obese abdomen  Musculoskeletal: No bilateral ankle edema  Psychiatric: Appropriate affect, cooperative  Neurologic: Oriented x 3, strength symmetric in all extremities, Cranial Nerves grossly intact to confrontation, speech clear  Skin: No rashes     Results Reviewed:  I have personally reviewed current lab, radiology, and data and agree.        Results from last 7 days  Lab Units 11/13/17  0518 11/12/17  1249   WBC 10*3/mm3 6.19 6.82   HEMOGLOBIN g/dL 11.6* 13.2   HEMATOCRIT % 35.2* 39.2   PLATELETS 10*3/mm3 223 226        Results from last 7 days  Lab Units 11/15/17  0453 11/14/17  0459 11/13/17  0518 11/12/17  2358 11/12/17  1249   SODIUM mmol/L 138 138 137  --  135   POTASSIUM mmol/L 3.7 3.9 3.1*  --  3.6   CHLORIDE mmol/L 107 103 103  --  100   CO2 mmol/L 27.0 28.0 28.0  --  25.0   BUN mg/dL 26* 25* 19  --  15   CREATININE mg/dL 2.10* 2.10* 1.90*  --  1.90*   GLUCOSE mg/dL 144* 138* 95  --  281*   CALCIUM mg/dL 8.6* 8.8 8.2*  --  8.6*   ALT (SGPT) U/L  --   --   --   --  30   AST (SGOT) U/L  --   --   --   --  39*   TROPONIN I ng/mL  --  0.035  --  0.087*  --            BNP   Date Value Ref Range Status   11/15/2017 244.0 (H) 0.0 - 100.0 pg/mL Final      No results found for: PHART         Microbiology Results Abnormal      None                 Imaging Results (last 24 hours)      ** No results found for the last 24 hours. **               Results for orders placed during the hospital encounter of 11/12/17   Adult Transthoracic Echo Complete W/ Cont if Necessary Per Protocol     Addendum · Left ventricular systolic function is moderately decreased. Calculated  EF = 39.9%. Estimated EF appears to be in the range of 36 - 40%. · Left ventricular wall thickness is consistent with mild concentric  hypertrophy.  · Left ventricular diastolic dysfunction (grade III) consistent with  reversible restrictive pattern. · Right ventricular cavity is borderline dilated. · Left atrial cavity size is mildly dilated. · There is mild calcification of the aortic valve. Mild aortic valve  regurgitation is present. · Mild pulmonic valve regurgitation is present. · Mild mitral valve regurgitation is present        Blake Lynch MD 11/14/2017 10:01 AM           Narrative · Left ventricular systolic function is moderately decreased. Calculated   EF = 39.9%. Estimated EF appears to be in the range of 36 - 40%.  · Left ventricular wall thickness is consistent with mild concentric   hypertrophy.  · Left ventricular diastolic dysfunction (grade III) consistent with   reversible restrictive pattern.  · Right ventricular cavity is borderline dilated.  · Left atrial cavity size is mildly dilated.  · There is mild calcification of the aortic valve. Mild aortic valve   regurgitation is present.  · Mild pulmonic valve regurgitation is present.  · Mild mitral valve regurgitation is present             I have reviewed the medications.        Assessment/Plan      Assessment / Plan          Hospital Problem List      * (Principal)Acute congestive heart failure     Overview Signed 11/15/2017  8:55 AM by Afshin Parekh MD       Added automatically from request for surgery [id number]         Controlled type 2 diabetes mellitus with kidney complication, with long-term current use of insulin     Essential hypertension (Chronic)     Mixed hyperlipidemia (Chronic)     Gout     Elevated brain natriuretic peptide (BNP) level     Elevated serum creatinine     Chronic renal insufficiency     Acute pulmonary edema     Obesity, Class I, BMI 30-34.9                 Brief Hospital Course to date:  Trace Escalante is a 57 y.o. male who presented to [facility] for failure of outpatient treatment for shortness of breath.     Sent from [facility] to  our ER.     Assessment & Plan:     Shortness of Breath  - possible multifactorial issue  - Cardiomyopathy - unclear etiology - no intervention - radial artery approached cardiac cath - Dr. Marcum today  - very hypertensive on multiple mediations  - ALYSSA  - Obesity  - leaky valves / combined systolic/diastolic heart failure  Chronic Kidney Disease  - unclear baseline  - likely CKD stage III  - holding spironolactone / holding hydrochlorothiazide  Hypertension  - hydralazine added  - Norvasc  - Imdur  ALYSSA  - CPAP at night  DM  - follows with Dr. SOFIYA Thomas     IV fluids after radial artery cath today with contrast given in patient with baseline CKD  Increase medications for hypertension today  Discussed case with Dr. Marcum today in person  EKG in AM  BNP in AM  Fluids after cardiac cath due to contrast load with CKD     Will need follow up in Cardiology Clinic   No ace inhibitor for now due to DILAN/CKD + contrast load today  May be able to start at discharge or follow up and start in clinic  HF Navigator / Cardiac Rehab consulted     DVT Prophylaxis:  Heparin 5,000 Units SC every 8 hours     CODE STATUS: Full Code     Disposition: I expect the patient to be discharged home in 24 hours     Desmond Espinoza MD  11/15/17  5:14 PM                                 Consult Notes (last 72 hours) (Notes from 11/18/2017  3:20 PM through 11/21/2017  3:20 PM)     No notes of this type exist for this encounter.

## 2017-11-22 VITALS
RESPIRATION RATE: 18 BRPM | OXYGEN SATURATION: 99 % | HEIGHT: 75 IN | SYSTOLIC BLOOD PRESSURE: 134 MMHG | DIASTOLIC BLOOD PRESSURE: 72 MMHG | BODY MASS INDEX: 37.3 KG/M2 | WEIGHT: 300 LBS | TEMPERATURE: 98.2 F | HEART RATE: 73 BPM

## 2017-11-22 LAB
ALBUMIN SERPL-MCNC: 3.8 G/DL (ref 3.2–4.8)
ALBUMIN/GLOB SERPL: 1.2 G/DL (ref 1.5–2.5)
ALP SERPL-CCNC: 70 U/L (ref 25–100)
ALT SERPL W P-5'-P-CCNC: 58 U/L (ref 7–40)
ANION GAP SERPL CALCULATED.3IONS-SCNC: 6 MMOL/L (ref 3–11)
AST SERPL-CCNC: 34 U/L (ref 0–33)
BASOPHILS # BLD AUTO: 0.05 10*3/MM3 (ref 0–0.2)
BASOPHILS NFR BLD AUTO: 0.6 % (ref 0–1)
BILIRUB SERPL-MCNC: 0.3 MG/DL (ref 0.3–1.2)
BNP SERPL-MCNC: 356 PG/ML (ref 0–100)
BUN BLD-MCNC: 25 MG/DL (ref 9–23)
BUN/CREAT SERPL: 13.2 (ref 7–25)
CALCIUM SPEC-SCNC: 9.1 MG/DL (ref 8.7–10.4)
CHLORIDE SERPL-SCNC: 104 MMOL/L (ref 99–109)
CO2 SERPL-SCNC: 24 MMOL/L (ref 20–31)
CREAT BLD-MCNC: 1.9 MG/DL (ref 0.6–1.3)
DEPRECATED RDW RBC AUTO: 45 FL (ref 37–54)
EOSINOPHIL # BLD AUTO: 0.4 10*3/MM3 (ref 0–0.3)
EOSINOPHIL NFR BLD AUTO: 5 % (ref 0–3)
ERYTHROCYTE [DISTWIDTH] IN BLOOD BY AUTOMATED COUNT: 14 % (ref 11.3–14.5)
GFR SERPL CREATININE-BSD FRML MDRD: 45 ML/MIN/1.73
GLOBULIN UR ELPH-MCNC: 3.2 GM/DL
GLUCOSE BLD-MCNC: 194 MG/DL (ref 70–100)
HCT VFR BLD AUTO: 33.8 % (ref 38.9–50.9)
HGB BLD-MCNC: 11.2 G/DL (ref 13.1–17.5)
HOLD SPECIMEN: NORMAL
HOLD SPECIMEN: NORMAL
IMM GRANULOCYTES # BLD: 0.01 10*3/MM3 (ref 0–0.03)
IMM GRANULOCYTES NFR BLD: 0.1 % (ref 0–0.6)
LYMPHOCYTES # BLD AUTO: 2.1 10*3/MM3 (ref 0.6–4.8)
LYMPHOCYTES NFR BLD AUTO: 26.3 % (ref 24–44)
MCH RBC QN AUTO: 28.9 PG (ref 27–31)
MCHC RBC AUTO-ENTMCNC: 33.1 G/DL (ref 32–36)
MCV RBC AUTO: 87.3 FL (ref 80–99)
MONOCYTES # BLD AUTO: 0.68 10*3/MM3 (ref 0–1)
MONOCYTES NFR BLD AUTO: 8.5 % (ref 0–12)
NEUTROPHILS # BLD AUTO: 4.73 10*3/MM3 (ref 1.5–8.3)
NEUTROPHILS NFR BLD AUTO: 59.5 % (ref 41–71)
PLATELET # BLD AUTO: 299 10*3/MM3 (ref 150–450)
PMV BLD AUTO: 10.7 FL (ref 6–12)
POTASSIUM BLD-SCNC: 3.7 MMOL/L (ref 3.5–5.5)
PROT SERPL-MCNC: 7 G/DL (ref 5.7–8.2)
RBC # BLD AUTO: 3.87 10*6/MM3 (ref 4.2–5.76)
SODIUM BLD-SCNC: 134 MMOL/L (ref 132–146)
TROPONIN I SERPL-MCNC: 0.04 NG/ML (ref 0–0.07)
WBC NRBC COR # BLD: 7.97 10*3/MM3 (ref 3.5–10.8)
WHOLE BLOOD HOLD SPECIMEN: NORMAL
WHOLE BLOOD HOLD SPECIMEN: NORMAL

## 2017-11-22 PROCEDURE — 25010000002 FUROSEMIDE PER 20 MG: Performed by: EMERGENCY MEDICINE

## 2017-11-22 PROCEDURE — 85025 COMPLETE CBC W/AUTO DIFF WBC: CPT | Performed by: EMERGENCY MEDICINE

## 2017-11-22 PROCEDURE — 96374 THER/PROPH/DIAG INJ IV PUSH: CPT

## 2017-11-22 PROCEDURE — 83880 ASSAY OF NATRIURETIC PEPTIDE: CPT | Performed by: EMERGENCY MEDICINE

## 2017-11-22 PROCEDURE — 84484 ASSAY OF TROPONIN QUANT: CPT

## 2017-11-22 PROCEDURE — 80053 COMPREHEN METABOLIC PANEL: CPT | Performed by: EMERGENCY MEDICINE

## 2017-11-22 RX ORDER — FUROSEMIDE 10 MG/ML
40 INJECTION INTRAMUSCULAR; INTRAVENOUS ONCE
Status: COMPLETED | OUTPATIENT
Start: 2017-11-22 | End: 2017-11-22

## 2017-11-22 RX ORDER — SPIRONOLACTONE 25 MG/1
25 TABLET ORAL DAILY
Qty: 30 TABLET | Refills: 0 | Status: SHIPPED | OUTPATIENT
Start: 2017-11-22 | End: 2017-11-27 | Stop reason: SDUPTHER

## 2017-11-22 RX ADMIN — FUROSEMIDE 40 MG: 10 INJECTION, SOLUTION INTRAMUSCULAR; INTRAVENOUS at 01:31

## 2017-11-22 NOTE — ED PROVIDER NOTES
Subjective   HPI Comments: Trace Escalante is a 57 y.o.male with previous hx of CHF, who presents to the ED with c/o SOA with onset today. He reports that he suddenly developed severe SOA, with coughing, wheezing, and bilateral leg swelling which prompted his visit to the ED. He notes that he was admitted here at [facility], 2 weeks ago, and was discharged with CHF. He notes that he has gained 4 pounds over the last two days which is unexpected for him. He states that he stopped his Spironolactone medication two days ago and increased his Lasix medication today. He reports that he has taken a total of 60 mg today of his Lasix. There are no other complaints at this time.     Patient is a 57 y.o. male presenting with shortness of breath.   History provided by:  Patient  Shortness of Breath   Severity:  Moderate  Onset quality:  Sudden  Timing:  Constant  Progression:  Worsening  Chronicity:  Recurrent  Relieved by:  None tried  Worsened by:  Nothing  Ineffective treatments:  None tried  Associated symptoms: cough and wheezing        Review of Systems   Constitutional: Positive for unexpected weight change.   Respiratory: Positive for cough, shortness of breath and wheezing.    Cardiovascular: Positive for leg swelling.   All other systems reviewed and are negative.      Past Medical History:   Diagnosis Date   • CKD (chronic kidney disease) stage 3, GFR 30-59 ml/min    • Gout    • Hyperlipidemia    • Hypertension    • Insulin dependent diabetes mellitus    • Kidney stones    • Obesity    • ALYSSA (obstructive sleep apnea)        No Known Allergies    Past Surgical History:   Procedure Laterality Date   • CARDIAC CATHETERIZATION N/A 11/15/2017    Procedure: Coronary angiography;  Surgeon: Elke Marcum MD;  Location: [facility] INVASIVE LOCATION;  Service:    • MEDIAL COLLATERAL LIGAMENT REPAIR, KNEE Left 1978       Family History   Problem Relation Age of Onset   • Heart disease Mother    • Stroke Mother    • Diabetes Father     • Hypertension Father    • Heart disease Father    • Heart attack Father    • Kidney disease Brother    • Stroke Brother        Social History     Social History   • Marital status: Single     Spouse name: N/A   • Number of children: 2   • Years of education: College     Occupational History   •  [email or web address]     Social History Main Topics   • Smoking status: Never Smoker   • Smokeless tobacco: Never Used   • Alcohol use No   • Drug use: No   • Sexual activity: Yes     Partners: Female     Other Topics Concern   • None     Social History Narrative         Objective   Physical Exam   Constitutional: He is oriented to person, place, and time. He appears well-developed and well-nourished. No distress.   HENT:   Head: Normocephalic and atraumatic.   Nose: Nose normal.   Eyes: Conjunctivae are normal. No scleral icterus.   Neck: Normal range of motion. Neck supple.   Cardiovascular: Normal rate, regular rhythm and normal heart sounds.    No murmur heard.  Pulmonary/Chest: Effort normal and breath sounds normal. No respiratory distress.   Anterior breathe sounds normal.   Abdominal: Soft. There is no tenderness.   Musculoskeletal: He exhibits edema (2+ edema bilateral lower extremities ).   Neurological: He is alert and oriented to person, place, and time.   Skin: Skin is warm and dry.   Psychiatric: He has a normal mood and affect. His behavior is normal.   Nursing note and vitals reviewed.      Procedures         ED Course  ED Course     Recent Results (from the past 24 hour(s))   Comprehensive Metabolic Panel    Collection Time: 11/22/17 12:07 AM   Result Value Ref Range    Glucose 194 (H) 70 - 100 mg/dL    BUN 25 (H) 9 - 23 mg/dL    Creatinine 1.90 (H) 0.60 - 1.30 mg/dL    Sodium 134 132 - 146 mmol/L    Potassium 3.7 3.5 - 5.5 mmol/L    Chloride 104 99 - 109 mmol/L    CO2 24.0 20.0 - 31.0 mmol/L    Calcium 9.1 8.7 - 10.4 mg/dL    Total Protein 7.0 5.7 - 8.2 g/dL    Albumin 3.80 3.20 - 4.80 g/dL    ALT (SGPT) 58  (H) 7 - 40 U/L    AST (SGOT) 34 (H) 0 - 33 U/L    Alkaline Phosphatase 70 25 - 100 U/L    Total Bilirubin 0.3 0.3 - 1.2 mg/dL    eGFR  African Amer 45 (L) >60 mL/min/1.73    Globulin 3.2 gm/dL    A/G Ratio 1.2 (L) 1.5 - 2.5 g/dL    BUN/Creatinine Ratio 13.2 7.0 - 25.0    Anion Gap 6.0 3.0 - 11.0 mmol/L   BNP    Collection Time: 11/22/17 12:07 AM   Result Value Ref Range    .0 (H) 0.0 - 100.0 pg/mL   Light Blue Top    Collection Time: 11/22/17 12:07 AM   Result Value Ref Range    Extra Tube hold for add-on    Green Top (Gel)    Collection Time: 11/22/17 12:07 AM   Result Value Ref Range    Extra Tube Hold for add-ons.    Lavender Top    Collection Time: 11/22/17 12:07 AM   Result Value Ref Range    Extra Tube hold for add-on    Gold Top - SST    Collection Time: 11/22/17 12:07 AM   Result Value Ref Range    Extra Tube Hold for add-ons.    CBC Auto Differential    Collection Time: 11/22/17 12:07 AM   Result Value Ref Range    WBC 7.97 3.50 - 10.80 10*3/mm3    RBC 3.87 (L) 4.20 - 5.76 10*6/mm3    Hemoglobin 11.2 (L) 13.1 - 17.5 g/dL    Hematocrit 33.8 (L) 38.9 - 50.9 %    MCV 87.3 80.0 - 99.0 fL    MCH 28.9 27.0 - 31.0 pg    MCHC 33.1 32.0 - 36.0 g/dL    RDW 14.0 11.3 - 14.5 %    RDW-SD 45.0 37.0 - 54.0 fl    MPV 10.7 6.0 - 12.0 fL    Platelets 299 150 - 450 10*3/mm3    Neutrophil % 59.5 41.0 - 71.0 %    Lymphocyte % 26.3 24.0 - 44.0 %    Monocyte % 8.5 0.0 - 12.0 %    Eosinophil % 5.0 (H) 0.0 - 3.0 %    Basophil % 0.6 0.0 - 1.0 %    Immature Grans % 0.1 0.0 - 0.6 %    Neutrophils, Absolute 4.73 1.50 - 8.30 10*3/mm3    Lymphocytes, Absolute 2.10 0.60 - 4.80 10*3/mm3    Monocytes, Absolute 0.68 0.00 - 1.00 10*3/mm3    Eosinophils, Absolute 0.40 (H) 0.00 - 0.30 10*3/mm3    Basophils, Absolute 0.05 0.00 - 0.20 10*3/mm3    Immature Grans, Absolute 0.01 0.00 - 0.03 10*3/mm3   POC Troponin, Rapid    Collection Time: 11/22/17 12:16 AM   Result Value Ref Range    Troponin I 0.04 0.00 - 0.07 ng/mL     Note: In addition  to lab results from this visit, the labs listed above may include labs taken at another facility or during a different encounter within the last 24 hours. Please correlate lab times with ED admission and discharge times for further clarification of the services performed during this visit.    XR Chest 1 View   Final Result     Hilar prominence and perihilar airspace opacities, consider mild pulmonary    edema.  Otherwise no acute findings.      THIS DOCUMENT HAS BEEN ELECTRONICALLY SIGNED BY ENRIKE DENG MD        Vitals:    11/22/17 0100 11/22/17 0131 11/22/17 0300 11/22/17 0353   BP: 132/77 136/71 134/72 134/72   BP Location:       Patient Position:       Pulse: 70 73 70 73   Resp:    18   Temp:       TempSrc:       SpO2: 98%  99% 99%   Weight:       Height:         Medications   sodium chloride 0.9 % flush 10 mL (not administered)   furosemide (LASIX) injection 40 mg (40 mg Intravenous Given 11/22/17 0131)     ECG/EMG Results (last 24 hours)     Procedure Component Value Units Date/Time    ECG 12 Lead [[id number]] Collected:  11/21/17 2251     Updated:  11/21/17 2253        Patient responded very well to IV Lasix.  He states that he feels dramatically better following diuresis here and he will follow up CHF clinic.  I have instructed him to take his medications as prescribed, including his spironolactone.  I have given him a refill of this and he will follow-up with heart failure clinic.                MDM    Final diagnoses:   Acute on chronic congestive heart failure, unspecified congestive heart failure type   Acute respiratory distress   Renal insufficiency       Documentation assistance provided by mariella Jarquin.  Information recorded by the mariella was done at my direction and has been verified and validated by me.     Cristiano Jarquin  11/21/17 [id number]       Anthony Crews DO  11/22/17 9993

## 2017-11-22 NOTE — DISCHARGE INSTRUCTIONS
Taking medication as prescribed.  Follow-up with the heart failure clinic at soonest availability.  Return to the emergency department if symptoms worsen or other concerns arise.

## 2017-11-27 ENCOUNTER — OFFICE VISIT (OUTPATIENT)
Dept: CARDIOLOGY | Facility: HOSPITAL | Age: 57
End: 2017-11-27

## 2017-11-27 ENCOUNTER — APPOINTMENT (OUTPATIENT)
Dept: LAB | Facility: HOSPITAL | Age: 57
End: 2017-11-27

## 2017-11-27 ENCOUNTER — HOSPITAL ENCOUNTER (OUTPATIENT)
Dept: CARDIOLOGY | Facility: HOSPITAL | Age: 57
Discharge: HOME | End: 2017-11-27
Admitting: NURSE PRACTITIONER

## 2017-11-27 VITALS
TEMPERATURE: 97.6 F | HEART RATE: 77 BPM | OXYGEN SATURATION: 96 % | HEIGHT: 75 IN | RESPIRATION RATE: 18 BRPM | DIASTOLIC BLOOD PRESSURE: 90 MMHG | BODY MASS INDEX: 38.32 KG/M2 | WEIGHT: 308.2 LBS | SYSTOLIC BLOOD PRESSURE: 162 MMHG

## 2017-11-27 DIAGNOSIS — R06.02 SHORTNESS OF BREATH: Primary | ICD-10-CM

## 2017-11-27 DIAGNOSIS — E11.22 CONTROLLED TYPE 2 DIABETES MELLITUS WITH CHRONIC KIDNEY DISEASE, WITH LONG-TERM CURRENT USE OF INSULIN, UNSPECIFIED CKD STAGE (HCC): ICD-10-CM

## 2017-11-27 DIAGNOSIS — R06.02 SHORTNESS OF BREATH: ICD-10-CM

## 2017-11-27 DIAGNOSIS — E66.9 OBESITY, CLASS I, BMI 30-34.9: ICD-10-CM

## 2017-11-27 DIAGNOSIS — N18.30 CHRONIC RENAL IMPAIRMENT, STAGE 3 (MODERATE) (HCC): ICD-10-CM

## 2017-11-27 DIAGNOSIS — I50.22 CHRONIC SYSTOLIC CONGESTIVE HEART FAILURE (HCC): ICD-10-CM

## 2017-11-27 DIAGNOSIS — I10 ESSENTIAL HYPERTENSION: Chronic | ICD-10-CM

## 2017-11-27 DIAGNOSIS — Z79.4 CONTROLLED TYPE 2 DIABETES MELLITUS WITH CHRONIC KIDNEY DISEASE, WITH LONG-TERM CURRENT USE OF INSULIN, UNSPECIFIED CKD STAGE (HCC): ICD-10-CM

## 2017-11-27 LAB
ANION GAP SERPL CALCULATED.3IONS-SCNC: 5 MMOL/L (ref 3–11)
BUN BLD-MCNC: 21 MG/DL (ref 9–23)
BUN/CREAT SERPL: 10.5 (ref 7–25)
CALCIUM SPEC-SCNC: 8.9 MG/DL (ref 8.7–10.4)
CHLORIDE SERPL-SCNC: 104 MMOL/L (ref 99–109)
CO2 SERPL-SCNC: 30 MMOL/L (ref 20–31)
CREAT BLD-MCNC: 2 MG/DL (ref 0.6–1.3)
GFR SERPL CREATININE-BSD FRML MDRD: 42 ML/MIN/1.73
GLUCOSE BLD-MCNC: 104 MG/DL (ref 70–100)
POTASSIUM BLD-SCNC: 4.7 MMOL/L (ref 3.5–5.5)
SODIUM BLD-SCNC: 139 MMOL/L (ref 132–146)

## 2017-11-27 PROCEDURE — 99213 OFFICE O/P EST LOW 20 MIN: CPT | Performed by: NURSE PRACTITIONER

## 2017-11-27 PROCEDURE — 80048 BASIC METABOLIC PNL TOTAL CA: CPT | Performed by: NURSE PRACTITIONER

## 2017-11-27 PROCEDURE — 93005 ELECTROCARDIOGRAM TRACING: CPT | Performed by: NURSE PRACTITIONER

## 2017-11-27 PROCEDURE — 36415 COLL VENOUS BLD VENIPUNCTURE: CPT | Performed by: NURSE PRACTITIONER

## 2017-11-27 PROCEDURE — 93010 ELECTROCARDIOGRAM REPORT: CPT | Performed by: INTERNAL MEDICINE

## 2017-11-27 RX ORDER — FUROSEMIDE 40 MG/1
40 TABLET ORAL DAILY
Qty: 30 TABLET | Refills: 3 | Status: SHIPPED | OUTPATIENT
Start: 2017-11-27 | End: 2017-12-08 | Stop reason: DRUGHIGH

## 2017-11-27 RX ORDER — SPIRONOLACTONE 25 MG/1
25 TABLET ORAL DAILY
Qty: 30 TABLET | Refills: 3 | Status: SHIPPED | OUTPATIENT
Start: 2017-11-27 | End: 2018-08-24 | Stop reason: SDUPTHER

## 2017-11-27 NOTE — PROGRESS NOTES
Subjective:     Encounter Date:11/27/2017      Patient ID: Trace Escalante is a 57 y.o. male.    Chief Complaint: CHF follow up    History of Present Illness:  Mr. Escalante comes in to the Heart Failure Clinic today at the request of Dr. Anthony Crews.  He was evaluated in the ED on 11/21/17 for recurrent SOA/ peripheral edema/ HF symptoms.  This followed hospital stay 11/12-11/16/2017 for new acute pulmonary edema/ heart failure.   He was treated with IV lasix 60 mg and advised to re-start Spironolactone.      Past Medical History:   Diagnosis Date   • CKD (chronic kidney disease) stage 3, GFR 30-59 ml/min    • Gout    • Hyperlipidemia    • Hypertension    • Insulin dependent diabetes mellitus    • Kidney stones    • Obesity    • ALYSSA (obstructive sleep apnea)        Past Surgical History:   Procedure Laterality Date   • CARDIAC CATHETERIZATION N/A 11/15/2017    Procedure: Coronary angiography;  Surgeon: Elke Marcum MD;  Location: [facility] INVASIVE LOCATION;  Service:    • MEDIAL COLLATERAL LIGAMENT REPAIR, KNEE Left 1978       Social History     Social History   • Marital status: Single     Spouse name: N/A   • Number of children: 2   • Years of education: College     Occupational History   •  [employer]     Social History Main Topics   • Smoking status: Never Smoker   • Smokeless tobacco: Never Used   • Alcohol use No   • Drug use: No   • Sexual activity: Yes     Partners: Female     Other Topics Concern   • Not on file     Social History Narrative    Caffeine use: 1-2 cups coffee or decaf tea  daily.    Patient lives at home with his daughters.            Family History   Problem Relation Age of Onset   • Heart disease Mother    • Stroke Mother    • Diabetes Father    • Hypertension Father    • Heart disease Father    • Heart attack Father    • Kidney disease Brother    • Stroke Brother        Review of Systems   Constitution: Positive for weight gain (2.5 lbs in 1 week). Negative for chills, decreased  "appetite, diaphoresis, fever, weakness, malaise/fatigue, night sweats and weight loss.   HENT: Positive for congestion (post nasal drip). Negative for nosebleeds.    Eyes: Negative for blurred vision, visual disturbance and visual halos.   Cardiovascular: Positive for dyspnea on exertion and leg swelling. Negative for chest pain, claudication, cyanosis, irregular heartbeat, near-syncope, orthopnea, palpitations, paroxysmal nocturnal dyspnea and syncope.   Respiratory: Positive for cough and sputum production. Negative for hemoptysis, shortness of breath, sleep disturbances due to breathing, snoring and wheezing.    Endocrine: Positive for cold intolerance. Negative for heat intolerance, polydipsia, polyphagia and polyuria.   Hematologic/Lymphatic: Bruises/bleeds easily.   Skin: Negative for dry skin, itching and rash.   Musculoskeletal: Negative for falls, joint pain, joint swelling, muscle weakness and myalgias.   Gastrointestinal: Positive for constipation and dysphagia. Negative for bloating, abdominal pain, diarrhea, heartburn, melena, nausea and vomiting.   Genitourinary: Negative for dysuria, flank pain, hematuria and nocturia.   Neurological: Negative for difficulty with concentration, excessive daytime sleepiness, dizziness, headaches and loss of balance.   Psychiatric/Behavioral: Negative for altered mental status and depression. The patient is not nervous/anxious.    Allergic/Immunologic: Positive for environmental allergies (seasonal allergies).     Vitals:    11/27/17 1512 11/27/17 1516 11/27/17 1517   BP: 150/90 160/87 162/90   BP Location: Right arm Left arm Left arm   Patient Position: Sitting Sitting Standing   Pulse: 73 71 77   Resp: 18     Temp: 97.6 °F (36.4 °C)     TempSrc: Temporal Artery      SpO2: 96%     Weight: (!) 308 lb 3.2 oz (140 kg)     Height: 75\" (190.5 cm)         Objective:     Physical Exam   Constitutional: He is oriented to person, place, and time. He appears well-developed and " well-nourished. No distress.   HENT:   Head: Normocephalic and atraumatic.   Eyes: Pupils are equal, round, and reactive to light. No scleral icterus.   Neck: Neck supple. No JVD present.   Cardiovascular: Normal rate, regular rhythm and intact distal pulses.    Murmur heard.  Soft systolic murmur   Pulmonary/Chest: Effort normal and breath sounds normal. No respiratory distress. He has no wheezes. He has no rales.   Musculoskeletal: Normal range of motion. He exhibits edema.   Trace to +1 bilateral lower extremity edema   Neurological: He is alert and oriented to person, place, and time. No cranial nerve deficit.   Skin: Skin is warm and dry.   Psychiatric: He has a normal mood and affect. His behavior is normal. Judgment and thought content normal.       Lab Review: BMP ordered for today     Assessment/ plan:        Diagnosis Plan   1. Chronic systolic congestive heart failure  New HF education booklet reviewed with patient today, including supplement for dietary recommendations.  Creatine/ GFR stable and should accommodate slight increase in lasix to 40 mg daily/ aldactone 25 mg daily.  Weigh daily.  RTC with repeat BMP 2 weeks.  Continue to work toward improved BP control with BB, hydralazine, imdur, norvasc.       2. Obesity, Class I, BMI 30-34.9  Reviewed instructions for daily exercise but avoid heavy weight lifting.       3. Controlled type 2 diabetes mellitus with chronic kidney disease, with long-term current use of insulin, unspecified CKD stage  HgA1C was 8.3% during recent hospital stay.  Follow up with Dr. Jose Ramon Allen, primary care.   4. Chronic renal impairment, stage 3 (moderate)  BMP today shows stable electrolytes and creatine 2.0 (1.9)/ GFR 42 (45).  Patient to follow up with Nephrology, Dr. Juarez within the next few weeks.

## 2017-12-01 ENCOUNTER — OFFICE VISIT (OUTPATIENT)
Dept: FAMILY MEDICINE CLINIC | Facility: CLINIC | Age: 57
End: 2017-12-01

## 2017-12-01 VITALS
RESPIRATION RATE: 20 BRPM | HEIGHT: 75 IN | HEART RATE: 76 BPM | WEIGHT: 303 LBS | DIASTOLIC BLOOD PRESSURE: 80 MMHG | BODY MASS INDEX: 37.67 KG/M2 | TEMPERATURE: 98.2 F | SYSTOLIC BLOOD PRESSURE: 130 MMHG

## 2017-12-01 DIAGNOSIS — E11.22 CONTROLLED TYPE 2 DIABETES MELLITUS WITH CHRONIC KIDNEY DISEASE, WITH LONG-TERM CURRENT USE OF INSULIN, UNSPECIFIED CKD STAGE (HCC): ICD-10-CM

## 2017-12-01 DIAGNOSIS — M10.9 GOUT, UNSPECIFIED CAUSE, UNSPECIFIED CHRONICITY, UNSPECIFIED SITE: ICD-10-CM

## 2017-12-01 DIAGNOSIS — I50.22 CHRONIC SYSTOLIC CONGESTIVE HEART FAILURE (HCC): Primary | ICD-10-CM

## 2017-12-01 DIAGNOSIS — Z79.4 CONTROLLED TYPE 2 DIABETES MELLITUS WITH CHRONIC KIDNEY DISEASE, WITH LONG-TERM CURRENT USE OF INSULIN, UNSPECIFIED CKD STAGE (HCC): ICD-10-CM

## 2017-12-01 PROCEDURE — 99214 OFFICE O/P EST MOD 30 MIN: CPT | Performed by: FAMILY MEDICINE

## 2017-12-01 RX ORDER — ALLOPURINOL 300 MG/1
150 TABLET ORAL DAILY
Qty: 45 TABLET | Refills: 1 | Status: SHIPPED | OUTPATIENT
Start: 2017-12-01 | End: 2018-05-10 | Stop reason: SDUPTHER

## 2017-12-01 NOTE — PROGRESS NOTES
Yani Escalante is a 57 y.o. male.     History of Present Illness   He was inpatient at  [facility] 11/12/17-11/16/17, diagnosed with acute CHF. He didn't have a history of CHF prior to that. He is a diabetic and has CKD stage 3.   Since his inpatient discharge, he has been seen at [facility] for acute SOA secondary to CHF on 11/21/17. He admits to not monitoring his diet initially, which is likely why he retained fluid and became symptomatic again. He is now following both a diabetic and low sodium diet. He is managed with Coreg, Lasix, spironolactone. Also, Norvasc, imdur, hydralazine for HTN control.   Today, he states that he is doing much better, the low sodium diet has improved symptoms. Continues to have some edema of lower extremities, but only minimal amount. Denies SOA, cough, orthopnea.     Hga1c nov 2017 8.3, currently treated with Tresiba, novolog.     He has a history of gout, treated with allopurinol. He is requesting a refill.     The following portions of the patient's history were reviewed and updated as appropriate: allergies, current medications, past family history, past medical history, past social history, past surgical history and problem list.    Review of Systems   Constitutional: Negative for chills and fever.   HENT: Negative.    Respiratory: Negative for apnea, cough, chest tightness and shortness of breath.    Cardiovascular: Positive for leg swelling. Negative for chest pain and palpitations.   Musculoskeletal: Negative.    Skin: Negative.    Neurological: Negative.    Hematological: Negative.    Psychiatric/Behavioral: Negative.        Objective   Physical Exam   Constitutional: He is oriented to person, place, and time. He appears well-developed and well-nourished.   HENT:   Head: Normocephalic and atraumatic.   Right Ear: External ear normal.   Left Ear: External ear normal.   Nose: Nose normal.   Eyes: Conjunctivae and EOM are normal.   Neck: Normal range of motion.    Cardiovascular: Normal rate, regular rhythm and normal heart sounds.    Pulmonary/Chest: Effort normal and breath sounds normal. He has no wheezes. He has no rales.   Musculoskeletal: He exhibits edema (trace pitting edema to calves b/l). He exhibits no deformity.   Neurological: He is alert and oriented to person, place, and time. No cranial nerve deficit.   Skin: Skin is warm and dry.   Psychiatric: He has a normal mood and affect. His behavior is normal.   Nursing note and vitals reviewed.      Assessment/Plan   Trace was seen today for fu from hosp discharge / soa.    Diagnoses and all orders for this visit:    Chronic systolic congestive heart failure    Controlled type 2 diabetes mellitus with chronic kidney disease, with long-term current use of insulin, unspecified CKD stage    Gout, unspecified cause, unspecified chronicity, unspecified site  -     allopurinol (ZYLOPRIM) 300 MG tablet; Take 0.5 tablets by mouth Daily.        Continue low sodium diet, no more than 1500mg sodium per day.  Overall, doing well. He is scheduled to follow up with cardiology on 12/8/17 and 1/2/18, keep those appointments. He also reports that he has an appointment scheduled with his nephrologist soon.  Recommended him to wear compression stockings if lower extremity edema worsens.   He will follow up in 3 months with PCP, will need to update labs then.

## 2017-12-01 NOTE — PATIENT INSTRUCTIONS
Go to the nearest ER or return to clinic if symptoms worsen, fever/chill develop      Heart Failure  Heart failure means your heart has trouble pumping blood. This makes it hard for your body to work well. Heart failure is usually a long-term (chronic) condition. You must take good care of yourself and follow your doctor's treatment plan.  HOME CARE  · Take your heart medicine as told by your doctor.    Do not stop taking medicine unless your doctor tells you to.    Do not skip any dose of medicine.    Refill your medicines before they run out.    Take other medicines only as told by your doctor or pharmacist.  · Stay active if told by your doctor. The elderly and people with severe heart failure should talk with a doctor about physical activity.  · Eat heart-healthy foods. Choose foods that are without trans fat and are low in saturated fat, cholesterol, and salt (sodium). This includes fresh or frozen fruits and vegetables, fish, lean meats, fat-free or low-fat dairy foods, whole grains, and high-fiber foods. Lentils and dried peas and beans (legumes) are also good choices.  · Limit salt if told by your doctor.  · Cook in a healthy way. Roast, grill, broil, bake, poach, steam, or stir-lock foods.  · Limit fluids as told by your doctor.  · Weigh yourself every morning. Do this after you pee (urinate) and before you eat breakfast. Write down your weight to give to your doctor.  · Take your blood pressure and write it down if your doctor tells you to.  · Ask your doctor how to check your pulse. Check your pulse as told.  · Lose weight if told by your doctor.  · Stop smoking or chewing tobacco. Do not use gum or patches that help you quit without your doctor's approval.  · Schedule and go to doctor visits as told.  · Nonpregnant women should have no more than 1 drink a day. Men should have no more than 2 drinks a day. Talk to your doctor about drinking alcohol.  · Stop illegal drug use.  · Stay current with shots  (immunizations).  · Manage your health conditions as told by your doctor.  · Learn to manage your stress.  · Rest when you are tired.  · If it is really hot outside:    Avoid intense activities.    Use air conditioning or fans, or get in a cooler place.    Avoid caffeine and alcohol.    Wear loose-fitting, lightweight, and light-colored clothing.  · If it is really cold outside:    Avoid intense activities.    Layer your clothing.    Wear mittens or gloves, a hat, and a scarf when going outside.    Avoid alcohol.  · Learn about heart failure and get support as needed.  · Get help to maintain or improve your quality of life and your ability to care for yourself as needed.  GET HELP IF:   · You gain weight quickly.  · You are more short of breath than usual.  · You cannot do your normal activities.  · You tire easily.  · You cough more than normal, especially with activity.  · You have any or more puffiness (swelling) in areas such as your hands, feet, ankles, or belly (abdomen).  · You cannot sleep because it is hard to breathe.  · You feel like your heart is beating fast (palpitations).  · You get dizzy or light-headed when you stand up.  GET HELP RIGHT AWAY IF:   · You have trouble breathing.  · There is a change in mental status, such as becoming less alert or not being able to focus.  · You have chest pain or discomfort.  · You faint.  MAKE SURE YOU:   · Understand these instructions.  · Will watch your condition.  · Will get help right away if you are not doing well or get worse.     This information is not intended to replace advice given to you by your health care provider. Make sure you discuss any questions you have with your health care provider.     Document Released: 09/26/2009 Document Revised: 01/08/2016 Document Reviewed: 02/03/2014  [employer] Interactive Patient Education ©2017 [employer]

## 2017-12-06 DIAGNOSIS — E11.22 CONTROLLED TYPE 2 DIABETES MELLITUS WITH CHRONIC KIDNEY DISEASE, WITH LONG-TERM CURRENT USE OF INSULIN, UNSPECIFIED CKD STAGE (HCC): ICD-10-CM

## 2017-12-06 DIAGNOSIS — I50.22 CHRONIC SYSTOLIC CONGESTIVE HEART FAILURE (HCC): Primary | ICD-10-CM

## 2017-12-06 DIAGNOSIS — Z79.4 CONTROLLED TYPE 2 DIABETES MELLITUS WITH CHRONIC KIDNEY DISEASE, WITH LONG-TERM CURRENT USE OF INSULIN, UNSPECIFIED CKD STAGE (HCC): ICD-10-CM

## 2017-12-08 ENCOUNTER — HOSPITAL ENCOUNTER (OUTPATIENT)
Dept: CARDIOLOGY | Facility: HOSPITAL | Age: 57
Discharge: HOME | End: 2017-12-08
Admitting: NURSE PRACTITIONER

## 2017-12-08 ENCOUNTER — TELEPHONE (OUTPATIENT)
Dept: CARDIOLOGY | Facility: HOSPITAL | Age: 57
End: 2017-12-08

## 2017-12-08 ENCOUNTER — OFFICE VISIT (OUTPATIENT)
Dept: CARDIOLOGY | Facility: HOSPITAL | Age: 57
End: 2017-12-08

## 2017-12-08 ENCOUNTER — LAB (OUTPATIENT)
Dept: LAB | Facility: HOSPITAL | Age: 57
End: 2017-12-08

## 2017-12-08 VITALS
HEART RATE: 77 BPM | OXYGEN SATURATION: 100 % | HEIGHT: 75 IN | BODY MASS INDEX: 37.18 KG/M2 | TEMPERATURE: 98 F | SYSTOLIC BLOOD PRESSURE: 138 MMHG | WEIGHT: 299 LBS | RESPIRATION RATE: 20 BRPM | DIASTOLIC BLOOD PRESSURE: 72 MMHG

## 2017-12-08 DIAGNOSIS — R06.02 SHORTNESS OF BREATH: ICD-10-CM

## 2017-12-08 DIAGNOSIS — Z79.4 CONTROLLED TYPE 2 DIABETES MELLITUS WITH STAGE 3 CHRONIC KIDNEY DISEASE, WITH LONG-TERM CURRENT USE OF INSULIN (HCC): ICD-10-CM

## 2017-12-08 DIAGNOSIS — I10 ESSENTIAL HYPERTENSION: Chronic | ICD-10-CM

## 2017-12-08 DIAGNOSIS — I50.22 CHRONIC SYSTOLIC CONGESTIVE HEART FAILURE (HCC): ICD-10-CM

## 2017-12-08 DIAGNOSIS — E11.22 CONTROLLED TYPE 2 DIABETES MELLITUS WITH STAGE 3 CHRONIC KIDNEY DISEASE, WITH LONG-TERM CURRENT USE OF INSULIN (HCC): ICD-10-CM

## 2017-12-08 DIAGNOSIS — N18.30 CONTROLLED TYPE 2 DIABETES MELLITUS WITH STAGE 3 CHRONIC KIDNEY DISEASE, WITH LONG-TERM CURRENT USE OF INSULIN (HCC): ICD-10-CM

## 2017-12-08 DIAGNOSIS — R06.02 SHORTNESS OF BREATH: Primary | ICD-10-CM

## 2017-12-08 DIAGNOSIS — E11.22 CONTROLLED TYPE 2 DIABETES MELLITUS WITH CHRONIC KIDNEY DISEASE, WITH LONG-TERM CURRENT USE OF INSULIN, UNSPECIFIED CKD STAGE (HCC): ICD-10-CM

## 2017-12-08 DIAGNOSIS — Z79.4 CONTROLLED TYPE 2 DIABETES MELLITUS WITH CHRONIC KIDNEY DISEASE, WITH LONG-TERM CURRENT USE OF INSULIN, UNSPECIFIED CKD STAGE (HCC): ICD-10-CM

## 2017-12-08 LAB
ANION GAP SERPL CALCULATED.3IONS-SCNC: 11 MMOL/L (ref 3–11)
BUN BLD-MCNC: 30 MG/DL (ref 9–23)
BUN/CREAT SERPL: 13.6 (ref 7–25)
CALCIUM SPEC-SCNC: 9 MG/DL (ref 8.7–10.4)
CHLORIDE SERPL-SCNC: 98 MMOL/L (ref 99–109)
CO2 SERPL-SCNC: 29 MMOL/L (ref 20–31)
CREAT BLD-MCNC: 2.2 MG/DL (ref 0.6–1.3)
GFR SERPL CREATININE-BSD FRML MDRD: 38 ML/MIN/1.73
GLUCOSE BLD-MCNC: 139 MG/DL (ref 70–100)
POTASSIUM BLD-SCNC: 4.4 MMOL/L (ref 3.5–5.5)
SODIUM BLD-SCNC: 138 MMOL/L (ref 132–146)

## 2017-12-08 PROCEDURE — 99213 OFFICE O/P EST LOW 20 MIN: CPT | Performed by: NURSE PRACTITIONER

## 2017-12-08 PROCEDURE — 36415 COLL VENOUS BLD VENIPUNCTURE: CPT

## 2017-12-08 PROCEDURE — 80048 BASIC METABOLIC PNL TOTAL CA: CPT

## 2017-12-08 PROCEDURE — 93005 ELECTROCARDIOGRAM TRACING: CPT | Performed by: NURSE PRACTITIONER

## 2017-12-08 PROCEDURE — 93010 ELECTROCARDIOGRAM REPORT: CPT | Performed by: INTERNAL MEDICINE

## 2017-12-08 RX ORDER — FUROSEMIDE 40 MG/1
20 TABLET ORAL DAILY
Qty: 30 TABLET | Refills: 1
Start: 2017-12-08 | End: 2017-12-15 | Stop reason: SDUPTHER

## 2017-12-08 NOTE — TELEPHONE ENCOUNTER
Called patient to review labs following clinic visit.  GFR reduced from 42 down to 38.  Creatinine increased from 2.0-2.2.  Potassium is within normal limits at 4.4.  Advised patient to please skip tomorrow's dose of Lasix.  Then reduce Lasix to 20 mg daily.  Patient repeated the information back to me correctly and will have repeat labs with Dr. Juarez next week (nephrology).

## 2017-12-08 NOTE — PROGRESS NOTES
Subjective:     Encounter Date:12/08/2017      Patient ID: Trace Escalante is a 57 y.o. male.    Chief Complaint: new systolic HF follow up     History of Present Illness:  Mr. Escalante returns to the HF clinic after medication adjustment (diuretics) approximately 2 weeks ago.  Patient relates weight is down 4 lbs and BP is much better controlled.  He has begun to walk daily.  Overall, feeling better.     He was recently hospitalized 11/2017 and diagnosed with mild non-obstructive CAD but new onset systolic HF.  This was thought to be r/t poorly controlled BP, CKD,, and DM.      Past Medical History:   Diagnosis Date   • CKD (chronic kidney disease) stage 3, GFR 30-59 ml/min    • Gout    • Hyperlipidemia    • Hypertension    • Insulin dependent diabetes mellitus    • Kidney stones    • Obesity    • ALYSSA (obstructive sleep apnea)        Past Surgical History:   Procedure Laterality Date   • CARDIAC CATHETERIZATION N/A 11/15/2017    Procedure: Coronary angiography;  Surgeon: Elke Marcum MD;  Location: [facility] INVASIVE LOCATION;  Service:    • MEDIAL COLLATERAL LIGAMENT REPAIR, KNEE Left 1978       Social History     Social History   • Marital status: Single     Spouse name: N/A   • Number of children: 2   • Years of education: College     Occupational History   •  [employer]     Social History Main Topics   • Smoking status: Never Smoker   • Smokeless tobacco: Never Used   • Alcohol use No   • Drug use: No   • Sexual activity: Yes     Partners: Female     Other Topics Concern   • Not on file     Social History Narrative    Caffeine use: 1-2 cups coffee or decaf tea  daily.    Patient lives at home with his daughters.            Family History   Problem Relation Age of Onset   • Heart disease Mother    • Stroke Mother    • Diabetes Father    • Hypertension Father    • Heart disease Father    • Heart attack Father    • Kidney disease Brother    • Stroke Brother    • Diabetes Sister    • Thyroid cancer  Sister        Review of Systems   Constitution: Positive for weight loss. Negative for chills, decreased appetite, diaphoresis, fever, weakness, malaise/fatigue, night sweats and weight gain.   HENT: Positive for congestion. Negative for hearing loss, hoarse voice and nosebleeds.    Eyes: Negative for blurred vision, visual disturbance and visual halos.   Cardiovascular: Positive for leg swelling. Negative for chest pain, claudication, cyanosis, dyspnea on exertion, irregular heartbeat, near-syncope, orthopnea, palpitations, paroxysmal nocturnal dyspnea and syncope.   Respiratory: Positive for cough and sputum production. Negative for hemoptysis, shortness of breath, sleep disturbances due to breathing, snoring and wheezing.    Endocrine: Positive for cold intolerance.   Hematologic/Lymphatic: Negative for bleeding problem. Does not bruise/bleed easily.   Skin: Negative for dry skin, itching and rash.   Musculoskeletal: Positive for gout, joint pain, muscle cramps and muscle weakness. Negative for arthritis, joint swelling and myalgias.   Gastrointestinal: Negative for bloating, abdominal pain, constipation, diarrhea, flatus, heartburn, hematemesis, hematochezia, melena, nausea and vomiting.   Genitourinary: Negative for dysuria, frequency, hematuria, nocturia and urgency.   Neurological: Positive for light-headedness. Negative for excessive daytime sleepiness, dizziness, headaches and loss of balance.   Psychiatric/Behavioral: Negative for depression. The patient does not have insomnia and is not nervous/anxious.    Allergic/Immunologic:        Seasonal allergies         Objective:     Physical Exam   Constitutional: He is oriented to person, place, and time. He appears well-developed and well-nourished. No distress.   HENT:   Head: Normocephalic and atraumatic.   Eyes: Pupils are equal, round, and reactive to light.   Neck: Neck supple. No JVD present.   Cardiovascular: Normal rate, regular rhythm, normal heart  sounds and intact distal pulses.    No murmur heard.  Pulmonary/Chest: Effort normal and breath sounds normal. No respiratory distress. He has no wheezes. He has no rales.   Musculoskeletal: Normal range of motion. He exhibits no edema.   Neurological: He is alert and oriented to person, place, and time. No cranial nerve deficit.   Skin: Skin is warm and dry.   Psychiatric: He has a normal mood and affect. His behavior is normal.       Lab Review: BMP pending today     Assessment/ Plan:          Diagnosis Plan   1. Chronic systolic congestive heart failure  Diagnosed 11/ 2017.  Medical Rx with coreg, imdur, lasix, aldactone.  Current NYHA class I-II.  Continue walking/ physical activity, continue low sodium diet and follow up with Dr. De La Fuente first week January.  May call HF clinic PRN.  RTC 6 months.     2. Controlled type 2 diabetes mellitus with stage 3 chronic kidney disease, with long-term current use of insulin  To follow up with Dr. Juarez/ Nephrology next Thursday.  BMP to look at renal function today.   3. Essential hypertension  Much better controlled.

## 2017-12-15 RX ORDER — FUROSEMIDE 40 MG/1
20 TABLET ORAL DAILY
Qty: 30 TABLET | Refills: 2
Start: 2017-12-15 | End: 2017-12-22 | Stop reason: SDUPTHER

## 2017-12-15 NOTE — TELEPHONE ENCOUNTER
Refill request from [facility] pharmacy in [place]. Sent furosemide 40 mg tablets: take 0.5 tablet po daily. #30 with 2 refills. Last seen 12/8/17, next appointment on 6/1/18.

## 2017-12-22 RX ORDER — FUROSEMIDE 40 MG/1
40 TABLET ORAL DAILY
Qty: 30 TABLET | Refills: 0 | Status: SHIPPED | OUTPATIENT
Start: 2017-12-22 | End: 2018-01-11 | Stop reason: SDUPTHER

## 2017-12-22 NOTE — TELEPHONE ENCOUNTER
Nephrologist increased dose from 20mg to 40mg daily and he is out of medication. Per SALLY Crystal a one month refill of furosemide 40mg daily sent to [facility] and he is going to follow-up with Dr. De La Fuente on 1/2/18.    Ksenia Gonzales, GabrielD

## 2018-01-02 ENCOUNTER — OFFICE VISIT (OUTPATIENT)
Dept: CARDIOLOGY | Facility: CLINIC | Age: 58
End: 2018-01-02

## 2018-01-02 VITALS
WEIGHT: 299 LBS | HEART RATE: 65 BPM | HEIGHT: 75 IN | DIASTOLIC BLOOD PRESSURE: 82 MMHG | SYSTOLIC BLOOD PRESSURE: 134 MMHG | BODY MASS INDEX: 37.18 KG/M2

## 2018-01-02 DIAGNOSIS — I10 ESSENTIAL HYPERTENSION: Chronic | ICD-10-CM

## 2018-01-02 DIAGNOSIS — N18.30 CHRONIC RENAL IMPAIRMENT, STAGE 3 (MODERATE) (HCC): ICD-10-CM

## 2018-01-02 DIAGNOSIS — I42.8 NONISCHEMIC CARDIOMYOPATHY (HCC): Primary | ICD-10-CM

## 2018-01-02 DIAGNOSIS — E78.2 MIXED HYPERLIPIDEMIA: Chronic | ICD-10-CM

## 2018-01-02 PROCEDURE — 99214 OFFICE O/P EST MOD 30 MIN: CPT | Performed by: INTERNAL MEDICINE

## 2018-01-02 RX ORDER — CLONIDINE HYDROCHLORIDE 0.2 MG/1
0.1 TABLET ORAL NIGHTLY
COMMUNITY
End: 2018-05-14

## 2018-01-02 NOTE — PROGRESS NOTES
"Subjective:     Encounter Date:01/02/2018      Patient ID: Trace Escalante is a 57 y.o. male.    Chief Complaint: Essential hypertension and Mixed hyperlipidemia      PROBLEM LIST:  1. Congestive heart failure  1. BHL 11/12/17  with CCS class II chest discomfort/NYHA class III dyspnea  2. Echocardiogram 11/13/17; LVEF 40%.  3. Cardiac catheter, normal coronary arteries  2. Hypertension  3. Hyperlipidemia  4. Type 2 diabetes mellitus; uncontrolled, hemoglobin A1c was 8.3%  5. Obstructive sleep apnea; compliant with CPAP  6. Moderate obesity; BMI 38  7. Chronic kidney disease; stage III, baseline creatinine 2.0  8. Murmur  9. Hypokalemia  10. Nephrolithiasis  11. Gout  12. Surgical history; MCL repair (right) 1978    History of Present Illness  Patient returns today for follow up with a history of heart failure.  Since his hospital admission 6 weeks ago, the patient has been following a even more stringent low sodium diet.  He notes he is found lots of hidden sodium in his diet and he has resolved this.  He is starting exercise back in the gym, but doing it slowly.  Overall he has lost 9 pounds since hospital discharge, notes he feels better, but still \"has a ways to go\".  He has no orthopnea PND dyspnea on exertion chest pain orthopnea PND or tachycardia palpitations..     No Known Allergies      Current Outpatient Prescriptions:   •  allopurinol (ZYLOPRIM) 300 MG tablet, Take 0.5 tablets by mouth Daily., Disp: 45 tablet, Rfl: 1  •  amLODIPine (NORVASC) 10 MG tablet, Take 10 mg by mouth Daily., Disp: , Rfl: 1  •  aspirin 81 MG tablet, Take 81 mg by mouth Daily., Disp: , Rfl:   •  carvedilol (COREG) 25 MG tablet, Take 1 tablet by mouth Every 12 (Twelve) Hours., Disp: 60 tablet, Rfl: 11  •  CloNIDine (CATAPRES) 0.2 MG tablet, Take 0.2 mg by mouth Every Night., Disp: , Rfl:   •  furosemide (LASIX) 40 MG tablet, Take 1 tablet by mouth Daily., Disp: 30 tablet, Rfl: 0  •  hydrALAZINE (APRESOLINE) 50 MG tablet, " "Take 1 tablet by mouth Every 8 (Eight) Hours., Disp: 90 tablet, Rfl: 11  •  Insulin Degludec (TRESIBA FLEXTOUCH SC), Inject 40-50 Units under the skin Every Evening., Disp: , Rfl:   •  isosorbide mononitrate (IMDUR) 60 MG 24 hr tablet, Take 1 tablet by mouth Daily., Disp: 30 tablet, Rfl: 11  •  Multiple Vitamin (MULTI VITAMIN PO), Take 1 tablet by mouth., Disp: , Rfl:   •  NOVOLOG FLEXPEN 100 UNIT/ML solution pen-injector sc pen, Inject 5-10 Units under the skin 3 (Three) Times a Day With Meals., Disp: , Rfl:   •  rosuvastatin (CRESTOR) 40 MG tablet, Take 1 tablet by mouth Every Night., Disp: 30 tablet, Rfl: 11  •  spironolactone (ALDACTONE) 25 MG tablet, Take 1 tablet by mouth Daily., Disp: 30 tablet, Rfl: 3    The following portions of the patient's history were reviewed and updated as appropriate: allergies, current medications, past family history, past medical history, past social history, past surgical history and problem list.    Review of Systems   Constitution: Negative.   Cardiovascular: Negative.    Respiratory: Negative.    Hematologic/Lymphatic: Negative for bleeding problem. Does not bruise/bleed easily.   Skin: Negative for rash.   Musculoskeletal: Negative for muscle weakness and myalgias.   Gastrointestinal: Negative for heartburn, nausea and vomiting.   Neurological: Negative.           Objective:   Blood pressure 134/82, pulse 65, height 190.5 cm (75\"), weight 136 kg (299 lb).      Physical Exam   Constitutional: He is oriented to person, place, and time. He appears well-developed and well-nourished.   HENT:   Mouth/Throat: Oropharynx is clear and moist.   Neck: No JVD present. Carotid bruit is not present. No thyromegaly present.   Cardiovascular: Regular rhythm, S1 normal, S2 normal, normal heart sounds and intact distal pulses.  Exam reveals no gallop, no S3 and no S4.    No murmur heard.  Pulses:       Carotid pulses are 2+ on the right side, and 2+ on the left side.       Radial pulses are 2+ " on the right side, and 2+ on the left side.   Pulmonary/Chest: Breath sounds normal.   Abdominal: Soft. Bowel sounds are normal. He exhibits no mass. There is no tenderness.   Musculoskeletal: He exhibits no edema.   Neurological: He is alert and oriented to person, place, and time.   Skin: Skin is warm and dry. No rash noted.       Lab Review:    Procedures        Assessment:   Trace was seen today for essential hypertension and mixed hyperlipidemia.    Diagnoses and all orders for this visit:    Nonischemic cardiomyopathy    Essential hypertension    Mixed hyperlipidemia    Chronic renal impairment, stage 3 (moderate)      Impression  1. Nonischemic cardio myopathy.  Not volume overloaded currently.  Functional class II status.  2. Heart failure now resolved  3. Renal failure, last creatinine 2.2 with clearance of 38.  4. Diabetes poorly controlled  5. Hypertension improved on nightly clonidine    Plan:  13. Continue to encourage his exercise and weight loss.  Had long discussion regarding different types of exercise regimens to aid with his weight loss which and 2 and will help his heart failure and hypertension.  14. Continue current medications  15. Revisit in6 MO, or sooner as needed.    Loki De La Fuente MD

## 2018-01-10 ENCOUNTER — TELEPHONE (OUTPATIENT)
Dept: CARDIOLOGY | Facility: CLINIC | Age: 58
End: 2018-01-10

## 2018-01-10 DIAGNOSIS — Z79.899 HIGH RISK MEDICATION USE: Primary | ICD-10-CM

## 2018-01-10 NOTE — TELEPHONE ENCOUNTER
Patient is out of lasix and needs a refill.  He was given a 30 day supply by Savannah Myers on 12/22/17 one tablet daily.  Previous RX states patient may take one additional dose for 3lb weight gain, and currently has one pill remaining.    Patient has nephrologist and has CKD stage three.    I will have patient get a BMP to assess kidney function.  Send in refill with PRN, or wait to evaluate labs?  Please advise.

## 2018-01-11 DIAGNOSIS — Z79.899 HIGH RISK MEDICATION USE: Primary | ICD-10-CM

## 2018-01-11 RX ORDER — FUROSEMIDE 40 MG/1
TABLET ORAL
Qty: 45 TABLET | Refills: 0 | Status: SHIPPED | OUTPATIENT
Start: 2018-01-11 | End: 2018-02-07 | Stop reason: SDUPTHER

## 2018-01-12 LAB
BUN SERPL-MCNC: 34 MG/DL (ref 7–20)
BUN/CREAT SERPL: 14.8 (ref 6.3–21.9)
CALCIUM SERPL-MCNC: 9.2 MG/DL (ref 8.4–10.2)
CHLORIDE SERPL-SCNC: 100 MMOL/L (ref 98–107)
CO2 SERPL-SCNC: 28 MMOL/L (ref 26–30)
CREAT SERPL-MCNC: 2.3 MG/DL (ref 0.6–1.3)
GLUCOSE SERPL-MCNC: 126 MG/DL (ref 74–98)
POTASSIUM SERPL-SCNC: 4.3 MMOL/L (ref 3.5–5.1)
SODIUM SERPL-SCNC: 137 MMOL/L (ref 137–145)

## 2018-01-15 ENCOUNTER — TELEPHONE (OUTPATIENT)
Dept: CARDIOLOGY | Facility: CLINIC | Age: 58
End: 2018-01-15

## 2018-01-15 NOTE — TELEPHONE ENCOUNTER
----- Message from Loki De La Fuente MD sent at 1/12/2018  4:26 PM EST -----  No significant change, recheck with primary physician within next 1 month.    Letter sent with above message.

## 2018-02-07 RX ORDER — FUROSEMIDE 40 MG/1
TABLET ORAL
Qty: 135 TABLET | Refills: 3 | Status: SHIPPED | OUTPATIENT
Start: 2018-02-07 | End: 2018-12-26 | Stop reason: SDUPTHER

## 2018-02-09 RX ORDER — FUROSEMIDE 40 MG/1
TABLET ORAL
Qty: 45 TABLET | Refills: 0 | OUTPATIENT
Start: 2018-02-09

## 2018-03-09 ENCOUNTER — OFFICE VISIT (OUTPATIENT)
Dept: FAMILY MEDICINE CLINIC | Facility: CLINIC | Age: 58
End: 2018-03-09

## 2018-03-09 VITALS
SYSTOLIC BLOOD PRESSURE: 110 MMHG | HEART RATE: 58 BPM | HEIGHT: 75 IN | WEIGHT: 292 LBS | RESPIRATION RATE: 20 BRPM | BODY MASS INDEX: 36.31 KG/M2 | DIASTOLIC BLOOD PRESSURE: 60 MMHG | TEMPERATURE: 98.1 F

## 2018-03-09 DIAGNOSIS — I50.22 CHRONIC SYSTOLIC CONGESTIVE HEART FAILURE (HCC): ICD-10-CM

## 2018-03-09 DIAGNOSIS — N18.30 CHRONIC RENAL IMPAIRMENT, STAGE 3 (MODERATE) (HCC): ICD-10-CM

## 2018-03-09 DIAGNOSIS — I10 ESSENTIAL HYPERTENSION: Chronic | ICD-10-CM

## 2018-03-09 DIAGNOSIS — I42.8 NONISCHEMIC CARDIOMYOPATHY (HCC): Primary | ICD-10-CM

## 2018-03-09 PROCEDURE — 99214 OFFICE O/P EST MOD 30 MIN: CPT | Performed by: FAMILY MEDICINE

## 2018-03-11 ENCOUNTER — APPOINTMENT (OUTPATIENT)
Dept: GENERAL RADIOLOGY | Facility: HOSPITAL | Age: 58
End: 2018-03-11

## 2018-03-11 ENCOUNTER — PREP FOR SURGERY (OUTPATIENT)
Dept: OTHER | Facility: HOSPITAL | Age: 58
End: 2018-03-11

## 2018-03-11 ENCOUNTER — HOSPITAL ENCOUNTER (EMERGENCY)
Facility: HOSPITAL | Age: 58
Discharge: HOME | End: 2018-03-11
Attending: EMERGENCY MEDICINE | Admitting: EMERGENCY MEDICINE

## 2018-03-11 VITALS
OXYGEN SATURATION: 98 % | TEMPERATURE: 98.7 F | DIASTOLIC BLOOD PRESSURE: 69 MMHG | HEART RATE: 78 BPM | HEIGHT: 75 IN | BODY MASS INDEX: 34.82 KG/M2 | RESPIRATION RATE: 16 BRPM | WEIGHT: 280 LBS | SYSTOLIC BLOOD PRESSURE: 126 MMHG

## 2018-03-11 DIAGNOSIS — S86.812A RUPTURE OF LEFT PATELLAR TENDON, INITIAL ENCOUNTER: Primary | ICD-10-CM

## 2018-03-11 DIAGNOSIS — S92.102A CLOSED NONDISPLACED FRACTURE OF LEFT TALUS, UNSPECIFIED PORTION OF TALUS, INITIAL ENCOUNTER: ICD-10-CM

## 2018-03-11 DIAGNOSIS — S86.892A AVULSION OF LEFT PATELLAR TENDON, INITIAL ENCOUNTER: Primary | ICD-10-CM
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
## 2018-03-11 LAB
ALBUMIN SERPL-MCNC: 4 G/DL (ref 3.2–4.8)
ALBUMIN/GLOB SERPL: 1.1 G/DL (ref 1.5–2.5)
ALP SERPL-CCNC: 63 U/L (ref 25–100)
ALT SERPL W P-5'-P-CCNC: 26 U/L (ref 7–40)
ANION GAP SERPL CALCULATED.3IONS-SCNC: 6 MMOL/L (ref 3–11)
AST SERPL-CCNC: 27 U/L (ref 0–33)
BASOPHILS # BLD AUTO: 0.05 10*3/MM3 (ref 0–0.2)
BASOPHILS NFR BLD AUTO: 0.6 % (ref 0–1)
BILIRUB SERPL-MCNC: 0.6 MG/DL (ref 0.3–1.2)
BUN BLD-MCNC: 25 MG/DL (ref 9–23)
BUN/CREAT SERPL: 11.9 (ref 7–25)
CALCIUM SPEC-SCNC: 8.9 MG/DL (ref 8.7–10.4)
CHLORIDE SERPL-SCNC: 105 MMOL/L (ref 99–109)
CO2 SERPL-SCNC: 29 MMOL/L (ref 20–31)
CREAT BLD-MCNC: 2.1 MG/DL (ref 0.6–1.3)
DEPRECATED RDW RBC AUTO: 46.8 FL (ref 37–54)
EOSINOPHIL # BLD AUTO: 0.2 10*3/MM3 (ref 0–0.3)
EOSINOPHIL NFR BLD AUTO: 2.5 % (ref 0–3)
ERYTHROCYTE [DISTWIDTH] IN BLOOD BY AUTOMATED COUNT: 14.7 % (ref 11.3–14.5)
GFR SERPL CREATININE-BSD FRML MDRD: 40 ML/MIN/1.73
GLOBULIN UR ELPH-MCNC: 3.5 GM/DL
GLUCOSE BLD-MCNC: 134 MG/DL (ref 70–100)
HBA1C MFR BLD: 7.5 % (ref 4.8–5.6)
HCT VFR BLD AUTO: 35.8 % (ref 38.9–50.9)
HGB BLD-MCNC: 11.5 G/DL (ref 13.1–17.5)
IMM GRANULOCYTES # BLD: 0.04 10*3/MM3 (ref 0–0.03)
IMM GRANULOCYTES NFR BLD: 0.5 % (ref 0–0.6)
LYMPHOCYTES # BLD AUTO: 1.19 10*3/MM3 (ref 0.6–4.8)
LYMPHOCYTES NFR BLD AUTO: 15.1 % (ref 24–44)
MCH RBC QN AUTO: 28.1 PG (ref 27–31)
MCHC RBC AUTO-ENTMCNC: 32.1 G/DL (ref 32–36)
MCV RBC AUTO: 87.5 FL (ref 80–99)
MONOCYTES # BLD AUTO: 0.57 10*3/MM3 (ref 0–1)
MONOCYTES NFR BLD AUTO: 7.2 % (ref 0–12)
NEUTROPHILS # BLD AUTO: 5.84 10*3/MM3 (ref 1.5–8.3)
NEUTROPHILS NFR BLD AUTO: 74.1 % (ref 41–71)
PLATELET # BLD AUTO: 222 10*3/MM3 (ref 150–450)
PMV BLD AUTO: 11 FL (ref 6–12)
POTASSIUM BLD-SCNC: 4.2 MMOL/L (ref 3.5–5.5)
PROT SERPL-MCNC: 7.5 G/DL (ref 5.7–8.2)
RBC # BLD AUTO: 4.09 10*6/MM3 (ref 4.2–5.76)
SODIUM BLD-SCNC: 140 MMOL/L (ref 132–146)
WBC NRBC COR # BLD: 7.89 10*3/MM3 (ref 3.5–10.8)

## 2018-03-11 PROCEDURE — 73610 X-RAY EXAM OF ANKLE: CPT

## 2018-03-11 PROCEDURE — 80053 COMPREHEN METABOLIC PANEL: CPT | Performed by: PHYSICIAN ASSISTANT

## 2018-03-11 PROCEDURE — 36415 COLL VENOUS BLD VENIPUNCTURE: CPT

## 2018-03-11 PROCEDURE — 85025 COMPLETE CBC W/AUTO DIFF WBC: CPT | Performed by: PHYSICIAN ASSISTANT

## 2018-03-11 PROCEDURE — 73560 X-RAY EXAM OF KNEE 1 OR 2: CPT

## 2018-03-11 PROCEDURE — 73630 X-RAY EXAM OF FOOT: CPT

## 2018-03-11 PROCEDURE — 83036 HEMOGLOBIN GLYCOSYLATED A1C: CPT | Performed by: PHYSICIAN ASSISTANT

## 2018-03-11 PROCEDURE — 99284 EMERGENCY DEPT VISIT MOD MDM: CPT

## 2018-03-11 RX ORDER — HYDROCODONE BITARTRATE AND ACETAMINOPHEN 7.5; 325 MG/1; MG/1
1 TABLET ORAL EVERY 6 HOURS PRN
Qty: 15 TABLET | Refills: 0 | Status: ON HOLD | OUTPATIENT
Start: 2018-03-11 | End: 2018-03-13

## 2018-03-11 NOTE — ED PROVIDER NOTES
Subjective     History provided by:  Patient  Fall   Mechanism of injury: fall    Injury location:  Leg  Leg injury location:  L knee, L foot and L ankle  Incident location: [facility]   Time since incident: hospitals   Fall:     Fall occurred:  Down stairs (Was carrying coffee in one hand and Bible in the other. He slipped and fell down about 5 stairs. He states his left leg bent behind him.)  Associated symptoms: no abdominal pain, no back pain, no chest pain, no difficulty breathing, no headaches, no loss of consciousness, no nausea and no neck pain    Patient has been unable to walk on left leg and fully extend. He denies prior injury to left leg.      Review of Systems   Cardiovascular: Negative for chest pain.   Gastrointestinal: Negative for abdominal pain and nausea.   Musculoskeletal: Positive for arthralgias (Left ankle, foot and knee ). Negative for back pain and neck pain.   Neurological: Negative for loss of consciousness and headaches.   All other systems reviewed and are negative.      Past Medical History:   Diagnosis Date   • CKD (chronic kidney disease) stage 3, GFR 30-59 ml/min    • Gout    • Hyperlipidemia    • Hypertension    • Insulin dependent diabetes mellitus    • Kidney stones    • Obesity    • ALYSSA (obstructive sleep apnea)        No Known Allergies    Past Surgical History:   Procedure Laterality Date   • CARDIAC CATHETERIZATION N/A 11/15/2017    Procedure: Coronary angiography;  Surgeon: Elke Marcum MD;  Location: [facility] INVASIVE LOCATION;  Service:    • MEDIAL COLLATERAL LIGAMENT REPAIR, KNEE Left 1978       Family History   Problem Relation Age of Onset   • Heart disease Mother    • Stroke Mother    • Diabetes Father    • Hypertension Father    • Heart disease Father    • Heart attack Father    • Kidney disease Brother    • Stroke Brother    • Diabetes Sister    • Thyroid cancer Sister        Social History     Social History   • Marital status: Single     Spouse name: N/A   • Number of  children: 2   • Years of education: College     Occupational History   •  [employer]     Social History Main Topics   • Smoking status: Never Smoker   • Smokeless tobacco: Never Used   • Alcohol use No   • Drug use: No   • Sexual activity: Yes     Partners: Female     Other Topics Concern   • Not on file     Social History Narrative    Caffeine use: 1-2 cups coffee or decaf tea  daily.    Patient lives at home with his daughters.                Objective   Physical Exam   Constitutional: He is oriented to person, place, and time. He appears well-developed and well-nourished. No distress.   HENT:   Head: Atraumatic.   Eyes: Conjunctivae are normal.   Neck: Normal range of motion. Neck supple.   Cardiovascular: Normal rate, regular rhythm and intact distal pulses.    Pulmonary/Chest: Effort normal. No respiratory distress.   Neurological: He is alert and oriented to person, place, and time.   Skin: Skin is warm.   Psychiatric: He has a normal mood and affect.   Nursing note and vitals reviewed.      Procedures         ED Course  ED Course      Discussed patient with Dr. Simeon who personally evaluated patient. Discussed results and treatment plan. Pt is agreeable.     Consulted Ortho Dr. Mcbride. He recommended knee immobilizer and crutches / non weight bearing. He will see patient in the office tomorrow. He requested pre op labs in ED including CBC, CMP and HA1c. He did not recommend splinting or immobilization to foot fracture due to non weight bearing and knee immobilizer.     KUSH query complete. Treatment plan to include limited course of prescribed  controlled substance. Risks including addiction, benefits, and alternatives presented to patient.       Recent Results (from the past 24 hour(s))   Comprehensive Metabolic Panel    Collection Time: 03/11/18 12:27 PM   Result Value Ref Range    Glucose 134 (H) 70 - 100 mg/dL    BUN 25 (H) 9 - 23 mg/dL    Creatinine 2.10 (H) 0.60 - 1.30 mg/dL    Sodium 140 132 -  146 mmol/L    Potassium 4.2 3.5 - 5.5 mmol/L    Chloride 105 99 - 109 mmol/L    CO2 29.0 20.0 - 31.0 mmol/L    Calcium 8.9 8.7 - 10.4 mg/dL    Total Protein 7.5 5.7 - 8.2 g/dL    Albumin 4.00 3.20 - 4.80 g/dL    ALT (SGPT) 26 7 - 40 U/L    AST (SGOT) 27 0 - 33 U/L    Alkaline Phosphatase 63 25 - 100 U/L    Total Bilirubin 0.6 0.3 - 1.2 mg/dL    eGFR  African Amer 40 (L) >60 mL/min/1.73    Globulin 3.5 gm/dL    A/G Ratio 1.1 (L) 1.5 - 2.5 g/dL    BUN/Creatinine Ratio 11.9 7.0 - 25.0    Anion Gap 6.0 3.0 - 11.0 mmol/L   Hemoglobin A1c    Collection Time: 03/11/18 12:27 PM   Result Value Ref Range    Hemoglobin A1C 7.50 (H) 4.80 - 5.60 %   CBC Auto Differential    Collection Time: 03/11/18 12:27 PM   Result Value Ref Range    WBC 7.89 3.50 - 10.80 10*3/mm3    RBC 4.09 (L) 4.20 - 5.76 10*6/mm3    Hemoglobin 11.5 (L) 13.1 - 17.5 g/dL    Hematocrit 35.8 (L) 38.9 - 50.9 %    MCV 87.5 80.0 - 99.0 fL    MCH 28.1 27.0 - 31.0 pg    MCHC 32.1 32.0 - 36.0 g/dL    RDW 14.7 (H) 11.3 - 14.5 %    RDW-SD 46.8 37.0 - 54.0 fl    MPV 11.0 6.0 - 12.0 fL    Platelets 222 150 - 450 10*3/mm3    Neutrophil % 74.1 (H) 41.0 - 71.0 %    Lymphocyte % 15.1 (L) 24.0 - 44.0 %    Monocyte % 7.2 0.0 - 12.0 %    Eosinophil % 2.5 0.0 - 3.0 %    Basophil % 0.6 0.0 - 1.0 %    Immature Grans % 0.5 0.0 - 0.6 %    Neutrophils, Absolute 5.84 1.50 - 8.30 10*3/mm3    Lymphocytes, Absolute 1.19 0.60 - 4.80 10*3/mm3    Monocytes, Absolute 0.57 0.00 - 1.00 10*3/mm3    Eosinophils, Absolute 0.20 0.00 - 0.30 10*3/mm3    Basophils, Absolute 0.05 0.00 - 0.20 10*3/mm3    Immature Grans, Absolute 0.04 (H) 0.00 - 0.03 10*3/mm3     Note: In addition to lab results from this visit, the labs listed above may include labs taken at another facility or during a different encounter within the last 24 hours. Please correlate lab times with ED admission and discharge times for further clarification of the services performed during this visit.    XR Ankle 3+ View Left    Preliminary Result       Fraying and fragmentation anterior superior talus.       Linear calcifications in the left lower leg which are likely venous.       Mild posttraumatic fraying from the distal lateral malleolus.       No acute injury, fracture or malalignment of the left ankle is noted.       DICTATED:     03/11/2018   EDITED/ls :     03/11/2018           XR Foot 3+ View Left   Final Result   Osseous fraying with small fragments from the anterior superior talus.       Arthropathy noted extensively with periarticular cystic erosions and   soft tissue prominence involving the left first metatarsal phalangeal   joint.       No other acute or suspect abnormalities identified in the bone   structures the left foot.               This report was finalized on 3/11/2018 11:34 AM by Dr. Robert David MD.          XR Knee 1 or 2 View Left   Final Result       Marked patella eddie. Please evaluate for integrity of the patellar   tendon. There is soft tissue thickening of the anterior compartment soft   tissues and patellar tendon. Quadriceps tendon is intact.       Osteoporosis and deformity of the proximal left fibula which appears   chronic.       No other acute finding and no fracture.       This report was finalized on 3/11/2018 11:31 AM by Dr. Robert David MD.            Vitals:    03/11/18 1030 03/11/18 1055 03/11/18 1214 03/11/18 1334   BP: 123/77 123/67  126/69   BP Location:       Patient Position:       Pulse:  75 79 78   Resp:    16   Temp:       TempSrc:       SpO2: 99% 98% 98% 98%   Weight:       Height:         Medications - No data to display  ECG/EMG Results (last 24 hours)     ** No results found for the last 24 hours. **                    [facility]    Final diagnoses:   Avulsion of left patellar tendon, initial encounter   Fall, initial encounter   Closed nondisplaced fracture of left talus, unspecified portion of talus, initial encounter            VIKI Barrios  03/11/18 0017

## 2018-03-12 ENCOUNTER — ANESTHESIA EVENT (OUTPATIENT)
Dept: PERIOP | Facility: HOSPITAL | Age: 58
End: 2018-03-12

## 2018-03-12 PROBLEM — S86.812A RUPTURE OF LEFT PATELLAR TENDON: Status: ACTIVE | Noted: 2018-03-12

## 2018-03-13 ENCOUNTER — ANESTHESIA (OUTPATIENT)
Dept: PERIOP | Facility: HOSPITAL | Age: 58
End: 2018-03-13

## 2018-03-13 ENCOUNTER — HOSPITAL ENCOUNTER (OUTPATIENT)
Facility: HOSPITAL | Age: 58
Discharge: HOME | End: 2018-03-13
Attending: ORTHOPAEDIC SURGERY | Admitting: ORTHOPAEDIC SURGERY

## 2018-03-13 VITALS
OXYGEN SATURATION: 94 % | BODY MASS INDEX: 34.82 KG/M2 | DIASTOLIC BLOOD PRESSURE: 99 MMHG | TEMPERATURE: 97.5 F | WEIGHT: 280 LBS | RESPIRATION RATE: 18 BRPM | HEIGHT: 75 IN | HEART RATE: 83 BPM | SYSTOLIC BLOOD PRESSURE: 156 MMHG

## 2018-03-13 DIAGNOSIS — S86.812A RUPTURE OF LEFT PATELLAR TENDON, INITIAL ENCOUNTER: ICD-10-CM

## 2018-03-13 LAB — GLUCOSE BLDC GLUCOMTR-MCNC: 127 MG/DL (ref 70–130)

## 2018-03-13 PROCEDURE — 25010000002 FENTANYL CITRATE (PF) 100 MCG/2ML SOLUTION: Performed by: NURSE ANESTHETIST, CERTIFIED REGISTERED

## 2018-03-13 PROCEDURE — 25010000002 ROPIVACAINE HCL-NACL 0.2-0.9 % SOLUTION: Performed by: NURSE ANESTHETIST, CERTIFIED REGISTERED

## 2018-03-13 PROCEDURE — L1833 KO ADJ JNT POS R SUP PRE OTS: HCPCS | Performed by: ORTHOPAEDIC SURGERY

## 2018-03-13 PROCEDURE — 25010000002 ROPIVACAINE PER 1 MG: Performed by: NURSE ANESTHETIST, CERTIFIED REGISTERED

## 2018-03-13 PROCEDURE — 25010000002 PROMETHAZINE PER 50 MG: Performed by: NURSE ANESTHETIST, CERTIFIED REGISTERED

## 2018-03-13 PROCEDURE — 25010000002 DEXAMETHASONE PER 1 MG: Performed by: NURSE ANESTHETIST, CERTIFIED REGISTERED

## 2018-03-13 PROCEDURE — 27380 REPAIR OF KNEECAP TENDON: CPT | Performed by: ORTHOPAEDIC SURGERY

## 2018-03-13 PROCEDURE — 25010000002 HYDROMORPHONE PER 4 MG: Performed by: NURSE ANESTHETIST, CERTIFIED REGISTERED

## 2018-03-13 PROCEDURE — 25010000002 ONDANSETRON PER 1 MG: Performed by: NURSE ANESTHETIST, CERTIFIED REGISTERED

## 2018-03-13 PROCEDURE — 25010000002 PROPOFOL 10 MG/ML EMULSION: Performed by: NURSE ANESTHETIST, CERTIFIED REGISTERED

## 2018-03-13 PROCEDURE — 82962 GLUCOSE BLOOD TEST: CPT

## 2018-03-13 PROCEDURE — C1713 ANCHOR/SCREW BN/BN,TIS/BN: HCPCS | Performed by: ORTHOPAEDIC SURGERY

## 2018-03-13 RX ORDER — HYDROCODONE BITARTRATE AND ACETAMINOPHEN 7.5; 325 MG/1; MG/1
1 TABLET ORAL EVERY 4 HOURS PRN
Qty: 40 TABLET | Refills: 0 | Status: SHIPPED | OUTPATIENT
Start: 2018-03-13 | End: 2018-04-05

## 2018-03-13 RX ORDER — FENTANYL CITRATE 50 UG/ML
50 INJECTION, SOLUTION INTRAMUSCULAR; INTRAVENOUS
Status: DISCONTINUED | OUTPATIENT
Start: 2018-03-13 | End: 2018-03-13 | Stop reason: HOSPADM

## 2018-03-13 RX ORDER — FENTANYL CITRATE 50 UG/ML
INJECTION, SOLUTION INTRAMUSCULAR; INTRAVENOUS AS NEEDED
Status: DISCONTINUED | OUTPATIENT
Start: 2018-03-13 | End: 2018-03-13 | Stop reason: SURG

## 2018-03-13 RX ORDER — MAGNESIUM HYDROXIDE 1200 MG/15ML
LIQUID ORAL AS NEEDED
Status: DISCONTINUED | OUTPATIENT
Start: 2018-03-13 | End: 2018-03-13 | Stop reason: HOSPADM

## 2018-03-13 RX ORDER — SODIUM CHLORIDE 0.9 % (FLUSH) 0.9 %
1-10 SYRINGE (ML) INJECTION AS NEEDED
Status: DISCONTINUED | OUTPATIENT
Start: 2018-03-13 | End: 2018-03-13 | Stop reason: HOSPADM

## 2018-03-13 RX ORDER — SODIUM CHLORIDE, SODIUM LACTATE, POTASSIUM CHLORIDE, CALCIUM CHLORIDE 600; 310; 30; 20 MG/100ML; MG/100ML; MG/100ML; MG/100ML
9 INJECTION, SOLUTION INTRAVENOUS CONTINUOUS
Status: DISCONTINUED | OUTPATIENT
Start: 2018-03-13 | End: 2018-03-13 | Stop reason: HOSPADM

## 2018-03-13 RX ORDER — MEPERIDINE HYDROCHLORIDE 25 MG/ML
12.5 INJECTION INTRAMUSCULAR; INTRAVENOUS; SUBCUTANEOUS
Status: DISCONTINUED | OUTPATIENT
Start: 2018-03-13 | End: 2018-03-13 | Stop reason: HOSPADM

## 2018-03-13 RX ORDER — IPRATROPIUM BROMIDE AND ALBUTEROL SULFATE 2.5; .5 MG/3ML; MG/3ML
3 SOLUTION RESPIRATORY (INHALATION) ONCE AS NEEDED
Status: DISCONTINUED | OUTPATIENT
Start: 2018-03-13 | End: 2018-03-13 | Stop reason: HOSPADM

## 2018-03-13 RX ORDER — PROMETHAZINE HYDROCHLORIDE 25 MG/ML
6.25 INJECTION, SOLUTION INTRAMUSCULAR; INTRAVENOUS ONCE AS NEEDED
Status: COMPLETED | OUTPATIENT
Start: 2018-03-13 | End: 2018-03-13

## 2018-03-13 RX ORDER — PROMETHAZINE HYDROCHLORIDE 25 MG/1
25 SUPPOSITORY RECTAL ONCE AS NEEDED
Status: COMPLETED | OUTPATIENT
Start: 2018-03-13 | End: 2018-03-13

## 2018-03-13 RX ORDER — FAMOTIDINE 20 MG/1
20 TABLET, FILM COATED ORAL ONCE
Status: COMPLETED | OUTPATIENT
Start: 2018-03-13 | End: 2018-03-13

## 2018-03-13 RX ORDER — LABETALOL HYDROCHLORIDE 5 MG/ML
5 INJECTION, SOLUTION INTRAVENOUS
Status: DISCONTINUED | OUTPATIENT
Start: 2018-03-13 | End: 2018-03-13 | Stop reason: HOSPADM

## 2018-03-13 RX ORDER — ROPIVACAINE HYDROCHLORIDE 2 MG/ML
INJECTION, SOLUTION EPIDURAL; INFILTRATION; PERINEURAL AS NEEDED
Status: DISCONTINUED | OUTPATIENT
Start: 2018-03-13 | End: 2018-03-13 | Stop reason: SURG

## 2018-03-13 RX ORDER — PROPOFOL 10 MG/ML
VIAL (ML) INTRAVENOUS AS NEEDED
Status: DISCONTINUED | OUTPATIENT
Start: 2018-03-13 | End: 2018-03-13 | Stop reason: SURG

## 2018-03-13 RX ORDER — LIDOCAINE HYDROCHLORIDE 10 MG/ML
0.5 INJECTION, SOLUTION EPIDURAL; INFILTRATION; INTRACAUDAL; PERINEURAL ONCE AS NEEDED
Status: COMPLETED | OUTPATIENT
Start: 2018-03-13 | End: 2018-03-13

## 2018-03-13 RX ORDER — HYDROMORPHONE HYDROCHLORIDE 1 MG/ML
0.5 INJECTION, SOLUTION INTRAMUSCULAR; INTRAVENOUS; SUBCUTANEOUS
Status: DISCONTINUED | OUTPATIENT
Start: 2018-03-13 | End: 2018-03-13 | Stop reason: HOSPADM

## 2018-03-13 RX ORDER — PROMETHAZINE HYDROCHLORIDE 25 MG/1
25 TABLET ORAL ONCE AS NEEDED
Status: COMPLETED | OUTPATIENT
Start: 2018-03-13 | End: 2018-03-13

## 2018-03-13 RX ORDER — ONDANSETRON 2 MG/ML
INJECTION INTRAMUSCULAR; INTRAVENOUS AS NEEDED
Status: DISCONTINUED | OUTPATIENT
Start: 2018-03-13 | End: 2018-03-13 | Stop reason: SURG

## 2018-03-13 RX ORDER — ROPIVACAINE IN 0.9% SOD CHL/PF 0.2% 545ML
6 ELASTOMERIC PUMP, HI VARIABLE RATE INJECTION CONTINUOUS
Status: DISCONTINUED | OUTPATIENT
Start: 2018-03-13 | End: 2018-03-13 | Stop reason: HOSPADM

## 2018-03-13 RX ORDER — LIDOCAINE HYDROCHLORIDE 10 MG/ML
INJECTION, SOLUTION EPIDURAL; INFILTRATION; INTRACAUDAL; PERINEURAL AS NEEDED
Status: DISCONTINUED | OUTPATIENT
Start: 2018-03-13 | End: 2018-03-13 | Stop reason: SURG

## 2018-03-13 RX ORDER — DEXAMETHASONE SODIUM PHOSPHATE 4 MG/ML
INJECTION, SOLUTION INTRA-ARTICULAR; INTRALESIONAL; INTRAMUSCULAR; INTRAVENOUS; SOFT TISSUE AS NEEDED
Status: DISCONTINUED | OUTPATIENT
Start: 2018-03-13 | End: 2018-03-13 | Stop reason: SURG

## 2018-03-13 RX ADMIN — ROPIVACAINE HYDROCHLORIDE 10 ML: 2 INJECTION, SOLUTION EPIDURAL; INFILTRATION at 15:21

## 2018-03-13 RX ADMIN — FENTANYL CITRATE 50 MCG: 50 INJECTION INTRAMUSCULAR; INTRAVENOUS at 18:03

## 2018-03-13 RX ADMIN — LIDOCAINE HYDROCHLORIDE 50 MG: 10 INJECTION, SOLUTION EPIDURAL; INFILTRATION; INTRACAUDAL; PERINEURAL at 15:18

## 2018-03-13 RX ADMIN — HYDROMORPHONE HYDROCHLORIDE 0.5 MG: 10 INJECTION INTRAMUSCULAR; INTRAVENOUS; SUBCUTANEOUS at 17:55

## 2018-03-13 RX ADMIN — HYDROMORPHONE HYDROCHLORIDE 0.5 MG: 10 INJECTION INTRAMUSCULAR; INTRAVENOUS; SUBCUTANEOUS at 18:41

## 2018-03-13 RX ADMIN — FENTANYL CITRATE 100 MCG: 50 INJECTION, SOLUTION INTRAMUSCULAR; INTRAVENOUS at 15:18

## 2018-03-13 RX ADMIN — MUPIROCIN 1 APPLICATION: 20 OINTMENT TOPICAL at 13:19

## 2018-03-13 RX ADMIN — FENTANYL CITRATE 50 MCG: 50 INJECTION INTRAMUSCULAR; INTRAVENOUS at 17:40

## 2018-03-13 RX ADMIN — FAMOTIDINE 20 MG: 20 TABLET, FILM COATED ORAL at 12:55

## 2018-03-13 RX ADMIN — DEXAMETHASONE SODIUM PHOSPHATE 4 MG: 4 INJECTION, SOLUTION INTRAMUSCULAR; INTRAVENOUS at 15:24

## 2018-03-13 RX ADMIN — SODIUM CHLORIDE, POTASSIUM CHLORIDE, SODIUM LACTATE AND CALCIUM CHLORIDE 9 ML/HR: 600; 310; 30; 20 INJECTION, SOLUTION INTRAVENOUS at 12:50

## 2018-03-13 RX ADMIN — Medication 6 ML/HR: at 16:55

## 2018-03-13 RX ADMIN — FENTANYL CITRATE 50 MCG: 50 INJECTION INTRAMUSCULAR; INTRAVENOUS at 17:35

## 2018-03-13 RX ADMIN — FENTANYL CITRATE 50 MCG: 50 INJECTION INTRAMUSCULAR; INTRAVENOUS at 18:08

## 2018-03-13 RX ADMIN — LIDOCAINE HYDROCHLORIDE 0.2 ML: 10 INJECTION, SOLUTION EPIDURAL; INFILTRATION; INTRACAUDAL; PERINEURAL at 12:50

## 2018-03-13 RX ADMIN — Medication 3 G: at 15:14

## 2018-03-13 RX ADMIN — PROPOFOL 200 MG: 10 INJECTION, EMULSION INTRAVENOUS at 15:18

## 2018-03-13 RX ADMIN — HYDROMORPHONE HYDROCHLORIDE 0.5 MG: 10 INJECTION INTRAMUSCULAR; INTRAVENOUS; SUBCUTANEOUS at 17:50

## 2018-03-13 RX ADMIN — ONDANSETRON 4 MG: 2 INJECTION INTRAMUSCULAR; INTRAVENOUS at 16:53

## 2018-03-13 RX ADMIN — PROMETHAZINE HYDROCHLORIDE 6.25 MG: 25 INJECTION INTRAMUSCULAR; INTRAVENOUS at 18:42

## 2018-03-13 NOTE — OP NOTE
Orthopaedics Operative Report    PREOPERATIVE DIAGNOSIS: Rupture of left patellar tendon, initial encounter [S86.812A]    Post-Op Diagnosis Codes:  Rupture of left patellar tendon, initial encounter [S86.812A]    Procedure(s):  PATELLA TENDON REPAIR LEFT    Surgeon(s):  Blake Mcbride MD     IMPLANTS:   Implants     No active implants to display in this view.           SURGEON: Blake Mcbride MD    ASSISTANT: Denzel Dumont PA-C    ANESTHESIA:  General with Block    STAFF:  Circulator: Emilee Golden RN; Nicolle Mc RN  Scrub Person: Anaya Almonte  Assistant: VIKI Correa    ESTIMATED BLOOD LOSS: minimal     TOURNIQUET TIME: 60 minutes     COMPLICATIONS: None      PREOPERATIVE ANTIBIOTICS: Ancef 3 g    INDICATIONS: Patient is a 57-year-old male sustained an injury to his left knee, with a resultant left patellar tendon disruption.  Options were discussed, and he wished proceed with surgical intervention, knowing the risks, benefits, and alternatives to the procedure.  Risks discussed included, but are not limited to: Bleeding, infection, damage to blood vessels and nerves, need for further surgery, failure of the repair, knee stiffness, deep venous thrombosis, poor embolus, death, and anesthetic complications.  He understands, consents, and his questions are answered.    DESCRIPTION OF PROCEDURE:  Patient was positively identified in the preoperative area, brought to the operating suite, and placed into the supine position.  After adequate general anesthetic and been achieved, as well as a single shot femoral nerve block, the left lower extremity was prepped and draped in the usual sterile fashion after the patient position with tourniquet to left upper thigh, which was used during the procedure for a total of 60 minutes.  Timeout procedure was performed to confirm the operative site as well as the other parameters.  Landmarks the knee were identified, and an incision was made just off of the medial  aspect of midline, and the patellar tendon disruption was readily identified.  The bulk of the tendon had ruptured off of the inferior aspect of the patella, however there was a lateral portion that had ruptured in the midportion and off of the lateral aspect of the tubercle.  The tear was adequately exposed and visualized, and the inferior pole of patella clearly identified after the soft tissue had been cleared.  3 drill holes were then placed and the patella, and #2 FiberWire sutures were utilized in a [place] fashion through the patellar tendon, for a 4 stranded repair, and the sutures were tied over the top of the patella through the 3 drill holes.  The retinaculum was then repaired both on the medial and lateral aspects with #1 Vicryl in an interrupted figure-of-eight fashion.  The repair was stable to 90° flexion.  Soft tissues were then repaired in the deep layer with #1 Vicryl, followed by closure of the septae saline with 2-0 Vicryl, and the skin with 3-0 Strata-Fix in a running subcuticular fashion.  A Prineo wound closure dressing was then applied, and a sterile dressing placed in the form of 4 x 4's and soft roll.  With a Ace wrap from the foot to the thigh.  He was in placed into a knee immobilizer and brought to the recovery room in good condition.      POSTOPERATIVE PLAN:  1. Weight bearing status:  Nonweightbearing on the left lower extremity with crutches for 2 weeks, followed by weightbearing with the knee in full extension with the brace on for 2 weeks, followed by a progressive range of motion protocol.  2.  Peripheral nerve block for pain control, and Norco prescription has been provided  3.  Follow-up with me in 2 weeks for wound check.    Blake Mcbride MD  03/13/18  5:09 PM

## 2018-03-13 NOTE — ANESTHESIA PREPROCEDURE EVALUATION
Anesthesia Evaluation     Patient summary reviewed and Nursing notes reviewed   NPO Solid Status: > 8 hours  NPO Liquid Status: > 8 hours           Airway   Mallampati: II  TM distance: >3 FB  Neck ROM: full  Possible difficult intubation  Dental - normal exam     Pulmonary    (+) sleep apnea,     ROS comment: Hx of pulmonary edema  Cardiovascular     (+) hypertension,     ROS comment: EF 36-40%    Neuro/Psych  GI/Hepatic/Renal/Endo    (+) obesity,   renal disease CRI, diabetes mellitus type 2 using insulin,     Musculoskeletal     Abdominal    Substance History      OB/GYN          Other                        Anesthesia Plan    ASA 3     general   (L Femoral N blk)  intravenous induction   Anesthetic plan and risks discussed with patient.    Plan discussed with CRNA.

## 2018-03-13 NOTE — ANESTHESIA POSTPROCEDURE EVALUATION
Patient: Trace Escalante    Procedure Summary     Date:  03/13/18 Room / Location:   [facility] OR  /  [facility]    Anesthesia Start:  1514 Anesthesia Stop:  1726    Procedure:  PATELLA TENDON REPAIR LEFT (Left Knee) Diagnosis:       Rupture of left patellar tendon, initial encounter      (Rupture of left patellar tendon, initial encounter [S86.812A])    Surgeon:  Blaek Mcbride MD Provider:  Josep Daugherty MD    Anesthesia Type:  general ASA Status:  3          Anesthesia Type: general  Last vitals  BP   148/84   Temp   97.5   Pulse   68   Resp   16   SpO2   97     Post Anesthesia Care and Evaluation    Patient location during evaluation: PACU  Patient participation: complete - patient participated  Level of consciousness: awake and alert  Pain score: 0  Pain management: adequate  Airway patency: patent  Anesthetic complications: No anesthetic complications  PONV Status: none  Cardiovascular status: hemodynamically stable and acceptable  Respiratory status: nonlabored ventilation, acceptable and nasal cannula  Hydration status: acceptable

## 2018-03-13 NOTE — ANESTHESIA PROCEDURE NOTES
Airway  Urgency: elective    Airway not difficult    General Information and Staff    Patient location during procedure: OR  CRNA: CHRISSY SWAN    Indications and Patient Condition  Indications for airway management: airway protection    Preoxygenated: yes  Mask difficulty assessment: 1 - vent by mask    Final Airway Details  Final airway type: supraglottic airway      Successful airway: classic  Size 5    Number of attempts at approach: 1    Additional Comments  LMA placed without difficulty, ventilation with assist, equal breath sounds and symmetric chest rise and fall

## 2018-03-13 NOTE — ANESTHESIA PROCEDURE NOTES
Peripheral Block    Patient location during procedure: OR  Reason for block: post-op pain management  Performed by  CRNA: VU WADSWORTH  Preanesthetic Checklist  Completed: patient identified, site marked, surgical consent, pre-op evaluation, timeout performed, IV checked, risks and benefits discussed and monitors and equipment checked  Prep:  Sterile barriers:gloves, cap, sterile barriers and mask  Prep: ChloraPrep  Patient monitoring: blood pressure monitoring, continuous pulse oximetry and EKG  Procedure  Performed under: general  Guidance:ultrasound guided, nerve stimulator and landmark technique  Images:still images not obtained    Laterality:left  Block Type:femoral  Injection Technique:single-shot  Needle Type:short-bevel  Needle Gauge:20 G    Medications  Local Injected:ropivacaine 0.2% Local Amount Injected:10mL  Post Assessment  Injection Assessment: negative aspiration for heme, no paresthesia on injection and incremental injection  Patient Tolerance:comfortable throughout block  Complications:no  Additional Notes  The BBRaun 360 degree echogenic needle was introduced in plane, in a lateral to medial direction at the level of the inguinal crease.  Under ultrasound guidance, the femoral artery and vein where located.  The needle was then directed below Fascia Iliacus towards the Femoral nerve.  NS was utilized to hydro dissect and denice needle advancement towards the target structure.   LA was injected incrementally in 3-5 ml aliquots with negative aspirate.  LA spread was visualized around the nerve, negative intraneural injection, low injection pressures.  Thank you

## 2018-03-13 NOTE — H&P
Pre-Op H&P  Trace Escalante  [id number]  1960    Chief complaint: Left knee pain    HPI:    Patient is a 57 y.o.male presented to [facility] ER on 3/11/18 following a fall down the stairs with his left leg bent behind him.   Imaging revealed a left patellar tendon avulsion and closed nondisplaced fracture of the left talus.  Here today for left patella tendon repair    Review of Systems:  General ROS: negative for chills, fever or skin lesions;  No changes since last office visit  · Cardiovascular ROS: no chest pain or dyspnea on exertion.  11/15/17:  TTE Left ventricular systolic function is moderately decreased. Calculated EF = 39.9%. Estimated EF appears to be in the range of 36 - 40%.  · Left ventricular wall thickness is consistent with mild concentric hypertrophy.  · Left ventricular diastolic dysfunction (grade III) consistent with reversible restrictive pattern.  · Right ventricular cavity is borderline dilated.  · Left atrial cavity size is mildly dilated.  · There is mild calcification of the aortic valve. Mild aortic valve regurgitation is present.  · Mild pulmonic valve regurgitation is present.  Mild mitral valve regurgitation is present  11/15/17 LHC:  EF 40% mild nonobstructive plaque  Respiratory ROS: no cough, shortness of breath, or wheezing  :  Follows with NephroDr. Juarez every 3-4 months  MS:  +edema/erythema left great toe MT joint    Allergies: No Known Allergies    Home Meds:    No current facility-administered medications on file prior to encounter.      Current Outpatient Prescriptions on File Prior to Encounter   Medication Sig Dispense Refill   • allopurinol (ZYLOPRIM) 300 MG tablet Take 0.5 tablets by mouth Daily. 45 tablet 1   • amLODIPine (NORVASC) 10 MG tablet Take 10 mg by mouth Daily.  1   • aspirin 81 MG tablet Take 81 mg by mouth Daily.     • carvedilol (COREG) 25 MG tablet Take 1 tablet by mouth Every 12 (Twelve) Hours. 60 tablet 11   • CloNIDine (CATAPRES) 0.2 MG tablet  Take 0.2 mg by mouth Every Night.     • furosemide (LASIX) 40 MG tablet TAKE 1 TABLET BY MOUTH DAILY. MAY TAKE ADDITIONAL TABLET AS NEEDED FOR WEIGHT GAIN 3 LBS OVERNIGHT 135 tablet 3   • hydrALAZINE (APRESOLINE) 50 MG tablet Take 1 tablet by mouth Every 8 (Eight) Hours. 90 tablet 11   • HYDROcodone-acetaminophen (NORCO) 7.5-325 MG per tablet Take 1 tablet by mouth Every 6 (Six) Hours As Needed for Moderate Pain  for up to 15 doses. 15 tablet 0   • Insulin Degludec (TRESIBA FLEXTOUCH SC) Inject 40-50 Units under the skin Every Evening.     • isosorbide mononitrate (IMDUR) 60 MG 24 hr tablet Take 1 tablet by mouth Daily. 30 tablet 11   • Multiple Vitamin (MULTI VITAMIN PO) Take 1 tablet by mouth.     • NOVOLOG FLEXPEN 100 UNIT/ML solution pen-injector sc pen Inject 5-10 Units under the skin 3 (Three) Times a Day With Meals.     • rosuvastatin (CRESTOR) 40 MG tablet Take 1 tablet by mouth Every Night. 30 tablet 11   • spironolactone (ALDACTONE) 25 MG tablet Take 1 tablet by mouth Daily. 30 tablet 3       PMH:   Past Medical History:   Diagnosis Date   • CKD (chronic kidney disease) stage 3, GFR 30-59 ml/min    • Gout    • Hyperlipidemia    • Hypertension    • Insulin dependent diabetes mellitus    • Kidney stones    • NICM (nonischemic cardiomyopathy)    • Obesity    • ALYSSA (obstructive sleep apnea)      PSH:    Past Surgical History:   Procedure Laterality Date   • CARDIAC CATHETERIZATION N/A 11/15/2017    Procedure: Coronary angiography;  Surgeon: Elke Mracum MD;  Location: [facility] INVASIVE LOCATION;  Service:    • MEDIAL COLLATERAL LIGAMENT REPAIR, KNEE Right 1978       Immunization History:  Influenza: yes 2017  Pneumococcal: no  Tetanus: unknown    Social History:   Tobacco:   History   Smoking Status   • Never Smoker   Smokeless Tobacco   • Never Used      Alcohol:     History   Alcohol Use No       Vitals:           /94 (BP Location: Right arm, Patient Position: Sitting)   Pulse 68   Temp 98.2 °F  "(36.8 °C) (Temporal Artery )   Resp 18   Ht 190.5 cm (75\")   Wt 127 kg (280 lb)   SpO2 98%   BMI 35.00 kg/m²     Physical Exam:  General Appearance:    Alert, cooperative, no distress, appears stated age   Head:    Normocephalic, without obvious abnormality, atraumatic   Lungs:     Clear to auscultation bilaterally, respirations unlabored    Heart:   Regular rate and rhythm, S1 and S2 normal, no murmur, rub    or gallop    Abdomen:    Soft, non-tender.  +bowel sounds   Breast Exam:    deferred   Genitalia:    deferred   Extremities:   Extremities normal, atraumatic, no cyanosis or edema.  Left knee with edema/TTP.  Left 1st MT with edema/erythema and TTP   Skin:   Skin color, texture, turgor normal, no rashes or lesions   Neurologic:   Grossly intact   Results Review  I reviewed the patient's new clinical results.  3/11 A1C 7.50    Cancer Staging (if applicable)  Cancer Patient: __ yes _x_no __unknown; If yes, clinical stage T:__ N:__M:__, stage group or __N/A    Impression: Left patellar tendon avulsion    Plan: Left patella tendon repair    Maria Alejandra Todd, APRN   3/13/2018   1:14 PM     Agree with above.  Plan for left patellar tendon repair.  "

## 2018-03-28 ENCOUNTER — OFFICE VISIT (OUTPATIENT)
Dept: ORTHOPEDIC SURGERY | Facility: CLINIC | Age: 58
End: 2018-03-28

## 2018-03-28 ENCOUNTER — TELEPHONE (OUTPATIENT)
Dept: ORTHOPEDIC SURGERY | Facility: CLINIC | Age: 58
End: 2018-03-28

## 2018-03-28 DIAGNOSIS — Z47.89 ORTHOPEDIC AFTERCARE: Primary | ICD-10-CM

## 2018-03-28 PROCEDURE — 99024 POSTOP FOLLOW-UP VISIT: CPT | Performed by: ORTHOPAEDIC SURGERY

## 2018-03-28 NOTE — PROGRESS NOTES
[facility] [place] [facility] [place]    Subjective     Chief Complaint   Patient presents with   • Post-op     2 weeks Left Patella tendon Repair 3/13/18        HPI    Trace Escalante is a 57 y.o. male. He follows up today status post left patellar tendon repair.  He's doing well today.  He is nonweightbearing with crutches.  He is wearing his brace.  No pain medications for 3 days now.      Patient Active Problem List   Diagnosis   • Controlled type 2 diabetes mellitus with kidney complication, with long-term current use of insulin   • Essential hypertension   • Mixed hyperlipidemia   • Gout   • Nephrolithiasis   • Elevated brain natriuretic peptide (BNP) level   • Elevated serum creatinine   • Chronic renal insufficiency   • Acute pulmonary edema   • Obesity, Class I, BMI 30-34.9   • Chronic systolic congestive heart failure   • Nonischemic cardiomyopathy   • Rupture of left patellar tendon   • Patellar tendon rupture, left, initial encounter     Past Medical History:   Diagnosis Date   • CKD (chronic kidney disease) stage 3, GFR 30-59 ml/min    • Gout    • Hyperlipidemia    • Hypertension    • Insulin dependent diabetes mellitus    • Kidney stones    • NICM (nonischemic cardiomyopathy)    • Obesity    • ALYSSA (obstructive sleep apnea)       Past Surgical History:   Procedure Laterality Date   • CARDIAC CATHETERIZATION N/A 11/15/2017    Procedure: Coronary angiography;  Surgeon: Elke Marcum MD;  Location:  [facility] CATH INVASIVE LOCATION;  Service:    • MEDIAL COLLATERAL LIGAMENT REPAIR, KNEE Right 1978   • PATELLA TENDON REPAIR Left 3/13/2018    Procedure: PATELLA TENDON REPAIR LEFT;  Surgeon: Blake Mcbride MD;  Location: [facility] OR;  Service: Orthopedics      Family History   Problem Relation Age of Onset   • Heart disease Mother    • Stroke Mother    • Diabetes Father    • Hypertension Father    • Heart disease Father    • Heart attack Father    • Kidney disease Brother    • Stroke Brother    • Diabetes  Sister    • Thyroid cancer Sister      Social History     Social History   • Marital status: Single     Spouse name: N/A   • Number of children: 2   • Years of education: College     Occupational History   •  [employer]     Social History Main Topics   • Smoking status: Never Smoker   • Smokeless tobacco: Never Used   • Alcohol use No   • Drug use: No   • Sexual activity: Yes     Partners: Female     Other Topics Concern   • Not on file     Social History Narrative    Caffeine use: 1-2 cups coffee or decaf tea  daily.    Patient lives at home with his daughters.           Current Outpatient Prescriptions on File Prior to Visit   Medication Sig Dispense Refill   • allopurinol (ZYLOPRIM) 300 MG tablet Take 0.5 tablets by mouth Daily. 45 tablet 1   • amLODIPine (NORVASC) 10 MG tablet Take 10 mg by mouth Daily.  1   • aspirin 81 MG tablet Take 81 mg by mouth Daily.     • carvedilol (COREG) 25 MG tablet Take 1 tablet by mouth Every 12 (Twelve) Hours. 60 tablet 11   • CloNIDine (CATAPRES) 0.2 MG tablet Take 0.2 mg by mouth Every Night.     • furosemide (LASIX) 40 MG tablet TAKE 1 TABLET BY MOUTH DAILY. MAY TAKE ADDITIONAL TABLET AS NEEDED FOR WEIGHT GAIN 3 LBS OVERNIGHT 135 tablet 3   • hydrALAZINE (APRESOLINE) 50 MG tablet Take 1 tablet by mouth Every 8 (Eight) Hours. 90 tablet 11   • HYDROcodone-acetaminophen (NORCO) 7.5-325 MG per tablet Take 1 tablet by mouth Every 4 (Four) Hours As Needed for Moderate Pain  for up to 40 doses. 40 tablet 0   • Insulin Degludec (TRESIBA FLEXTOUCH SC) Inject 40-50 Units under the skin Every Evening.     • isosorbide mononitrate (IMDUR) 60 MG 24 hr tablet Take 1 tablet by mouth Daily. 30 tablet 11   • Multiple Vitamin (MULTI VITAMIN PO) Take 1 tablet by mouth.     • NOVOLOG FLEXPEN 100 UNIT/ML solution pen-injector sc pen Inject 5-10 Units under the skin 3 (Three) Times a Day With Meals.     • rosuvastatin (CRESTOR) 40 MG tablet Take 1 tablet by mouth Every Night. 30 tablet 11   •  spironolactone (ALDACTONE) 25 MG tablet Take 1 tablet by mouth Daily. 30 tablet 3     No current facility-administered medications on file prior to visit.       No Known Allergies     Review of Systems   Constitutional: Negative for activity change, appetite change, chills, diaphoresis, fatigue, fever and unexpected weight change.   HENT: Negative for congestion, dental problem, drooling, ear discharge, ear pain, facial swelling, hearing loss, mouth sores, nosebleeds, postnasal drip, rhinorrhea, sinus pressure, sneezing, sore throat, tinnitus, trouble swallowing and voice change.    Eyes: Negative for photophobia, pain, discharge, redness, itching and visual disturbance.   Respiratory: Positive for apnea. Negative for cough, choking, chest tightness, shortness of breath, wheezing and stridor.    Cardiovascular: Negative for chest pain, palpitations and leg swelling.   Gastrointestinal: Positive for constipation (mild). Negative for abdominal distention, abdominal pain, anal bleeding, blood in stool, diarrhea, nausea, rectal pain and vomiting.   Endocrine: Positive for cold intolerance. Negative for heat intolerance, polydipsia, polyphagia and polyuria.   Genitourinary: Negative for decreased urine volume, difficulty urinating, dysuria, enuresis, flank pain, frequency, genital sores, hematuria and urgency.   Musculoskeletal: Positive for back pain. Negative for arthralgias, gait problem, joint swelling, myalgias, neck pain and neck stiffness.        S/p knee surgery   Skin: Negative for color change, pallor, rash and wound.   Allergic/Immunologic: Negative for environmental allergies, food allergies and immunocompromised state.   Neurological: Negative for dizziness, tremors, seizures, syncope, facial asymmetry, speech difficulty, weakness, light-headedness, numbness and headaches.   Hematological: Negative for adenopathy. Does not bruise/bleed easily.   Psychiatric/Behavioral: Negative for agitation, behavioral  problems, confusion, decreased concentration, dysphoric mood, hallucinations, self-injury, sleep disturbance and suicidal ideas. The patient is not nervous/anxious and is not hyperactive.         Objective      Physical Exam  There were no vitals taken for this visit.    There is no height or weight on file to calculate BMI.    General:   Mental Status:  Alert   Appearance: Cooperative, in no acute distress   Build and Nutrition: Overweight male   Orientation: Alert and oriented to person, place and time   Posture: Normal    Integument:   Left knee: Wound is well-healed with no signs of infection    Lower Extremities:   Left Knee:    Tenderness:  None    Effusion:  1+    Swelling: Positive        Assessment and Plan     Trace was seen today for post-op.    Diagnoses and all orders for this visit:    Orthopedic aftercare        I reviewed my findings with patient today.  His patellar tendon repair appears to be healing well, and I will see him back in 2 weeks, at which point we will start some supervised therapy, and allow him to weight-bear with the brace on in a progressive fashion.  I will see him back sooner for any problems.    Return in about 2 weeks (around 4/11/2018).          Blake Mcbride MD  03/28/18  10:13 AM

## 2018-03-28 NOTE — TELEPHONE ENCOUNTER
PT PAID FOR [facility] PAPERWORK. PLEASE FAX TO OFELIA DORADO [phone] AND CALL PT ONCE COMPLETED. THANKS

## 2018-04-04 ENCOUNTER — TELEPHONE (OUTPATIENT)
Dept: CARDIOLOGY | Facility: HOSPITAL | Age: 58
End: 2018-04-04

## 2018-04-04 NOTE — TELEPHONE ENCOUNTER
Mr. Escalante called to report that he had episode of syncope while sitting down eating oatmeal on 4/3. He reports presyncopal symptoms of severe nausea and next thing he knows he woke up face down in his own vomit. He also had a presyncopal episode with similar symptoms last week when going at a doctor's appt. Of note, he did have recent knee surgery and is non-weightbearing with crutches and brace. EMS was called on 4/3 and he was told HR was in the 40s and BP was 119/69. He is concerned because he thinks he has lost over 50lbs (intentionally) since November and wonders if his medications need to be adjusted. He says BPs are usually 130/80s and HR are around 65-75. Due to his history I told him that it would be best for him to come into clinic for evaluation. Patient will need EKG, labs, and event monitor. He verbalized understanding and agreed with plan

## 2018-04-05 ENCOUNTER — HOSPITAL ENCOUNTER (OUTPATIENT)
Dept: CARDIOLOGY | Facility: HOSPITAL | Age: 58
Discharge: HOME | End: 2018-04-05
Attending: NURSE PRACTITIONER | Admitting: NURSE PRACTITIONER

## 2018-04-05 ENCOUNTER — OFFICE VISIT (OUTPATIENT)
Dept: CARDIOLOGY | Facility: HOSPITAL | Age: 58
End: 2018-04-05

## 2018-04-05 ENCOUNTER — HOSPITAL ENCOUNTER (OUTPATIENT)
Dept: CARDIOLOGY | Facility: HOSPITAL | Age: 58
Discharge: HOME | End: 2018-04-05
Attending: NURSE PRACTITIONER

## 2018-04-05 VITALS
SYSTOLIC BLOOD PRESSURE: 128 MMHG | TEMPERATURE: 97.8 F | RESPIRATION RATE: 16 BRPM | HEART RATE: 65 BPM | DIASTOLIC BLOOD PRESSURE: 74 MMHG | BODY MASS INDEX: 33.45 KG/M2 | HEIGHT: 75 IN | OXYGEN SATURATION: 99 % | WEIGHT: 269 LBS

## 2018-04-05 DIAGNOSIS — R55 SYNCOPE, UNSPECIFIED SYNCOPE TYPE: ICD-10-CM

## 2018-04-05 DIAGNOSIS — I42.8 NONISCHEMIC CARDIOMYOPATHY (HCC): ICD-10-CM

## 2018-04-05 DIAGNOSIS — R55 SYNCOPE, UNSPECIFIED SYNCOPE TYPE: Primary | ICD-10-CM

## 2018-04-05 DIAGNOSIS — I10 ESSENTIAL HYPERTENSION: ICD-10-CM

## 2018-04-05 DIAGNOSIS — I50.42 CHRONIC COMBINED SYSTOLIC AND DIASTOLIC HEART FAILURE (HCC): ICD-10-CM

## 2018-04-05 LAB
ANION GAP SERPL CALCULATED.3IONS-SCNC: 7 MMOL/L (ref 3–11)
BASOPHILS # BLD AUTO: 0.03 10*3/MM3 (ref 0–0.2)
BASOPHILS NFR BLD AUTO: 0.5 % (ref 0–1)
BUN BLD-MCNC: 22 MG/DL (ref 9–23)
BUN/CREAT SERPL: 12.2 (ref 7–25)
CALCIUM SPEC-SCNC: 8.9 MG/DL (ref 8.7–10.4)
CHLORIDE SERPL-SCNC: 102 MMOL/L (ref 99–109)
CO2 SERPL-SCNC: 30 MMOL/L (ref 20–31)
CREAT BLD-MCNC: 1.8 MG/DL (ref 0.6–1.3)
DEPRECATED RDW RBC AUTO: 47.8 FL (ref 37–54)
EOSINOPHIL # BLD AUTO: 0.29 10*3/MM3 (ref 0–0.3)
EOSINOPHIL NFR BLD AUTO: 4.5 % (ref 0–3)
ERYTHROCYTE [DISTWIDTH] IN BLOOD BY AUTOMATED COUNT: 14.7 % (ref 11.3–14.5)
GFR SERPL CREATININE-BSD FRML MDRD: 47 ML/MIN/1.73
GLUCOSE BLD-MCNC: 188 MG/DL (ref 70–100)
HCT VFR BLD AUTO: 33.2 % (ref 38.9–50.9)
HGB BLD-MCNC: 10.4 G/DL (ref 13.1–17.5)
IMM GRANULOCYTES # BLD: 0.01 10*3/MM3 (ref 0–0.03)
IMM GRANULOCYTES NFR BLD: 0.2 % (ref 0–0.6)
LYMPHOCYTES # BLD AUTO: 1.31 10*3/MM3 (ref 0.6–4.8)
LYMPHOCYTES NFR BLD AUTO: 20.4 % (ref 24–44)
MAGNESIUM SERPL-MCNC: 1.9 MG/DL (ref 1.3–2.7)
MCH RBC QN AUTO: 27.8 PG (ref 27–31)
MCHC RBC AUTO-ENTMCNC: 31.3 G/DL (ref 32–36)
MCV RBC AUTO: 88.8 FL (ref 80–99)
MONOCYTES # BLD AUTO: 0.53 10*3/MM3 (ref 0–1)
MONOCYTES NFR BLD AUTO: 8.3 % (ref 0–12)
NEUTROPHILS # BLD AUTO: 4.24 10*3/MM3 (ref 1.5–8.3)
NEUTROPHILS NFR BLD AUTO: 66.1 % (ref 41–71)
PLATELET # BLD AUTO: 332 10*3/MM3 (ref 150–450)
PMV BLD AUTO: 11.7 FL (ref 6–12)
POTASSIUM BLD-SCNC: 4.7 MMOL/L (ref 3.5–5.5)
RBC # BLD AUTO: 3.74 10*6/MM3 (ref 4.2–5.76)
SODIUM BLD-SCNC: 139 MMOL/L (ref 132–146)
WBC NRBC COR # BLD: 6.41 10*3/MM3 (ref 3.5–10.8)

## 2018-04-05 PROCEDURE — 80048 BASIC METABOLIC PNL TOTAL CA: CPT | Performed by: NURSE PRACTITIONER

## 2018-04-05 PROCEDURE — 99214 OFFICE O/P EST MOD 30 MIN: CPT | Performed by: NURSE PRACTITIONER

## 2018-04-05 PROCEDURE — 85025 COMPLETE CBC W/AUTO DIFF WBC: CPT | Performed by: NURSE PRACTITIONER

## 2018-04-05 PROCEDURE — 93270 REMOTE 30 DAY ECG REV/REPORT: CPT

## 2018-04-05 PROCEDURE — 93005 ELECTROCARDIOGRAM TRACING: CPT | Performed by: NURSE PRACTITIONER

## 2018-04-05 PROCEDURE — 93272 ECG/REVIEW INTERPRET ONLY: CPT | Performed by: INTERNAL MEDICINE

## 2018-04-05 PROCEDURE — 93010 ELECTROCARDIOGRAM REPORT: CPT | Performed by: INTERNAL MEDICINE

## 2018-04-05 PROCEDURE — 83735 ASSAY OF MAGNESIUM: CPT | Performed by: NURSE PRACTITIONER

## 2018-04-05 NOTE — PROGRESS NOTES
[facility]2018      Patient ID: Trace Escalante is a 57 y.o. male.        Subjective:     Chief Complaint: Follow-up syncope     History of Present Illness  Mr. Escalante is 58 YO M with history of NICM, DM, HTN, and CKD who [facility]. He reports that on 4/3 he had an episode of syncope while sitting down eating oatmeal.  He reports presyncopal symptoms of severe nausea and next thing he knows he woke up face down in his own vomit. He also had a presyncopal episode with similar symptoms last week when going at a doctor's appt. Of note, he did have recent knee surgery and is non-weightbearing with crutches and brace. EMS was called on 4/3 and he was told HR was in the 40s and BP was 119/69. He is concerned because he he has lost over 50lbs (intentionally) since November and wonders if his medications need to be adjusted. He says BPs are usually 130/80s and HR are around 65-75.  He has no chest pain, SOB or palpitations. He thinks he might have taken his medications a little closer together the day he passed out than usual. His systolic blood pressure was 107 the morning of syncope. He has had no dizziness since syncopal episode.     Patient Active Problem List   Diagnosis   • Controlled type 2 diabetes mellitus with kidney complication, with long-term current use of insulin   • Essential hypertension   • Mixed hyperlipidemia   • Gout   • Nephrolithiasis   • Elevated brain natriuretic peptide (BNP) level   • Elevated serum creatinine   • Chronic renal insufficiency   • Acute pulmonary edema   • Obesity, Class I, BMI 30-34.9   • Chronic systolic congestive heart failure   • Nonischemic cardiomyopathy   • Rupture of left patellar tendon   • Patellar tendon rupture, left, initial encounter         Past Surgical History:   Procedure Laterality Date   • CARDIAC CATHETERIZATION N/A 11/15/2017    Procedure: Coronary angiography;  Surgeon: Elke Marcum MD;   Location:  [facility] CATH INVASIVE LOCATION;  Service:    • MEDIAL COLLATERAL LIGAMENT REPAIR, KNEE Right 1978   • PATELLA TENDON REPAIR Left 3/13/2018    Procedure: PATELLA TENDON REPAIR LEFT;  Surgeon: Blake Mcbride MD;  Location:  [facility] OR;  Service: Orthopedics       No Known Allergies      Current Outpatient Prescriptions:   •  allopurinol (ZYLOPRIM) 300 MG tablet, Take 0.5 tablets by mouth Daily., Disp: 45 tablet, Rfl: 1  •  amLODIPine (NORVASC) 10 MG tablet, Take 10 mg by mouth Daily., Disp: , Rfl: 1  •  aspirin 81 MG tablet, Take 81 mg by mouth Daily., Disp: , Rfl:   •  carvedilol (COREG) 25 MG tablet, Take 1 tablet by mouth Every 12 (Twelve) Hours., Disp: 60 tablet, Rfl: 11  •  CloNIDine (CATAPRES) 0.2 MG tablet, Take 0.2 mg by mouth Every Night., Disp: , Rfl:   •  furosemide (LASIX) 40 MG tablet, TAKE 1 TABLET BY MOUTH DAILY. MAY TAKE ADDITIONAL TABLET AS NEEDED FOR WEIGHT GAIN 3 LBS OVERNIGHT, Disp: 135 tablet, Rfl: 3  •  hydrALAZINE (APRESOLINE) 50 MG tablet, Take 1 tablet by mouth Every 8 (Eight) Hours., Disp: 90 tablet, Rfl: 11  •  Insulin Degludec (TRESIBA FLEXTOUCH SC), Inject 40 Units under the skin Every Evening., Disp: , Rfl:   •  isosorbide mononitrate (IMDUR) 60 MG 24 hr tablet, Take 1 tablet by mouth Daily., Disp: 30 tablet, Rfl: 11  •  Multiple Vitamin (MULTI VITAMIN PO), Take 1 tablet by mouth., Disp: , Rfl:   •  NOVOLOG FLEXPEN 100 UNIT/ML solution pen-injector sc pen, Inject 5-10 Units under the skin 3 (Three) Times a Day With Meals., Disp: , Rfl:   •  rosuvastatin (CRESTOR) 40 MG tablet, Take 1 tablet by mouth Every Night., Disp: 30 tablet, Rfl: 11  •  spironolactone (ALDACTONE) 25 MG tablet, Take 1 tablet by mouth Daily., Disp: 30 tablet, Rfl: 3    The following portions of the chart were reviewed and updated as appropriate: Allergies, current medications, past family history, social history, past medical history.     Review of Systems   Constitution: Positive for weight loss. Negative for  "chills, decreased appetite, diaphoresis, fever, weakness, malaise/fatigue, night sweats and weight gain.   HENT: Negative for congestion, hearing loss, hoarse voice and nosebleeds.    Eyes: Negative for blurred vision, visual disturbance and visual halos.   Cardiovascular: Positive for syncope. Negative for chest pain, claudication, cyanosis, dyspnea on exertion, irregular heartbeat, leg swelling, near-syncope, orthopnea, palpitations and paroxysmal nocturnal dyspnea.   Respiratory: Negative for cough, hemoptysis, shortness of breath, sleep disturbances due to breathing, snoring, sputum production and wheezing.    Hematologic/Lymphatic: Negative for bleeding problem. Does not bruise/bleed easily.   Skin: Negative for dry skin, itching and rash.   Musculoskeletal: Positive for gout. Negative for arthritis, joint pain, joint swelling and myalgias.   Gastrointestinal: Negative for bloating, abdominal pain, constipation, diarrhea, flatus, heartburn, hematemesis, hematochezia, melena, nausea and vomiting.   Genitourinary: Negative for dysuria, frequency, hematuria, nocturia and urgency.   Neurological: Positive for dizziness, light-headedness and loss of balance. Negative for excessive daytime sleepiness and headaches.   Psychiatric/Behavioral: Negative for depression. The patient does not have insomnia and is not nervous/anxious.            Objective:     Vitals:    04/05/18 1007 04/05/18 1009   BP: 131/71 128/74   BP Location: Right arm Left arm   Patient Position: Sitting Sitting   Pulse: 67 65   Resp: 16    Temp: 97.8 °F (36.6 °C)    TempSrc: Temporal Artery     SpO2: 99%    Weight: 122 kg (269 lb)    Height: 190.5 cm (75\")          Physical Exam   Constitutional: He is oriented to person, place, and time. He appears well-developed and well-nourished. No distress.   HENT:   Head: Normocephalic.   Eyes: Conjunctivae are normal. Pupils are equal, round, and reactive to light.   Neck: Neck supple. No JVD present. No " thyromegaly present.   Cardiovascular: Normal rate, regular rhythm, normal heart sounds and intact distal pulses.  Exam reveals no gallop and no friction rub.    No murmur heard.  Pulmonary/Chest: Effort normal and breath sounds normal. No respiratory distress. He has no wheezes. He has no rales. He exhibits no tenderness.   Abdominal: Soft. Bowel sounds are normal.   Musculoskeletal: Normal range of motion. He exhibits no edema.   Wearing splint on left leg   Neurological: He is alert and oriented to person, place, and time.   Skin: Skin is warm and dry.   Psychiatric: He has a normal mood and affect. His behavior is normal. Thought content normal.   Vitals reviewed.      Lab and Diagnostic Review:   Lab Results   Component Value Date    GLUCOSE 134 (H) 03/11/2018    BUN 25 (H) 03/11/2018    CREATININE 2.10 (H) 03/11/2018    EGFRIFNONA 29 (L) 01/11/2018    EGFRIFAFRI 40 (L) 03/11/2018    BCR 11.9 03/11/2018    K 4.2 03/11/2018    CO2 29.0 03/11/2018    CALCIUM 8.9 03/11/2018    ALBUMIN 4.00 03/11/2018    LABIL2 1.1 (L) 03/11/2018    AST 27 03/11/2018    ALT 26 03/11/2018     Lab Results   Component Value Date    WBC 7.89 03/11/2018    HGB 11.5 (L) 03/11/2018    HCT 35.8 (L) 03/11/2018    MCV 87.5 03/11/2018     03/11/2018     Lab Results   Component Value Date    TSH 1.152 11/13/2017     EKG NSR, LAD, nonspecific T wave abnormality    Echo 11/2017  · Left ventricular systolic function is moderately decreased. Calculated EF = 39.9%. Estimated EF appears to be in the range of 36 - 40%.  · Left ventricular wall thickness is consistent with mild concentric hypertrophy.  · Left ventricular diastolic dysfunction (grade III) consistent with reversible restrictive pattern.  · Right ventricular cavity is borderline dilated.  · Left atrial cavity size is mildly dilated.  · There is mild calcification of the aortic valve. Mild aortic valve regurgitation is present.  · Mild pulmonic valve regurgitation is  present.  · Mild mitral valve regurgitation is present    [facility] 11/2017  FINAL IMPRESSION:  · No evidence of hemodynamically significant coronary artery disease.  · Mild nonobstructive plaque is noted suggesting presence of atherosclerotic process.  · Acceptable hemodynamics.     RECOMMENDATIONS:  · Based on above findings the patient has nonischemic cardiomyopathy with ejection fraction of 40% by echocardiogram.    · Optimal medical therapy and risk factor management is recommended with future monitoring of left ventricular systolic function.  Assessment and Plan:         1. Syncope, unspecified syncope type  - ECG 12 Lead; Future  - Cardiac Event Monitor to rule out arrhythmia  - Adult Transthoracic Echo Complete W/ Cont if Necessary Per Protocol; Future    2. Nonischemic cardiomyopathy NYHA class II  - LVEF 36-40%  - Repeat echo to rule out worsening LVEF  - Adult Transthoracic Echo Complete W/ Cont if Necessary Per Protocol; Future    3. Chronic combined systolic and diastolic heart failure  - Euvolemic  - Adult Transthoracic Echo Complete W/ Cont if Necessary Per Protocol; Future    4. Essential hypertension  - Controlled. Patient and wife are very concerned that his recent weight loss warrants a reduction in medications. Based on his home readings and vitals today, I do not think it is necessary at this time. However, I did tell him to call us and let us know if SBP consistently <100 and we would likely decrease clonidine since this was the last medication added and is not GDMT for cardiomyopathy  - Adult Transthoracic Echo Complete W/ Cont if Necessary Per Protocol; Future    Follow up in 6 weeks with Savannah ZAVALA    It has been a pleasure to participate in the care of this patient.  Patient was instructed to call the [facility] with any questions, concerns, or worsening symptoms.    **ADDENDUM**: Patient came back to our office after feeling presyncopal symptoms while in the lobby. In our  office BP was 92/66, HR 54 and 5 minutes later dropped to 75/56 with a HR of 54. Patient appeared pale and felt nauseous and lightheaded. Blood sugar was 211. 15 minutes later BP was 107/69 and HR was 56. Patient was monitored for 20 minutes and symptoms completely resolved.He did have monitor on at the time. Last BP was 117/72 and HR 56. He is very concerned that both of these episodes have happened a couple hours after his medications. I advised him to cut back his lasix to 20mg but he is hesitant to do this because he is afraid he will gain fluid. I advised him to try cutting back his imdur to 30mg and also cut back clonidine to 0.1 mg. Goal is to get off clonidine since this was the last medication added. Check BP before meds and 2 hours after meds. Offered 24hr ambulatory BP monitor but he wants to monitor it himself with his home cuff. I told him that if his labs suggest dehydration/overdiuresis that I think cutting back the lasix will be most beneficial. Call if any recurring symptoms     * Please note that portions of this note were completed with a voice recognition program. Efforts were made to edit the dictation but occasionally words are transcribed.

## 2018-04-06 ENCOUNTER — TELEPHONE (OUTPATIENT)
Dept: CARDIOLOGY | Facility: HOSPITAL | Age: 58
End: 2018-04-06

## 2018-04-06 RX ORDER — FERROUS SULFATE 325(65) MG
325 TABLET ORAL 2 TIMES DAILY WITH MEALS
Qty: 60 TABLET | Refills: 1 | Status: SHIPPED | OUTPATIENT
Start: 2018-04-06 | End: 2018-11-28

## 2018-04-06 NOTE — TELEPHONE ENCOUNTER
Reviewed labs with patient. Labs stable except he is anemic which is likely from surgery. Will send in prescription for iron. Repeat in a month. Continue to monitor BP closely during the adjustment of medications   Breath sounds clear and equal bilaterally.

## 2018-04-11 ENCOUNTER — HOSPITAL ENCOUNTER (OUTPATIENT)
Dept: CARDIOLOGY | Facility: HOSPITAL | Age: 58
Discharge: HOME | End: 2018-04-11
Attending: NURSE PRACTITIONER | Admitting: NURSE PRACTITIONER

## 2018-04-11 ENCOUNTER — OFFICE VISIT (OUTPATIENT)
Dept: ORTHOPEDIC SURGERY | Facility: CLINIC | Age: 58
End: 2018-04-11

## 2018-04-11 VITALS — WEIGHT: 269 LBS | HEIGHT: 75 IN | BODY MASS INDEX: 33.45 KG/M2

## 2018-04-11 DIAGNOSIS — I10 ESSENTIAL HYPERTENSION: ICD-10-CM

## 2018-04-11 DIAGNOSIS — R55 SYNCOPE, UNSPECIFIED SYNCOPE TYPE: ICD-10-CM

## 2018-04-11 DIAGNOSIS — Z47.89 ORTHOPEDIC AFTERCARE: Primary | ICD-10-CM

## 2018-04-11 DIAGNOSIS — I42.8 NONISCHEMIC CARDIOMYOPATHY (HCC): ICD-10-CM

## 2018-04-11 DIAGNOSIS — I50.42 CHRONIC COMBINED SYSTOLIC AND DIASTOLIC HEART FAILURE (HCC): ICD-10-CM

## 2018-04-11 PROCEDURE — 99024 POSTOP FOLLOW-UP VISIT: CPT | Performed by: ORTHOPAEDIC SURGERY

## 2018-04-11 PROCEDURE — 93306 TTE W/DOPPLER COMPLETE: CPT | Performed by: INTERNAL MEDICINE

## 2018-04-11 PROCEDURE — 93306 TTE W/DOPPLER COMPLETE: CPT

## 2018-04-11 NOTE — PROGRESS NOTES
[facility] Note    Subjective     Chief Complaint   Patient presents with   • Post-op     2 week follow up, 4 weeks status post: Left patella tendon repair 3/13/18        HPI    Trace Escalante is a 57 y.o. male. He follows up today for his left patellar tendon repair.  He is doing well today.  No complaints.      Patient Active Problem List   Diagnosis   • Controlled type 2 diabetes mellitus with kidney complication, with long-term current use of insulin   • Essential hypertension   • Mixed hyperlipidemia   • Gout   • Nephrolithiasis   • Elevated brain natriuretic peptide (BNP) level   • Elevated serum creatinine   • Chronic renal insufficiency   • Acute pulmonary edema   • Obesity, Class I, BMI 30-34.9   • Chronic systolic congestive heart failure   • Nonischemic cardiomyopathy   • Rupture of left patellar tendon   • Patellar tendon rupture, left, initial encounter     Past Medical History:   Diagnosis Date   • CKD (chronic kidney disease) stage 3, GFR 30-59 ml/min    • Gout    • Hyperlipidemia    • Hypertension    • Insulin dependent diabetes mellitus    • Kidney stones    • NICM (nonischemic cardiomyopathy)    • Obesity    • ALYSSA (obstructive sleep apnea)       Past Surgical History:   Procedure Laterality Date   • CARDIAC CATHETERIZATION N/A 11/15/2017    Procedure: Coronary angiography;  Surgeon: Elke Marcum MD;  Location:  [facility] CATH INVASIVE LOCATION;  Service:    • MEDIAL COLLATERAL LIGAMENT REPAIR, KNEE Right 1978   • PATELLA TENDON REPAIR Left 3/13/2018    Procedure: PATELLA TENDON REPAIR LEFT;  Surgeon: Blake Mcbride MD;  Location:  [facility] OR;  Service: Orthopedics      Family History   Problem Relation Age of Onset   • Heart disease Mother    • Stroke Mother    • Diabetes Father    • Hypertension Father    • Heart disease Father    • Heart attack Father    • Kidney disease Brother    • Stroke Brother    • Diabetes Sister    • Thyroid cancer Sister      Social History      Social History   • Marital status: Single     Spouse name: N/A   • Number of children: 2   • Years of education: College     Occupational History   •  [employer]     Social History Main Topics   • Smoking status: Never Smoker   • Smokeless tobacco: Never Used   • Alcohol use No   • Drug use: No   • Sexual activity: Yes     Partners: Female     Other Topics Concern   • Not on file     Social History Narrative    Caffeine use: 1-2 cups coffee or decaf tea  daily.    Patient lives at home with his daughters.           Current Outpatient Prescriptions on File Prior to Visit   Medication Sig Dispense Refill   • allopurinol (ZYLOPRIM) 300 MG tablet Take 0.5 tablets by mouth Daily. 45 tablet 1   • amLODIPine (NORVASC) 10 MG tablet Take 10 mg by mouth Daily.  1   • aspirin 81 MG tablet Take 81 mg by mouth Daily.     • carvedilol (COREG) 25 MG tablet Take 1 tablet by mouth Every 12 (Twelve) Hours. 60 tablet 11   • CloNIDine (CATAPRES) 0.2 MG tablet Take 0.1 mg by mouth Every Night.     • ferrous sulfate 325 (65 FE) MG tablet Take 1 tablet by mouth 2 (Two) Times a Day With Meals. 60 tablet 1   • furosemide (LASIX) 40 MG tablet TAKE 1 TABLET BY MOUTH DAILY. MAY TAKE ADDITIONAL TABLET AS NEEDED FOR WEIGHT GAIN 3 LBS OVERNIGHT 135 tablet 3   • hydrALAZINE (APRESOLINE) 50 MG tablet Take 1 tablet by mouth Every 8 (Eight) Hours. 90 tablet 11   • Insulin Degludec (TRESIBA FLEXTOUCH SC) Inject 40 Units under the skin Every Evening.     • isosorbide mononitrate (IMDUR) 60 MG 24 hr tablet Take 1 tablet by mouth Daily. 30 tablet 11   • Multiple Vitamin (MULTI VITAMIN PO) Take 1 tablet by mouth.     • NOVOLOG FLEXPEN 100 UNIT/ML solution pen-injector sc pen Inject 5-10 Units under the skin 3 (Three) Times a Day With Meals.     • rosuvastatin (CRESTOR) 40 MG tablet Take 1 tablet by mouth Every Night. 30 tablet 11   • spironolactone (ALDACTONE) 25 MG tablet Take 1 tablet by mouth Daily. 30 tablet 3     No current  facility-administered medications on file prior to visit.       No Known Allergies     Review of Systems   Constitutional: Negative for activity change, appetite change, chills, diaphoresis, fatigue, fever and unexpected weight change.   HENT: Negative for congestion, dental problem, drooling, ear discharge, ear pain, facial swelling, hearing loss, mouth sores, nosebleeds, postnasal drip, rhinorrhea, sinus pressure, sneezing, sore throat, tinnitus, trouble swallowing and voice change.    Eyes: Negative for photophobia, pain, discharge, redness, itching and visual disturbance.   Respiratory: Positive for apnea. Negative for cough, choking, chest tightness, shortness of breath, wheezing and stridor.    Cardiovascular: Negative for chest pain, palpitations and leg swelling.   Gastrointestinal: Negative for abdominal distention, abdominal pain, anal bleeding, blood in stool, constipation, diarrhea, nausea, rectal pain and vomiting.   Endocrine: Negative for cold intolerance, heat intolerance, polydipsia, polyphagia and polyuria.   Genitourinary: Negative for decreased urine volume, difficulty urinating, dysuria, enuresis, flank pain, frequency, genital sores, hematuria and urgency.   Musculoskeletal: Positive for arthralgias. Negative for back pain, gait problem, joint swelling, myalgias, neck pain and neck stiffness.   Skin: Negative for color change, pallor, rash and wound.   Allergic/Immunologic: Negative for environmental allergies, food allergies and immunocompromised state.   Neurological: Positive for dizziness and light-headedness. Negative for tremors, seizures, syncope, facial asymmetry, speech difficulty, weakness, numbness and headaches.   Hematological: Negative for adenopathy. Does not bruise/bleed easily.   Psychiatric/Behavioral: Negative for agitation, behavioral problems, confusion, decreased concentration, dysphoric mood, hallucinations, self-injury, sleep disturbance and suicidal ideas. The patient  is not nervous/anxious and is not hyperactive.         Objective      Physical Exam  There were no vitals taken for this visit.    There is no height or weight on file to calculate BMI.    General:   Mental Status:  Alert   Appearance: Cooperative, in no acute distress   Build and Nutrition: Well-nourished and well developed male   Orientation: Alert and oriented to person, place and time   Posture: Normal   Gait: With crutches and a knee brace    Integument:   Left knee: Wound is well-healed with no signs of infection    Lower Extremities:   Left Knee:    Tenderness:  None    Effusion:  None    Swelling: None    Crepitus:  None    Range of motion:  Extension: 0°, with weak but intact straight leg raise       Flexion: 30°  Instability:  No varus laxity, no valgus laxity, negative anterior drawer  Deformities:  None      Imaging/Studies  Imaging Results (last 24 hours)     Procedure Component Value Units Date/Time    XR Knee 1 or 2 View Left [[id number]] Resulted:  04/11/18 1007     Updated:  04/11/18 1008    Narrative:       Left Knee Radiographs  Indication: left knee pain  Views: AP, lateral views of the left knee    Comparison: 3/11/2018    Findings:   Good alignment of the patella is seen, with improvement compared to   previous films from 3/11/2018, following repair the patellar tendon.            Assessment and Plan     Trace was seen today for post-op.    Diagnoses and all orders for this visit:    Orthopedic aftercare  -     XR Knee 1 or 2 View Left  -     Ambulatory Referral to Physical Therapy Evaluate and treat        I reviewed my findings with patient today.  He may begin physical therapy, with gentle range of motion and isometric quad strengthening.  Weightbearing as tolerated with the brace in full extension and crutches for 2 weeks, and then allow for 0-60° with the knee brace, and use the crutches weightbearing as tolerated for the ensuing 2 weeks.  I will see him back in 4 weeks, but sooner for any  problems.    Return in about 4 weeks (around 5/9/2018).          Blake Mcbride MD  04/11/18  10:18 AM

## 2018-04-12 ENCOUNTER — TELEPHONE (OUTPATIENT)
Dept: CARDIOLOGY | Facility: HOSPITAL | Age: 58
End: 2018-04-12

## 2018-04-12 LAB
BH CV ECHO MEAS - AO ROOT AREA (BSA CORRECTED): 1.4
BH CV ECHO MEAS - AO ROOT AREA: 9.6 CM^2
BH CV ECHO MEAS - AO ROOT DIAM: 3.5 CM
BH CV ECHO MEAS - BSA(HAYCOCK): 2.6 M^2
BH CV ECHO MEAS - BSA: 2.5 M^2
BH CV ECHO MEAS - BZI_BMI: 33.6 KILOGRAMS/M^2
BH CV ECHO MEAS - BZI_METRIC_HEIGHT: 190.5 CM
BH CV ECHO MEAS - BZI_METRIC_WEIGHT: 122 KG
BH CV ECHO MEAS - CONTRAST EF (2CH): 41.5 ML/M^2
BH CV ECHO MEAS - CONTRAST EF 4CH: 41.4 ML/M^2
BH CV ECHO MEAS - EDV(CUBED): 194.1 ML
BH CV ECHO MEAS - EDV(MOD-SP2): 142 ML
BH CV ECHO MEAS - EDV(MOD-SP4): 181 ML
BH CV ECHO MEAS - EDV(TEICH): 165.9 ML
BH CV ECHO MEAS - EF(CUBED): 63.2 %
BH CV ECHO MEAS - EF(MOD-SP2): 41.5 %
BH CV ECHO MEAS - EF(MOD-SP4): 40 %
BH CV ECHO MEAS - EF(TEICH): 54 %
BH CV ECHO MEAS - ESV(CUBED): 71.5 ML
BH CV ECHO MEAS - ESV(MOD-SP2): 83 ML
BH CV ECHO MEAS - ESV(MOD-SP4): 106 ML
BH CV ECHO MEAS - ESV(TEICH): 76.4 ML
BH CV ECHO MEAS - FS: 28.3 %
BH CV ECHO MEAS - IVS/LVPW: 0.97
BH CV ECHO MEAS - IVSD: 1.3 CM
BH CV ECHO MEAS - LA/AO: 1.2
BH CV ECHO MEAS - LAT PEAK E' VEL: 4.6 CM/SEC
BH CV ECHO MEAS - LV DIASTOLIC VOL/BSA (35-75): 72.7 ML/M^2
BH CV ECHO MEAS - LV MASS(C)D: 323.5 GRAMS
BH CV ECHO MEAS - LV MASS(C)DI: 130 GRAMS/M^2
BH CV ECHO MEAS - LV SYSTOLIC VOL/BSA (12-30): 42.6 ML/M^2
BH CV ECHO MEAS - LVIDD: 5.8 CM
BH CV ECHO MEAS - LVIDS: 4.2 CM
BH CV ECHO MEAS - LVLD AP2: 7.3 CM
BH CV ECHO MEAS - LVLD AP4: 7.6 CM
BH CV ECHO MEAS - LVLS AP2: 7.4 CM
BH CV ECHO MEAS - LVLS AP4: 7.7 CM
BH CV ECHO MEAS - LVPWD: 1.3 CM
BH CV ECHO MEAS - MED PEAK E' VEL: 4.39 CM/SEC
BH CV ECHO MEAS - MV A MAX VEL: 60.7 CM/SEC
BH CV ECHO MEAS - MV E MAX VEL: 68.6 CM/SEC
BH CV ECHO MEAS - MV E/A: 1.1
BH CV ECHO MEAS - PA ACC SLOPE: 522 CM/SEC^2
BH CV ECHO MEAS - PA ACC TIME: 0.14 SEC
BH CV ECHO MEAS - PA PR(ACCEL): 15.6 MMHG
BH CV ECHO MEAS - PI END-D VEL: 158 CM/SEC
BH CV ECHO MEAS - RAP SYSTOLE: 8 MMHG
BH CV ECHO MEAS - RVDD: 3.5 CM
BH CV ECHO MEAS - RVSP: 28 MMHG
BH CV ECHO MEAS - SI(CUBED): 49.3 ML/M^2
BH CV ECHO MEAS - SI(MOD-SP2): 23.7 ML/M^2
BH CV ECHO MEAS - SI(MOD-SP4): 30.1 ML/M^2
BH CV ECHO MEAS - SI(TEICH): 36 ML/M^2
BH CV ECHO MEAS - SV(CUBED): 122.6 ML
BH CV ECHO MEAS - SV(MOD-SP2): 59 ML
BH CV ECHO MEAS - SV(MOD-SP4): 75 ML
BH CV ECHO MEAS - SV(TEICH): 89.5 ML
BH CV ECHO MEAS - TAPSE (>1.6): 1.3 CM2
BH CV ECHO MEAS - TR MAX VEL: 225 CM/SEC
BH CV VAS BP LEFT ARM: NORMAL MMHG
BH CV XLRA - RV BASE: 4.3 CM
BH CV XLRA - RV LENGTH: 8.4 CM
BH CV XLRA - RV MID: 3.4 CM
BH CV XLRA - TDI S': 14.8 CM/SEC
E/E' RATIO: 15.3
LEFT ATRIUM VOLUME INDEX: 57.4 ML/M2
LEFT ATRIUM VOLUME: 143 CM3

## 2018-04-12 NOTE — TELEPHONE ENCOUNTER
Called and with echo results.  EF is stable and slightly improved.  Symptoms have improved however he continues to have some dizziness and nausea a couple hours after his morning meds and is concerned he is taking too many medications in the morning.  He does seem to feel better cutting the dose of clonidine.  Most systolic blood pressures are in the 120s.  I told him to the amlodipine to nighttime and try stopping the clonidine since he is re: been weaning off of this.  If systolic blood pressure consistently greater than 140 and he will need to resume the clonidine nightly. Keep follow up with Savannah ZAVALA

## 2018-04-13 ENCOUNTER — TELEPHONE (OUTPATIENT)
Dept: CARDIOLOGY | Facility: HOSPITAL | Age: 58
End: 2018-04-13

## 2018-04-13 NOTE — TELEPHONE ENCOUNTER
"Received event monitor transmission this afternoon showing that patient went into rate controlled afib around 1am last night. Called patient and he thinks he might have been awake but he was asymptomatic. No history of afib. Discussed atrial fibrillation at length including causes of afib, s/s, risks for stroke and use of anticoagulation for stroke prevention and treatment of atrial fibrillation. Recommended that he start eliquis today. He is very hesitant to do this and says he might go and get a \"second and third opinion\" on this. I encouraged him to consult with whomever he would like but to please start anticoagulation in the mean time. He says he would have to \"think about it\". Will go ahead and send prescription for Eliquis in case he would decide to start. If he does start Eliquis then advised to stop ASA since he has no CAD      "

## 2018-05-09 ENCOUNTER — OFFICE VISIT (OUTPATIENT)
Dept: ORTHOPEDIC SURGERY | Facility: CLINIC | Age: 58
End: 2018-05-09

## 2018-05-09 DIAGNOSIS — Z47.89 ORTHOPEDIC AFTERCARE: Primary | ICD-10-CM

## 2018-05-09 PROCEDURE — 99024 POSTOP FOLLOW-UP VISIT: CPT | Performed by: ORTHOPAEDIC SURGERY

## 2018-05-09 RX ORDER — ROPIVACAINE HYDROCHLORIDE 5 MG/ML
3 INJECTION, SOLUTION EPIDURAL; INFILTRATION; PERINEURAL
Status: COMPLETED | OUTPATIENT
Start: 2018-05-09 | End: 2018-05-09

## 2018-05-09 RX ORDER — ROSUVASTATIN CALCIUM 20 MG/1
TABLET, COATED ORAL
COMMUNITY
Start: 2018-05-06 | End: 2018-05-14

## 2018-05-09 RX ADMIN — ROPIVACAINE HYDROCHLORIDE 3 ML: 5 INJECTION, SOLUTION EPIDURAL; INFILTRATION; PERINEURAL at 11:35

## 2018-05-09 NOTE — PROGRESS NOTES
Procedure   Large Joint Arthrocentesis  Date/Time: 5/9/2018 11:35 AM  Consent given by: patient  Site marked: site marked  Timeout: Immediately prior to procedure a time out was called to verify the correct patient, procedure, equipment, support staff and site/side marked as required   Supporting Documentation  Indications: pain   Procedure Details  Location: knee - L knee  Preparation: Patient was prepped and draped in the usual sterile fashion  Needle size: 18 G  Approach: anterolateral  Medications administered: 3 mL ropivacaine 0.5 %  Aspirate amount: 30 mL  Aspirate: yellow and clear  Patient tolerance: patient tolerated the procedure well with no immediate complications

## 2018-05-09 NOTE — PROGRESS NOTES
[facility] [place] [facility] Note    Subjective     Chief Complaint   Patient presents with   • Post-op     8 weeks status post; Left patella tendon repair 3/13/18        HPI    Trace Escalante is a 57 y.o. male. He follows up today for his left patellar tendon repair.  He is doing well today.  Making improvements with therapy.  Ambulating with a single crutch and brace today.      Patient Active Problem List   Diagnosis   • Controlled type 2 diabetes mellitus with kidney complication, with long-term current use of insulin   • Essential hypertension   • Mixed hyperlipidemia   • Gout   • Nephrolithiasis   • Elevated brain natriuretic peptide (BNP) level   • Elevated serum creatinine   • Chronic renal insufficiency   • Acute pulmonary edema   • Obesity, Class I, BMI 30-34.9   • Chronic systolic congestive heart failure   • Nonischemic cardiomyopathy   • Rupture of left patellar tendon   • Patellar tendon rupture, left, initial encounter     Past Medical History:   Diagnosis Date   • CKD (chronic kidney disease) stage 3, GFR 30-59 ml/min    • Gout    • Hyperlipidemia    • Hypertension    • Insulin dependent diabetes mellitus    • Kidney stones    • NICM (nonischemic cardiomyopathy)    • Obesity    • ALYSSA (obstructive sleep apnea)       Past Surgical History:   Procedure Laterality Date   • CARDIAC CATHETERIZATION N/A 11/15/2017    Procedure: Coronary angiography;  Surgeon: Elke Marcum MD;  Location:  [facility] CATH INVASIVE LOCATION;  Service:    • MEDIAL COLLATERAL LIGAMENT REPAIR, KNEE Right 1978   • PATELLA TENDON REPAIR Left 3/13/2018    Procedure: PATELLA TENDON REPAIR LEFT;  Surgeon: Blake Mcbride MD;  Location: [facility];  Service: Orthopedics      Family History   Problem Relation Age of Onset   • Heart disease Mother    • Stroke Mother    • Diabetes Father    • Hypertension Father    • Heart disease Father    • Heart attack Father    • Kidney disease Brother    • Stroke Brother    • Diabetes Sister    •  Thyroid cancer Sister      Social History     Social History   • Marital status: Single     Spouse name: N/A   • Number of children: 2   • Years of education: College     Occupational History   •  [employer]     Social History Main Topics   • Smoking status: Never Smoker   • Smokeless tobacco: Never Used   • Alcohol use No   • Drug use: No   • Sexual activity: Yes     Partners: Female     Other Topics Concern   • Not on file     Social History Narrative    Caffeine use: 1-2 cups coffee or decaf tea  daily.    Patient lives at home with his daughters.           Current Outpatient Prescriptions on File Prior to Visit   Medication Sig Dispense Refill   • allopurinol (ZYLOPRIM) 300 MG tablet Take 0.5 tablets by mouth Daily. 45 tablet 1   • amLODIPine (NORVASC) 10 MG tablet Take 10 mg by mouth Daily.  1   • apixaban (ELIQUIS) 5 MG tablet tablet Take 1 tablet by mouth Every 12 (Twelve) Hours. 60 tablet 0   • apixaban (ELIQUIS) 5 MG tablet tablet Take 1 tablet by mouth 2 (Two) Times a Day. 60 tablet 1   • carvedilol (COREG) 25 MG tablet Take 1 tablet by mouth Every 12 (Twelve) Hours. 60 tablet 11   • CloNIDine (CATAPRES) 0.2 MG tablet Take 0.1 mg by mouth Every Night.     • ferrous sulfate 325 (65 FE) MG tablet Take 1 tablet by mouth 2 (Two) Times a Day With Meals. 60 tablet 1   • furosemide (LASIX) 40 MG tablet TAKE 1 TABLET BY MOUTH DAILY. MAY TAKE ADDITIONAL TABLET AS NEEDED FOR WEIGHT GAIN 3 LBS OVERNIGHT 135 tablet 3   • hydrALAZINE (APRESOLINE) 50 MG tablet Take 1 tablet by mouth Every 8 (Eight) Hours. 90 tablet 11   • Insulin Degludec (TRESIBA FLEXTOUCH SC) Inject 40 Units under the skin Every Evening.     • isosorbide mononitrate (IMDUR) 60 MG 24 hr tablet Take 1 tablet by mouth Daily. 30 tablet 11   • Multiple Vitamin (MULTI VITAMIN PO) Take 1 tablet by mouth.     • NOVOLOG FLEXPEN 100 UNIT/ML solution pen-injector sc pen Inject 5-10 Units under the skin 3 (Three) Times a Day With Meals.     • rosuvastatin  (CRESTOR) 40 MG tablet Take 1 tablet by mouth Every Night. 30 tablet 11   • spironolactone (ALDACTONE) 25 MG tablet Take 1 tablet by mouth Daily. 30 tablet 3     No current facility-administered medications on file prior to visit.       No Known Allergies     Review of Systems   Constitutional: Negative for activity change, appetite change, chills, diaphoresis, fatigue, fever and unexpected weight change.   HENT: Negative for congestion, dental problem, drooling, ear discharge, ear pain, facial swelling, hearing loss, mouth sores, nosebleeds, postnasal drip, rhinorrhea, sinus pressure, sneezing, sore throat, tinnitus, trouble swallowing and voice change.    Eyes: Negative for photophobia, pain, discharge, redness, itching and visual disturbance.   Respiratory: Negative for apnea, cough, choking, chest tightness, shortness of breath, wheezing and stridor.    Cardiovascular: Positive for leg swelling. Negative for chest pain and palpitations.   Gastrointestinal: Negative for abdominal distention, abdominal pain, anal bleeding, blood in stool, constipation, diarrhea, nausea, rectal pain and vomiting.   Endocrine: Negative for cold intolerance, heat intolerance, polydipsia, polyphagia and polyuria.   Genitourinary: Negative for decreased urine volume, difficulty urinating, dysuria, enuresis, flank pain, frequency, genital sores, hematuria and urgency.   Musculoskeletal: Positive for arthralgias. Negative for back pain, gait problem, joint swelling, myalgias, neck pain and neck stiffness.   Skin: Negative for color change, pallor, rash and wound.   Allergic/Immunologic: Negative for environmental allergies, food allergies and immunocompromised state.   Neurological: Negative for dizziness, tremors, seizures, syncope, facial asymmetry, speech difficulty, weakness, light-headedness, numbness and headaches.   Hematological: Negative for adenopathy. Does not bruise/bleed easily.   Psychiatric/Behavioral: Negative for  agitation, behavioral problems, confusion, decreased concentration, dysphoric mood, hallucinations, self-injury, sleep disturbance and suicidal ideas. The patient is not nervous/anxious and is not hyperactive.         Objective      Physical Exam  There were no vitals taken for this visit.    There is no height or weight on file to calculate BMI.    General:   Mental Status:  Alert   Appearance: Cooperative, in no acute distress   Build and Nutrition: Well-nourished and well developed male   Orientation: Alert and oriented to person, place and time   Posture: Normal   Gait: With a single crutch    Integument:   Left knee: Wound is well-healed with no signs of infection    Lower Extremities:   Left Knee:    Tenderness:  None    Effusion:  2+    Swelling: None    Crepitus:  None    Range of motion:  Extension: 0°       Flexion: 90°  Instability:  No varus laxity, no valgus laxity, negative anterior drawer  Straight leg raise is intact  Deformities:  None          Assessment and Plan     Trace was seen today for post-op.    Diagnoses and all orders for this visit:    Orthopedic aftercare        I reviewed my findings with patient today.  His left knee is doing well, and he is making improvements.  He does have a significant effusion, and I offered him an aspiration today.  This may help with his rehabilitation.  I will see him back in 4 weeks, with a 3 view knee series x-ray on that visit.  I will see him back sooner for any problems.    He may return to light duty, with sedentary work on 5/14/2018, and a work note was provided today.    Of note, I did aspirate 30 cc of clear straw-colored fluid off of the knee, and then injected it with ropivacaine, with 75% pain relief just a few minutes following the injection.    Return in about 4 weeks (around 6/6/2018).      Medical Decision Making  Management Options : prescription/IM medicine      Blake Mcbride MD  05/09/18  11:26 AM

## 2018-05-10 DIAGNOSIS — M10.9 GOUT, UNSPECIFIED CAUSE, UNSPECIFIED CHRONICITY, UNSPECIFIED SITE: ICD-10-CM

## 2018-05-14 ENCOUNTER — OFFICE VISIT (OUTPATIENT)
Dept: FAMILY MEDICINE CLINIC | Facility: CLINIC | Age: 58
End: 2018-05-14

## 2018-05-14 VITALS
RESPIRATION RATE: 16 BRPM | SYSTOLIC BLOOD PRESSURE: 124 MMHG | BODY MASS INDEX: 35.93 KG/M2 | WEIGHT: 289 LBS | TEMPERATURE: 98.9 F | DIASTOLIC BLOOD PRESSURE: 70 MMHG | HEART RATE: 68 BPM | OXYGEN SATURATION: 99 % | HEIGHT: 75 IN

## 2018-05-14 DIAGNOSIS — M10.9 ACUTE GOUT OF RIGHT FOOT, UNSPECIFIED CAUSE: Primary | ICD-10-CM

## 2018-05-14 DIAGNOSIS — I10 ESSENTIAL HYPERTENSION: Chronic | ICD-10-CM

## 2018-05-14 PROCEDURE — 99213 OFFICE O/P EST LOW 20 MIN: CPT | Performed by: FAMILY MEDICINE

## 2018-05-14 RX ORDER — ALLOPURINOL 300 MG/1
TABLET ORAL
Qty: 45 TABLET | Refills: 1 | Status: SHIPPED | OUTPATIENT
Start: 2018-05-14 | End: 2018-11-09 | Stop reason: SDUPTHER

## 2018-05-14 RX ORDER — PREDNISONE 20 MG/1
40 TABLET ORAL DAILY
Qty: 10 TABLET | Refills: 0 | Status: SHIPPED | OUTPATIENT
Start: 2018-05-14 | End: 2018-05-18

## 2018-05-14 NOTE — PROGRESS NOTES
"  Assessment/Plan       Problems Addressed this Visit        Cardiovascular and Mediastinum    Essential hypertension (Chronic)    Relevant Orders    Blood Pressure Device       Other    Gout - Primary    Relevant Medications    predniSONE (DELTASONE) 20 MG tablet      Other Visit Diagnoses    None.           Follow up: Return if symptoms worsen or fail to improve.     DISCUSSION  Gout.  Right great toe.  Prednisone for 3-5 day course.  Explained risk of increasing sugars.  Call or follow-up if not improving.      Hypertension.  He needs a blood pressure cuff.  Continue to monitor blood pressure.          MEDICATIONS PRESCRIBED  Requested Prescriptions     Signed Prescriptions Disp Refills   • predniSONE (DELTASONE) 20 MG tablet 10 tablet 0     Sig: Take 2 tablets by mouth Daily.          -------------------------------------------    Subjective     Chief Complaint   Patient presents with   • Gout     right foot         History of Present Illness    Right foot  Increased pain right foot on Sat and worsened  Some better  No meds for it now  (motrin as needed)      Previous dx gout    Had sl increased uric acid level in the past    Nephrology gave allopurinol 300 mg 1/2 po daily     Was to return to work today    Getting P T now for surg for left patellar tendon    Patient is a new blood pressure cuff.  Has history of hypertension.        History   Smoking Status   • Never Smoker   Smokeless Tobacco   • Never Used        Past Medical History,Medications, Allergies, and social history was reviewed.      Review of Systems   Constitutional: Negative.    HENT: Negative.    Respiratory: Negative.    Cardiovascular: Negative.    Musculoskeletal: Positive for arthralgias.       Objective     Vitals:    05/14/18 1120   BP: 124/70   Pulse: 68   Resp: 16   Temp: 98.9 °F (37.2 °C)   TempSrc: Temporal Artery    SpO2: 99%   Weight: 131 kg (289 lb)   Height: 191 cm (75.2\")          Physical Exam   Constitutional: He is oriented to " person, place, and time. He appears well-developed and well-nourished.   HENT:   Head: Normocephalic and atraumatic.   Right Ear: External ear normal.   Left Ear: External ear normal.   Eyes: EOM are normal. Pupils are equal, round, and reactive to light.   Pulmonary/Chest: Effort normal.   Musculoskeletal:   Tenderness of the right great toe and first joint.  No significant redness at this time.  Mild warmth.  Tenderness to light palpation.   Neurological: He is alert and oriented to person, place, and time.   Skin: Skin is warm and dry.   Psychiatric: He has a normal mood and affect. His behavior is normal.   Nursing note and vitals reviewed.                Jose Ramon Allen MD

## 2018-05-18 ENCOUNTER — HOSPITAL ENCOUNTER (OUTPATIENT)
Dept: CARDIOLOGY | Facility: HOSPITAL | Age: 58
Discharge: HOME | End: 2018-05-18
Admitting: NURSE PRACTITIONER

## 2018-05-18 ENCOUNTER — OFFICE VISIT (OUTPATIENT)
Dept: CARDIOLOGY | Facility: HOSPITAL | Age: 58
End: 2018-05-18

## 2018-05-18 VITALS
BODY MASS INDEX: 34.71 KG/M2 | RESPIRATION RATE: 18 BRPM | SYSTOLIC BLOOD PRESSURE: 122 MMHG | HEART RATE: 48 BPM | TEMPERATURE: 98 F | DIASTOLIC BLOOD PRESSURE: 62 MMHG | HEIGHT: 75 IN | OXYGEN SATURATION: 99 % | WEIGHT: 279.2 LBS

## 2018-05-18 DIAGNOSIS — E11.22 CONTROLLED TYPE 2 DIABETES MELLITUS WITH STAGE 3 CHRONIC KIDNEY DISEASE, WITH LONG-TERM CURRENT USE OF INSULIN (HCC): ICD-10-CM

## 2018-05-18 DIAGNOSIS — N18.30 CHRONIC RENAL IMPAIRMENT, STAGE 3 (MODERATE) (HCC): ICD-10-CM

## 2018-05-18 DIAGNOSIS — I42.8 NONISCHEMIC CARDIOMYOPATHY (HCC): ICD-10-CM

## 2018-05-18 DIAGNOSIS — R00.1 BRADYCARDIA: Primary | ICD-10-CM

## 2018-05-18 DIAGNOSIS — Z79.4 CONTROLLED TYPE 2 DIABETES MELLITUS WITH STAGE 3 CHRONIC KIDNEY DISEASE, WITH LONG-TERM CURRENT USE OF INSULIN (HCC): ICD-10-CM

## 2018-05-18 DIAGNOSIS — R00.1 BRADYCARDIA: ICD-10-CM

## 2018-05-18 DIAGNOSIS — N18.30 CONTROLLED TYPE 2 DIABETES MELLITUS WITH STAGE 3 CHRONIC KIDNEY DISEASE, WITH LONG-TERM CURRENT USE OF INSULIN (HCC): ICD-10-CM

## 2018-05-18 DIAGNOSIS — I48.0 PAROXYSMAL ATRIAL FIBRILLATION (HCC): ICD-10-CM

## 2018-05-18 PROCEDURE — 99214 OFFICE O/P EST MOD 30 MIN: CPT | Performed by: NURSE PRACTITIONER

## 2018-05-18 PROCEDURE — 93010 ELECTROCARDIOGRAM REPORT: CPT | Performed by: INTERNAL MEDICINE

## 2018-05-18 PROCEDURE — 93005 ELECTROCARDIOGRAM TRACING: CPT | Performed by: NURSE PRACTITIONER

## 2018-05-18 RX ORDER — CARVEDILOL 12.5 MG/1
12.5 TABLET ORAL 2 TIMES DAILY
Qty: 60 TABLET | Refills: 2 | Status: SHIPPED | OUTPATIENT
Start: 2018-05-18 | End: 2018-11-29 | Stop reason: SDUPTHER

## 2018-05-18 NOTE — PROGRESS NOTES
Subjective:     Encounter Date:05/18/2018      Patient ID: Trace Escalante is a 57 y.o. male.    Chief Complaint: follow up monitor placed for prior c/o syncope / PAF    History of Present Illness:  Mr. Escalante has followed at HF clinic since new diagnosis of systolic heart failure/ hospitalization 11/2017.  No obstructive CAD per cath.  PMH also inclused CKD stage III, diabetes, and ALYSSA (compliant with CPAP).  He was doing well until he injured his left knee requiring surgical repair. Soon after, he called the [facility] to report unprovoked syncope.  Event monitor was placed.  PAF was noted early on, but he declined to start/ change meds until he came back for data review.       Past Medical History:   Diagnosis Date   • CKD (chronic kidney disease) stage 3, GFR 30-59 ml/min    • Gout    • Hyperlipidemia    • Hypertension    • Insulin dependent diabetes mellitus    • Kidney stones    • NICM (nonischemic cardiomyopathy)    • Obesity    • ALYSSA (obstructive sleep apnea)        Past Surgical History:   Procedure Laterality Date   • CARDIAC CATHETERIZATION N/A 11/15/2017    Procedure: Coronary angiography;  Surgeon: Elke Marcum MD;  Location: [facility] CATH INVASIVE LOCATION;  Service:    • MEDIAL COLLATERAL LIGAMENT REPAIR, KNEE Right 1978   • PATELLA TENDON REPAIR Left 3/13/2018    Procedure: PATELLA TENDON REPAIR LEFT;  Surgeon: Blake Mcbride MD;  Location: [facility] OR;  Service: Orthopedics       Social History     Social History   • Marital status: Single     Spouse name: N/A   • Number of children: 2   • Years of education: College     Occupational History   •  [email or web address]     Social History Main Topics   • Smoking status: Never Smoker   • Smokeless tobacco: Never Used   • Alcohol use No   • Drug use: No   • Sexual activity: Yes     Partners: Female     Other Topics Concern   • Not on file     Social History Narrative    Caffeine use: 1 cups coffee 1 cup decaf tea  daily.    Patient lives at home with his  daughters.            Family History   Problem Relation Age of Onset   • Heart disease Mother    • Stroke Mother    • Diabetes Father    • Hypertension Father    • Heart disease Father    • Heart attack Father    • Kidney disease Brother    • Stroke Brother    • Diabetes Sister    • Thyroid cancer Sister        Current Outpatient Prescriptions:   •  allopurinol (ZYLOPRIM) 300 MG tablet, TAKE 1/2 A TABLET BY MOUTH EVERY DAY, Disp: 45 tablet, Rfl: 1  •  amLODIPine (NORVASC) 10 MG tablet, Take 10 mg by mouth Daily., Disp: , Rfl: 1  •  carvedilol (COREG) 25 MG tablet, Take 1 tablet by mouth Every 12 (Twelve) Hours., Disp: 60 tablet, Rfl: 11  •  ferrous sulfate 325 (65 FE) MG tablet, Take 1 tablet by mouth 2 (Two) Times a Day With Meals., Disp: 60 tablet, Rfl: 1  •  furosemide (LASIX) 40 MG tablet, TAKE 1 TABLET BY MOUTH DAILY. MAY TAKE ADDITIONAL TABLET AS NEEDED FOR WEIGHT GAIN 3 LBS OVERNIGHT, Disp: 135 tablet, Rfl: 3  •  hydrALAZINE (APRESOLINE) 50 MG tablet, Take 1 tablet by mouth Every 8 (Eight) Hours., Disp: 90 tablet, Rfl: 11  •  Insulin Degludec (TRESIBA FLEXTOUCH SC), Inject 40 Units under the skin Every Evening., Disp: , Rfl:   •  isosorbide mononitrate (IMDUR) 60 MG 24 hr tablet, Take 1 tablet by mouth Daily., Disp: 30 tablet, Rfl: 11  •  Multiple Vitamin (MULTI VITAMIN PO), Take 1 tablet by mouth., Disp: , Rfl:   •  NOVOLOG FLEXPEN 100 UNIT/ML solution pen-injector sc pen, Inject 5-10 Units under the skin 3 (Three) Times a Day With Meals., Disp: , Rfl:   •  ONE TOUCH ULTRA TEST test strip, TEST 3X DAILY, Disp: , Rfl: 0  •  rosuvastatin (CRESTOR) 40 MG tablet, Take 1 tablet by mouth Every Night., Disp: 30 tablet, Rfl: 11  •  spironolactone (ALDACTONE) 25 MG tablet, Take 1 tablet by mouth Daily., Disp: 30 tablet, Rfl: 3    Review of Systems   Constitution: Negative for chills, decreased appetite, diaphoresis, fever, weakness, malaise/fatigue, night sweats, weight gain and weight loss.   HENT: Negative for  "congestion and nosebleeds.    Eyes: Negative for blurred vision, visual disturbance and visual halos.   Cardiovascular: Positive for leg swelling. Negative for chest pain, claudication, cyanosis, dyspnea on exertion, irregular heartbeat, near-syncope, orthopnea, palpitations, paroxysmal nocturnal dyspnea and syncope.   Respiratory: Negative for cough, hemoptysis, shortness of breath, sleep disturbances due to breathing, snoring, sputum production and wheezing.    Endocrine: Negative for cold intolerance, heat intolerance, polydipsia, polyphagia and polyuria.   Hematologic/Lymphatic: Does not bruise/bleed easily.   Skin: Negative for dry skin, itching and rash.   Musculoskeletal: Positive for gout. Negative for falls, joint pain, joint swelling, muscle weakness and myalgias.   Gastrointestinal: Negative for bloating, abdominal pain, constipation, diarrhea, dysphagia, heartburn, melena, nausea and vomiting.   Genitourinary: Negative for dysuria, flank pain, hematuria and nocturia.   Neurological: Negative for difficulty with concentration, excessive daytime sleepiness, dizziness, headaches and loss of balance.   Psychiatric/Behavioral: Negative for altered mental status and depression. The patient is not nervous/anxious.    Allergic/Immunologic: Negative for environmental allergies.     Vitals:    05/18/18 1110 05/18/18 1115 05/18/18 1117   BP: 122/65 151/76 122/62   BP Location: Right arm Left arm Left arm   Patient Position: Sitting Sitting Sitting   Pulse: (!) 35 (!) 36 (!) 48   Resp: 18     Temp: 98 °F (36.7 °C)     TempSrc: Temporal Artery      SpO2: 99%     Weight: 127 kg (279 lb 3.2 oz)     Height: 190.5 cm (75\")         Objective:     Physical Exam   Constitutional: He is oriented to person, place, and time. He appears well-developed and well-nourished. No distress.   HENT:   Head: Normocephalic and atraumatic.   Eyes: Conjunctivae are normal. Pupils are equal, round, and reactive to light. No scleral icterus. "   Neck: Neck supple. No JVD present.   Cardiovascular: Normal rate, regular rhythm, normal heart sounds and intact distal pulses.    No murmur heard.  Pulmonary/Chest: Effort normal and breath sounds normal. No respiratory distress. He has no wheezes. He has no rales. He exhibits no tenderness.   Musculoskeletal: He exhibits no edema.   Left knee sleeve brace   Neurological: He is alert and oriented to person, place, and time. No cranial nerve deficit.   Skin: Skin is warm and dry.   Psychiatric: He has a normal mood and affect. His behavior is normal.       Lab Review: 30 day event monitor end of service report.       Assessment/ Plan:       Diagnosis Plan   1. Bradycardia  ECG 12 Lead today has Sinus rhythm with PVC's @ 63 BPM.  Pulse with vitals as noted above.  Reduce Coreg to 12.5 mg BID.  Keep close check on BP as we do not want him to become hypertensive.   2. Paroxysmal atrial fibrillation  CHADS VASC =3 (male gender, HTN, HF, diabetes).  Long discussion re: afib in general and anti-coagulation choices. 2% Afib burden per event monitor.  Trial of Eliquis 5 mg BID with plans for BMP/ CBC in 2 weeks. Telephone review.  Reviewed s/sx to report.  If tolerated, may follow up with Dr. De La Fuente in July but if new problems or symptoms develop, call C.  Discontinue ASA.     3. Nonischemic cardiomyopathy  Echo recently updated 4/2018.  LVEF 41-45%.     4. Controlled type 2 diabetes mellitus with stage 3 chronic kidney disease, with long-term current use of insulin  Diabetes control much improved.  Most recent HgA1C 7.5%.   5. Chronic renal impairment, stage 3 (moderate)  Creatine 1.8.  Follows with Dr. Juarez.  Check renal function in 2 weeks.

## 2018-05-30 ENCOUNTER — LAB (OUTPATIENT)
Dept: LAB | Facility: HOSPITAL | Age: 58
End: 2018-05-30

## 2018-05-30 DIAGNOSIS — I48.0 PAROXYSMAL ATRIAL FIBRILLATION (HCC): ICD-10-CM

## 2018-05-30 LAB
ANION GAP SERPL CALCULATED.3IONS-SCNC: 9 MMOL/L (ref 3–11)
BUN BLD-MCNC: 25 MG/DL (ref 9–23)
BUN/CREAT SERPL: 13.2 (ref 7–25)
CALCIUM SPEC-SCNC: 8.9 MG/DL (ref 8.7–10.4)
CHLORIDE SERPL-SCNC: 104 MMOL/L (ref 99–109)
CO2 SERPL-SCNC: 27 MMOL/L (ref 20–31)
CREAT BLD-MCNC: 1.9 MG/DL (ref 0.6–1.3)
DEPRECATED RDW RBC AUTO: 47.9 FL (ref 37–54)
ERYTHROCYTE [DISTWIDTH] IN BLOOD BY AUTOMATED COUNT: 15 % (ref 11.3–14.5)
GFR SERPL CREATININE-BSD FRML MDRD: 45 ML/MIN/1.73
GLUCOSE BLD-MCNC: 107 MG/DL (ref 70–100)
HCT VFR BLD AUTO: 32.8 % (ref 38.9–50.9)
HGB BLD-MCNC: 10.5 G/DL (ref 13.1–17.5)
MCH RBC QN AUTO: 28.1 PG (ref 27–31)
MCHC RBC AUTO-ENTMCNC: 32 G/DL (ref 32–36)
MCV RBC AUTO: 87.7 FL (ref 80–99)
PLATELET # BLD AUTO: 253 10*3/MM3 (ref 150–450)
PMV BLD AUTO: 11.7 FL (ref 6–12)
POTASSIUM BLD-SCNC: 4.3 MMOL/L (ref 3.5–5.5)
RBC # BLD AUTO: 3.74 10*6/MM3 (ref 4.2–5.76)
SODIUM BLD-SCNC: 140 MMOL/L (ref 132–146)
WBC NRBC COR # BLD: 6.02 10*3/MM3 (ref 3.5–10.8)

## 2018-05-30 PROCEDURE — 36415 COLL VENOUS BLD VENIPUNCTURE: CPT

## 2018-05-30 PROCEDURE — 85027 COMPLETE CBC AUTOMATED: CPT

## 2018-05-30 PROCEDURE — 80048 BASIC METABOLIC PNL TOTAL CA: CPT

## 2018-05-31 ENCOUNTER — TELEPHONE (OUTPATIENT)
Dept: CARDIOLOGY | Facility: HOSPITAL | Age: 58
End: 2018-05-31

## 2018-05-31 NOTE — TELEPHONE ENCOUNTER
Spoke with patient to review labs.  Advised that H/H is stable since Eliquis started.  He reports no bleeding or bruising concerns.  Renal function is not perfect but stable as well.  Creatine is 1.9 with GFR 45 (Gfr has been 40-47 over the past 4 months).  He continues to follow with Dr. Juarez.  Advised to keep follow up with Dr. De La Fuente as scheduled for July then follow up here at the HF clinic PRN.  Also, discussed watching for/ reporting further episodes of syncope/ near syncope.  Hopefully the reduction of coreg to 12.5 mg BID will allow BP to be well controlled by avoid bradycardia episodes as well.

## 2018-06-06 ENCOUNTER — OFFICE VISIT (OUTPATIENT)
Dept: ORTHOPEDIC SURGERY | Facility: CLINIC | Age: 58
End: 2018-06-06

## 2018-06-06 DIAGNOSIS — Z47.89 ORTHOPEDIC AFTERCARE: ICD-10-CM

## 2018-06-06 PROCEDURE — 99024 POSTOP FOLLOW-UP VISIT: CPT | Performed by: ORTHOPAEDIC SURGERY

## 2018-06-06 NOTE — PROGRESS NOTES
[facility] [place] [facility] Clinic Note    Subjective     Chief Complaint   Patient presents with   • Post-op     4 weeks - 3 month s/p Left patella tendon repair 3/13/18        HPI    Trace Escalante is a 57 y.o. male. He follows up today for his left patellar tendon repair.  He's doing well today.  Fully ambulatory without external aids.  Only mild pain in the knee, associated with swelling and stiffness.  He is continuing with physical therapy.      Patient Active Problem List   Diagnosis   • Controlled type 2 diabetes mellitus with kidney complication, with long-term current use of insulin   • Essential hypertension   • Mixed hyperlipidemia   • Gout   • Nephrolithiasis   • Elevated brain natriuretic peptide (BNP) level   • Elevated serum creatinine   • Chronic renal insufficiency   • Acute pulmonary edema   • Obesity, Class I, BMI 30-34.9   • Chronic systolic congestive heart failure   • Nonischemic cardiomyopathy   • Rupture of left patellar tendon   • Patellar tendon rupture, left, initial encounter   • Paroxysmal atrial fibrillation     Past Medical History:   Diagnosis Date   • CKD (chronic kidney disease) stage 3, GFR 30-59 ml/min    • Gout    • Hyperlipidemia    • Hypertension    • Insulin dependent diabetes mellitus    • Kidney stones    • NICM (nonischemic cardiomyopathy)    • Obesity    • ALYSSA (obstructive sleep apnea)       Past Surgical History:   Procedure Laterality Date   • CARDIAC CATHETERIZATION N/A 11/15/2017    Procedure: Coronary angiography;  Surgeon: Elke Marcum MD;  Location:  [facility] CATH INVASIVE LOCATION;  Service:    • KNEE SURGERY Left     Patellar tendon repair - [facility]   • MEDIAL COLLATERAL LIGAMENT REPAIR, KNEE Right 1978   • PATELLA TENDON REPAIR Left 3/13/2018    Procedure: PATELLA TENDON REPAIR LEFT;  Surgeon: Blake Mcbride MD;  Location:  [facility] OR;  Service: Orthopedics      Family History   Problem Relation Age of Onset   • Heart disease Mother    • Stroke Mother    • Diabetes  Father    • Hypertension Father    • Heart disease Father    • Heart attack Father    • Kidney disease Brother    • Stroke Brother    • Diabetes Sister    • Thyroid cancer Sister      Social History     Social History   • Marital status: Single     Spouse name: N/A   • Number of children: 2   • Years of education: College     Occupational History   •  [employer]     Social History Main Topics   • Smoking status: Never Smoker   • Smokeless tobacco: Never Used   • Alcohol use No   • Drug use: No   • Sexual activity: Yes     Partners: Female     Other Topics Concern   • Not on file     Social History Narrative    Caffeine use: 1 cups coffee 1 cup decaf tea  daily.    Patient lives at home with his daughters.           Current Outpatient Prescriptions on File Prior to Visit   Medication Sig Dispense Refill   • allopurinol (ZYLOPRIM) 300 MG tablet TAKE 1/2 A TABLET BY MOUTH EVERY DAY 45 tablet 1   • amLODIPine (NORVASC) 10 MG tablet Take 10 mg by mouth Daily.  1   • apixaban (ELIQUIS) 5 MG tablet tablet Take 1 tablet by mouth Every 12 (Twelve) Hours. 60 tablet 0   • carvedilol (COREG) 12.5 MG tablet Take 1 tablet by mouth 2 (Two) Times a Day. 60 tablet 2   • ferrous sulfate 325 (65 FE) MG tablet Take 1 tablet by mouth 2 (Two) Times a Day With Meals. 60 tablet 1   • furosemide (LASIX) 40 MG tablet TAKE 1 TABLET BY MOUTH DAILY. MAY TAKE ADDITIONAL TABLET AS NEEDED FOR WEIGHT GAIN 3 LBS OVERNIGHT 135 tablet 3   • hydrALAZINE (APRESOLINE) 50 MG tablet Take 1 tablet by mouth Every 8 (Eight) Hours. 90 tablet 11   • Insulin Degludec (TRESIBA FLEXTOUCH SC) Inject 40 Units under the skin Every Evening.     • isosorbide mononitrate (IMDUR) 60 MG 24 hr tablet Take 1 tablet by mouth Daily. 30 tablet 11   • Multiple Vitamin (MULTI VITAMIN PO) Take 1 tablet by mouth.     • NOVOLOG FLEXPEN 100 UNIT/ML solution pen-injector sc pen Inject 5-10 Units under the skin 3 (Three) Times a Day With Meals.     • ONE TOUCH ULTRA TEST test  strip TEST 3X DAILY  0   • rosuvastatin (CRESTOR) 40 MG tablet Take 1 tablet by mouth Every Night. 30 tablet 11   • spironolactone (ALDACTONE) 25 MG tablet Take 1 tablet by mouth Daily. 30 tablet 3     No current facility-administered medications on file prior to visit.       No Known Allergies     Review of Systems   Constitutional: Positive for activity change. Negative for appetite change, chills, diaphoresis, fatigue, fever and unexpected weight change.   HENT: Negative for congestion, dental problem, drooling, ear discharge, ear pain, facial swelling, hearing loss, mouth sores, nosebleeds, postnasal drip, rhinorrhea, sinus pressure, sneezing, sore throat, tinnitus, trouble swallowing and voice change.    Eyes: Negative for photophobia, pain, discharge, redness, itching and visual disturbance.   Respiratory: Positive for apnea. Negative for cough, choking, chest tightness, shortness of breath, wheezing and stridor.    Cardiovascular: Positive for leg swelling. Negative for chest pain and palpitations.   Gastrointestinal: Negative for abdominal distention, abdominal pain, anal bleeding, blood in stool, constipation, diarrhea, nausea, rectal pain and vomiting.   Endocrine: Positive for cold intolerance. Negative for heat intolerance, polydipsia, polyphagia and polyuria.   Genitourinary: Negative for decreased urine volume, difficulty urinating, dysuria, enuresis, flank pain, frequency, genital sores, hematuria and urgency.   Musculoskeletal: Positive for arthralgias and joint swelling. Negative for back pain, gait problem, myalgias, neck pain and neck stiffness.   Skin: Negative for color change, pallor, rash and wound.   Allergic/Immunologic: Negative for environmental allergies, food allergies and immunocompromised state.   Neurological: Negative for dizziness, tremors, seizures, syncope, facial asymmetry, speech difficulty, weakness, light-headedness, numbness and headaches.   Hematological: Negative for  adenopathy. Does not bruise/bleed easily.   Psychiatric/Behavioral: Negative for agitation, behavioral problems, confusion, decreased concentration, dysphoric mood, hallucinations, self-injury, sleep disturbance and suicidal ideas. The patient is not nervous/anxious and is not hyperactive.         Objective      Physical Exam  There were no vitals taken for this visit.    There is no height or weight on file to calculate BMI.    General:   Mental Status:  Alert   Appearance: Cooperative, in no acute distress   Build and Nutrition: Overweight male   Orientation: Alert and oriented to person, place and time   Posture: Normal   Gait: Normal    Integument:   Left knee: Wound is well-healed with no signs of infection    Lower Extremities:   Left Knee:    Tenderness:  None    Effusion:  1+    Swelling: On the anterior aspect of the knee    Range of motion:  Extension: 0°       Flexion: 120°  Instability:  No varus laxity, no valgus laxity, negative anterior drawer  Deformities:  None        Imaging/Studies      Imaging Results (last 24 hours)     Procedure Component Value Units Date/Time    XR Knee 3+ View With Ladora Left [[id number]] Resulted:  06/06/18 1652     Updated:  06/06/18 1653    Narrative:       Left Knee Radiographs  Indication: left knee pain  Views: AP, lateral, and sunrise views of the left knee    Comparison: 4/11/2018    Findings:   Patella position is stable, with no acute bony abnormalities, with good   alignment.  Patellofemoral degeneration is appreciated.  Squaring of the   medial femoral condyle, with signs of degeneration in the knee.          Assessment and Plan     Trace was seen today for post-op.    Diagnoses and all orders for this visit:    Orthopedic aftercare  -     XR Knee 3+ View With Ladora Left        I reviewed my findings with patient today.  His patellar tendon repair appears to be stable, and he has a strong straight leg raise, and is walking well.  I will see him back in 2  months, for what may be a final checkup.  I will see him back sooner for any problems.    Return in about 2 months (around 8/6/2018).      Medical Decision Making  Management Options : physical/occupational therapy  Data/Risk: radiology tests and independent visualization of imaging, lab tests, or EMG/NCV      Blake Mcbride MD  06/06/18  5:48 PM

## 2018-07-16 ENCOUNTER — OFFICE VISIT (OUTPATIENT)
Dept: CARDIOLOGY | Facility: CLINIC | Age: 58
End: 2018-07-16

## 2018-07-16 VITALS
HEART RATE: 80 BPM | SYSTOLIC BLOOD PRESSURE: 138 MMHG | HEIGHT: 75 IN | WEIGHT: 288.6 LBS | BODY MASS INDEX: 35.88 KG/M2 | DIASTOLIC BLOOD PRESSURE: 78 MMHG

## 2018-07-16 DIAGNOSIS — I50.22 CHRONIC SYSTOLIC CONGESTIVE HEART FAILURE (HCC): Primary | ICD-10-CM

## 2018-07-16 DIAGNOSIS — I10 ESSENTIAL HYPERTENSION: Chronic | ICD-10-CM

## 2018-07-16 DIAGNOSIS — E78.2 MIXED HYPERLIPIDEMIA: Chronic | ICD-10-CM

## 2018-07-16 DIAGNOSIS — I48.0 PAROXYSMAL ATRIAL FIBRILLATION (HCC): ICD-10-CM

## 2018-07-16 PROCEDURE — 99214 OFFICE O/P EST MOD 30 MIN: CPT | Performed by: INTERNAL MEDICINE

## 2018-07-16 NOTE — PROGRESS NOTES
Subjective:     Encounter Date:07/16/2018      Patient ID: Trace Escalante is a 57 y.o. male.    PCP: Jose Ramon Allen MD    Chief Complaint: Atrial Fibrillation and Hypertension    PROBLEM LIST:  1. Congestive heart failure:   a. BHL (11/12/2017):  with CCS class II chest discomfort/NYHA class III dyspnea.   b. Echocardiogram (11/13/2017): LVEF 40%.   c. Cardiac catheterization: Normal coronary arteries.   d. Echocardiogram (04/11/2018): LVEF 41-45%. Cardiac valves normal.   2. Atrial fibrillation:   a. Cardiac event monitor, 30-days (05/11/2018): 2% a-fib, 2 episodes of 5 beat NSVT.  3. Hypertension.   4. Hyperlipidemia.   5. Type 2 diabetes mellitus; uncontrolled, hemoglobin A1c was 8.3%.   6. Obstructive sleep apnea; compliant with CPAP.   7. Moderate obesity; BMI 38.   8. Chronic kidney disease; stage III, baseline creatinine 2.0.   9. Murmur.   10. Hypokalemia.   11. Nephrolithiasis.   12. Gout.   13. Surgical history:   a. MCL repair, right (1978).  b. Left patella tendon repair (2018).    History of Present Illness  Trace Escalante presents today for a 6 month follow-up with a history of CHF and cardiac risk factors. Since last visit, patient has not experienced any sensations that he can attribute to his heart going out of rhythm. The patient explains his reluctance to begin a blood thinner because of a past event of his brother going into a coma and passing away after a car accident, during which his brother was taking a blood thinner. The patient expresses his honest fear of something similar happening to him. He wonders how essential it is for him to take a blood thinner, the risks associated with taking a blood thinner, and any other possible options. Denies chest pain, shortness of breath, edema, palpitations, and syncope. Remains busy and active by exercising at the gym and attending physical therapy twice a week. The patient has been making a conscious effort to lose weight. been working  on losing weight, with he has been seeing success.     No Known Allergies      Current Outpatient Prescriptions:   •  allopurinol (ZYLOPRIM) 300 MG tablet, TAKE 1/2 A TABLET BY MOUTH EVERY DAY, Disp: 45 tablet, Rfl: 1  •  amLODIPine (NORVASC) 10 MG tablet, Take 10 mg by mouth Daily., Disp: , Rfl: 1  •  apixaban (ELIQUIS) 5 MG tablet tablet, Take 1 tablet by mouth Every 12 (Twelve) Hours., Disp: 60 tablet, Rfl: 0  •  carvedilol (COREG) 12.5 MG tablet, Take 1 tablet by mouth 2 (Two) Times a Day., Disp: 60 tablet, Rfl: 2  •  ferrous sulfate 325 (65 FE) MG tablet, Take 1 tablet by mouth 2 (Two) Times a Day With Meals., Disp: 60 tablet, Rfl: 1  •  furosemide (LASIX) 40 MG tablet, TAKE 1 TABLET BY MOUTH DAILY. MAY TAKE ADDITIONAL TABLET AS NEEDED FOR WEIGHT GAIN 3 LBS OVERNIGHT, Disp: 135 tablet, Rfl: 3  •  hydrALAZINE (APRESOLINE) 50 MG tablet, Take 1 tablet by mouth Every 8 (Eight) Hours., Disp: 90 tablet, Rfl: 11  •  Insulin Degludec (TRESIBA FLEXTOUCH SC), Inject 40 Units under the skin Every Evening., Disp: , Rfl:   •  isosorbide mononitrate (IMDUR) 60 MG 24 hr tablet, Take 1 tablet by mouth Daily., Disp: 30 tablet, Rfl: 11  •  Multiple Vitamin (MULTI VITAMIN PO), Take 1 tablet by mouth., Disp: , Rfl:   •  NOVOLOG FLEXPEN 100 UNIT/ML solution pen-injector sc pen, Inject 5-10 Units under the skin 3 (Three) Times a Day With Meals., Disp: , Rfl:   •  ONE TOUCH ULTRA TEST test strip, TEST 3X DAILY, Disp: , Rfl: 0  •  rosuvastatin (CRESTOR) 40 MG tablet, Take 1 tablet by mouth Every Night., Disp: 30 tablet, Rfl: 11  •  spironolactone (ALDACTONE) 25 MG tablet, Take 1 tablet by mouth Daily., Disp: 30 tablet, Rfl: 3    The following portions of the patient's history were reviewed and updated as appropriate: allergies, current medications, past family history, past medical history, past social history, past surgical history and problem list.    Review of Systems   Constitution: Negative.   Cardiovascular: Positive for leg  "swelling.   Respiratory: Negative.    Hematologic/Lymphatic: Negative for bleeding problem. Does not bruise/bleed easily.   Skin: Negative for rash.   Musculoskeletal: Negative for muscle weakness and myalgias.   Gastrointestinal: Negative for heartburn, nausea and vomiting.   Neurological: Negative.      Objective:     Vitals:    07/16/18 1553 07/16/18 1622   BP: 160/94 138/78   BP Location: Left arm Right arm   Patient Position: Sitting Sitting   Pulse: 80    Weight: 131 kg (288 lb 9.6 oz)    Height: 190.5 cm (75\")        Physical Exam   Constitutional: He is oriented to person, place, and time. He appears well-developed and well-nourished.   HENT:   Mouth/Throat: Oropharynx is clear and moist.   Neck: No JVD present. Carotid bruit is not present. No thyromegaly present.   Cardiovascular: Regular rhythm, S1 normal, S2 normal, normal heart sounds and intact distal pulses.  Exam reveals no gallop, no S3 and no S4.    No murmur heard.  Pulses:       Carotid pulses are 2+ on the right side, and 2+ on the left side.       Radial pulses are 2+ on the right side, and 2+ on the left side.   Pulmonary/Chest: Breath sounds normal.   Abdominal: Soft. Bowel sounds are normal. He exhibits no mass. There is no tenderness.   Musculoskeletal: He exhibits no edema.   Neurological: He is alert and oriented to person, place, and time.   Skin: Skin is warm and dry. No rash noted.     Lab Review:  Lab Results   Component Value Date    GLUCOSE 107 (H) 05/30/2018    CALCIUM 8.9 05/30/2018     05/30/2018    K 4.3 05/30/2018    CO2 27.0 05/30/2018     05/30/2018    BUN 25 (H) 05/30/2018    CREATININE 1.90 (H) 05/30/2018    EGFRIFAFRI 45 (L) 05/30/2018    EGFRIFNONA 29 (L) 01/11/2018    BCR 13.2 05/30/2018    ANIONGAP 9.0 05/30/2018     Lab Results   Component Value Date    CHOL 154 11/13/2017    TRIG 78 11/13/2017    HDL 40 11/13/2017    AST 27 03/11/2018    ALT 26 03/11/2018     Lab Results   Component Value Date    HGBA1C " 7.50 (H) 03/11/2018     Lab Results   Component Value Date    WBC 6.02 05/30/2018    HGB 10.5 (L) 05/30/2018    HCT 32.8 (L) 05/30/2018    MCV 87.7 05/30/2018     05/30/2018     Procedures        Assessment:   Trace was seen today for atrial fibrillation and hypertension.    Diagnoses and all orders for this visit:    Chronic systolic congestive heart failure (CMS/HCC)    Paroxysmal atrial fibrillation (CMS/HCC)    Essential hypertension    Mixed hyperlipidemia      Impression  1. Congestive heart failure: Last LVEF 45%.  Currently euvolemic.  Heart myopathy presumed due to hypertension.  2. Atrial fibrillation:  Discussed importance and risks of taking blood thinner with patient. Also discussed the option of undergoing an ablation.  Patient is currently not taking his Eliquis due to concerns of bleeding.  He is asymptomatic.    3. Hypertension: Stable.  Elevated on presentation today, however returned to normal with rest.  4. Dyslipidemia:  On statin therapy (Crestor).     Plan:  1. Encouraged to continue regular exercise regimen.   2. Long discussion regarding the need for anticoagulation therapy with his high chads fast score.  He seems understandably somewhat reluctant.  3. Also discussed other possibilities of avoiding anticoagulation, including possibility of antiarrhythmics (although he understands these do not prevent stroke, and given his renal failure may be somewhat higher risk for him, and therefore did not think is a good idea).  Also discussed the possibility of A. fib ablation procedures.  He will seek electrophysiology discuss further.  4. Stop Eliquis 3 days before future dental procedure.   5. Continue current medications, as listed above.  6. Revisit in 12 months, or sooner as needed.    Scribed for Loki De La Fuente MD by Kimberlyn Mcdaniel. 7/16/2018  4:53 PM    ILoki MD, personally performed the services described in this documentation as scribed by the above individual in my  presence, and it is both accurate and complete      Please note that portions of this note may have been completed with a voice recognition program. Efforts were made to edit the dictations, but occasionally words are mistranscribed.

## 2018-07-17 DIAGNOSIS — I48.0 PAF (PAROXYSMAL ATRIAL FIBRILLATION) (HCC): Primary | ICD-10-CM

## 2018-07-24 ENCOUNTER — OFFICE VISIT (OUTPATIENT)
Dept: FAMILY MEDICINE CLINIC | Facility: CLINIC | Age: 58
End: 2018-07-24

## 2018-07-24 VITALS
SYSTOLIC BLOOD PRESSURE: 136 MMHG | WEIGHT: 288 LBS | OXYGEN SATURATION: 98 % | DIASTOLIC BLOOD PRESSURE: 80 MMHG | BODY MASS INDEX: 35.81 KG/M2 | HEIGHT: 75 IN | RESPIRATION RATE: 18 BRPM | TEMPERATURE: 97.8 F | HEART RATE: 75 BPM

## 2018-07-24 DIAGNOSIS — L25.5 DERMATITIS DUE TO PLANTS, INCLUDING POISON IVY, SUMAC, AND OAK: Primary | ICD-10-CM

## 2018-07-24 DIAGNOSIS — Z79.4 CONTROLLED TYPE 2 DIABETES MELLITUS WITH STAGE 3 CHRONIC KIDNEY DISEASE, WITH LONG-TERM CURRENT USE OF INSULIN (HCC): ICD-10-CM

## 2018-07-24 DIAGNOSIS — E11.22 CONTROLLED TYPE 2 DIABETES MELLITUS WITH STAGE 3 CHRONIC KIDNEY DISEASE, WITH LONG-TERM CURRENT USE OF INSULIN (HCC): ICD-10-CM

## 2018-07-24 DIAGNOSIS — N18.30 CONTROLLED TYPE 2 DIABETES MELLITUS WITH STAGE 3 CHRONIC KIDNEY DISEASE, WITH LONG-TERM CURRENT USE OF INSULIN (HCC): ICD-10-CM

## 2018-07-24 PROCEDURE — 99213 OFFICE O/P EST LOW 20 MIN: CPT | Performed by: NURSE PRACTITIONER

## 2018-07-24 RX ORDER — METHYLPREDNISOLONE 4 MG/1
TABLET ORAL
Qty: 21 TABLET | Refills: 0 | Status: SHIPPED | OUTPATIENT
Start: 2018-07-24 | End: 2018-09-14

## 2018-07-24 NOTE — PATIENT INSTRUCTIONS
Poison Ivy Dermatitis  Poison ivy dermatitis is redness and soreness (inflammation) of the skin. It is caused by a chemical that is found on the leaves of the poison ivy plant. You may also have itching, a rash, and blisters. Symptoms often clear up in 1-2 weeks.  You may get this condition by touching a poison ivy plant. You can also get it by touching something that has the chemical on it. This may include animals or objects that have come in contact with the plant.  Follow these instructions at home:  General instructions  · Take or apply over-the-counter and prescription medicines only as told by your doctor.  · If you touch poison ivy, wash your skin with soap and cold water right away.  · Use hydrocortisone creams or calamine lotion as needed to help with itching.  · Take oatmeal baths as needed. Use colloidal oatmeal. You can get this at a pharmacy or grocery store. Follow the instructions on the package.  · Do not scratch or rub your skin.  · While you have the rash, wash your clothes right after you wear them.  Prevention  · Know what poison ivy looks like so you can avoid it. This plant has three leaves with flowering branches on a single stem. The leaves are glossy. They have uneven edges that come to a point at the front.  · If you have touched poison ivy, wash with soap and water right away. Be sure to wash under your fingernails.  · When hiking or camping, wear long pants, a long-sleeved shirt, tall socks, and hiking boots. You can also use a lotion on your skin that helps to prevent contact with the chemical on the plant.  · If you think that your clothes or outdoor gear came in contact with poison ivy, rinse them off with a garden hose before you bring them inside your house.  Contact a doctor if:  · You have open sores in the rash area.  · You have more redness, swelling, or pain in the affected area.  · You have redness that spreads beyond the rash area.  · You have fluid, blood, or pus coming from  the affected area.  · You have a fever.  · You have a rash over a large area of your body.  · You have a rash on your eyes, mouth, or genitals.  · Your rash does not get better after a few days.  Get help right away if:  · Your face swells or your eyes swell shut.  · You have trouble breathing.  · You have trouble swallowing.  This information is not intended to replace advice given to you by your health care provider. Make sure you discuss any questions you have with your health care provider.  Document Released: 01/20/2012 Document Revised: 05/25/2017 Document Reviewed: 05/25/2016  [employer] Interactive Patient Education © 2018 [employer]

## 2018-07-24 NOTE — PROGRESS NOTES
Subjective   Trace Escalante is a 57 y.o. male.     History of Present Illness   Rash on arms and trunk for the past 10 days  Was weeding a few weeks ago, and then a few days later an itchy rash appeared on inner wrists  Now spreading to trunk  Using Calamine lotion which helps some  No redness swelling or drainage  Itching is interfering with sleep   Hx of Diabetes on insulin injections daily  Hx of heart failure currently stable     The following portions of the patient's history were reviewed and updated as appropriate: allergies, current medications, past family history, past medical history, past social history, past surgical history and problem list.    Review of Systems   Constitutional: Negative for activity change, unexpected weight gain and unexpected weight loss.   Eyes: Negative for itching.   Respiratory: Negative for cough, chest tightness, shortness of breath and wheezing.    Cardiovascular: Negative for chest pain, palpitations and leg swelling.   Skin: Positive for rash.   Neurological: Negative for light-headedness.   Psychiatric/Behavioral: Positive for sleep disturbance.       Objective   Physical Exam   Constitutional: He is oriented to person, place, and time. He appears well-developed and well-nourished. No distress.   HENT:   Head: Normocephalic.   Nose: Nose normal.   Eyes: Lids are normal.   Neck: Neck supple.   Cardiovascular: Normal rate, regular rhythm and normal heart sounds.    Pulmonary/Chest: Effort normal and breath sounds normal. No respiratory distress. He has no wheezes. He has no rales.   Musculoskeletal: He exhibits no edema.   Neurological: He is alert and oriented to person, place, and time.   Skin: Skin is warm and dry. Rash noted. Rash is papular.   Psychiatric: He has a normal mood and affect. His behavior is normal. Judgment and thought content normal.   Nursing note and vitals reviewed.        Assessment/Plan   Trace was seen today for poison ivy.    Diagnoses and all  orders for this visit:    Dermatitis due to plants, including poison ivy, sumac, and oak  -     MethylPREDNISolone (MEDROL, ANDREA,) 4 MG tablet; Take as directed on package instructions.    Controlled type 2 diabetes mellitus with stage 3 chronic kidney disease, with long-term current use of insulin (CMS/[facility])    will have pt take Medrol dose pack as directed.   Informed pt that BS levels can increase while on steroids and he may need to monitor BS and adjust his insulin while on the medication. Pt agrees

## 2018-08-01 ENCOUNTER — OFFICE VISIT (OUTPATIENT)
Dept: ORTHOPEDIC SURGERY | Facility: CLINIC | Age: 58
End: 2018-08-01

## 2018-08-01 VITALS — HEART RATE: 80 BPM | OXYGEN SATURATION: 99 % | BODY MASS INDEX: 36.22 KG/M2 | WEIGHT: 291.3 LBS | HEIGHT: 75 IN

## 2018-08-01 DIAGNOSIS — Z47.89 ORTHOPEDIC AFTERCARE: Primary | ICD-10-CM

## 2018-08-01 PROCEDURE — 99212 OFFICE O/P EST SF 10 MIN: CPT | Performed by: ORTHOPAEDIC SURGERY

## 2018-08-01 NOTE — PROGRESS NOTES
[facility] Orthopaedic Surgery Clinic Note    Subjective     Chief Complaint   Patient presents with   • Left Knee - Follow-up     Left Patella Tendon Repair 3/13/18  2 month f/u        HPI    Trace Escalante is a 57 y.o. male.  Follows up today for his left patellar tendon repair.  He is doing well today, with only mild pain on occasion.  Fully ambulatory without external aids.      Patient Active Problem List   Diagnosis   • Controlled type 2 diabetes mellitus with kidney complication, with long-term current use of insulin (CMS/[facility])   • Essential hypertension   • Mixed hyperlipidemia   • Gout   • Nephrolithiasis   • Elevated brain natriuretic peptide (BNP) level   • Elevated serum creatinine   • Chronic renal insufficiency   • Acute pulmonary edema (CMS/HCC)   • Obesity, Class I, BMI 30-34.9   • Chronic systolic congestive heart failure (CMS/HCC)   • Nonischemic cardiomyopathy (CMS/[facility])   • Rupture of left patellar tendon   • Patellar tendon rupture, left, initial encounter   • Paroxysmal atrial fibrillation (CMS/HCC)     Past Medical History:   Diagnosis Date   • CKD (chronic kidney disease) stage 3, GFR 30-59 ml/min    • Gout    • Hyperlipidemia    • Hypertension    • Insulin dependent diabetes mellitus (CMS/HCC)    • Kidney stones    • NICM (nonischemic cardiomyopathy) (CMS/[facility])    • Obesity    • ALYSSA (obstructive sleep apnea)       Past Surgical History:   Procedure Laterality Date   • CARDIAC CATHETERIZATION N/A 11/15/2017    Procedure: Coronary angiography;  Surgeon: Elke Marcmu MD;  Location:  [facility] CATH INVASIVE LOCATION;  Service:    • KNEE SURGERY Left     Patellar tendon repair - Reed   • MEDIAL COLLATERAL LIGAMENT REPAIR, KNEE Right 1978   • PATELLA TENDON REPAIR Left 3/13/2018    Procedure: PATELLA TENDON REPAIR LEFT;  Surgeon: Blake Mcbride MD;  Location:  [facility] OR;  Service: Orthopedics      Family History   Problem Relation Age of Onset   • Heart disease Mother    • Stroke Mother    • Diabetes  Father    • Hypertension Father    • Heart disease Father    • Heart attack Father    • Kidney disease Brother    • Stroke Brother    • Diabetes Sister    • Thyroid cancer Sister      Social History     Social History   • Marital status: Single     Spouse name: N/A   • Number of children: 2   • Years of education: College     Occupational History   •  [employer]     Social History Main Topics   • Smoking status: Never Smoker   • Smokeless tobacco: Never Used   • Alcohol use No   • Drug use: No   • Sexual activity: Yes     Partners: Female     Other Topics Concern   • Not on file     Social History Narrative    Caffeine use: 1 cups coffee 1 cup decaf tea  daily.    Patient lives at home with his daughters.           Current Outpatient Prescriptions on File Prior to Visit   Medication Sig Dispense Refill   • allopurinol (ZYLOPRIM) 300 MG tablet TAKE 1/2 A TABLET BY MOUTH EVERY DAY 45 tablet 1   • amLODIPine (NORVASC) 10 MG tablet Take 10 mg by mouth Daily.  1   • apixaban (ELIQUIS) 5 MG tablet tablet Take 1 tablet by mouth Every 12 (Twelve) Hours. 60 tablet 0   • carvedilol (COREG) 12.5 MG tablet Take 1 tablet by mouth 2 (Two) Times a Day. 60 tablet 2   • ferrous sulfate 325 (65 FE) MG tablet Take 1 tablet by mouth 2 (Two) Times a Day With Meals. 60 tablet 1   • furosemide (LASIX) 40 MG tablet TAKE 1 TABLET BY MOUTH DAILY. MAY TAKE ADDITIONAL TABLET AS NEEDED FOR WEIGHT GAIN 3 LBS OVERNIGHT 135 tablet 3   • hydrALAZINE (APRESOLINE) 50 MG tablet Take 1 tablet by mouth Every 8 (Eight) Hours. 90 tablet 11   • Insulin Degludec (TRESIBA FLEXTOUCH SC) Inject 40 Units under the skin Every Evening.     • isosorbide mononitrate (IMDUR) 60 MG 24 hr tablet Take 1 tablet by mouth Daily. 30 tablet 11   • MethylPREDNISolone (MEDROL, ANDREA,) 4 MG tablet Take as directed on package instructions. 21 tablet 0   • Multiple Vitamin (MULTI VITAMIN PO) Take 1 tablet by mouth.     • NOVOLOG FLEXPEN 100 UNIT/ML solution pen-injector sc  pen Inject 5-10 Units under the skin 3 (Three) Times a Day With Meals.     • ONE TOUCH ULTRA TEST test strip TEST 3X DAILY  0   • rosuvastatin (CRESTOR) 40 MG tablet Take 1 tablet by mouth Every Night. 30 tablet 11   • spironolactone (ALDACTONE) 25 MG tablet Take 1 tablet by mouth Daily. 30 tablet 3     No current facility-administered medications on file prior to visit.       No Known Allergies     Review of Systems   Constitutional: Negative for activity change, appetite change, chills, diaphoresis, fatigue, fever and unexpected weight change.   HENT: Negative for congestion, dental problem, drooling, ear discharge, ear pain, facial swelling, hearing loss, mouth sores, nosebleeds, postnasal drip, rhinorrhea, sinus pressure, sneezing, sore throat, tinnitus, trouble swallowing and voice change.    Eyes: Negative for photophobia, pain, discharge, redness, itching and visual disturbance.   Respiratory: Negative for apnea, cough, choking, chest tightness, shortness of breath, wheezing and stridor.    Cardiovascular: Positive for leg swelling. Negative for chest pain and palpitations.   Gastrointestinal: Negative for abdominal distention, abdominal pain, anal bleeding, blood in stool, constipation, diarrhea, nausea, rectal pain and vomiting.   Endocrine: Positive for cold intolerance. Negative for heat intolerance, polydipsia, polyphagia and polyuria.   Genitourinary: Negative for decreased urine volume, difficulty urinating, dysuria, enuresis, flank pain, frequency, genital sores, hematuria and urgency.   Musculoskeletal: Positive for joint swelling. Negative for arthralgias, back pain, gait problem, myalgias, neck pain and neck stiffness.   Skin: Negative for color change, pallor, rash and wound.   Allergic/Immunologic: Negative for environmental allergies, food allergies and immunocompromised state.   Neurological: Negative for dizziness, tremors, seizures, syncope, facial asymmetry, speech difficulty, weakness,  "light-headedness, numbness and headaches.   Hematological: Negative for adenopathy. Does not bruise/bleed easily.   Psychiatric/Behavioral: Negative for agitation, behavioral problems, confusion, decreased concentration, dysphoric mood, hallucinations, self-injury, sleep disturbance and suicidal ideas. The patient is not nervous/anxious and is not hyperactive.         Objective      Physical Exam  Pulse 80   Ht 190.5 cm (75\")   Wt 132 kg (291 lb 4.8 oz)   SpO2 99%   BMI 36.41 kg/m²     Body mass index is 36.41 kg/m².    General:   Mental Status:  Alert   Appearance: Cooperative, in no acute distress   Build and Nutrition: Well-nourished and well developed male   Orientation: Alert and oriented to person, place and time   Posture: Normal   Gait: Normal    Integument:   Left knee: Wound is well-healed with no signs of infection    Lower Extremities:   Left Knee:    Tenderness:  None    Effusion:  None    Swelling: None    Crepitus:  Positive    Range of motion:  Extension: 0°       Flexion: 130°  Instability:  No varus laxity, no valgus laxity, negative anterior drawer        Assessment and Plan     Trace was seen today for follow-up.    Diagnoses and all orders for this visit:    Orthopedic aftercare        I reviewed my findings with patient today.  His left knee is doing well, and I will see him back if there are any problems in the future.  He does have some underlying arthritis in the knee, and that may be bothersome to him in the future.  I'll be happy to see him back if he has any problems in the future.  He may return to full duty at work, and a work note was provided today.    Return if symptoms worsen or fail to improve.        Blake Mcbride MD  08/01/18  4:07 PM  "

## 2018-08-07 ENCOUNTER — CONSULT (OUTPATIENT)
Dept: CARDIOLOGY | Facility: CLINIC | Age: 58
End: 2018-08-07

## 2018-08-07 VITALS
SYSTOLIC BLOOD PRESSURE: 142 MMHG | BODY MASS INDEX: 36.13 KG/M2 | WEIGHT: 290.6 LBS | HEIGHT: 75 IN | HEART RATE: 66 BPM | DIASTOLIC BLOOD PRESSURE: 84 MMHG

## 2018-08-07 DIAGNOSIS — I48.0 PAROXYSMAL ATRIAL FIBRILLATION (HCC): Primary | ICD-10-CM

## 2018-08-07 DIAGNOSIS — Z79.899 LONG TERM CURRENT USE OF ANTIARRHYTHMIC MEDICAL THERAPY: ICD-10-CM

## 2018-08-07 DIAGNOSIS — I50.22 CHRONIC SYSTOLIC CONGESTIVE HEART FAILURE (HCC): ICD-10-CM

## 2018-08-07 PROCEDURE — 93000 ELECTROCARDIOGRAM COMPLETE: CPT | Performed by: INTERNAL MEDICINE

## 2018-08-07 PROCEDURE — 99244 OFF/OP CNSLTJ NEW/EST MOD 40: CPT | Performed by: INTERNAL MEDICINE

## 2018-08-07 NOTE — PROGRESS NOTES
Trace Escalante  1960  PCP: Jose Ramon Allen MD    SUBJECTIVE:   Trace Escalante is a 57 y.o. male seen for a consultation visit regarding the following:     Chief Complaint:   Chief Complaint   Patient presents with   • Congestive Heart Failure          Consultation is requested by Loki De La Fuente MD for evaluation of Congestive Heart Failure        History:  This is a 57-year-old patient followed by Dr. De La Fuente.  He has a history of a nonischemic dilated cardiomyopathy.  Along with diabetes, blood pressure, sleep apnea, and chronic kidney disease.  He was discovered to have atrial fibrillation the spring of 2018.  He had a workup including a cardiac monitor that showed his burdened to be about 2%.  He has issues with shortness of breath but is unclear of its due to his periods of atrial fibrillation or his other medical conditions.  He also has been discussed the importance of anticoagulation but he has concerns as he had a brother for him had significant bleeding after an accident while taking blood thinners.      Cardiac PMH: (Old records have been reviewed and summarized below)  1. Congestive heart failure:   a. BHL (11/12/2017):  with CCS class II chest discomfort/NYHA class III dyspnea.   b. Echocardiogram (11/13/2017): LVEF 40%.   c. Cardiac catheterization: Normal coronary arteries.   d. Echocardiogram (04/11/2018): LVEF 41-45%. Cardiac valves normal.   2. Atrial fibrillation: Dx May 2018  a. Cardiac event monitor, 30-days (05/11/2018): 2% a-fib, 2 episodes of 5 beat NSVT.  3. Hypertension.   4. Hyperlipidemia.   5. Type 2 diabetes mellitus; uncontrolled, hemoglobin A1c was 8.3%.   6. Obstructive sleep apnea; compliant with CPAP.   7. Moderate obesity; BMI 38.   8. Chronic kidney disease; stage III, baseline creatinine 2.0.   9. Murmur.   10. Hypokalemia.   11. Nephrolithiasis.   12. Gout.   13. Surgical history:   a. MCL repair, right (1978).  b. Left patella tendon repair (2018).    Past  Medical History, Past Surgical History, Family history, Social History, and Medications were all reviewed with the patient today and updated as necessary.       Current Outpatient Prescriptions:   •  allopurinol (ZYLOPRIM) 300 MG tablet, TAKE 1/2 A TABLET BY MOUTH EVERY DAY, Disp: 45 tablet, Rfl: 1  •  amLODIPine (NORVASC) 10 MG tablet, Take 10 mg by mouth Daily., Disp: , Rfl: 1  •  apixaban (ELIQUIS) 5 MG tablet tablet, Take 1 tablet by mouth Every 12 (Twelve) Hours., Disp: 60 tablet, Rfl: 0  •  carvedilol (COREG) 12.5 MG tablet, Take 1 tablet by mouth 2 (Two) Times a Day., Disp: 60 tablet, Rfl: 2  •  ferrous sulfate 325 (65 FE) MG tablet, Take 1 tablet by mouth 2 (Two) Times a Day With Meals., Disp: 60 tablet, Rfl: 1  •  furosemide (LASIX) 40 MG tablet, TAKE 1 TABLET BY MOUTH DAILY. MAY TAKE ADDITIONAL TABLET AS NEEDED FOR WEIGHT GAIN 3 LBS OVERNIGHT, Disp: 135 tablet, Rfl: 3  •  hydrALAZINE (APRESOLINE) 50 MG tablet, Take 1 tablet by mouth Every 8 (Eight) Hours., Disp: 90 tablet, Rfl: 11  •  Insulin Degludec (TRESIBA FLEXTOUCH SC), Inject 40 Units under the skin Every Evening., Disp: , Rfl:   •  isosorbide mononitrate (IMDUR) 60 MG 24 hr tablet, Take 1 tablet by mouth Daily., Disp: 30 tablet, Rfl: 11  •  MethylPREDNISolone (MEDROL, ANDREA,) 4 MG tablet, Take as directed on package instructions., Disp: 21 tablet, Rfl: 0  •  Multiple Vitamin (MULTI VITAMIN PO), Take 1 tablet by mouth., Disp: , Rfl:   •  NOVOLOG FLEXPEN 100 UNIT/ML solution pen-injector sc pen, Inject 5-10 Units under the skin 3 (Three) Times a Day With Meals., Disp: , Rfl:   •  ONE TOUCH ULTRA TEST test strip, TEST 3X DAILY, Disp: , Rfl: 0  •  rosuvastatin (CRESTOR) 40 MG tablet, Take 1 tablet by mouth Every Night., Disp: 30 tablet, Rfl: 11  •  spironolactone (ALDACTONE) 25 MG tablet, Take 1 tablet by mouth Daily., Disp: 30 tablet, Rfl: 3  •  dronedarone (MULTAQ) 400 MG tablet, Take 1 tablet by mouth 2 (Two) Times a Day With Meals., Disp: 60 tablet,  Rfl: 11    No Known Allergies      Past Medical History:   Diagnosis Date   • CKD (chronic kidney disease) stage 3, GFR 30-59 ml/min    • Gout    • Hyperlipidemia    • Hypertension    • Insulin dependent diabetes mellitus (CMS/HCC)    • Kidney stones    • NICM (nonischemic cardiomyopathy) (CMS/HCC)    • Obesity    • ALYSSA (obstructive sleep apnea)      Past Surgical History:   Procedure Laterality Date   • CARDIAC CATHETERIZATION N/A 11/15/2017    Procedure: Coronary angiography;  Surgeon: Elke Marcum MD;  Location:  [facility] CATH INVASIVE LOCATION;  Service:    • KNEE SURGERY Left     Patellar tendon repair - Reed   • MEDIAL COLLATERAL LIGAMENT REPAIR, KNEE Right 1978   • PATELLA TENDON REPAIR Left 3/13/2018    Procedure: PATELLA TENDON REPAIR LEFT;  Surgeon: Blake Mcbride MD;  Location: [facility] OR;  Service: Orthopedics     Family History   Problem Relation Age of Onset   • Heart disease Mother    • Stroke Mother    • Diabetes Father    • Hypertension Father    • Heart disease Father    • Heart attack Father    • Kidney disease Brother    • Stroke Brother    • Diabetes Sister    • Thyroid cancer Sister      Social History   Substance Use Topics   • Smoking status: Never Smoker   • Smokeless tobacco: Never Used   • Alcohol use No       ROS:  Review of Systems:  General: no recent weight loss/gain, weakness or fatigue  Skin: no rashes, lumps, or other skin changes  HEENT: no dizziness, lightheadedness, or vision changes  Respiratory: no cough or hemoptysis  Cardiovascular: + palpitations, and tachycardia  Gastrointestinal: no black/tarry stools or diarrhea  Urinary: no change in frequency or urgency  Peripheral Vascular: no claudication or leg cramps  Musculoskeletal: no muscle or joint pain/stiffness  Psychiatric: no depression or excessive stress  Neurological: no sensory or motor loss, no syncope  Hematologic: no anemia, easy bruising or bleeding  Endocrine: no thyroid problems, nor heat or cold intolerance    "    PHYSICAL EXAM:   /84 (BP Location: Left arm, Patient Position: Sitting)   Pulse 66   Ht 190.5 cm (75\")   Wt 132 kg (290 lb 9.6 oz)   BMI 36.32 kg/m²      Wt Readings from Last 5 Encounters:   08/07/18 132 kg (290 lb 9.6 oz)   08/01/18 132 kg (291 lb 4.8 oz)   07/24/18 131 kg (288 lb)   07/16/18 131 kg (288 lb 9.6 oz)   05/18/18 127 kg (279 lb 3.2 oz)     BP Readings from Last 5 Encounters:   08/07/18 142/84   07/24/18 136/80   07/16/18 138/78   05/18/18 122/62   05/14/18 124/70       General-Well Nourished, Well developed  Eyes - PERRLA  Neck- supple, No mass  CV- regular rate and rhythm, no MRG  Lung- clear bilaterally  Abd- soft, +BS  Musc/skel - Norm strength and range of motion  Skin- warm and dry  Neuro - Alert & Oriented x 3, appropriate mood.    Medical problems and test results were reviewed with the patient today.     Results for orders placed or performed in visit on 05/30/18   Basic Metabolic Panel   Result Value Ref Range    Glucose 107 (H) 70 - 100 mg/dL    BUN 25 (H) 9 - 23 mg/dL    Creatinine 1.90 (H) 0.60 - 1.30 mg/dL    Sodium 140 132 - 146 mmol/L    Potassium 4.3 3.5 - 5.5 mmol/L    Chloride 104 99 - 109 mmol/L    CO2 27.0 20.0 - 31.0 mmol/L    Calcium 8.9 8.7 - 10.4 mg/dL    eGFR  African Amer 45 (L) >60 mL/min/1.73    BUN/Creatinine Ratio 13.2 7.0 - 25.0    Anion Gap 9.0 3.0 - 11.0 mmol/L   CBC (No Diff)   Result Value Ref Range    WBC 6.02 3.50 - 10.80 10*3/mm3    RBC 3.74 (L) 4.20 - 5.76 10*6/mm3    Hemoglobin 10.5 (L) 13.1 - 17.5 g/dL    Hematocrit 32.8 (L) 38.9 - 50.9 %    MCV 87.7 80.0 - 99.0 fL    MCH 28.1 27.0 - 31.0 pg    MCHC 32.0 32.0 - 36.0 g/dL    RDW 15.0 (H) 11.3 - 14.5 %    RDW-SD 47.9 37.0 - 54.0 fl    MPV 11.7 6.0 - 12.0 fL    Platelets 253 150 - 450 10*3/mm3         Lab Results   Component Value Date    CHOL 154 11/13/2017    HDL 40 11/13/2017    LDL 95 11/13/2017       EKG:  (EKG/Tracing has been independently visualized by me and summarized below)      ECG 12 " Lead  Date/Time: 8/7/2018 4:03 PM  Performed by: MATT GUSTAFSON  Authorized by: MATT GUSTAFSON   Rhythm: sinus rhythm  Rate: normal  BPM: 66  Conduction: conduction normal  ST Segments: ST segments normal  QRS axis: normal  Clinical impression: normal ECG            ASSESSMENT and PLAN  1.  Atrial fibrillation-current burden is about 2%.  Most likely made worse by his sleep apnea, obesity, hypertension, and chronic systolic heart failure.  We discussed with him therapy options.  His symptom and heart failure burden is such that he does not warrant catheter-based ablation at this time.  We will start him on Multaq, as he does not have active congestive heart failure, to help with his overall burden.  2.  Stroke prophylaxis-we did explain to the patient that with his risk factors he is at increased risk of stroke.  I did encourage him to continue his Eliquis therapy.  3.  Use of Multaq - it can result in a slight increase Cr that does not represent true renal function.  We'll continue to monitor labs.  4.  Sleep apnea-he reports that he will be more compliant with his sleep apnea therapy and CPAP.    Return in about 3 months (around 11/7/2018).            Matt Gustafson M.D., F.A.C.C, F.H.R.S.  Cardiology/Electrophysiology  08/07/18  6:32 PM

## 2018-08-24 RX ORDER — SPIRONOLACTONE 25 MG/1
TABLET ORAL
Qty: 90 TABLET | Refills: 0 | Status: SHIPPED | OUTPATIENT
Start: 2018-08-24 | End: 2018-11-26 | Stop reason: SDUPTHER

## 2018-08-24 NOTE — TELEPHONE ENCOUNTER
Mr. Escalante last seen at [facility] in May 2018 with no future appointment scheduled. Sent 3-month supply and no refills of spironolactone 25mg daily to [facility] pharmacy in [place] with instructions to pharmacy to contact patient's PCP/cardiologist for further refills.    Xin Underwood, Pharmacy Resident

## 2018-09-14 ENCOUNTER — OFFICE VISIT (OUTPATIENT)
Dept: FAMILY MEDICINE CLINIC | Facility: CLINIC | Age: 58
End: 2018-09-14

## 2018-09-14 VITALS
WEIGHT: 289 LBS | BODY MASS INDEX: 35.93 KG/M2 | HEART RATE: 72 BPM | RESPIRATION RATE: 18 BRPM | DIASTOLIC BLOOD PRESSURE: 80 MMHG | TEMPERATURE: 98.2 F | HEIGHT: 75 IN | SYSTOLIC BLOOD PRESSURE: 144 MMHG

## 2018-09-14 DIAGNOSIS — I10 ESSENTIAL HYPERTENSION: Primary | ICD-10-CM

## 2018-09-14 DIAGNOSIS — H69.83 DYSFUNCTION OF BOTH EUSTACHIAN TUBES: ICD-10-CM

## 2018-09-14 PROCEDURE — 99213 OFFICE O/P EST LOW 20 MIN: CPT | Performed by: FAMILY MEDICINE

## 2018-09-14 RX ORDER — FLUTICASONE PROPIONATE 50 MCG
2 SPRAY, SUSPENSION (ML) NASAL DAILY
Qty: 16 G | Refills: 2 | Status: SHIPPED | OUTPATIENT
Start: 2018-09-14 | End: 2019-09-18 | Stop reason: SDUPTHER

## 2018-09-14 RX ORDER — CLONIDINE HYDROCHLORIDE 0.2 MG/1
0.2 TABLET ORAL NIGHTLY
COMMUNITY
Start: 2018-08-23

## 2018-09-14 RX ORDER — INSULIN DEGLUDEC 200 U/ML
20 INJECTION, SOLUTION SUBCUTANEOUS DAILY
COMMUNITY
Start: 2018-08-17 | End: 2021-08-06

## 2018-09-14 RX ORDER — ROSUVASTATIN CALCIUM 20 MG/1
20 TABLET, COATED ORAL EVERY OTHER DAY
COMMUNITY
Start: 2018-08-17 | End: 2020-03-06 | Stop reason: SDUPTHER

## 2018-09-14 NOTE — PROGRESS NOTES
Assessment/Plan       Problems Addressed this Visit        Cardiovascular and Mediastinum    Essential hypertension - Primary (Chronic)    Relevant Medications    CloNIDine (CATAPRES) 0.2 MG tablet      Other Visit Diagnoses     Dysfunction of both eustachian tubes        Relevant Medications    fluticasone (FLONASE) 50 MCG/ACT nasal spray            Follow up: Return in about 6 months (around 3/14/2019).     DISCUSSION  Overall doing well.  Continue current medication and follow-up in 6 months.  He sees multiple specialists including cardiology, endocrinology and nephrology.  Diabetes is managed by endocrinology.  His heart failure management by cardiology and renal failure by nephrology.    Hearing issue.  May be related to eustachian tube dysfunction.  Trial of Flonase.  If not improving in the next 1-2 weeks, he is to call.      MEDICATIONS PRESCRIBED  Requested Prescriptions     Signed Prescriptions Disp Refills   • fluticasone (FLONASE) 50 MCG/ACT nasal spray 16 g 2     Si sprays into the nostril(s) as directed by provider Daily.              -------------------------------------------    Subjective     Chief Complaint   Patient presents with   • Hypertension     6 month f/u         Hypertension   This is a chronic problem. The current episode started more than 1 year ago. The problem is unchanged. The problem is controlled. Associated symptoms include peripheral edema (left after TKR). Pertinent negatives include no chest pain or shortness of breath. There are no associated agents to hypertension. Risk factors for coronary artery disease include diabetes mellitus, dyslipidemia and male gender. Current antihypertension treatment includes beta blockers, central alpha agonists and direct vasodilators. There are no compliance problems.  Hypertensive end-organ damage includes heart failure. Identifiable causes of hypertension include chronic renal disease.       Afib/cardiomyopathy  Had a monitor and 2%  "afib. Rec blood thinner  He saw cardiologist  Rare events, chose not to take the blood thinner  On Mutaq  Take low dose aspirin    DM 2  Sees endocrinology  Sees in 2 months  Check a1c there  check lipids and panels and labs there  Had labs one month ago  On insulin 5 units in am, 10 units in afternoon  20-24 Tresiba at night       Sees nephrology, Dr Kt Juarez      Has an issue with his years.  Low sounds sound odd.  Some cracking and popping at times.  No definite ear pain.  No recent illness or fever or chills.      History   Smoking Status   • Never Smoker   Smokeless Tobacco   • Never Used        Past Medical History,Medications, Allergies, and social history was reviewed.      Review of Systems   Constitutional: Negative.    HENT: Negative.    Respiratory: Negative.  Negative for shortness of breath.    Cardiovascular: Negative.  Negative for chest pain.   Gastrointestinal: Negative.    Psychiatric/Behavioral: Negative.        Objective     Vitals:    09/14/18 1107   BP: 144/80   Pulse: 72   Resp: 18   Temp: 98.2 °F (36.8 °C)   Weight: 131 kg (289 lb)   Height: 190.5 cm (75\")          Physical Exam   Constitutional: Vital signs are normal. He appears well-developed and well-nourished.   HENT:   Head: Normocephalic and atraumatic.   Right Ear: Hearing, external ear and ear canal normal. Tympanic membrane is retracted. Tympanic membrane is not erythematous.   Left Ear: Hearing, external ear and ear canal normal. Tympanic membrane is retracted. Tympanic membrane is not erythematous.   Mouth/Throat: Oropharynx is clear and moist.   Eyes: Pupils are equal, round, and reactive to light. Conjunctivae, EOM and lids are normal.   Neck: Normal range of motion. Neck supple. No thyromegaly present.   Cardiovascular: Normal rate, regular rhythm and normal heart sounds.  Exam reveals no friction rub.    No murmur heard.  Pulmonary/Chest: Effort normal and breath sounds normal. No respiratory distress. He has no " wheezes. He has no rales.   Abdominal: Normal appearance.   Musculoskeletal: He exhibits no edema.   Neurological: He is alert. He has normal strength.   Skin: Skin is warm and dry.   Psychiatric: He has a normal mood and affect. His speech is normal and behavior is normal. Cognition and memory are normal.   Nursing note and vitals reviewed.                Jose Ramon Allen MD

## 2018-11-09 DIAGNOSIS — M10.9 GOUT, UNSPECIFIED CAUSE, UNSPECIFIED CHRONICITY, UNSPECIFIED SITE: ICD-10-CM

## 2018-11-09 RX ORDER — ALLOPURINOL 300 MG/1
TABLET ORAL
Qty: 45 TABLET | Refills: 1 | Status: SHIPPED | OUTPATIENT
Start: 2018-11-09 | End: 2019-05-08 | Stop reason: SDUPTHER

## 2018-11-19 RX ORDER — SPIRONOLACTONE 25 MG/1
TABLET ORAL
Qty: 90 TABLET | Refills: 0 | OUTPATIENT
Start: 2018-11-19

## 2018-11-26 RX ORDER — SPIRONOLACTONE 25 MG/1
25 TABLET ORAL DAILY
Qty: 90 TABLET | Refills: 1 | Status: SHIPPED | OUTPATIENT
Start: 2018-11-26 | End: 2018-11-28 | Stop reason: SDUPTHER

## 2018-11-26 NOTE — TELEPHONE ENCOUNTER
Received refill request for spironolactone. Previous refill request denied because patient had not been seen since May 2018. Patient now has an appointment scheduled for 11/28/18. Discussed with SALLY Crystal and sent refill for Spironolactone 25mg daily to [facility] in [place].    Thanks,  Vivi Polanco, PharmD  Pharmacy Resident  11/26/2018  4:09 PM

## 2018-11-28 ENCOUNTER — OFFICE VISIT (OUTPATIENT)
Dept: CARDIOLOGY | Facility: HOSPITAL | Age: 58
End: 2018-11-28

## 2018-11-28 ENCOUNTER — LAB (OUTPATIENT)
Dept: LAB | Facility: HOSPITAL | Age: 58
End: 2018-11-28

## 2018-11-28 VITALS
HEIGHT: 75 IN | HEART RATE: 67 BPM | SYSTOLIC BLOOD PRESSURE: 139 MMHG | TEMPERATURE: 97.2 F | DIASTOLIC BLOOD PRESSURE: 77 MMHG | RESPIRATION RATE: 15 BRPM | OXYGEN SATURATION: 98 % | BODY MASS INDEX: 34.94 KG/M2 | WEIGHT: 281 LBS

## 2018-11-28 DIAGNOSIS — I48.0 PAF (PAROXYSMAL ATRIAL FIBRILLATION) (HCC): ICD-10-CM

## 2018-11-28 DIAGNOSIS — I50.22 CHRONIC SYSTOLIC CONGESTIVE HEART FAILURE (HCC): Primary | ICD-10-CM

## 2018-11-28 DIAGNOSIS — N18.30 STAGE 3 CHRONIC KIDNEY DISEASE (HCC): ICD-10-CM

## 2018-11-28 DIAGNOSIS — Z79.4 CONTROLLED TYPE 2 DIABETES MELLITUS WITH STAGE 3 CHRONIC KIDNEY DISEASE, WITH LONG-TERM CURRENT USE OF INSULIN (HCC): ICD-10-CM

## 2018-11-28 DIAGNOSIS — E11.22 CONTROLLED TYPE 2 DIABETES MELLITUS WITH STAGE 3 CHRONIC KIDNEY DISEASE, WITH LONG-TERM CURRENT USE OF INSULIN (HCC): ICD-10-CM

## 2018-11-28 DIAGNOSIS — I10 ESSENTIAL HYPERTENSION: ICD-10-CM

## 2018-11-28 DIAGNOSIS — N18.30 CONTROLLED TYPE 2 DIABETES MELLITUS WITH STAGE 3 CHRONIC KIDNEY DISEASE, WITH LONG-TERM CURRENT USE OF INSULIN (HCC): ICD-10-CM

## 2018-11-28 LAB
ANION GAP SERPL CALCULATED.3IONS-SCNC: 1 MMOL/L (ref 3–11)
ARTICHOKE IGE QN: 115 MG/DL (ref 0–130)
BUN BLD-MCNC: 22 MG/DL (ref 9–23)
BUN/CREAT SERPL: 9.7 (ref 7–25)
CALCIUM SPEC-SCNC: 8.9 MG/DL (ref 8.7–10.4)
CHLORIDE SERPL-SCNC: 104 MMOL/L (ref 99–109)
CHOLEST SERPL-MCNC: 195 MG/DL (ref 0–200)
CO2 SERPL-SCNC: 31 MMOL/L (ref 20–31)
CREAT BLD-MCNC: 2.26 MG/DL (ref 0.6–1.3)
DEPRECATED RDW RBC AUTO: 46.1 FL (ref 37–54)
ERYTHROCYTE [DISTWIDTH] IN BLOOD BY AUTOMATED COUNT: 13.7 % (ref 11.3–14.5)
GFR SERPL CREATININE-BSD FRML MDRD: 36 ML/MIN/1.73
GLUCOSE BLD-MCNC: 173 MG/DL (ref 70–100)
HCT VFR BLD AUTO: 36.7 % (ref 38.9–50.9)
HDLC SERPL-MCNC: 53 MG/DL (ref 40–60)
HGB BLD-MCNC: 11.7 G/DL (ref 13.1–17.5)
MCH RBC QN AUTO: 29.3 PG (ref 27–31)
MCHC RBC AUTO-ENTMCNC: 31.9 G/DL (ref 32–36)
MCV RBC AUTO: 92 FL (ref 80–99)
PLATELET # BLD AUTO: 245 10*3/MM3 (ref 150–450)
PMV BLD AUTO: 11.5 FL (ref 6–12)
POTASSIUM BLD-SCNC: 4.4 MMOL/L (ref 3.5–5.5)
RBC # BLD AUTO: 3.99 10*6/MM3 (ref 4.2–5.76)
SODIUM BLD-SCNC: 136 MMOL/L (ref 132–146)
TRIGL SERPL-MCNC: 70 MG/DL (ref 0–150)
WBC NRBC COR # BLD: 5.82 10*3/MM3 (ref 3.5–10.8)

## 2018-11-28 PROCEDURE — 80048 BASIC METABOLIC PNL TOTAL CA: CPT | Performed by: NURSE PRACTITIONER

## 2018-11-28 PROCEDURE — 85027 COMPLETE CBC AUTOMATED: CPT | Performed by: NURSE PRACTITIONER

## 2018-11-28 PROCEDURE — 80061 LIPID PANEL: CPT

## 2018-11-28 PROCEDURE — 99213 OFFICE O/P EST LOW 20 MIN: CPT | Performed by: NURSE PRACTITIONER

## 2018-11-28 PROCEDURE — 36415 COLL VENOUS BLD VENIPUNCTURE: CPT | Performed by: NURSE PRACTITIONER

## 2018-11-28 RX ORDER — SPIRONOLACTONE 25 MG/1
25 TABLET ORAL DAILY
Qty: 90 TABLET | Refills: 1 | Status: SHIPPED | OUTPATIENT
Start: 2018-11-28 | End: 2019-11-05 | Stop reason: SDUPTHER

## 2018-11-28 RX ORDER — FERROUS SULFATE 325(65) MG
325 TABLET ORAL
Qty: 30 TABLET | Refills: 1
Start: 2018-11-28 | End: 2019-08-12

## 2018-11-28 RX ORDER — ISOSORBIDE MONONITRATE 60 MG/1
60 TABLET, EXTENDED RELEASE ORAL
Qty: 90 TABLET | Refills: 4 | Status: SHIPPED | OUTPATIENT
Start: 2018-11-28 | End: 2020-02-04 | Stop reason: SDUPTHER

## 2018-11-28 NOTE — PROGRESS NOTES
[facility]  [facility]  Heart Failure Clinic    Encounter Date:11/28/2018     Trace Escalante  [address] [place] [address]  1960    Jose Ramon Allen MD    Trace Escalante is a 58 y.o. male.      Subjective:     Chief Complaint:  Follow-up       HPI :  Mr. Escalante comes in for medication refills and labs today.  Hx of systolic heart failure, CKD, PAF, diabetes, and HTN.  He had a consult with Dr. Gustafson on 8/7/18.  Multaq was prescribed, but patient has decided not to take.      Overall, patient states BP is doing well, usually 130's.  He continues to follow with Dr. Juarze (Nephrology) and endocrinology.  Dr. Jose Ramon Allen is PCP.  Patient reports exercise with weights 4 x week and aerobic work out 2 x per week.  His weight is up 4 lbs today (due to Holiday meals with more sodium intake than normal) and he will take a lasix this afternoon.  He denies dyspnea, chest pain, or fatigue.      No Known Allergies      Current Outpatient Medications:   •  allopurinol (ZYLOPRIM) 300 MG tablet, TAKE 1/2 TABLET BY MOUTH EVERYDAY, Disp: 45 tablet, Rfl: 1  •  amLODIPine (NORVASC) 10 MG tablet, Take 10 mg by mouth Daily., Disp: , Rfl: 1  •  carvedilol (COREG) 12.5 MG tablet, Take 1 tablet by mouth 2 (Two) Times a Day., Disp: 60 tablet, Rfl: 2  •  CloNIDine (CATAPRES) 0.2 MG tablet, , Disp: , Rfl:   •  fluticasone (FLONASE) 50 MCG/ACT nasal spray, 2 sprays into the nostril(s) as directed by provider Daily., Disp: 16 g, Rfl: 2  •  furosemide (LASIX) 40 MG tablet, TAKE 1 TABLET BY MOUTH DAILY. MAY TAKE ADDITIONAL TABLET AS NEEDED FOR WEIGHT GAIN 3 LBS OVERNIGHT, Disp: 135 tablet, Rfl: 3  •  hydrALAZINE (APRESOLINE) 50 MG tablet, Take 1 tablet by mouth Every 8 (Eight) Hours., Disp: 90 tablet, Rfl: 11  •  isosorbide mononitrate (IMDUR) 60 MG 24 hr tablet, Take 1 tablet by mouth Daily., Disp: 90 tablet, Rfl: 4  •  Multiple Vitamin (MULTI VITAMIN PO), Take 1 tablet by mouth., Disp: , Rfl:  "  •  NOVOLOG FLEXPEN 100 UNIT/ML solution pen-injector sc pen, Inject 5-10 Units under the skin 3 (Three) Times a Day With Meals., Disp: , Rfl:   •  ONE TOUCH ULTRA TEST test strip, TEST 3X DAILY, Disp: , Rfl: 0  •  rosuvastatin (CRESTOR) 20 MG tablet, 20 mg Daily., Disp: , Rfl:   •  spironolactone (ALDACTONE) 25 MG tablet, Take 1 tablet by mouth Daily., Disp: 90 tablet, Rfl: 1  •  TRESIBA FLEXTOUCH 200 UNIT/ML solution pen-injector, 20 Units., Disp: , Rfl:   •  ferrous sulfate 325 (65 FE) MG tablet, Take 1 tablet by mouth Daily With Breakfast., Disp: 30 tablet, Rfl: 1    The following portions of the patient's history were reviewed and updated as appropriate in Epic:  Problem list, allergies, current medications, past medical and surgical history, past social and family history.     Review of Systems   Constitution: Positive for weight gain.   HENT: Positive for congestion.    Eyes: Negative.    Cardiovascular: Positive for leg swelling.   Respiratory: Positive for cough and sputum production.    Endocrine: Negative.    Hematologic/Lymphatic: Negative.    Skin: Positive for itching and rash.   Musculoskeletal: Positive for muscle cramps.   Gastrointestinal: Negative.    Genitourinary: Negative.    Neurological: Negative.    Psychiatric/Behavioral: Negative.    Allergic/Immunologic: Positive for environmental allergies.       Objective:     Vitals:    11/28/18 1120 11/28/18 1124 11/28/18 1125   BP: 147/78 160/82 139/77   BP Location: Left arm Right arm Left arm   Patient Position: Sitting Sitting Standing   Cuff Size: Large Adult     Pulse: 64 60 67   Resp: 15     Temp: 97.2 °F (36.2 °C)     TempSrc: Temporal     SpO2: 98%     Weight: 127 kg (281 lb)     Height: 190.5 cm (75\")           Physical Exam   Constitutional: He is oriented to person, place, and time. He appears well-developed and well-nourished. No distress.   HENT:   Head: Normocephalic and atraumatic.   Eyes: Conjunctivae are normal. Pupils are equal, " round, and reactive to light.   Neck: Neck supple. No JVD present.   Cardiovascular: Normal rate.   Pulmonary/Chest: Effort normal and breath sounds normal. No respiratory distress. He has no wheezes. He has no rales.   Musculoskeletal: Normal range of motion. He exhibits edema.   Trace bilateral lower leg edema.     Neurological: He is alert and oriented to person, place, and time. No cranial nerve deficit.   Skin: Skin is warm and dry.   Psychiatric: He has a normal mood and affect. His behavior is normal. Judgment and thought content normal.   Vitals reviewed.      Lab and Diagnostic Review:Contains abnormal data CBC (No Diff)    Ref Range & Units 12:12   WBC 3.50 - 10.80 10*3/mm3 5.82    RBC 4.20 - 5.76 10*6/mm3 3.99 Abnormally low     Hemoglobin 13.1 - 17.5 g/dL 11.7 Abnormally low     Hematocrit 38.9 - 50.9 % 36.7 Abnormally low     MCV 80.0 - 99.0 fL 92.0    MCH 27.0 - 31.0 pg 29.3    MCHC 32.0 - 36.0 g/dL 31.9 Abnormally low     RDW 11.3 - 14.5 % 13.7    RDW-SD 37.0 - 54.0 fl 46.1    MPV 6.0 - 12.0 fL 11.5    Platelets 150 - 450 10*3/mm3 245            Basic Metabolic Panel    Ref Range & Units 12:12   Glucose 70 - 100 mg/dL 173 Abnormally high     BUN 9 - 23 mg/dL 22    Creatinine 0.60 - 1.30 mg/dL 2.26 Abnormally high     Sodium 132 - 146 mmol/L 136    Potassium 3.5 - 5.5 mmol/L 4.4    Chloride 99 - 109 mmol/L 104    CO2 20.0 - 31.0 mmol/L 31.0    Calcium 8.7 - 10.4 mg/dL 8.9    eGFR  African Amer >60 mL/min/1.73 36 Abnormally low     BUN/Creatinine Ratio 7.0 - 25.0 9.7    Anion Gap 3.0 - 11.0 mmol/L 1.0 Abnormally low                 Assessment and Plan:     1. Chronic systolic congestive heart failure (CMS/[facility])  - NYHA class I  - Most recent LVEF 4/2018 measured 41-45%  - Cath 11/2017 showed no obstructive disease.  Continue ASA, statin, BB  - Current HF meds: Coreg, aldactone, no ACE or ARB due to renal insufficiency.  Imdur for afterload reduction.  PRN lasix  - Labs today and refill Imdur,  aldactone  - See Dr. De La Fuente July as scheduled/ most likely re-check echo then  - Call/ RTC HF clinic PRN sympotms    2. PAF (paroxysmal atrial fibrillation) (CMS/[facility])  - CHADS Vasc = 3  - 2% AF burden per event monitor  - Patient declines both NOAC and Multaq    3. Stage 3 chronic kidney disease (CMS/[facility])  - Follows with Nephrology/ last visit 2 weeks ago  - Basic Metabolic Panel today indicates GFR 36 ml/min  - Send copy of today's labs to Nephrology    4. Essential hypertension  - Controlled.  - Hydralazine 50 TID, Norvasc 10 mg daily, and PRN Catapres 0.2 mg @ HS in addition to HF meds as noted above    5. Controlled type 2 diabetes mellitus with stage 3 chronic kidney disease, with long-term current use of insulin (CMS/[facility])  - Follows with Endocrinology  - States most recent HgA1C 8%  - Lipid Panel; Future when he is fasting

## 2018-11-29 RX ORDER — CARVEDILOL 12.5 MG/1
TABLET ORAL
Qty: 60 TABLET | Refills: 3 | Status: SHIPPED | OUTPATIENT
Start: 2018-11-29 | End: 2019-02-27 | Stop reason: SDUPTHER

## 2018-12-03 ENCOUNTER — OFFICE VISIT (OUTPATIENT)
Dept: FAMILY MEDICINE CLINIC | Facility: CLINIC | Age: 58
End: 2018-12-03

## 2018-12-03 VITALS
TEMPERATURE: 98.2 F | SYSTOLIC BLOOD PRESSURE: 138 MMHG | WEIGHT: 289 LBS | BODY MASS INDEX: 36.12 KG/M2 | RESPIRATION RATE: 18 BRPM | DIASTOLIC BLOOD PRESSURE: 84 MMHG | HEART RATE: 68 BPM

## 2018-12-03 DIAGNOSIS — J20.9 ACUTE BRONCHITIS, UNSPECIFIED ORGANISM: Primary | ICD-10-CM

## 2018-12-03 PROCEDURE — 99213 OFFICE O/P EST LOW 20 MIN: CPT | Performed by: FAMILY MEDICINE

## 2018-12-03 RX ORDER — DEXTROMETHORPHAN HYDROBROMIDE AND PROMETHAZINE HYDROCHLORIDE 15; 6.25 MG/5ML; MG/5ML
5 SYRUP ORAL 4 TIMES DAILY PRN
Qty: 180 ML | Refills: 0 | Status: SHIPPED | OUTPATIENT
Start: 2018-12-03 | End: 2019-03-15

## 2018-12-03 RX ORDER — AZITHROMYCIN 250 MG/1
TABLET, FILM COATED ORAL
Qty: 6 TABLET | Refills: 0 | Status: SHIPPED | OUTPATIENT
Start: 2018-12-03 | End: 2019-03-15

## 2018-12-03 NOTE — PROGRESS NOTES
Subjective   Trace Escalante is a 58 y.o. male.   Chief Complaint   Patient presents with   • Cough     productive, chest congestion x 2 weeks     Symptoms worse in the morning, improve as the day progresses.       Cough   This is a new problem. The current episode started 1 to 4 weeks ago. The problem has been unchanged. The cough is productive of purulent sputum. Pertinent negatives include no ear congestion, ear pain, fever, nasal congestion, postnasal drip, sore throat, shortness of breath or wheezing. The symptoms are aggravated by lying down. Treatments tried: Coricidin  The treatment provided no relief.        The following portions of the patient's history were reviewed and updated as appropriate: allergies, current medications, past family history, past medical history, past social history, past surgical history and problem list.    Review of Systems   Constitutional: Negative for fever.   HENT: Negative for congestion, ear discharge, ear pain, postnasal drip and sore throat.    Respiratory: Positive for cough. Negative for shortness of breath and wheezing.    Cardiovascular: Negative.        Objective   Physical Exam   Constitutional: He appears well-developed and well-nourished. No distress.   HENT:   Head: Normocephalic.   Right Ear: External ear and ear canal normal. A middle ear effusion is present.   Left Ear: External ear and ear canal normal. A middle ear effusion is present.   Nose: Nose normal.   Eyes: Conjunctivae are normal.   Cardiovascular: Normal rate, regular rhythm and normal heart sounds.   Pulmonary/Chest: Effort normal and breath sounds normal. He has no wheezes. He has no rhonchi.   Lymphadenopathy:     He has no cervical adenopathy.   Neurological: He is alert.   Skin: Skin is warm and dry.   Nursing note and vitals reviewed.        Assessment/Plan   Trace was seen today for cough.    Diagnoses and all orders for this visit:    Acute bronchitis, unspecified organism  -      azithromycin (ZITHROMAX) 250 MG tablet; Take 2 tablets the first day, then 1 tablet daily for 4 days.  -     promethazine-dextromethorphan (PROMETHAZINE-DM) 6.25-15 MG/5ML syrup; Take 5 mL by mouth 4 (Four) Times a Day As Needed for Cough.      Zpak and cough syrup to treat bronchitis.  Follow up if no improvement.

## 2018-12-26 DIAGNOSIS — I10 ESSENTIAL HYPERTENSION: Chronic | ICD-10-CM

## 2018-12-26 RX ORDER — HYDRALAZINE HYDROCHLORIDE 50 MG/1
50 TABLET, FILM COATED ORAL EVERY 8 HOURS SCHEDULED
Qty: 90 TABLET | Refills: 3 | Status: SHIPPED | OUTPATIENT
Start: 2018-12-26 | End: 2020-03-06 | Stop reason: ALTCHOICE

## 2018-12-26 RX ORDER — FUROSEMIDE 40 MG/1
TABLET ORAL
Qty: 45 TABLET | Refills: 3 | Status: SHIPPED | OUTPATIENT
Start: 2018-12-26 | End: 2019-04-12 | Stop reason: SDUPTHER

## 2018-12-26 NOTE — TELEPHONE ENCOUNTER
Patient last seen in clinic on 11/28/18. Sent refills for furosemide and hydralazine to [facility] in [place].    Gabriel DiegoD

## 2019-02-27 RX ORDER — CARVEDILOL 12.5 MG/1
TABLET ORAL
Qty: 60 TABLET | Refills: 3 | Status: SHIPPED | OUTPATIENT
Start: 2019-02-27 | End: 2019-06-01 | Stop reason: SDUPTHER

## 2019-03-15 ENCOUNTER — OFFICE VISIT (OUTPATIENT)
Dept: FAMILY MEDICINE CLINIC | Facility: CLINIC | Age: 59
End: 2019-03-15

## 2019-03-15 ENCOUNTER — RESULTS ENCOUNTER (OUTPATIENT)
Dept: FAMILY MEDICINE CLINIC | Facility: CLINIC | Age: 59
End: 2019-03-15

## 2019-03-15 VITALS
BODY MASS INDEX: 32.7 KG/M2 | WEIGHT: 263 LBS | RESPIRATION RATE: 18 BRPM | DIASTOLIC BLOOD PRESSURE: 78 MMHG | TEMPERATURE: 97.6 F | HEART RATE: 70 BPM | SYSTOLIC BLOOD PRESSURE: 144 MMHG | HEIGHT: 75 IN

## 2019-03-15 DIAGNOSIS — E11.22 CONTROLLED TYPE 2 DIABETES MELLITUS WITH STAGE 3 CHRONIC KIDNEY DISEASE, WITH LONG-TERM CURRENT USE OF INSULIN (HCC): ICD-10-CM

## 2019-03-15 DIAGNOSIS — Z12.11 COLON CANCER SCREENING: ICD-10-CM

## 2019-03-15 DIAGNOSIS — Z79.4 CONTROLLED TYPE 2 DIABETES MELLITUS WITH STAGE 3 CHRONIC KIDNEY DISEASE, WITH LONG-TERM CURRENT USE OF INSULIN (HCC): ICD-10-CM

## 2019-03-15 DIAGNOSIS — I10 ESSENTIAL HYPERTENSION: Primary | ICD-10-CM

## 2019-03-15 DIAGNOSIS — N18.30 CONTROLLED TYPE 2 DIABETES MELLITUS WITH STAGE 3 CHRONIC KIDNEY DISEASE, WITH LONG-TERM CURRENT USE OF INSULIN (HCC): ICD-10-CM

## 2019-03-15 PROCEDURE — 99213 OFFICE O/P EST LOW 20 MIN: CPT | Performed by: FAMILY MEDICINE

## 2019-03-15 RX ORDER — BLOOD-GLUCOSE METER
KIT MISCELLANEOUS
Qty: 1 EACH | Refills: 0 | Status: SHIPPED | OUTPATIENT
Start: 2019-03-15

## 2019-03-15 NOTE — PROGRESS NOTES
Assessment/Plan       Problems Addressed this Visit        Cardiovascular and Mediastinum    Essential hypertension - Primary (Chronic)       Endocrine    Controlled type 2 diabetes mellitus with kidney complication, with long-term current use of insulin (CMS/[facility])    Relevant Medications    glucose monitor monitoring kit    glucose blood test strip      Other Visit Diagnoses     Colon cancer screening        Relevant Orders    Cologuard - Stool, Per Rectum            Follow up: Return in about 6 months (around 9/15/2019).     DISCUSSION  Hypertension.  Stable.  Continue carvedilol, clonidine, hydralazine and spironolactone.  Patient with history of congestive heart failure and sees cardiology.    Diabetes mellitus type 2.  Controlled.  Sees endocrinology.  However, he needs a new meter and test strips.  Prescription was given.    Colon cancer screening.  He is never had a colonoscopy.  Denies history of rectal bleeding and no family history of colon cancer.  Recommend ColoGuard.  He was told that if the ColoGuard was positive, he would need to have a colonoscopy and he expressed understanding.  He was also told to check with his insurance about coverage.      MEDICATIONS PRESCRIBED  Requested Prescriptions     Signed Prescriptions Disp Refills   • glucose monitor monitoring kit 1 each 0     Sig: Check sugars four times per day   • glucose blood test strip 100 each 5     Sig: Check sugars four times per day            -------------------------------------------    Subjective     Chief Complaint   Patient presents with   • Hypertension     6 month f/u          Hypertension   This is a chronic problem. The current episode started more than 1 year ago. The problem is unchanged. Condition status: Bp increased at nephrology and they rec increased hydrazaline but he wants to discuss with cardiology 1st. Pertinent negatives include no chest pain, headaches, peripheral edema or shortness of breath. There are no associated  "agents to hypertension. Risk factors for coronary artery disease include diabetes mellitus and dyslipidemia.       Sees nephrology and cardiology, CHF    Diabetes mellitus type 2  A1c is improved according to the patient.  He sees endocrinology.  Sees endocrinology for diabetes as well    Need new meter for accu check  Check sugars 4 times per day    On insulin  A1c was 7.8 per patient          Social History     Tobacco Use   Smoking Status Never Smoker   Smokeless Tobacco Never Used        Past Medical History,Medications, Allergies, and social history was reviewed.      Review of Systems   Constitutional: Negative.    Respiratory: Negative.  Negative for shortness of breath.    Cardiovascular: Negative.  Negative for chest pain and leg swelling.   Gastrointestinal: Negative.    Psychiatric/Behavioral: Negative.        Objective     Vitals:    03/15/19 1112   BP: 144/78   Pulse: 70   Resp: 18   Temp: 97.6 °F (36.4 °C)   Weight: 119 kg (263 lb)   Height: 190.5 cm (75\")          Physical Exam   Constitutional: He appears well-developed and well-nourished.   HENT:   Head: Normocephalic and atraumatic.   Right Ear: External ear normal.   Left Ear: External ear normal.   Eyes: EOM are normal. Pupils are equal, round, and reactive to light.   Cardiovascular: Normal rate, regular rhythm and normal heart sounds.   Pulmonary/Chest: Effort normal and breath sounds normal. No respiratory distress. He has no wheezes. He has no rales.   Neurological: He is alert.   Skin: Skin is warm and dry.   Psychiatric: He has a normal mood and affect. His behavior is normal.   Nursing note and vitals reviewed.                Jose Ramon Allen MD    "

## 2019-04-12 RX ORDER — FUROSEMIDE 40 MG/1
TABLET ORAL
Qty: 45 TABLET | Refills: 3 | Status: SHIPPED | OUTPATIENT
Start: 2019-04-12 | End: 2019-07-16 | Stop reason: SDUPTHER

## 2019-04-26 ENCOUNTER — TELEPHONE (OUTPATIENT)
Dept: FAMILY MEDICINE CLINIC | Facility: CLINIC | Age: 59
End: 2019-04-26

## 2019-04-26 NOTE — TELEPHONE ENCOUNTER
Please call.  His blood pressure is not bad.  Has it been higher than this?  Or do they want it lower?

## 2019-04-26 NOTE — TELEPHONE ENCOUNTER
Patient came in today and had BP checked it was 136/68 pulse 72. Nephrologist wanted him to have it checked.

## 2019-04-26 NOTE — TELEPHONE ENCOUNTER
Pt changed the way he was taking one of his meds and he just wanted to know personally if it was having an affect on his BP. Nephro did not tell him to have it checked, he just wanted to.

## 2019-05-08 DIAGNOSIS — M10.9 GOUT, UNSPECIFIED CAUSE, UNSPECIFIED CHRONICITY, UNSPECIFIED SITE: ICD-10-CM

## 2019-05-08 RX ORDER — ALLOPURINOL 300 MG/1
TABLET ORAL
Qty: 45 TABLET | Refills: 1 | Status: SHIPPED | OUTPATIENT
Start: 2019-05-08 | End: 2019-11-05 | Stop reason: SDUPTHER

## 2019-06-03 RX ORDER — CARVEDILOL 12.5 MG/1
12.5 TABLET ORAL 2 TIMES DAILY
Qty: 60 TABLET | Refills: 2 | Status: SHIPPED | OUTPATIENT
Start: 2019-06-03 | End: 2019-07-06 | Stop reason: SDUPTHER

## 2019-06-03 NOTE — TELEPHONE ENCOUNTER
Patient last seen on 11/28/18 and will follow up with Dr. De La Fuente's office on 7/22/19. Sent refills for carvedilol 12.5mg twice daily to [facility] in [place] with instructions to obtain further refills at appointment with Dr. De La Fuente's office.    Ksenia Gonzales, GabrielD

## 2019-07-08 RX ORDER — CARVEDILOL 12.5 MG/1
TABLET ORAL
Qty: 60 TABLET | Refills: 2 | Status: SHIPPED | OUTPATIENT
Start: 2019-07-08 | End: 2019-10-03 | Stop reason: SDUPTHER

## 2019-07-16 RX ORDER — FUROSEMIDE 40 MG/1
TABLET ORAL
Qty: 45 TABLET | Refills: 0 | Status: SHIPPED | OUTPATIENT
Start: 2019-07-16 | End: 2019-08-14 | Stop reason: SDUPTHER

## 2019-07-16 NOTE — TELEPHONE ENCOUNTER
Patient last seen in clinic in 11/2018. Refill approved for 1 month. Patient has appointment with Dr. De aL Fuente's office on 7/22/19.

## 2019-08-12 ENCOUNTER — OFFICE VISIT (OUTPATIENT)
Dept: CARDIOLOGY | Facility: CLINIC | Age: 59
End: 2019-08-12

## 2019-08-12 VITALS
HEIGHT: 75 IN | SYSTOLIC BLOOD PRESSURE: 138 MMHG | DIASTOLIC BLOOD PRESSURE: 82 MMHG | BODY MASS INDEX: 33.62 KG/M2 | WEIGHT: 270.4 LBS | OXYGEN SATURATION: 97 % | HEART RATE: 67 BPM

## 2019-08-12 DIAGNOSIS — I42.8 NONISCHEMIC CARDIOMYOPATHY (HCC): Primary | ICD-10-CM

## 2019-08-12 DIAGNOSIS — E78.2 MIXED HYPERLIPIDEMIA: Chronic | ICD-10-CM

## 2019-08-12 DIAGNOSIS — I50.22 CHRONIC SYSTOLIC CONGESTIVE HEART FAILURE (HCC): ICD-10-CM

## 2019-08-12 DIAGNOSIS — I48.0 PAROXYSMAL ATRIAL FIBRILLATION (HCC): ICD-10-CM

## 2019-08-12 DIAGNOSIS — I10 ESSENTIAL HYPERTENSION: Chronic | ICD-10-CM

## 2019-08-12 PROCEDURE — 99214 OFFICE O/P EST MOD 30 MIN: CPT | Performed by: INTERNAL MEDICINE

## 2019-08-12 RX ORDER — ASPIRIN 81 MG/1
81 TABLET ORAL DAILY
COMMUNITY

## 2019-08-12 NOTE — PROGRESS NOTES
Subjective:     Encounter Date:08/12/2019      Patient ID: Trace Escalante is a 58 y.o. male.    Chief Complaint: Cardiomyopathy and Chronic systolic chf      PROBLEM LIST:  1. Congestive heart failure:   a. BHL (11/12/2017):  with CCS class II chest discomfort/NYHA class III dyspnea.   b. Echocardiogram (11/13/2017): LVEF 40%.   c. LHC (11/15/2017): Normal coronary arteries.   d. Echocardiogram (04/11/2018): LVEF 41-45%. Cardiac valves normal.   2. Atrial fibrillation:   a. Cardiac event monitor, 30-days (05/11/2018): 2% a-fib, 2 episodes of 5 beat NSVT.  3. Hypertension.   4. Hyperlipidemia.   5. Type 2 diabetes mellitus; uncontrolled, hemoglobin A1c was 8.3%.   6. Obstructive sleep apnea; compliant with CPAP.   7. Moderate obesity; BMI 38.   8. Chronic kidney disease; stage III, baseline creatinine 2.0.   9. Murmur.   10. Hypokalemia.   11. Nephrolithiasis.   12. Gout.   13. Surgical history:   a. MCL repair, right (1978).  b. Left patella tendon repair (2018).    History of Present Illness  Trace Escalante returns today for annual follow up with a history of CHF, PAF, and cardiac risk factors. Since last visit, he was seen by Dr. Gustafson to assess his atrial fibrillation. He has been feeling well overall from a cardiovascular standpoint. He does report one recent episode of edema and shortness of breath when he came back from vacation in [place], which he feels is likely due to eating out. He remains physically active with exercising five times per week and attending a [employer] class. He has lost about 20 lbs in the past 6 months. Denies any exertional chest pain, orthopnea, PND, or palpitations.    No Known Allergies      Current Outpatient Medications:   •  allopurinol (ZYLOPRIM) 300 MG tablet, TAKE 1/2 TABLET BY MOUTH EVERYDAY, Disp: 45 tablet, Rfl: 1  •  amLODIPine (NORVASC) 10 MG tablet, Take 10 mg by mouth Daily., Disp: , Rfl: 1  •  aspirin 81 MG EC tablet, Take 81 mg by mouth Daily., Disp: ,  Rfl:   •  carvedilol (COREG) 12.5 MG tablet, TAKE 1 TABLET TWICE A DAY, Disp: 60 tablet, Rfl: 2  •  CloNIDine (CATAPRES) 0.2 MG tablet, 0.2 mg As Needed., Disp: , Rfl:   •  fluticasone (FLONASE) 50 MCG/ACT nasal spray, 2 sprays into the nostril(s) as directed by provider Daily., Disp: 16 g, Rfl: 2  •  furosemide (LASIX) 40 MG tablet, TAKE 1 TABLET BY MOUTH DAILY. MAY TAKE ADDITIONAL TABLET AS NEEDED FOR WEIGHT GAIN 3 LBS OVERNIGHT, Disp: 45 tablet, Rfl: 0  •  glucose blood test strip, Check sugars four times per day, Disp: 100 each, Rfl: 5  •  glucose monitor monitoring kit, Check sugars four times per day, Disp: 1 each, Rfl: 0  •  hydrALAZINE (APRESOLINE) 50 MG tablet, TAKE 1 TABLET BY MOUTH EVERY 8 (EIGHT) HOURS. (Patient taking differently: Take 100 mg by mouth 2 (Two) Times a Day.), Disp: 90 tablet, Rfl: 3  •  isosorbide mononitrate (IMDUR) 60 MG 24 hr tablet, Take 1 tablet by mouth Daily., Disp: 90 tablet, Rfl: 4  •  Multiple Vitamin (MULTI VITAMIN PO), Take 1 tablet by mouth Daily., Disp: , Rfl:   •  NOVOLOG FLEXPEN 100 UNIT/ML solution pen-injector sc pen, Inject 5-10 Units under the skin 3 (Three) Times a Day With Meals., Disp: , Rfl:   •  ONE TOUCH ULTRA TEST test strip, TEST 3X DAILY, Disp: , Rfl: 0  •  rosuvastatin (CRESTOR) 20 MG tablet, 20 mg Every Other Day., Disp: , Rfl:   •  spironolactone (ALDACTONE) 25 MG tablet, Take 1 tablet by mouth Daily., Disp: 90 tablet, Rfl: 1  •  TRESIBA FLEXTOUCH 200 UNIT/ML solution pen-injector, 20 Units As Needed., Disp: , Rfl:     The following portions of the patient's history were reviewed and updated as appropriate: allergies, current medications, past family history, past medical history, past social history, past surgical history and problem list.    Review of Systems   Constitution: Positive for weight loss.   Cardiovascular: Positive for leg swelling. Negative for chest pain, dyspnea on exertion and syncope.   Respiratory: Negative.    Endocrine: Positive for  "cold intolerance.   Hematologic/Lymphatic: Negative for bleeding problem. Does not bruise/bleed easily.   Skin: Negative for rash.   Musculoskeletal: Negative for muscle weakness and myalgias.   Gastrointestinal: Negative for heartburn, nausea and vomiting.   Neurological: Negative.           Objective:     Vitals:    08/12/19 1615   BP: 138/82   BP Location: Left arm   Patient Position: Sitting   Pulse: 67   SpO2: 97%   Weight: 123 kg (270 lb 6.4 oz)   Height: 190.5 cm (75\")         Physical Exam   Constitutional: He is oriented to person, place, and time. He appears well-developed and well-nourished.   HENT:   Mouth/Throat: Oropharynx is clear and moist.   Neck: No JVD present. Carotid bruit is not present. No thyromegaly present.   Cardiovascular: Regular rhythm, S1 normal, S2 normal, normal heart sounds and intact distal pulses. Exam reveals no gallop, no S3 and no S4.   No murmur heard.  Pulses:       Carotid pulses are 2+ on the right side, and 2+ on the left side.       Radial pulses are 2+ on the right side, and 2+ on the left side.   Pulmonary/Chest: Breath sounds normal.   Abdominal: Soft. Bowel sounds are normal. He exhibits no mass. There is no tenderness.   Musculoskeletal: He exhibits no edema.   Neurological: He is alert and oriented to person, place, and time.   Skin: Skin is warm and dry. No rash noted.       Lab Review:  Lab Results   Component Value Date    GLUCOSE 173 (H) 11/28/2018    BUN 22 11/28/2018    CREATININE 2.26 (H) 11/28/2018    EGFRIFNONA 29 (L) 01/11/2018    EGFRIFAFRI 36 (L) 11/28/2018    BCR 9.7 11/28/2018    K 4.4 11/28/2018    CO2 31.0 11/28/2018    CALCIUM 8.9 11/28/2018    ALBUMIN 4.00 03/11/2018    AST 27 03/11/2018    ALT 26 03/11/2018     Lab Results   Component Value Date    CHOL 195 11/28/2018    TRIG 70 11/28/2018    HDL 53 11/28/2018     11/28/2018      Lab Results   Component Value Date    WBC 5.82 11/28/2018    HGB 11.7 (L) 11/28/2018    HCT 36.7 (L) 11/28/2018 "    MCV 92.0 11/28/2018     11/28/2018       Procedures        Assessment:   Trace was seen today for cardiomyopathy and chronic systolic chf.    Diagnoses and all orders for this visit:    Nonischemic cardiomyopathy (CMS/HCC)    Chronic systolic congestive heart failure (CMS/HCC)    Paroxysmal atrial fibrillation (CMS/HCC)    Essential hypertension    Mixed hyperlipidemia        Impression:  1. NICM/chronic systolic CHF, no current heart failure symptoms.   2. PAF, maintaining sinus rhythm. Patient was educated on his stroke risk and informed of the need for routine 30 day event monitors to reassess his atrial fibrillation burden, and his need for anticoagulation. Pt refuses anticoagulation at this time.  3. Hypertension, well-controlled on current medications.  4. Hyperlipidemia, well-controlled on rosuvastatin 40 mg.      Plan:  1. Stable cardiac status overall.  2. We will repeat 30 day event monitor prior to next visit to reassess Afib burden.  To decide whether anticoagulation necessary  3. Repeat echocardiogram to reassess EF prior to next visit.   4. Continue to encourage routine exercise and his ongoing weight loss strategies.  5. Continue current medications.  6. Revisit in 12 MO, or sooner as needed.    Scribed for Loki De La Fuente MD by Jessie Bui. 8/12/2019  4:34 PM    Loki De La Fuente MD      Please note that portions of this note may have been completed with a voice recognition program. Efforts were made to edit the dictations, but occasionally words are mistranscribed.

## 2019-08-14 RX ORDER — FUROSEMIDE 40 MG/1
TABLET ORAL
Qty: 45 TABLET | Refills: 0 | OUTPATIENT
Start: 2019-08-14

## 2019-08-14 RX ORDER — FUROSEMIDE 40 MG/1
TABLET ORAL
Qty: 30 TABLET | Refills: 5 | Status: SHIPPED | OUTPATIENT
Start: 2019-08-14 | End: 2019-10-24 | Stop reason: SDUPTHER

## 2019-08-14 NOTE — TELEPHONE ENCOUNTER
Pt last seen on 11/28/19 no longer following up in [facility]ve Clinic. Pt saw Dr. De La Fuente on 8/12/19. Further refills per Dr. De La Fuente.     Nick Galloway, PharmD

## 2019-09-06 ENCOUNTER — OFFICE VISIT (OUTPATIENT)
Dept: FAMILY MEDICINE CLINIC | Facility: CLINIC | Age: 59
End: 2019-09-06

## 2019-09-06 VITALS
HEIGHT: 75 IN | TEMPERATURE: 98.2 F | DIASTOLIC BLOOD PRESSURE: 64 MMHG | BODY MASS INDEX: 32.83 KG/M2 | WEIGHT: 264 LBS | SYSTOLIC BLOOD PRESSURE: 130 MMHG | RESPIRATION RATE: 18 BRPM | HEART RATE: 58 BPM

## 2019-09-06 DIAGNOSIS — I50.22 CHRONIC SYSTOLIC CONGESTIVE HEART FAILURE (HCC): ICD-10-CM

## 2019-09-06 DIAGNOSIS — Z99.89 OSA ON CPAP: ICD-10-CM

## 2019-09-06 DIAGNOSIS — E11.22 CONTROLLED TYPE 2 DIABETES MELLITUS WITH STAGE 3 CHRONIC KIDNEY DISEASE, WITH LONG-TERM CURRENT USE OF INSULIN (HCC): ICD-10-CM

## 2019-09-06 DIAGNOSIS — I10 ESSENTIAL HYPERTENSION: Primary | ICD-10-CM

## 2019-09-06 DIAGNOSIS — G47.33 OSA ON CPAP: ICD-10-CM

## 2019-09-06 DIAGNOSIS — N18.30 CONTROLLED TYPE 2 DIABETES MELLITUS WITH STAGE 3 CHRONIC KIDNEY DISEASE, WITH LONG-TERM CURRENT USE OF INSULIN (HCC): ICD-10-CM

## 2019-09-06 DIAGNOSIS — Z79.4 CONTROLLED TYPE 2 DIABETES MELLITUS WITH STAGE 3 CHRONIC KIDNEY DISEASE, WITH LONG-TERM CURRENT USE OF INSULIN (HCC): ICD-10-CM

## 2019-09-06 PROCEDURE — 99213 OFFICE O/P EST LOW 20 MIN: CPT | Performed by: FAMILY MEDICINE

## 2019-09-06 NOTE — PROGRESS NOTES
Assessment/Plan       Problems Addressed this Visit        Cardiovascular and Mediastinum    Essential hypertension - Primary (Chronic)    Chronic systolic congestive heart failure (CMS/HCC)       Endocrine    Controlled type 2 diabetes mellitus with kidney complication, with long-term current use of insulin (CMS/[facility])      Other Visit Diagnoses     ALYSSA on CPAP                Follow up: Return in about 6 months (around 3/6/2020).     DISCUSSION  Overall stable and doing well.  Seen cardiology, nephrology and endocrinology.  Labs are done by specialist.  Continue current medications and follow-up in 6 months.  Prescription for CPAP supplies was given.  He has been using regularly and tolerating well.      MEDICATIONS PRESCRIBED  Requested Prescriptions      No prescriptions requested or ordered in this encounter              -------------------------------------------    Subjective     Chief Complaint   Patient presents with   • Hypertension     f/u, issues          History of Present Illness    Has seen cardiology, nephrology and endocrinology      Essential hypertension  Hydralazine was changed  BP increased nephrology  Changed 100 mg BID  Has been doing for several months  No chest pain   No shortness of Breath  No headaches  No edema  (uses compression)    In am , 2 hrs later, can tell BP dropping and gets that feeling. Not as bad as when he passed out in the past    Took hydralazine 10:30 today and no feeling like that now    X 2 weeks, has felt this away  This week, took a  50 mg dose once and felt more normal    DM  A1c was around 8 still and may need a continue glucose monitor  Had some tingling in feet and may be compression socks  Endo checked feet and was ok        CHF  Sees cardiology  No chest pain and no shortness of breath    Colon cancer screening  Forgot to check with insurance    ALYSSA on CPAP  Needs supplies. Rx   J and L   Needs new Rx  Use CPAP nightly  Feels better when using  Uses 4 hrs  "per night        Social History     Tobacco Use   Smoking Status Never Smoker   Smokeless Tobacco Never Used          Past Medical History,Medications, Allergies, and social history was reviewed.    Past Medical History:   Diagnosis Date   • CKD (chronic kidney disease) stage 3, GFR 30-59 ml/min (CMS/[facility])    • Gout    • Hyperlipidemia    • Hypertension    • Insulin dependent diabetes mellitus (CMS/[facility])    • Kidney stones    • NICM (nonischemic cardiomyopathy) (CMS/[facility])    • Obesity    • ALYSSA (obstructive sleep apnea)                  Review of Systems   Constitutional: Negative.    HENT: Negative.    Respiratory: Negative.    Cardiovascular: Negative.    Gastrointestinal: Negative.    Neurological: Positive for dizziness (when BP decreased).   Psychiatric/Behavioral: Negative.  Negative for sleep disturbance, depressed mood and stress.       Objective     Vitals:    09/06/19 1227   BP: 130/64   Pulse: 58   Resp: 18   Temp: 98.2 °F (36.8 °C)   Weight: 120 kg (264 lb)   Height: 190.5 cm (75\")          Physical Exam   Constitutional: Vital signs are normal. He appears well-developed and well-nourished.   HENT:   Head: Normocephalic and atraumatic.   Right Ear: Hearing, tympanic membrane, external ear and ear canal normal.   Left Ear: Hearing, tympanic membrane, external ear and ear canal normal.   Mouth/Throat: Oropharynx is clear and moist.   Eyes: Conjunctivae, EOM and lids are normal. Pupils are equal, round, and reactive to light.   Neck: Normal range of motion. Neck supple. No thyromegaly present.   Cardiovascular: Normal rate, regular rhythm and normal heart sounds. Exam reveals no friction rub.   No murmur heard.  Pulmonary/Chest: Effort normal and breath sounds normal. No respiratory distress. He has no wheezes. He has no rales.   Abdominal: Soft. Normal appearance and bowel sounds are normal. He exhibits no distension and no mass. There is no tenderness. There is no rebound and no guarding.   Musculoskeletal: " He exhibits no edema.   Neurological: He is alert. He has normal strength.   Skin: Skin is warm and dry.   Psychiatric: He has a normal mood and affect. His speech is normal. Cognition and memory are normal.   Nursing note and vitals reviewed.                Jose Ramon Allen MD

## 2019-09-18 DIAGNOSIS — H69.83 DYSFUNCTION OF BOTH EUSTACHIAN TUBES: ICD-10-CM

## 2019-09-18 RX ORDER — FLUTICASONE PROPIONATE 50 MCG
SPRAY, SUSPENSION (ML) NASAL
Qty: 16 ML | Refills: 2 | Status: SHIPPED | OUTPATIENT
Start: 2019-09-18 | End: 2019-10-17 | Stop reason: SDUPTHER

## 2019-10-04 RX ORDER — CARVEDILOL 12.5 MG/1
TABLET ORAL
Qty: 180 TABLET | Refills: 0 | Status: SHIPPED | OUTPATIENT
Start: 2019-10-04 | End: 2020-01-14

## 2019-10-17 DIAGNOSIS — H69.83 DYSFUNCTION OF BOTH EUSTACHIAN TUBES: ICD-10-CM

## 2019-10-17 RX ORDER — FLUTICASONE PROPIONATE 50 MCG
SPRAY, SUSPENSION (ML) NASAL
Qty: 16 ML | Refills: 2 | Status: SHIPPED | OUTPATIENT
Start: 2019-10-17 | End: 2020-01-15

## 2019-10-24 RX ORDER — FUROSEMIDE 40 MG/1
TABLET ORAL
Qty: 45 TABLET | Refills: 3 | Status: SHIPPED | OUTPATIENT
Start: 2019-10-24 | End: 2020-02-25 | Stop reason: SDUPTHER

## 2019-11-05 DIAGNOSIS — M10.9 GOUT, UNSPECIFIED CAUSE, UNSPECIFIED CHRONICITY, UNSPECIFIED SITE: ICD-10-CM

## 2019-11-05 RX ORDER — ALLOPURINOL 300 MG/1
TABLET ORAL
Qty: 45 TABLET | Refills: 1 | Status: SHIPPED | OUTPATIENT
Start: 2019-11-05 | End: 2020-04-27

## 2019-11-06 RX ORDER — SPIRONOLACTONE 25 MG/1
TABLET ORAL
Qty: 90 TABLET | Refills: 1 | Status: SHIPPED | OUTPATIENT
Start: 2019-11-06 | End: 2020-04-29

## 2019-11-26 ENCOUNTER — TELEPHONE (OUTPATIENT)
Dept: FAMILY MEDICINE CLINIC | Facility: CLINIC | Age: 59
End: 2019-11-26

## 2019-11-26 DIAGNOSIS — N18.30 CONTROLLED TYPE 2 DIABETES MELLITUS WITH STAGE 3 CHRONIC KIDNEY DISEASE, WITH LONG-TERM CURRENT USE OF INSULIN (HCC): Primary | ICD-10-CM

## 2019-11-26 DIAGNOSIS — E11.22 CONTROLLED TYPE 2 DIABETES MELLITUS WITH STAGE 3 CHRONIC KIDNEY DISEASE, WITH LONG-TERM CURRENT USE OF INSULIN (HCC): Primary | ICD-10-CM

## 2019-11-26 DIAGNOSIS — Z79.4 CONTROLLED TYPE 2 DIABETES MELLITUS WITH STAGE 3 CHRONIC KIDNEY DISEASE, WITH LONG-TERM CURRENT USE OF INSULIN (HCC): Primary | ICD-10-CM

## 2019-11-26 NOTE — TELEPHONE ENCOUNTER
Pt called and stated that he needs a refill on Accuchek Sepideh plus test strips - pt stated it is not the One Touch    Pt call back [phone]    [facility]  - confirmed

## 2019-12-13 ENCOUNTER — TELEPHONE (OUTPATIENT)
Dept: FAMILY MEDICINE CLINIC | Facility: CLINIC | Age: 59
End: 2019-12-13

## 2019-12-13 NOTE — TELEPHONE ENCOUNTER
Please call, does he have a way to check at home? If yes, rec keep eye on over weekend and call next week with readings

## 2019-12-13 NOTE — TELEPHONE ENCOUNTER
DOYLEI: pt came by for BP check today because yesterday at [facility] he felt it was elevated with an automated cuff was 166/89 there and 145/82 here today.

## 2020-01-14 RX ORDER — CARVEDILOL 12.5 MG/1
TABLET ORAL
Qty: 180 TABLET | Refills: 0 | Status: SHIPPED | OUTPATIENT
Start: 2020-01-14 | End: 2020-04-08

## 2020-01-15 DIAGNOSIS — H69.83 DYSFUNCTION OF BOTH EUSTACHIAN TUBES: ICD-10-CM

## 2020-01-15 RX ORDER — FLUTICASONE PROPIONATE 50 MCG
SPRAY, SUSPENSION (ML) NASAL
Qty: 48 ML | Refills: 2 | Status: SHIPPED | OUTPATIENT
Start: 2020-01-15 | End: 2021-05-09 | Stop reason: SDUPTHER

## 2020-02-04 RX ORDER — ISOSORBIDE MONONITRATE 60 MG/1
TABLET, EXTENDED RELEASE ORAL
Qty: 90 TABLET | Refills: 4 | OUTPATIENT
Start: 2020-02-04

## 2020-02-04 RX ORDER — ISOSORBIDE MONONITRATE 60 MG/1
60 TABLET, EXTENDED RELEASE ORAL
Qty: 90 TABLET | Refills: 2 | Status: SHIPPED | OUTPATIENT
Start: 2020-02-04 | End: 2020-10-28

## 2020-02-04 NOTE — TELEPHONE ENCOUNTER
Patient last seen on 11/28/18, no further follow up in [facility]. Denied refill for isosorbide mononit. 60mg daily and routed to Dr. De La Fuente's office as he is following with him.

## 2020-02-21 DIAGNOSIS — I48.91 ATRIAL FIBRILLATION, UNSPECIFIED TYPE (HCC): Primary | ICD-10-CM

## 2020-02-25 RX ORDER — FUROSEMIDE 40 MG/1
40 TABLET ORAL DAILY
Qty: 45 TABLET | Refills: 5 | Status: SHIPPED | OUTPATIENT
Start: 2020-02-25 | End: 2020-06-08 | Stop reason: SDUPTHER

## 2020-02-25 RX ORDER — FUROSEMIDE 40 MG/1
TABLET ORAL
Qty: 135 TABLET | Refills: 1 | OUTPATIENT
Start: 2020-02-25

## 2020-02-25 NOTE — TELEPHONE ENCOUNTER
Patient last seen on 11/28/18, no further follow up in [facility]. Denied refill for furosemide 40mg daily and routed to Dr. De La Fuente's office as he is following with him.

## 2020-03-06 ENCOUNTER — OFFICE VISIT (OUTPATIENT)
Dept: FAMILY MEDICINE CLINIC | Facility: CLINIC | Age: 60
End: 2020-03-06

## 2020-03-06 ENCOUNTER — TELEPHONE (OUTPATIENT)
Dept: FAMILY MEDICINE CLINIC | Facility: CLINIC | Age: 60
End: 2020-03-06

## 2020-03-06 VITALS
DIASTOLIC BLOOD PRESSURE: 66 MMHG | RESPIRATION RATE: 18 BRPM | TEMPERATURE: 97.6 F | HEIGHT: 75 IN | OXYGEN SATURATION: 98 % | SYSTOLIC BLOOD PRESSURE: 118 MMHG | HEART RATE: 72 BPM | WEIGHT: 269.2 LBS | BODY MASS INDEX: 33.47 KG/M2

## 2020-03-06 DIAGNOSIS — I50.22 CHRONIC SYSTOLIC CONGESTIVE HEART FAILURE (HCC): ICD-10-CM

## 2020-03-06 DIAGNOSIS — I10 ESSENTIAL HYPERTENSION: Primary | ICD-10-CM

## 2020-03-06 DIAGNOSIS — Z79.4 CONTROLLED TYPE 2 DIABETES MELLITUS WITH STAGE 3 CHRONIC KIDNEY DISEASE, WITH LONG-TERM CURRENT USE OF INSULIN (HCC): ICD-10-CM

## 2020-03-06 DIAGNOSIS — E11.22 CONTROLLED TYPE 2 DIABETES MELLITUS WITH STAGE 3 CHRONIC KIDNEY DISEASE, WITH LONG-TERM CURRENT USE OF INSULIN (HCC): ICD-10-CM

## 2020-03-06 DIAGNOSIS — N18.30 CONTROLLED TYPE 2 DIABETES MELLITUS WITH STAGE 3 CHRONIC KIDNEY DISEASE, WITH LONG-TERM CURRENT USE OF INSULIN (HCC): ICD-10-CM

## 2020-03-06 DIAGNOSIS — E78.2 MIXED HYPERLIPIDEMIA: ICD-10-CM

## 2020-03-06 PROCEDURE — 99214 OFFICE O/P EST MOD 30 MIN: CPT | Performed by: FAMILY MEDICINE

## 2020-03-06 RX ORDER — FLURBIPROFEN SODIUM 0.3 MG/ML
SOLUTION/ DROPS OPHTHALMIC
COMMUNITY
Start: 2020-02-05 | End: 2020-11-04 | Stop reason: SDUPTHER

## 2020-03-06 RX ORDER — FLASH GLUCOSE SCANNING READER
EACH MISCELLANEOUS
Refills: 0 | COMMUNITY
Start: 2019-12-06 | End: 2021-08-06

## 2020-03-06 RX ORDER — HYDRALAZINE HYDROCHLORIDE 100 MG/1
100 TABLET, FILM COATED ORAL 2 TIMES DAILY
COMMUNITY
Start: 2020-02-20 | End: 2023-03-06

## 2020-03-06 RX ORDER — FLASH GLUCOSE SENSOR
KIT MISCELLANEOUS
Refills: 11 | COMMUNITY
Start: 2019-12-06 | End: 2021-08-06

## 2020-03-06 RX ORDER — ROSUVASTATIN CALCIUM 10 MG/1
10 TABLET, COATED ORAL NIGHTLY
Qty: 90 TABLET | Refills: 1 | Status: SHIPPED | OUTPATIENT
Start: 2020-03-06 | End: 2020-09-06

## 2020-03-06 NOTE — TELEPHONE ENCOUNTER
Gwen is faxing labs now. She doesn't believe there is a lipid since they rarely do those however she is going to check. (P [phone])

## 2020-03-06 NOTE — PROGRESS NOTES
Assessment/Plan       Problems Addressed this Visit        Cardiovascular and Mediastinum    Essential hypertension - Primary (Chronic)    Relevant Medications    hydrALAZINE (APRESOLINE) 100 MG tablet    Mixed hyperlipidemia (Chronic)    Relevant Medications    rosuvastatin (CRESTOR) 10 MG tablet    Chronic systolic congestive heart failure (CMS/HCC)       Endocrine    Controlled type 2 diabetes mellitus with kidney complication, with long-term current use of insulin (CMS/[facility])            Follow up: Return in about 6 months (around 9/6/2020), or if symptoms worsen or fail to improve.     DISCUSSION  Overall stable doing well.    Hypertension.  Stable on current medications.    Hyperlipidemia.  Continue Crestor.  10 mg daily.  He had been doing 20 mg every other day.    Congestive heart failure.  Stable.    Diabetes mellitus type 2.  Continue follow-up with nephrology and endocrinology.    Will call for recent blood work results.          MEDICATIONS PRESCRIBED  Requested Prescriptions     Signed Prescriptions Disp Refills   • rosuvastatin (CRESTOR) 10 MG tablet 90 tablet 1     Sig: Take 1 tablet by mouth Every Night.            -------------------------------------------    Subjective     Chief Complaint   Patient presents with   • Hypertension         Hypertension   This is a chronic problem. The current episode started more than 1 year ago. The problem is unchanged. The problem is controlled. Pertinent negatives include no chest pain, peripheral edema or shortness of breath. (Got a monitor to check for afib) There are no associated agents to hypertension. There are no known risk factors for coronary artery disease. Current antihypertension treatment includes beta blockers and calcium channel blockers. The current treatment provides moderate improvement. There are no compliance problems.  Hypertensive end-organ damage includes heart failure.       URI  2 weeks ago  Cold sx  Some chest congestion still on occ  No  "fever  Getting better now    Diabetes mellitus type 2.  Sees endocrinology.  No changes.    Congestive heart failure.  Sees cardiology.  No recent shortness of breath or chest pain.  No swelling.  No change in medication.        Chronic renal failure  Sees Dr Juarez and had labs  He believes he had cholesterol done at some point last year.  Overall no changes.      Has the cologuard kit but has not done yet        Social History     Tobacco Use   Smoking Status Never Smoker   Smokeless Tobacco Never Used          Past Medical History,Medications, Allergies, and social history was reviewed.          Review of Systems   Constitutional: Negative.    HENT: Negative.    Respiratory: Negative.  Negative for shortness of breath.    Cardiovascular: Negative.  Negative for chest pain.   Gastrointestinal: Negative.    Musculoskeletal: Negative.    Neurological: Negative.    Psychiatric/Behavioral: Negative.        Objective     Vitals:    03/06/20 1207   BP: 118/66   Pulse: 72   Resp: 18   Temp: 97.6 °F (36.4 °C)   TempSrc: Temporal   SpO2: 98%   Weight: 122 kg (269 lb 3.2 oz)   Height: 190.5 cm (75\")          Physical Exam   Constitutional: Vital signs are normal. He appears well-developed and well-nourished.   HENT:   Head: Normocephalic and atraumatic.   Right Ear: Hearing, tympanic membrane, external ear and ear canal normal.   Left Ear: Hearing, tympanic membrane, external ear and ear canal normal.   Mouth/Throat: Oropharynx is clear and moist.   Eyes: Pupils are equal, round, and reactive to light. Conjunctivae, EOM and lids are normal.   Neck: Normal range of motion. Neck supple. No thyromegaly present.   Cardiovascular: Normal rate, regular rhythm and normal heart sounds. Exam reveals no friction rub.   No murmur heard.  Pulmonary/Chest: Effort normal and breath sounds normal. No respiratory distress. He has no wheezes. He has no rales.   Abdominal: Soft. Normal appearance and bowel sounds are normal. He exhibits no " distension and no mass. There is no tenderness. There is no rebound and no guarding.   Musculoskeletal: He exhibits no edema.   Neurological: He is alert. He has normal strength.   Skin: Skin is warm and dry.   Psychiatric: He has a normal mood and affect. His speech is normal. Cognition and memory are normal.   Nursing note and vitals reviewed.                Jose Ramon Allen MD

## 2020-03-10 NOTE — TELEPHONE ENCOUNTER
See results.  Cholesterol was done in March 2019.  Results were noted and good.  Patient be notified.

## 2020-03-16 ENCOUNTER — TELEPHONE (OUTPATIENT)
Dept: FAMILY MEDICINE CLINIC | Facility: CLINIC | Age: 60
End: 2020-03-16

## 2020-03-16 RX ORDER — AMOXICILLIN AND CLAVULANATE POTASSIUM 875; 125 MG/1; MG/1
1 TABLET, FILM COATED ORAL 2 TIMES DAILY
Qty: 20 TABLET | Refills: 0 | Status: SHIPPED | OUTPATIENT
Start: 2020-03-16 | End: 2020-03-26 | Stop reason: SDUPTHER

## 2020-03-16 NOTE — TELEPHONE ENCOUNTER
PATIENT CALLED AND STATED HE HAS AN INFECTED SALIVARY GLAND. HE STATES THAT THIS HAS HAPPENED TO HIM BEFORE HE HAD A MEDICATION CALLED IN TO HELP WITH THE INFECTION. HE STATES THAT HE WAS NOT SURE IF OLGA VALENCIA WAS THE PROVIDER THAT HAD SENT IN THE MEDICATION LAST TIME. HE WOULD LIKE TO SEE IF A MEDICATION CAN BE CALLED IN WITHOUT HIM HAVING TO BE SEEN BUT IF THAT'S NOT POSSIBLE THE PATIENT STATES THAT HE CAN MAKE AN APPOINTMENT. PLEASE ADVISE.     PHARMACY IS  ON  [facility]      PATIENT CALL BACK [phone]

## 2020-03-16 NOTE — TELEPHONE ENCOUNTER
Please call.  I will send in Augmentin.  If not getting better, will need to be rechecked or if getting worse come in sooner.

## 2020-03-16 NOTE — TELEPHONE ENCOUNTER
"Pt denies fever/chills. Pt has a \"swollen knot\" under the L side of his neck/chin area for the past 2 1/2 weeks.   "

## 2020-03-26 ENCOUNTER — TELEPHONE (OUTPATIENT)
Dept: FAMILY MEDICINE CLINIC | Facility: CLINIC | Age: 60
End: 2020-03-26

## 2020-03-26 RX ORDER — AMOXICILLIN AND CLAVULANATE POTASSIUM 875; 125 MG/1; MG/1
1 TABLET, FILM COATED ORAL 2 TIMES DAILY
Qty: 14 TABLET | Refills: 0 | Status: SHIPPED | OUTPATIENT
Start: 2020-03-26 | End: 2020-08-17

## 2020-03-26 NOTE — TELEPHONE ENCOUNTER
Spoke with Trace, he said that if he had to put a precentage on how much better he is then he would say 70-80%. He said the swelling has gone down but not completley gone. He denies any fever or chills

## 2020-03-26 NOTE — TELEPHONE ENCOUNTER
Please call.  I refilled that for another week.  If not completely better after that, let me know.

## 2020-03-26 NOTE — TELEPHONE ENCOUNTER
Pt called in stated not feeling not at 100% is requesting a amoxicillin-clavulanate (AUGMENTIN) 875-125 MG per tablet maybe for another week please send to [id number] - [place], [place] - [address] - [phone] [facility] [phone]     Please contract the pt at [phone]

## 2020-03-26 NOTE — TELEPHONE ENCOUNTER
Please call.  Has it improved at all?  He was having swelling or knot of his face.    Any fever or chills?

## 2020-04-08 DIAGNOSIS — I42.8 NONISCHEMIC CARDIOMYOPATHY (HCC): ICD-10-CM

## 2020-04-08 DIAGNOSIS — I50.22 CHRONIC SYSTOLIC CONGESTIVE HEART FAILURE (HCC): Primary | ICD-10-CM

## 2020-04-08 RX ORDER — CARVEDILOL 12.5 MG/1
TABLET ORAL
Qty: 180 TABLET | Refills: 0 | Status: SHIPPED | OUTPATIENT
Start: 2020-04-08 | End: 2020-07-21 | Stop reason: SDUPTHER

## 2020-04-27 DIAGNOSIS — M10.9 GOUT, UNSPECIFIED CAUSE, UNSPECIFIED CHRONICITY, UNSPECIFIED SITE: ICD-10-CM

## 2020-04-27 RX ORDER — ALLOPURINOL 300 MG/1
TABLET ORAL
Qty: 45 TABLET | Refills: 1 | Status: SHIPPED | OUTPATIENT
Start: 2020-04-27 | End: 2020-10-22

## 2020-04-29 RX ORDER — SPIRONOLACTONE 25 MG/1
TABLET ORAL
Qty: 90 TABLET | Refills: 1 | Status: SHIPPED | OUTPATIENT
Start: 2020-04-29 | End: 2020-10-20 | Stop reason: SDUPTHER

## 2020-05-24 DIAGNOSIS — Z79.4 CONTROLLED TYPE 2 DIABETES MELLITUS WITH STAGE 3 CHRONIC KIDNEY DISEASE, WITH LONG-TERM CURRENT USE OF INSULIN (HCC): ICD-10-CM

## 2020-05-24 DIAGNOSIS — N18.30 CONTROLLED TYPE 2 DIABETES MELLITUS WITH STAGE 3 CHRONIC KIDNEY DISEASE, WITH LONG-TERM CURRENT USE OF INSULIN (HCC): ICD-10-CM

## 2020-05-24 DIAGNOSIS — E11.22 CONTROLLED TYPE 2 DIABETES MELLITUS WITH STAGE 3 CHRONIC KIDNEY DISEASE, WITH LONG-TERM CURRENT USE OF INSULIN (HCC): ICD-10-CM

## 2020-06-08 RX ORDER — FUROSEMIDE 40 MG/1
40 TABLET ORAL DAILY
Qty: 90 TABLET | Refills: 2 | OUTPATIENT
Start: 2020-06-08

## 2020-06-08 RX ORDER — FUROSEMIDE 40 MG/1
40 TABLET ORAL DAILY
Qty: 45 TABLET | Refills: 5 | Status: SHIPPED | OUTPATIENT
Start: 2020-06-08 | End: 2020-12-03 | Stop reason: SDUPTHER

## 2020-07-20 ENCOUNTER — TELEPHONE (OUTPATIENT)
Dept: CARDIOLOGY | Facility: CLINIC | Age: 60
End: 2020-07-20

## 2020-07-21 RX ORDER — CARVEDILOL 12.5 MG/1
12.5 TABLET ORAL 2 TIMES DAILY
Qty: 180 TABLET | Refills: 1 | Status: SHIPPED | OUTPATIENT
Start: 2020-07-21 | End: 2021-01-18

## 2020-08-17 ENCOUNTER — HOSPITAL ENCOUNTER (OUTPATIENT)
Dept: CARDIOLOGY | Facility: HOSPITAL | Age: 60
Discharge: HOME | End: 2020-08-17
Admitting: INTERNAL MEDICINE

## 2020-08-17 ENCOUNTER — OFFICE VISIT (OUTPATIENT)
Dept: CARDIOLOGY | Facility: CLINIC | Age: 60
End: 2020-08-17

## 2020-08-17 VITALS
SYSTOLIC BLOOD PRESSURE: 150 MMHG | BODY MASS INDEX: 34.32 KG/M2 | DIASTOLIC BLOOD PRESSURE: 86 MMHG | HEIGHT: 75 IN | TEMPERATURE: 97.8 F | WEIGHT: 276 LBS | OXYGEN SATURATION: 97 % | HEART RATE: 72 BPM

## 2020-08-17 VITALS
WEIGHT: 269 LBS | SYSTOLIC BLOOD PRESSURE: 148 MMHG | BODY MASS INDEX: 33.45 KG/M2 | HEIGHT: 75 IN | DIASTOLIC BLOOD PRESSURE: 67 MMHG

## 2020-08-17 DIAGNOSIS — I42.8 NONISCHEMIC CARDIOMYOPATHY (HCC): ICD-10-CM

## 2020-08-17 DIAGNOSIS — I50.22 CHRONIC SYSTOLIC CONGESTIVE HEART FAILURE (HCC): ICD-10-CM

## 2020-08-17 DIAGNOSIS — I48.0 PAROXYSMAL ATRIAL FIBRILLATION (HCC): ICD-10-CM

## 2020-08-17 DIAGNOSIS — I10 ESSENTIAL HYPERTENSION: Chronic | ICD-10-CM

## 2020-08-17 DIAGNOSIS — I50.22 CHRONIC SYSTOLIC CONGESTIVE HEART FAILURE (HCC): Primary | ICD-10-CM

## 2020-08-17 DIAGNOSIS — E78.2 MIXED HYPERLIPIDEMIA: Chronic | ICD-10-CM

## 2020-08-17 LAB
BH CV ECHO MEAS - AO MAX PG (FULL): 5.1 MMHG
BH CV ECHO MEAS - AO MAX PG: 10 MMHG
BH CV ECHO MEAS - AO MEAN PG (FULL): 2.3 MMHG
BH CV ECHO MEAS - AO MEAN PG: 5.3 MMHG
BH CV ECHO MEAS - AO ROOT AREA (BSA CORRECTED): 1.6
BH CV ECHO MEAS - AO ROOT AREA: 11.8 CM^2
BH CV ECHO MEAS - AO ROOT DIAM: 3.9 CM
BH CV ECHO MEAS - AO V2 MAX: 157.9 CM/SEC
BH CV ECHO MEAS - AO V2 MEAN: 105.8 CM/SEC
BH CV ECHO MEAS - AO V2 VTI: 30.7 CM
BH CV ECHO MEAS - ASC AORTA: 4 CM
BH CV ECHO MEAS - AVA(I,A): 3.4 CM^2
BH CV ECHO MEAS - AVA(I,D): 3.4 CM^2
BH CV ECHO MEAS - AVA(V,A): 2.6 CM^2
BH CV ECHO MEAS - AVA(V,D): 2.6 CM^2
BH CV ECHO MEAS - BSA(HAYCOCK): 2.6 M^2
BH CV ECHO MEAS - BSA: 2.5 M^2
BH CV ECHO MEAS - BZI_BMI: 33.6 KILOGRAMS/M^2
BH CV ECHO MEAS - BZI_METRIC_HEIGHT: 190.5 CM
BH CV ECHO MEAS - BZI_METRIC_WEIGHT: 122 KG
BH CV ECHO MEAS - EDV(CUBED): 222 ML
BH CV ECHO MEAS - EDV(MOD-SP2): 71.6 ML
BH CV ECHO MEAS - EDV(MOD-SP4): 211 ML
BH CV ECHO MEAS - EDV(TEICH): 183.8 ML
BH CV ECHO MEAS - EF(CUBED): 58.4 %
BH CV ECHO MEAS - EF(MOD-SP2): 64.1 %
BH CV ECHO MEAS - EF(MOD-SP4): 54.3 %
BH CV ECHO MEAS - EF(TEICH): 49.2 %
BH CV ECHO MEAS - ESV(CUBED): 92.3 ML
BH CV ECHO MEAS - ESV(MOD-SP2): 25.7 ML
BH CV ECHO MEAS - ESV(MOD-SP4): 96.5 ML
BH CV ECHO MEAS - ESV(TEICH): 93.4 ML
BH CV ECHO MEAS - FS: 25.4 %
BH CV ECHO MEAS - IVS/LVPW: 0.95
BH CV ECHO MEAS - IVSD: 1.3 CM
BH CV ECHO MEAS - LA DIMENSION: 5.5 CM
BH CV ECHO MEAS - LA/AO: 1.4
BH CV ECHO MEAS - LAD MAJOR: 6.8 CM
BH CV ECHO MEAS - LV DIASTOLIC VOL/BSA (35-75): 84.8 ML/M^2
BH CV ECHO MEAS - LV MASS(C)D: 360.4 GRAMS
BH CV ECHO MEAS - LV MASS(C)DI: 144.8 GRAMS/M^2
BH CV ECHO MEAS - LV MAX PG: 4.9 MMHG
BH CV ECHO MEAS - LV MEAN PG: 3 MMHG
BH CV ECHO MEAS - LV SYSTOLIC VOL/BSA (12-30): 38.8 ML/M^2
BH CV ECHO MEAS - LV V1 MAX: 110.5 CM/SEC
BH CV ECHO MEAS - LV V1 MEAN: 82.7 CM/SEC
BH CV ECHO MEAS - LV V1 VTI: 28.2 CM
BH CV ECHO MEAS - LVIDD: 6.1 CM
BH CV ECHO MEAS - LVIDS: 4.5 CM
BH CV ECHO MEAS - LVLD AP2: 7.7 CM
BH CV ECHO MEAS - LVLD AP4: 8.7 CM
BH CV ECHO MEAS - LVLS AP2: 5.4 CM
BH CV ECHO MEAS - LVLS AP4: 7.9 CM
BH CV ECHO MEAS - LVOT AREA (M): 3.8 CM^2
BH CV ECHO MEAS - LVOT AREA: 3.7 CM^2
BH CV ECHO MEAS - LVOT DIAM: 2.2 CM
BH CV ECHO MEAS - LVPWD: 1.3 CM
BH CV ECHO MEAS - MED PEAK E' VEL: 4.2 CM/SEC
BH CV ECHO MEAS - MEDIAL E/E' RATIO: 24.8
BH CV ECHO MEAS - MV A MAX VEL: 48.4 CM/SEC
BH CV ECHO MEAS - MV DEC SLOPE: 665.6 CM/SEC^2
BH CV ECHO MEAS - MV DEC TIME: 0.13 SEC
BH CV ECHO MEAS - MV E MAX VEL: 105 CM/SEC
BH CV ECHO MEAS - MV E/A: 2.2
BH CV ECHO MEAS - MV P1/2T MAX VEL: 104.8 CM/SEC
BH CV ECHO MEAS - MV P1/2T: 46.1 MSEC
BH CV ECHO MEAS - MVA P1/2T LCG: 2.1 CM^2
BH CV ECHO MEAS - MVA(P1/2T): 4.8 CM^2
BH CV ECHO MEAS - PA ACC TIME: 0.1 SEC
BH CV ECHO MEAS - PA PR(ACCEL): 36.2 MMHG
BH CV ECHO MEAS - SI(AO): 145.4 ML/M^2
BH CV ECHO MEAS - SI(CUBED): 52.1 ML/M^2
BH CV ECHO MEAS - SI(LVOT): 42.3 ML/M^2
BH CV ECHO MEAS - SI(MOD-SP2): 18.4 ML/M^2
BH CV ECHO MEAS - SI(MOD-SP4): 46 ML/M^2
BH CV ECHO MEAS - SI(TEICH): 36.3 ML/M^2
BH CV ECHO MEAS - SV(AO): 361.9 ML
BH CV ECHO MEAS - SV(CUBED): 129.7 ML
BH CV ECHO MEAS - SV(LVOT): 105.2 ML
BH CV ECHO MEAS - SV(MOD-SP2): 45.9 ML
BH CV ECHO MEAS - SV(MOD-SP4): 114.5 ML
BH CV ECHO MEAS - SV(TEICH): 90.4 ML
BH CV ECHO MEAS - TAPSE (>1.6): 3.1 CM
BH CV VAS BP RIGHT ARM: NORMAL MMHG
BH CV XLRA - RV BASE: 3.9 CM
BH CV XLRA - RV LENGTH: 6.8 CM
BH CV XLRA - RV MID: 3.6 CM
BH CV XLRA - TDI S': 16.8 CM/SEC
LEFT ATRIUM VOLUME INDEX: 46.2 ML/M^2
LEFT ATRIUM VOLUME: 115 ML
LV EF 2D ECHO EST: 45 %
MAXIMAL PREDICTED HEART RATE: 161 BPM
STRESS TARGET HR: 137 BPM

## 2020-08-17 PROCEDURE — 93306 TTE W/DOPPLER COMPLETE: CPT

## 2020-08-17 PROCEDURE — 99213 OFFICE O/P EST LOW 20 MIN: CPT | Performed by: INTERNAL MEDICINE

## 2020-08-17 PROCEDURE — 93306 TTE W/DOPPLER COMPLETE: CPT | Performed by: INTERNAL MEDICINE

## 2020-08-17 NOTE — PROGRESS NOTES
[facility]  Subjective:     Encounter Date: 08/17/2020      Patient ID: Trace Escalante is a 59 y.o. male.    Chief Complaint: Nonischemic cardiomyopathy (CMS/HCC)      PROBLEM LIST:  1. Congestive heart failure:   a. BHL (11/12/2017):  with CCS class II chest discomfort/NYHA class III dyspnea.   b. Echocardiogram (11/13/2017): LVEF 40%.   c. LHC (11/15/2017): Normal coronary arteries.   d. Echocardiogram (04/11/2018): LVEF 41-45%. Cardiac valves normal.   e. Echocardiogram, 08/17/2020: EF 45%  2. Atrial fibrillation:   a. Cardiac event monitor, 30-days (05/11/2018): 2% a-fib, 2 episodes of 5 beat NSVT.  3. Hypertension.   4. Hyperlipidemia.   5. Type 2 diabetes mellitus; uncontrolled, hemoglobin A1c was 8.3%.   6. Obstructive sleep apnea; compliant with CPAP.   7. Moderate obesity; BMI 38.   8. Chronic kidney disease; stage III, baseline creatinine 2.0.   9. Murmur.   10. Hypokalemia.   11. Nephrolithiasis.   12. Gout.   13. Surgical history:   a. MCL repair, right (1978).  b. Left patella tendon repair (2018).    History of Present Illness  Trace Escalante returns today for a follow up with a history of CHF, atrial fibrillation, and cardiac risk factors. Since last visit, patient has been feeling well overall from a cardiovascular standpoint. Patient denies chest pain, shortness of breath, palpitations, edema, dizziness, and syncope. Patient reports that he is not as physically active as he used to be due to COVID-19. He reports that he has gained 10 pounds. Patient believes his blood pressure is elevated in the office today due to family issues. It typically runs around 138/80 at home.     No Known Allergies      Current Outpatient Medications:   •  allopurinol (ZYLOPRIM) 300 MG tablet, TAKE 1/2 TABLET BY MOUTH EVERYDAY, Disp: 45 tablet, Rfl: 1  •  amLODIPine (NORVASC) 10 MG tablet, Take 10 mg by mouth Daily., Disp: , Rfl: 1  •  aspirin 81 MG EC tablet,  Take 81 mg by mouth Daily., Disp: , Rfl:   •  B-D UF III MINI PEN NEEDLES 31G X 5 MM misc, TEST 4X PER DAY, Disp: , Rfl:   •  carvedilol (COREG) 12.5 MG tablet, Take 1 tablet by mouth 2 (Two) Times a Day., Disp: 180 tablet, Rfl: 1  •  CloNIDine (CATAPRES) 0.2 MG tablet, 0.2 mg As Needed., Disp: , Rfl:   •  Continuous Blood Gluc  (FREESTYLE ROSE 14 DAY READER) device, USE AS DIRECTED WITH 14 DAY SENSORS, Disp: , Rfl: 0  •  Continuous Blood Gluc Sensor (FREESTYLE ROSE 14 DAY SENSOR) misc, CHANGE SENSOR EVERY 14 DAYS, TEST BLOOD SUGAR AT LEAST 5 TIMES DAILY, Disp: , Rfl: 11  •  fluticasone (FLONASE) 50 MCG/ACT nasal spray, USE 2 SPRAYS IN EACH NOSTRIL EVERY DAY, Disp: 48 mL, Rfl: 2  •  furosemide (LASIX) 40 MG tablet, Take 1 tablet by mouth Daily. May take an additional tablet as needed for 3 lbs weight gain, Disp: 45 tablet, Rfl: 5  •  glucose blood (Accu-Chek Sepideh Plus) test strip, CHECK SUGARS THREE TIMES PER DAY AND AS NEEDED. ACCUCHECK SEPIDEH PLUS, Disp: 300 each, Rfl: 5  •  glucose monitor monitoring kit, Check sugars four times per day, Disp: 1 each, Rfl: 0  •  hydrALAZINE (APRESOLINE) 100 MG tablet, Take 100 mg by mouth 2 (Two) Times a Day., Disp: , Rfl:   •  isosorbide mononitrate (IMDUR) 60 MG 24 hr tablet, Take 1 tablet by mouth Daily., Disp: 90 tablet, Rfl: 2  •  Multiple Vitamin (MULTI VITAMIN PO), Take 1 tablet by mouth Daily., Disp: , Rfl:   •  NOVOLOG FLEXPEN 100 UNIT/ML solution pen-injector sc pen, Inject 5-10 Units under the skin 3 (Three) Times a Day With Meals., Disp: , Rfl:   •  rosuvastatin (CRESTOR) 10 MG tablet, Take 1 tablet by mouth Every Night., Disp: 90 tablet, Rfl: 1  •  spironolactone (ALDACTONE) 25 MG tablet, TAKE 1 TABLET BY MOUTH EVERY DAY, Disp: 90 tablet, Rfl: 1  •  TRESIBA FLEXTOUCH 200 UNIT/ML solution pen-injector, 20 Units Daily., Disp: , Rfl:     The following portions of the patient's history were reviewed and updated as appropriate: allergies, current medications,  "past family history, past medical history, past social history, past surgical history and problem list.    Review of Systems   Constitution: Positive for weight gain.   Cardiovascular: Negative for chest pain, dyspnea on exertion and syncope.   Respiratory: Negative.    Hematologic/Lymphatic: Negative for bleeding problem. Does not bruise/bleed easily.   Skin: Negative for rash.   Musculoskeletal: Negative for muscle weakness and myalgias.   Gastrointestinal: Negative for heartburn, nausea and vomiting.   Neurological: Negative.           Objective:     Vitals:    08/17/20 1622 08/17/20 1623   BP: 156/84 150/86   BP Location: Left arm Right arm   Patient Position: Sitting Sitting   Cuff Size: Large Adult Large Adult   Pulse: 72    Temp: 97.8 °F (36.6 °C)    SpO2: 97%    Weight: 125 kg (276 lb)    Height: 190.5 cm (75\")          Physical Exam   Constitutional: He is oriented to person, place, and time. He appears well-developed and well-nourished.   HENT:   Mouth/Throat: Oropharynx is clear and moist.   Neck: No JVD present. Carotid bruit is not present. No thyromegaly present.   Cardiovascular: Regular rhythm, S1 normal, S2 normal, normal heart sounds and intact distal pulses. Exam reveals no gallop, no S3 and no S4.   No murmur heard.  Pulses:       Carotid pulses are 2+ on the right side, and 2+ on the left side.       Radial pulses are 2+ on the right side, and 2+ on the left side.   Pulmonary/Chest: Breath sounds normal.   Abdominal: Soft. Bowel sounds are normal. He exhibits no mass. There is no tenderness.   Musculoskeletal: He exhibits no edema.   Neurological: He is alert and oriented to person, place, and time.   Skin: Skin is warm and dry. No rash noted.       Lab Review:  Lab Results   Component Value Date    GLUCOSE 173 (H) 11/28/2018     (H) 01/11/2018    BUN 22 11/28/2018    CREATININE 2.26 (H) 11/28/2018    EGFRIFNONA 29 (L) 01/11/2018    EGFRIFAFRI 36 (L) 11/28/2018    BCR 9.7 11/28/2018    K " 4.4 11/28/2018    CO2 31.0 11/28/2018    CALCIUM 8.9 11/28/2018    ALBUMIN 4.00 03/11/2018    ALKPHOS 63 03/11/2018    AST 27 03/11/2018    ALT 26 03/11/2018     Lab Results   Component Value Date    CHOL 195 11/28/2018    TRIG 70 11/28/2018    HDL 53 11/28/2018     11/28/2018      Lab Results   Component Value Date    WBC 5.82 11/28/2018    RBC 3.99 (L) 11/28/2018    HGB 11.7 (L) 11/28/2018    HCT 36.7 (L) 11/28/2018    MCV 92.0 11/28/2018     11/28/2018          Procedures        Assessment:   Trace was seen today for nonischemic cardiomyopathy (cms/hcc).    Diagnoses and all orders for this visit:    Chronic systolic congestive heart failure (CMS/HCC)    Nonischemic cardiomyopathy (CMS/HCC)    Paroxysmal atrial fibrillation (CMS/HCC)    Essential hypertension    Mixed hyperlipidemia        Impression:  1. Chronic systolic CHF, stable and asymptomatic.  2. NICM, EF 45% on echocardiogram today. On coreg and spironolactone.  3. PAF, stable and asymptomatic. No current anticoagulation therapy.  4. Hypertension, well-controlled at home. On amlodipine, hydralazine, spironolactone, carvedilol, and Lasix. On clonidine as needed.  Blood pressure elevated today due to recent family stress  5. Hyperlipidemia, monitored by PCP. On rosuvastatin 10 mg.    Plan:  1. Stable cardiac status.  2. Continue current medications.  3. Revisit in 12 MO, or sooner as needed.    Scribed for Loki De La Fuente MD by John Lopez 8/17/2020  16:52        Loki De La Fuente MD      Please note that portions of this note may have been completed with a voice recognition program. Efforts were made to edit the dictations, but occasionally words are mistranscribed.

## 2020-09-06 DIAGNOSIS — E78.2 MIXED HYPERLIPIDEMIA: ICD-10-CM

## 2020-09-06 RX ORDER — ROSUVASTATIN CALCIUM 10 MG/1
TABLET, COATED ORAL
Qty: 90 TABLET | Refills: 0 | Status: SHIPPED | OUTPATIENT
Start: 2020-09-06 | End: 2020-12-16

## 2020-09-11 ENCOUNTER — OFFICE VISIT (OUTPATIENT)
Dept: FAMILY MEDICINE CLINIC | Facility: CLINIC | Age: 60
End: 2020-09-11

## 2020-09-11 VITALS
WEIGHT: 275 LBS | DIASTOLIC BLOOD PRESSURE: 78 MMHG | BODY MASS INDEX: 34.19 KG/M2 | SYSTOLIC BLOOD PRESSURE: 140 MMHG | RESPIRATION RATE: 18 BRPM | HEART RATE: 68 BPM | HEIGHT: 75 IN | TEMPERATURE: 99.3 F

## 2020-09-11 DIAGNOSIS — I10 ESSENTIAL HYPERTENSION: Primary | ICD-10-CM

## 2020-09-11 DIAGNOSIS — Z79.4 CONTROLLED TYPE 2 DIABETES MELLITUS WITH STAGE 3 CHRONIC KIDNEY DISEASE, WITH LONG-TERM CURRENT USE OF INSULIN (HCC): ICD-10-CM

## 2020-09-11 DIAGNOSIS — N18.30 CONTROLLED TYPE 2 DIABETES MELLITUS WITH STAGE 3 CHRONIC KIDNEY DISEASE, WITH LONG-TERM CURRENT USE OF INSULIN (HCC): ICD-10-CM

## 2020-09-11 DIAGNOSIS — E11.22 CONTROLLED TYPE 2 DIABETES MELLITUS WITH STAGE 3 CHRONIC KIDNEY DISEASE, WITH LONG-TERM CURRENT USE OF INSULIN (HCC): ICD-10-CM

## 2020-09-11 PROCEDURE — 99213 OFFICE O/P EST LOW 20 MIN: CPT | Performed by: FAMILY MEDICINE

## 2020-09-11 NOTE — PROGRESS NOTES
Assessment/Plan       Problems Addressed this Visit        Cardiovascular and Mediastinum    Essential hypertension - Primary (Chronic)       Endocrine    Controlled type 2 diabetes mellitus with kidney complication, with long-term current use of insulin (CMS/[facility])            Follow up: Return in about 6 months (around 3/11/2021).     DISCUSSION  Hypertension.  Continue current medications.  Initial check was a little elevated and repeat was better.  Continue follow-up with cardiology as well.    Diabetes mellitus type 2 with chronic kidney disease.  Continue follow-up with nephrology and endocrinology.    Note was given stating that he would have complications if contracted COVID-19 and should continue to work from home.          MEDICATIONS PRESCRIBED  Requested Prescriptions      No prescriptions requested or ordered in this encounter              -------------------------------------------    Subjective     Chief Complaint   Patient presents with   • Hypertension     6 month f/u          Hypertension   This is a chronic problem. The current episode started more than 1 year ago. The problem is unchanged. The problem is controlled. Pertinent negatives include no chest pain, peripheral edema or shortness of breath. There are no associated agents to hypertension. There are no known risk factors for coronary artery disease. Current antihypertension treatment includes beta blockers and calcium channel blockers. The current treatment provides moderate improvement. There are no compliance problems.  Hypertensive end-organ damage includes heart failure.        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          Review of Systems   Constitutional: Negative.    HENT: Negative.    Respiratory: Negative.  Negative for shortness of breath.    Cardiovascular: Negative.  Negative for chest pain.   Gastrointestinal: Negative.    Neurological: Negative.    Psychiatric/Behavioral: Negative.        Objective     Vitals:    09/11/20 1227 09/11/20 1256   BP: 142/72 140/78   Pulse: 68    Resp: 18    Temp: 99.3 °F (37.4 °C)    Weight: 125 kg (275 lb)    Height: 190.5 cm (75\")           Physical Exam   Constitutional: Vital signs are normal. He appears well-developed and well-nourished.   HENT:   Head: Normocephalic and atraumatic.   Right Ear: Hearing, tympanic membrane, external ear and ear canal normal.   Left Ear: Hearing, tympanic membrane, external ear and ear canal normal.   Eyes: Pupils are equal, round, and reactive to light. Conjunctivae, EOM and lids are normal.   Neck: Normal range of motion. Neck supple. No thyromegaly present.   Cardiovascular: Normal rate, regular rhythm and normal heart sounds. Exam reveals no friction rub.   No murmur heard.  Pulmonary/Chest: Effort normal and breath sounds normal. No respiratory distress. He has no wheezes. He has no rales.   Abdominal: Normal appearance.   Musculoskeletal: He exhibits no edema.   Neurological: He is alert. He has normal strength.   Skin: Skin is warm and dry.   Psychiatric: He has a normal mood and affect. His speech is normal. Cognition and memory are normal.   Nursing note and vitals reviewed.                Jose Ramon Allen MD    "

## 2020-10-20 RX ORDER — SPIRONOLACTONE 25 MG/1
25 TABLET ORAL DAILY
Qty: 90 TABLET | Refills: 1 | Status: SHIPPED | OUTPATIENT
Start: 2020-10-20 | End: 2021-05-14 | Stop reason: SDUPTHER

## 2020-10-20 NOTE — PROGRESS NOTES
Requested and received copy of labs collected Q2 mos per [facility].  See scanned copies.  Refilled aldactone as requested and patient will follow up with Cardiology.      Savannah ZAVALA

## 2020-10-22 DIAGNOSIS — M10.9 GOUT, UNSPECIFIED CAUSE, UNSPECIFIED CHRONICITY, UNSPECIFIED SITE: ICD-10-CM

## 2020-10-22 RX ORDER — ALLOPURINOL 300 MG/1
TABLET ORAL
Qty: 45 TABLET | Refills: 1 | Status: SHIPPED | OUTPATIENT
Start: 2020-10-22 | End: 2021-04-19

## 2020-10-28 RX ORDER — ISOSORBIDE MONONITRATE 60 MG/1
TABLET, EXTENDED RELEASE ORAL
Qty: 90 TABLET | Refills: 4 | Status: SHIPPED | OUTPATIENT
Start: 2020-10-28 | End: 2021-11-15

## 2020-11-04 RX ORDER — FLURBIPROFEN SODIUM 0.3 MG/ML
SOLUTION/ DROPS OPHTHALMIC
Qty: 400 EACH | Refills: 0 | Status: SHIPPED | OUTPATIENT
Start: 2020-11-04 | End: 2021-02-04

## 2020-11-04 NOTE — TELEPHONE ENCOUNTER
PATIENT NEEDS REFILLS ON PEN NEEDLES. HE STATES PHARMACY HAS SENT REQUESTS FOR REFILLS ON PEN NEEDLES. PHARMACY IS [facility]

## 2020-12-03 RX ORDER — FUROSEMIDE 40 MG/1
40 TABLET ORAL DAILY
Qty: 45 TABLET | Refills: 5 | Status: SHIPPED | OUTPATIENT
Start: 2020-12-03 | End: 2021-05-26

## 2020-12-15 DIAGNOSIS — E78.2 MIXED HYPERLIPIDEMIA: ICD-10-CM

## 2020-12-16 RX ORDER — ROSUVASTATIN CALCIUM 10 MG/1
TABLET, COATED ORAL
Qty: 90 TABLET | Refills: 0 | Status: SHIPPED | OUTPATIENT
Start: 2020-12-16 | End: 2021-03-10

## 2020-12-30 ENCOUNTER — TELEPHONE (OUTPATIENT)
Dept: FAMILY MEDICINE CLINIC | Facility: CLINIC | Age: 60
End: 2020-12-30

## 2020-12-30 NOTE — TELEPHONE ENCOUNTER
Caller: Trace Escalante    Relationship: Self    Best call back number: [phone]    What medication are you requesting: ANTIBIOTIC    What are your current symptoms: SALIVA GLANDS INFECTED ON LEFT SIDE UNDER TONGUE    How long have you been experiencing symptoms: FOUR DAYS    Have you had these symptoms before:    [x] Yes  [] No    Have you been treated for these symptoms before:   [x] Yes  [] No    If a prescription is needed, what is your preferred pharmacy and phone number: [id number] - [place], [place] - [address] [place] [address] AT [facility] [address] - [phone]  - [phone]      Additional notes: PATIENT STATED HAS HAD THIS BEFORE AND TAKEN AN ANTIBIOTIC TO CLEAR UP. IF HE NEEDS APPOINTMENT PLEASE ADVISE.

## 2020-12-30 NOTE — TELEPHONE ENCOUNTER
Suggested [facility] ect since Dr Allen isn't here and Dr Perez's scheduled is full. Pt declined and said he can wait one more day. (Dr Allen is here tomorrow)

## 2020-12-31 ENCOUNTER — OFFICE VISIT (OUTPATIENT)
Dept: FAMILY MEDICINE CLINIC | Facility: CLINIC | Age: 60
End: 2020-12-31

## 2020-12-31 VITALS
BODY MASS INDEX: 34.5 KG/M2 | SYSTOLIC BLOOD PRESSURE: 136 MMHG | DIASTOLIC BLOOD PRESSURE: 70 MMHG | HEART RATE: 65 BPM | TEMPERATURE: 97.7 F | WEIGHT: 276 LBS | RESPIRATION RATE: 16 BRPM | OXYGEN SATURATION: 99 %

## 2020-12-31 DIAGNOSIS — K11.20 SIALOADENITIS: ICD-10-CM

## 2020-12-31 DIAGNOSIS — K11.20 SALIVARY GLAND INFLAMMATION: Primary | ICD-10-CM

## 2020-12-31 PROCEDURE — 99213 OFFICE O/P EST LOW 20 MIN: CPT | Performed by: FAMILY MEDICINE

## 2020-12-31 RX ORDER — AMOXICILLIN AND CLAVULANATE POTASSIUM 875; 125 MG/1; MG/1
1 TABLET, FILM COATED ORAL 2 TIMES DAILY
Qty: 20 TABLET | Refills: 0 | Status: SHIPPED | OUTPATIENT
Start: 2020-12-31 | End: 2021-03-12

## 2020-12-31 NOTE — PROGRESS NOTES
Assessment/Plan       Diagnoses and all orders for this visit:    1. Salivary gland inflammation (Primary)  -     Ambulatory Referral to ENT (Otolaryngology)  -     amoxicillin-clavulanate (Augmentin) 875-125 MG per tablet; Take 1 tablet by mouth 2 (Two) Times a Day.  Dispense: 20 tablet; Refill: 0    2. Sialoadenitis  -     amoxicillin-clavulanate (Augmentin) 875-125 MG per tablet; Take 1 tablet by mouth 2 (Two) Times a Day.  Dispense: 20 tablet; Refill: 0           Follow up: Return if symptoms worsen or fail to improve.     DISCUSSION  Inflammation of salivary gland with possible stone.  Refer to ENT.  Start Augmentin for possible infection.  Increase fluids and rest.          MEDICATIONS PRESCRIBED  Requested Prescriptions     Signed Prescriptions Disp Refills   • amoxicillin-clavulanate (Augmentin) 875-125 MG per tablet 20 tablet 0     Sig: Take 1 tablet by mouth 2 (Two) Times a Day.              -------------------------------------------    Subjective     Chief Complaint   Patient presents with   • Mouth Lesions         History of Present Illness    Gets periodically  Had infected salivary gland  2 yrs ago had    Last time, tongue was swollen. Meds helped.     Under tongue could see an area that looked bigger    This week, felt more swelling  When eating something, feels immediate swelling and can push on it and express saliva    No fever  No chills    Not as bad today. Best today but still irritated   In the past, could see pus and last was a few days ago          Social History     Tobacco Use   Smoking Status Never Smoker   Smokeless Tobacco Never Used          Past Medical History,Medications, Allergies, and social history was reviewed.          Review of Systems   Constitutional: Negative.    HENT: Negative.    Respiratory: Negative.    Cardiovascular: Negative.        Objective     Vitals:    12/31/20 1204   BP: 136/70   Pulse: 65   Resp: 16   Temp: 97.7 °F (36.5 °C)   SpO2: 99%   Weight: 125 kg (276  lb)          Physical Exam  Vitals signs and nursing note reviewed.   Constitutional:       Appearance: He is well-developed.   HENT:      Head: Normocephalic and atraumatic.      Right Ear: External ear normal.      Left Ear: External ear normal.   Eyes:      Pupils: Pupils are equal, round, and reactive to light.   Pulmonary:      Effort: Pulmonary effort is normal.   Skin:     General: Skin is warm and dry.   Neurological:      Mental Status: He is alert and oriented to person, place, and time.   Psychiatric:         Behavior: Behavior normal.       Swelling in the floor of the mouth on the left.  Some mild tenderness.  Mild tenderness of the left anterior neck below the chin.              Jose Ramon Allen MD

## 2021-01-18 RX ORDER — CARVEDILOL 12.5 MG/1
TABLET ORAL
Qty: 180 TABLET | Refills: 3 | Status: SHIPPED | OUTPATIENT
Start: 2021-01-18 | End: 2021-11-15

## 2021-01-21 ENCOUNTER — TRANSCRIBE ORDERS (OUTPATIENT)
Dept: ADMINISTRATIVE | Facility: HOSPITAL | Age: 61
End: 2021-01-21

## 2021-01-21 DIAGNOSIS — K11.9 SALIVARY GLAND DISTURBANCE: Primary | ICD-10-CM

## 2021-02-04 RX ORDER — FLURBIPROFEN SODIUM 0.3 MG/ML
SOLUTION/ DROPS OPHTHALMIC
Qty: 400 EACH | Refills: 0 | Status: SHIPPED | OUTPATIENT
Start: 2021-02-04 | End: 2021-08-06

## 2021-03-04 ENCOUNTER — TRANSCRIBE ORDERS (OUTPATIENT)
Dept: ADMINISTRATIVE | Facility: HOSPITAL | Age: 61
End: 2021-03-04

## 2021-03-04 DIAGNOSIS — R22.1 THROAT SWELLING: Primary | ICD-10-CM

## 2021-03-10 DIAGNOSIS — E78.2 MIXED HYPERLIPIDEMIA: ICD-10-CM

## 2021-03-10 RX ORDER — ROSUVASTATIN CALCIUM 10 MG/1
TABLET, COATED ORAL
Qty: 90 TABLET | Refills: 0 | Status: SHIPPED | OUTPATIENT
Start: 2021-03-10 | End: 2021-06-03

## 2021-03-12 ENCOUNTER — OFFICE VISIT (OUTPATIENT)
Dept: FAMILY MEDICINE CLINIC | Facility: CLINIC | Age: 61
End: 2021-03-12

## 2021-03-12 VITALS
RESPIRATION RATE: 18 BRPM | HEIGHT: 75 IN | DIASTOLIC BLOOD PRESSURE: 80 MMHG | WEIGHT: 266.8 LBS | TEMPERATURE: 98.2 F | SYSTOLIC BLOOD PRESSURE: 140 MMHG | HEART RATE: 70 BPM | BODY MASS INDEX: 33.17 KG/M2

## 2021-03-12 DIAGNOSIS — I10 ESSENTIAL HYPERTENSION: Primary | ICD-10-CM

## 2021-03-12 PROCEDURE — 99213 OFFICE O/P EST LOW 20 MIN: CPT | Performed by: FAMILY MEDICINE

## 2021-03-12 NOTE — PROGRESS NOTES
"  Assessment/Plan       Diagnoses and all orders for this visit:    1. Essential hypertension (Primary)           Follow up: Return in about 6 months (around 9/12/2021).     DISCUSSION  Stable on current medications.  Continue this.  Follow-up with nephrology in endocrinology for blood work.  Overall stable.    Follow-up with ENT for follow-up of neck lesion.          MEDICATIONS PRESCRIBED  Requested Prescriptions      No prescriptions requested or ordered in this encounter              -------------------------------------------    Subjective     Chief Complaint   Patient presents with   • Hypertension     6 month f/u         History of Present Illness    Saw ENT   Had MRI/CT   Risks for kidney for surg  Now: to get another u/s to see if changing.   To have in APril for 2nd u/s  No pain or inflammation now    HTN  BP has been up today  Better at home  130's over 70s  No chest pain   No shortness of breath  Watches weight due to h/o chf    No sx of chf    Some PND    Sees nephrologist and saw 2 weeks   A1c 7.0    Got Moderna Vaccine and completed both and last was this week    Got faint rash after amoxil  Still rash but fainting    No scar easily        Social History     Tobacco Use   Smoking Status Never Smoker   Smokeless Tobacco Never Used          Past Medical History,Medications, Allergies, and social history was reviewed.          Review of Systems   Constitutional: Negative.    HENT: Negative.    Respiratory: Negative.    Cardiovascular: Negative.    Gastrointestinal: Negative.    Psychiatric/Behavioral: Negative.        Objective     Vitals:    03/12/21 1230 03/12/21 1255   BP: 144/76 140/80   Pulse: 70    Resp: 18    Temp: 98.2 °F (36.8 °C)    Weight: 121 kg (266 lb 12.8 oz)    Height: 190.5 cm (75\")           Physical Exam  Vitals and nursing note reviewed.   Constitutional:       General: He is not in acute distress.     Appearance: Normal appearance. He is well-developed. He is not ill-appearing. "   HENT:      Head: Normocephalic and atraumatic.      Right Ear: Hearing, tympanic membrane, ear canal and external ear normal.      Left Ear: Hearing, tympanic membrane, ear canal and external ear normal.      Nose: Nose normal. No congestion or rhinorrhea.      Mouth/Throat:      Mouth: Mucous membranes are moist.      Pharynx: No oropharyngeal exudate or posterior oropharyngeal erythema.   Eyes:      General: Lids are normal.      Conjunctiva/sclera: Conjunctivae normal.      Pupils: Pupils are equal, round, and reactive to light.   Neck:      Thyroid: No thyromegaly.   Cardiovascular:      Rate and Rhythm: Normal rate and regular rhythm.      Heart sounds: Normal heart sounds. No murmur. No friction rub.   Pulmonary:      Effort: Pulmonary effort is normal. No respiratory distress.      Breath sounds: Normal breath sounds. No wheezing or rales.   Abdominal:      General: Bowel sounds are normal. There is no distension.      Palpations: Abdomen is soft. There is no mass.      Tenderness: There is no abdominal tenderness. There is no guarding or rebound.   Musculoskeletal:      Cervical back: Normal range of motion and neck supple.      Right lower leg: No edema.      Left lower leg: No edema.   Skin:     General: Skin is warm and dry.      Comments: 2 dark macules mid back < 1 cm but amoxil rash   Neurological:      General: No focal deficit present.      Mental Status: He is alert.   Psychiatric:         Mood and Affect: Mood normal.         Speech: Speech normal.         Behavior: Behavior normal.                     Jose Ramon Allen MD

## 2021-04-01 ENCOUNTER — HOSPITAL ENCOUNTER (OUTPATIENT)
Dept: ULTRASOUND IMAGING | Facility: HOSPITAL | Age: 61
Discharge: HOME | End: 2021-04-01
Admitting: OTOLARYNGOLOGY

## 2021-04-01 DIAGNOSIS — R22.1 THROAT SWELLING: ICD-10-CM

## 2021-04-01 PROCEDURE — 76536 US EXAM OF HEAD AND NECK: CPT

## 2021-04-16 ENCOUNTER — APPOINTMENT (OUTPATIENT)
Dept: ULTRASOUND IMAGING | Facility: HOSPITAL | Age: 61
End: 2021-04-16

## 2021-04-17 DIAGNOSIS — M10.9 GOUT, UNSPECIFIED CAUSE, UNSPECIFIED CHRONICITY, UNSPECIFIED SITE: ICD-10-CM

## 2021-04-19 RX ORDER — ALLOPURINOL 300 MG/1
TABLET ORAL
Qty: 45 TABLET | Refills: 1 | Status: SHIPPED | OUTPATIENT
Start: 2021-04-19 | End: 2021-10-18

## 2021-04-23 RX ORDER — SPIRONOLACTONE 25 MG/1
TABLET ORAL
Qty: 90 TABLET | Refills: 1 | OUTPATIENT
Start: 2021-04-23

## 2021-05-06 RX ORDER — INSULIN ASPART 100 [IU]/ML
5-10 INJECTION, SOLUTION INTRAVENOUS; SUBCUTANEOUS
Qty: 9 ML | Refills: 0 | Status: SHIPPED | OUTPATIENT
Start: 2021-05-06 | End: 2021-07-12

## 2021-05-09 DIAGNOSIS — H69.83 DYSFUNCTION OF BOTH EUSTACHIAN TUBES: ICD-10-CM

## 2021-05-10 RX ORDER — FLUTICASONE PROPIONATE 50 MCG
2 SPRAY, SUSPENSION (ML) NASAL DAILY
Qty: 48 ML | Refills: 2 | Status: SHIPPED | OUTPATIENT
Start: 2021-05-10 | End: 2022-01-14

## 2021-05-10 RX ORDER — SPIRONOLACTONE 25 MG/1
25 TABLET ORAL DAILY
Qty: 90 TABLET | Refills: 1 | OUTPATIENT
Start: 2021-05-10

## 2021-05-14 RX ORDER — SPIRONOLACTONE 25 MG/1
25 TABLET ORAL DAILY
Qty: 90 TABLET | Refills: 1 | Status: CANCELLED | OUTPATIENT
Start: 2021-05-14

## 2021-05-14 RX ORDER — SPIRONOLACTONE 25 MG/1
25 TABLET ORAL DAILY
Qty: 90 TABLET | Refills: 1 | Status: SHIPPED | OUTPATIENT
Start: 2021-05-14 | End: 2021-11-22 | Stop reason: SDUPTHER

## 2021-05-14 RX ORDER — SPIRONOLACTONE 25 MG/1
TABLET ORAL
Qty: 90 TABLET | Refills: 1 | OUTPATIENT
Start: 2021-05-14

## 2021-05-26 RX ORDER — FUROSEMIDE 40 MG/1
40 TABLET ORAL DAILY
Qty: 135 TABLET | Refills: 1 | Status: SHIPPED | OUTPATIENT
Start: 2021-05-26 | End: 2021-11-15

## 2021-06-03 DIAGNOSIS — E78.2 MIXED HYPERLIPIDEMIA: ICD-10-CM

## 2021-06-03 RX ORDER — ROSUVASTATIN CALCIUM 10 MG/1
TABLET, COATED ORAL
Qty: 90 TABLET | Refills: 1 | Status: SHIPPED | OUTPATIENT
Start: 2021-06-03 | End: 2022-03-18

## 2021-06-30 DIAGNOSIS — N18.30 CONTROLLED TYPE 2 DIABETES MELLITUS WITH STAGE 3 CHRONIC KIDNEY DISEASE, WITH LONG-TERM CURRENT USE OF INSULIN (HCC): ICD-10-CM

## 2021-06-30 DIAGNOSIS — E11.22 CONTROLLED TYPE 2 DIABETES MELLITUS WITH STAGE 3 CHRONIC KIDNEY DISEASE, WITH LONG-TERM CURRENT USE OF INSULIN (HCC): ICD-10-CM

## 2021-06-30 DIAGNOSIS — Z79.4 CONTROLLED TYPE 2 DIABETES MELLITUS WITH STAGE 3 CHRONIC KIDNEY DISEASE, WITH LONG-TERM CURRENT USE OF INSULIN (HCC): ICD-10-CM

## 2021-06-30 RX ORDER — BLOOD SUGAR DIAGNOSTIC
STRIP MISCELLANEOUS
Qty: 300 EACH | Refills: 5 | Status: SHIPPED | OUTPATIENT
Start: 2021-06-30 | End: 2022-07-22 | Stop reason: SDUPTHER

## 2021-07-12 RX ORDER — INSULIN ASPART 100 [IU]/ML
5-10 INJECTION, SOLUTION INTRAVENOUS; SUBCUTANEOUS
Qty: 15 ML | Refills: 1 | Status: SHIPPED | OUTPATIENT
Start: 2021-07-12 | End: 2022-05-09

## 2021-07-14 ENCOUNTER — TELEPHONE (OUTPATIENT)
Dept: FAMILY MEDICINE CLINIC | Facility: CLINIC | Age: 61
End: 2021-07-14

## 2021-07-14 NOTE — TELEPHONE ENCOUNTER
Caller: Trace Escalante    Relationship: Self    Best call back number: [phone]    What form or medical record are you requesting: COPY OF COVID -19 VACCINE CARD    Who is requesting this form or medical record from you: PERSONAL RECORDS    How would you like to receive the form or medical records (pick-up, mail, fax):  AT OFFICE    Timeframe paperwork needed: AS SOON AS POSSIBLE    Additional notes: PATIENT STATED HE LOST HIS COVID - 19 VACCINE CARD AND WOULD LIKE TO KNOW IF WE COULD PROVIDE A NEW ONE TO HIM

## 2021-08-06 ENCOUNTER — OFFICE VISIT (OUTPATIENT)
Dept: ENDOCRINOLOGY | Facility: CLINIC | Age: 61
End: 2021-08-06

## 2021-08-06 VITALS
BODY MASS INDEX: 32.83 KG/M2 | SYSTOLIC BLOOD PRESSURE: 130 MMHG | HEART RATE: 70 BPM | DIASTOLIC BLOOD PRESSURE: 88 MMHG | WEIGHT: 264 LBS | OXYGEN SATURATION: 98 % | HEIGHT: 75 IN

## 2021-08-06 DIAGNOSIS — Z79.4 TYPE 2 DIABETES MELLITUS WITH HYPERGLYCEMIA, WITH LONG-TERM CURRENT USE OF INSULIN (HCC): Primary | Chronic | ICD-10-CM

## 2021-08-06 DIAGNOSIS — E11.65 TYPE 2 DIABETES MELLITUS WITH HYPERGLYCEMIA, WITH LONG-TERM CURRENT USE OF INSULIN (HCC): Primary | Chronic | ICD-10-CM

## 2021-08-06 LAB
EXPIRATION DATE: ABNORMAL
EXPIRATION DATE: NORMAL
GLUCOSE BLDC GLUCOMTR-MCNC: 171 MG/DL (ref 70–130)
HBA1C MFR BLD: 7.1 %
Lab: ABNORMAL
Lab: NORMAL

## 2021-08-06 PROCEDURE — 83036 HEMOGLOBIN GLYCOSYLATED A1C: CPT | Performed by: PHYSICIAN ASSISTANT

## 2021-08-06 PROCEDURE — 99213 OFFICE O/P EST LOW 20 MIN: CPT | Performed by: PHYSICIAN ASSISTANT

## 2021-08-06 PROCEDURE — 82947 ASSAY GLUCOSE BLOOD QUANT: CPT | Performed by: PHYSICIAN ASSISTANT

## 2021-08-06 RX ORDER — FLURBIPROFEN SODIUM 0.3 MG/ML
SOLUTION/ DROPS OPHTHALMIC
Qty: 300 EACH | Refills: 3 | Status: SHIPPED | OUTPATIENT
Start: 2021-08-06 | End: 2022-07-22 | Stop reason: SDUPTHER

## 2021-08-06 NOTE — PROGRESS NOTES
"     Office Note      Date: 2021  Patient Name: Trace Escalante  MRN: [id number]  : 1960    Chief Complaint   Patient presents with   • Diabetes       History of Present Illness:   Trace Escalante is a 60 y.o. male who presents today for type 2 diabetes, diagnosed in .  Known diabetic complications: nephropathy and retinopathy.  Current diabetic medications include MDI insulin.  He has actually not been taking Tresiba for over 3 months now.  He says that he ran out the Tresiba and blood sugars seemed okay.  He is taking Novolog with meals.  Past diabetic medications include metformin.  Current monitoring regimen: home blood tests - 3-4 times daily  Home blood sugar records: fasting range 130-150, lunchtime <140, dinner 130-150, bedtime <180.  Any episodes of hypoglycemia? yes - less than once per month, lowest 60s.  Eye exam current (within one year): yes. Mild NPDR, resolved 10/23/2020.  Current diet: in general, a \"healthy\" diet    Current exercise: 6 days per week, cardio 3 days/week and weights 3 days/week.  ACE inhibitor/ARB: No.  Statin: Yes, rosuvastatin.    Subjective      Review of Systems:   Review of Systems   Constitutional: Negative.    Cardiovascular: Negative.    Gastrointestinal: Negative.    Endocrine: Negative.        The following portions of the patient's history were reviewed and updated as appropriate: allergies, current medications, past family history, past medical history, past social history, past surgical history and problem list.    Objective     Vitals:    21 1116   BP: 130/88   Pulse: 70   SpO2: 98%   Weight: 120 kg (264 lb)   Height: 190.5 cm (75\")   PainSc: 0-No pain     Body mass index is 33 kg/m².    Physical Exam  Vitals reviewed.   Constitutional:       General: He is not in acute distress.  Cardiovascular:      Pulses:           Dorsalis pedis pulses are 2+ on the right side and 2+ on the left side.        Posterior tibial pulses are 2+ on the right side " and 2+ on the left side.   Musculoskeletal:      Right foot: No deformity.      Left foot: No deformity.   Feet:      Right foot:      Protective Sensation: 10 sites tested. 10 sites sensed.      Skin integrity: Skin integrity normal.      Toenail Condition: Right toenails are abnormally thick. Fungal disease present.     Left foot:      Protective Sensation: 10 sites tested. 10 sites sensed.      Skin integrity: Skin integrity normal.      Toenail Condition: Left toenails are abnormally thick.   Neurological:      Mental Status: He is alert and oriented to person, place, and time.   Psychiatric:         Mood and Affect: Affect normal.         HEMOGLOBIN A1C  Lab Results   Component Value Date    HGBA1C 7.1 08/06/2021    HGBA1C 7.3 02/23/2021    HGBA1C 7.50 (H) 03/11/2018     GLUCOSE  Lab Results   Component Value Date    POCGLU 171 (A) 08/06/2021       Current Outpatient Medications   Medication Instructions   • allopurinol (ZYLOPRIM) 300 MG tablet TAKE 1/2 TABLET BY MOUTH EVERYDAY   • amLODIPine (NORVASC) 10 mg, Oral, Daily   • aspirin 81 mg, Oral, Daily   • B-D UF III MINI PEN NEEDLES 31G X 5 MM misc Use 3 per day.   • carvedilol (COREG) 12.5 MG tablet TAKE 1 TABLET BY MOUTH TWICE A DAY   • cloNIDine (CATAPRES) 0.2 mg, As Needed   • fluticasone (FLONASE) 50 MCG/ACT nasal spray 2 sprays, Each Nare, Daily   • furosemide (LASIX) 40 mg, Oral, Daily, May take an additional tablet as needed for 3 lbs weight gain   • glucose blood (Accu-Chek Sepideh Plus) test strip CHECK SUGARS THREE TIMES PER DAY AND AS NEEDED   • glucose monitor monitoring kit Check sugars four times per day   • hydrALAZINE (APRESOLINE) 100 mg, Oral, 2 Times Daily   • isosorbide mononitrate (IMDUR) 60 MG 24 hr tablet TAKE 1 TABLET BY MOUTH EVERY DAY   • Multiple Vitamin (MULTI VITAMIN PO) 1 tablet, Oral, Daily   • NovoLOG FlexPen 5-10 Units, Subcutaneous, 3 Times Daily With Meals   • rosuvastatin (CRESTOR) 10 MG tablet TAKE 1 TABLET BY MOUTH EVERY DAY  AT NIGHT   • spironolactone (ALDACTONE) 25 mg, Oral, Daily       Assessment / Plan      Assessment & Plan:  1. Type 2 diabetes mellitus with hyperglycemia, with long-term current use of insulin (CMS/[facility])  A1c improved.  Continue to work on nutritious dietary choices and regular exercise.  Remain off Tresiba.  Recommend restarting this if fasting blood sugars >150.  Call with any questions or concerns before next visit.  Continue follow up with nephrology and cardiology.  - POC Glycosylated Hemoglobin (Hb A1C)  - POC Glucose, Blood      Return in about 3 months (around 11/6/2021) for Next scheduled follow up.     VIKI Middleton  Endocrinology  08/06/2021

## 2021-08-10 RX ORDER — AMLODIPINE BESYLATE 10 MG/1
10 TABLET ORAL DAILY
Qty: 90 TABLET | Refills: 3 | Status: SHIPPED | OUTPATIENT
Start: 2021-08-10 | End: 2022-08-05

## 2021-08-26 ENCOUNTER — TELEPHONE (OUTPATIENT)
Dept: FAMILY MEDICINE CLINIC | Facility: CLINIC | Age: 61
End: 2021-08-26

## 2021-08-26 RX ORDER — DOXYCYCLINE HYCLATE 100 MG/1
100 CAPSULE ORAL 2 TIMES DAILY
Qty: 20 CAPSULE | Refills: 0 | Status: SHIPPED | OUTPATIENT
Start: 2021-08-26 | End: 2021-09-13

## 2021-08-26 NOTE — TELEPHONE ENCOUNTER
Caller: Trace Escalante    Relationship: Self    Best call back number:     [phone]     What medication are you requesting:     AMOXICILLIN    PATIENT STATED IN THE PAST WHEN THE ANTIBIOTIC IS COMPLETED THERE IS A RASH OVER HIS BODY    PATIENT STATED DR VALENCIA MAY WANT TO PRESCRIBE A DIFFERENT ANTIBIOTIC FOR THIS REASON    What are your current symptoms:     IRRITATION UNDER THE TONGUE ON THE LEFT SIDE    How long have you been experiencing symptoms:      APPROXIMATELY (2) WEEKS    Have you had these symptoms before:    [x] Yes  [] No    Have you been treated for these symptoms before:   [x] Yes  [] No    If a prescription is needed, what is your preferred pharmacy and phone number:      [facility] - [place], [place]    TELEPHONE CONTACT:    [phone]    Additional notes:    N/A    DR VALENCIA

## 2021-08-26 NOTE — TELEPHONE ENCOUNTER
Stated he has talked about it before. Left side under tongue salvia duct is inflamed. Stated it hurts.

## 2021-08-26 NOTE — TELEPHONE ENCOUNTER
Please call, since had rash with amoxicillin, will try doxycyline 100 mg one twice per day. I will send that in . Office visit. if not getting better.

## 2021-09-13 ENCOUNTER — OFFICE VISIT (OUTPATIENT)
Dept: CARDIOLOGY | Facility: CLINIC | Age: 61
End: 2021-09-13

## 2021-09-13 VITALS
OXYGEN SATURATION: 100 % | SYSTOLIC BLOOD PRESSURE: 130 MMHG | DIASTOLIC BLOOD PRESSURE: 86 MMHG | BODY MASS INDEX: 33.42 KG/M2 | HEART RATE: 82 BPM | HEIGHT: 75 IN | WEIGHT: 268.8 LBS

## 2021-09-13 DIAGNOSIS — I48.0 PAROXYSMAL ATRIAL FIBRILLATION (HCC): ICD-10-CM

## 2021-09-13 DIAGNOSIS — I50.22 CHRONIC SYSTOLIC CONGESTIVE HEART FAILURE (HCC): Primary | ICD-10-CM

## 2021-09-13 DIAGNOSIS — I42.8 NONISCHEMIC CARDIOMYOPATHY (HCC): ICD-10-CM

## 2021-09-13 DIAGNOSIS — E78.2 MIXED HYPERLIPIDEMIA: ICD-10-CM

## 2021-09-13 DIAGNOSIS — I10 ESSENTIAL HYPERTENSION: ICD-10-CM

## 2021-09-13 PROCEDURE — 99214 OFFICE O/P EST MOD 30 MIN: CPT | Performed by: NURSE PRACTITIONER

## 2021-09-13 NOTE — PROGRESS NOTES
Subjective:     Encounter Date:09/13/2021    Primary Care Physician: Jose Ramon Allen MD      Patient ID: Trace Escalante is a 61 y.o. male.    Chief Complaint:follow-up for cardiomyopathy      PROBLEM LIST:  1. Congestive heart failure:   a. BHL (11/12/2017):  with CCS class II chest discomfort/NYHA class III dyspnea.   b. Echocardiogram (11/13/2017): LVEF 40%.   c. LHC (11/15/2017): Normal coronary arteries.   d. Echocardiogram (04/11/2018): LVEF 41-45%. Cardiac valves normal.   e. Echocardiogram, 08/17/2020: EF 45%  2. Atrial fibrillation:   a. Cardiac event monitor, 30-days (05/11/2018): 2% a-fib, 2 episodes of 5 beat NSVT.  3. Hypertension.   4. Hyperlipidemia.   5. Type 2 diabetes mellitus; uncontrolled, hemoglobin A1c was 8.3%.   6. Obstructive sleep apnea; compliant with CPAP.   7. Moderate obesity; BMI 38.   8. Chronic kidney disease; stage III, baseline creatinine 2.0.   9. Murmur.   10. Hypokalemia.   11. Nephrolithiasis.   12. Gout.   13. Surgical history:   a. MCL repair, right (1978).  b. Left patella tendon repair (2018).        Allergies   Allergen Reactions   • Amoxicillin Rash     Faint rash near end of Rx   • Augmentin [Amoxicillin-Pot Clavulanate] Rash         Current Outpatient Medications:   •  allopurinol (ZYLOPRIM) 300 MG tablet, TAKE 1/2 TABLET BY MOUTH EVERYDAY, Disp: 45 tablet, Rfl: 1  •  amLODIPine (NORVASC) 10 MG tablet, Take 1 tablet by mouth Daily., Disp: 90 tablet, Rfl: 3  •  aspirin 81 MG EC tablet, Take 81 mg by mouth Daily., Disp: , Rfl:   •  B-D UF III MINI PEN NEEDLES 31G X 5 MM misc, Use 3 per day., Disp: 300 each, Rfl: 3  •  carvedilol (COREG) 12.5 MG tablet, TAKE 1 TABLET BY MOUTH TWICE A DAY, Disp: 180 tablet, Rfl: 3  •  CloNIDine (CATAPRES) 0.2 MG tablet, 0.2 mg As Needed., Disp: , Rfl:   •  fluticasone (FLONASE) 50 MCG/ACT nasal spray, 2 sprays by Each Nare route Daily., Disp: 48 mL, Rfl: 2  •  furosemide (LASIX) 40 MG tablet, TAKE 1 TABLET BY MOUTH DAILY. MAY TAKE AN  ADDITIONAL TABLET AS NEEDED FOR 3 LBS WEIGHT GAIN, Disp: 135 tablet, Rfl: 1  •  glucose blood (Accu-Chek Sepideh Plus) test strip, CHECK SUGARS THREE TIMES PER DAY AND AS NEEDED, Disp: 300 each, Rfl: 5  •  glucose monitor monitoring kit, Check sugars four times per day, Disp: 1 each, Rfl: 0  •  hydrALAZINE (APRESOLINE) 100 MG tablet, Take 100 mg by mouth 2 (Two) Times a Day., Disp: , Rfl:   •  isosorbide mononitrate (IMDUR) 60 MG 24 hr tablet, TAKE 1 TABLET BY MOUTH EVERY DAY, Disp: 90 tablet, Rfl: 4  •  Multiple Vitamin (MULTI VITAMIN PO), Take 1 tablet by mouth Daily., Disp: , Rfl:   •  NovoLOG FlexPen 100 UNIT/ML solution pen-injector sc pen, Inject 5-10 Units under the skin into the appropriate area as directed 3 (Three) Times a Day With Meals., Disp: 15 mL, Rfl: 1  •  rosuvastatin (CRESTOR) 10 MG tablet, TAKE 1 TABLET BY MOUTH EVERY DAY AT NIGHT, Disp: 90 tablet, Rfl: 1  •  spironolactone (ALDACTONE) 25 MG tablet, Take 1 tablet by mouth Daily., Disp: 90 tablet, Rfl: 1        History of Present Illness    Patient is a 61-year-old -American male who is being seen today for annual follow-up for nonischemic cardiomyopathy.  Since last being seen he notes to overall be doing well.  He is going to the gym and exercising 6 days a week.  Notes that he is able to do this without any anginal symptoms.  Denies any chest pain, pressure, tightness.  Denies any increase shortness of breath.  No syncope, near syncope.  Has chronic lower extremity edema which she wears compression stockings for.  Is following with Dr. Juarez regarding nephrology/CKD.  Most recent creatinine per patient report around 2.7.    The following portions of the patient's history were reviewed and updated as appropriate: allergies, current medications, past family history, past medical history, past social history, past surgical history and problem list.      Social History     Tobacco Use   • Smoking status: Never Smoker   • Smokeless tobacco:  "Never Used   Vaping Use   • Vaping Use: Never used   Substance Use Topics   • Alcohol use: No   • Drug use: No         Review of Systems   Constitutional: Positive for malaise/fatigue.   Cardiovascular: Positive for leg swelling. Negative for chest pain, dyspnea on exertion, palpitations and syncope.   Respiratory: Negative.  Negative for shortness of breath.    Hematologic/Lymphatic: Negative for bleeding problem. Does not bruise/bleed easily.   Skin: Negative for rash.   Musculoskeletal: Negative for muscle weakness and myalgias.   Gastrointestinal: Negative for heartburn, nausea and vomiting.   Neurological: Negative for dizziness, light-headedness, loss of balance and numbness.          Objective:   /86 (BP Location: Left arm, Patient Position: Sitting)   Pulse 82   Ht 190.5 cm (75\")   Wt 122 kg (268 lb 12.8 oz)   SpO2 100%   BMI 33.60 kg/m²         Vitals reviewed.   Constitutional:       Appearance: Well-developed and not in distress.   Neck:      Vascular: No JVD.      Trachea: No tracheal deviation.   Pulmonary:      Effort: Pulmonary effort is normal.      Breath sounds: Normal breath sounds.   Cardiovascular:      Normal rate. Regular rhythm.   Pulses:     Intact distal pulses.   Edema:     Peripheral edema present.     Ankle: bilateral trace edema of the ankle.  Abdominal:      General: Bowel sounds are normal.      Palpations: Abdomen is soft.      Tenderness: There is no abdominal tenderness.   Musculoskeletal:         General: No deformity. Skin:     General: Skin is warm and dry.   Neurological:      Mental Status: Alert and oriented to person, place, and time.         Procedures          Assessment:   Assessment/Plan      Diagnoses and all orders for this visit:    1. Chronic systolic congestive heart failure (CMS/HCC) (Primary).  Stable.  On Aldactone.    2. Nonischemic cardiomyopathy (CMS/HCC), stable.  Last EF 45% in August of last year.  On Coreg, hydralazine, isosorbide.    3. " Paroxysmal atrial fibrillation (CMS/HCC), stable.  No recurrence.  No current anticoagulant.  On aspirin.    4. Essential hypertension, stable.  On amlodipine.    5. Mixed hyperlipidemia, on Crestor.  Labs with primary/nephrology.      Plan:  1. Continue current cardiac medications.  2. Discussed with patient should he develop dyspnea on exertion or exertional chest discomfort represent for evaluation.  3. Discussed amlodipine could be causing his lower extremity edema.  However, given his multiple hypertensive medications do not feel that there is anything to change him to that would necessarily help.  For now we will continue amlodipine.  4. Follow-up in 1 year's time with an echocardiogram or sooner if needed.       Rakel ZAVALA     Dictated utilizing Dragon dictation

## 2021-09-17 ENCOUNTER — OFFICE VISIT (OUTPATIENT)
Dept: FAMILY MEDICINE CLINIC | Facility: CLINIC | Age: 61
End: 2021-09-17

## 2021-09-17 VITALS
BODY MASS INDEX: 32.7 KG/M2 | RESPIRATION RATE: 18 BRPM | WEIGHT: 263 LBS | HEIGHT: 75 IN | DIASTOLIC BLOOD PRESSURE: 70 MMHG | HEART RATE: 68 BPM | TEMPERATURE: 97.2 F | SYSTOLIC BLOOD PRESSURE: 134 MMHG

## 2021-09-17 DIAGNOSIS — E78.2 MIXED HYPERLIPIDEMIA: ICD-10-CM

## 2021-09-17 DIAGNOSIS — I10 ESSENTIAL HYPERTENSION: Primary | ICD-10-CM

## 2021-09-17 DIAGNOSIS — Z12.11 COLON CANCER SCREENING: ICD-10-CM

## 2021-09-17 PROCEDURE — 99213 OFFICE O/P EST LOW 20 MIN: CPT | Performed by: FAMILY MEDICINE

## 2021-09-17 NOTE — PROGRESS NOTES
"Chief Complaint  Hypertension (6 month f/u )    Subjective          Trace Escalante presents to [facility]  Hypertension  This is a chronic problem. The current episode started more than 1 year ago. The problem is unchanged. The problem is controlled.       Hyperlipidemia  On crestor  On 10 mg once per day  Had some leg cramps at night  Stopped the crestor and helped    Had swelling in the jaw and been on antibiotic  Had infected salivary stone  Was able to pull out a large stone  ( 2 cm x 1 cm )       Review of Systems   Constitutional: Negative.    HENT: Negative.    Respiratory: Negative.    Cardiovascular: Negative.    Gastrointestinal: Negative.    Neurological: Negative.    Psychiatric/Behavioral: Negative.         Objective       Vital Signs:   /70   Pulse 68   Temp 97.2 °F (36.2 °C)   Resp 18   Ht 190.5 cm (75\")   Wt 119 kg (263 lb)   BMI 32.87 kg/m²     Physical Exam  Vitals and nursing note reviewed.   Constitutional:       Appearance: He is well-developed.   HENT:      Head: Normocephalic and atraumatic.      Right Ear: External ear normal.      Left Ear: External ear normal.   Eyes:      Pupils: Pupils are equal, round, and reactive to light.   Cardiovascular:      Rate and Rhythm: Normal rate and regular rhythm.      Heart sounds: Normal heart sounds.   Pulmonary:      Effort: Pulmonary effort is normal. No respiratory distress.      Breath sounds: Normal breath sounds. No wheezing or rales.   Abdominal:      General: There is no distension.      Tenderness: There is no abdominal tenderness. There is no guarding or rebound.   Musculoskeletal:      Right lower leg: No edema.      Left lower leg: No edema.   Skin:     General: Skin is warm and dry.   Neurological:      Mental Status: He is alert and oriented to person, place, and time.   Psychiatric:         Behavior: Behavior normal.          Picture is of left salivary stone that was extracted during recent " inflammation/infection.        Result Review :                     Assessment and Plan    Diagnoses and all orders for this visit:    1. Essential hypertension (Primary)  -     Comprehensive Metabolic Panel    2. Mixed hyperlipidemia  -     Lipid Panel With / Chol / HDL Ratio  -     Comprehensive Metabolic Panel    3. Colon cancer screening  -     Cologuard - Stool, Per Rectum; Future          DISCUSSION    Hypertension.  Blood pressure stable on current medications.  Check CMP    Hyperlipidemia.  Check lipid and CMP    Colon cancer screening.  His previous Cologuard order .  Will resend.          Follow Up   Return in about 6 months (around 3/17/2022).    Patient was given instructions and counseling regarding his condition or for health maintenance advice. Please see specific information pulled into the AVS if appropriate.       Jose Ramon Allen MD

## 2021-09-18 LAB
ALBUMIN SERPL-MCNC: 4.1 G/DL (ref 3.8–4.8)
ALBUMIN/GLOB SERPL: 1.9 {RATIO} (ref 1.2–2.2)
ALP SERPL-CCNC: 86 IU/L (ref 44–121)
ALT SERPL-CCNC: 29 IU/L (ref 0–44)
AST SERPL-CCNC: 25 IU/L (ref 0–40)
BILIRUB SERPL-MCNC: 0.4 MG/DL (ref 0–1.2)
BUN SERPL-MCNC: 41 MG/DL (ref 8–27)
BUN/CREAT SERPL: 16 (ref 10–24)
CALCIUM SERPL-MCNC: 8.7 MG/DL (ref 8.6–10.2)
CHLORIDE SERPL-SCNC: 105 MMOL/L (ref 96–106)
CHOLEST SERPL-MCNC: 177 MG/DL (ref 100–199)
CHOLEST/HDLC SERPL: 3.5 RATIO (ref 0–5)
CO2 SERPL-SCNC: 22 MMOL/L (ref 20–29)
CREAT SERPL-MCNC: 2.54 MG/DL (ref 0.76–1.27)
GLOBULIN SER CALC-MCNC: 2.2 G/DL (ref 1.5–4.5)
GLUCOSE SERPL-MCNC: 95 MG/DL (ref 65–99)
HDLC SERPL-MCNC: 51 MG/DL
LDLC SERPL CALC-MCNC: 112 MG/DL (ref 0–99)
POTASSIUM SERPL-SCNC: 4.1 MMOL/L (ref 3.5–5.2)
PROT SERPL-MCNC: 6.3 G/DL (ref 6–8.5)
SODIUM SERPL-SCNC: 141 MMOL/L (ref 134–144)
TRIGL SERPL-MCNC: 77 MG/DL (ref 0–149)
VLDLC SERPL CALC-MCNC: 14 MG/DL (ref 5–40)

## 2021-10-18 DIAGNOSIS — M10.9 GOUT, UNSPECIFIED CAUSE, UNSPECIFIED CHRONICITY, UNSPECIFIED SITE: ICD-10-CM

## 2021-10-18 RX ORDER — ALLOPURINOL 300 MG/1
TABLET ORAL
Qty: 45 TABLET | Refills: 1 | Status: SHIPPED | OUTPATIENT
Start: 2021-10-18 | End: 2022-03-28

## 2021-11-12 ENCOUNTER — OFFICE VISIT (OUTPATIENT)
Dept: ENDOCRINOLOGY | Facility: CLINIC | Age: 61
End: 2021-11-12

## 2021-11-12 VITALS
HEART RATE: 65 BPM | OXYGEN SATURATION: 97 % | BODY MASS INDEX: 32.95 KG/M2 | HEIGHT: 75 IN | WEIGHT: 265 LBS | SYSTOLIC BLOOD PRESSURE: 120 MMHG | DIASTOLIC BLOOD PRESSURE: 74 MMHG

## 2021-11-12 DIAGNOSIS — Z79.4 TYPE 2 DIABETES MELLITUS WITH CHRONIC KIDNEY DISEASE, WITH LONG-TERM CURRENT USE OF INSULIN, UNSPECIFIED CKD STAGE (HCC): ICD-10-CM

## 2021-11-12 DIAGNOSIS — Z79.4 TYPE 2 DIABETES MELLITUS WITH HYPERGLYCEMIA, WITH LONG-TERM CURRENT USE OF INSULIN (HCC): Primary | Chronic | ICD-10-CM

## 2021-11-12 DIAGNOSIS — E11.22 TYPE 2 DIABETES MELLITUS WITH CHRONIC KIDNEY DISEASE, WITH LONG-TERM CURRENT USE OF INSULIN, UNSPECIFIED CKD STAGE (HCC): ICD-10-CM

## 2021-11-12 DIAGNOSIS — E11.65 TYPE 2 DIABETES MELLITUS WITH HYPERGLYCEMIA, WITH LONG-TERM CURRENT USE OF INSULIN (HCC): Primary | Chronic | ICD-10-CM

## 2021-11-12 LAB
EXPIRATION DATE: ABNORMAL
EXPIRATION DATE: NORMAL
GLUCOSE BLDC GLUCOMTR-MCNC: 140 MG/DL (ref 70–130)
HBA1C MFR BLD: 7.3 %
Lab: ABNORMAL
Lab: NORMAL

## 2021-11-12 PROCEDURE — 99213 OFFICE O/P EST LOW 20 MIN: CPT | Performed by: PHYSICIAN ASSISTANT

## 2021-11-12 PROCEDURE — 82947 ASSAY GLUCOSE BLOOD QUANT: CPT | Performed by: PHYSICIAN ASSISTANT

## 2021-11-12 PROCEDURE — 83036 HEMOGLOBIN GLYCOSYLATED A1C: CPT | Performed by: PHYSICIAN ASSISTANT

## 2021-11-12 RX ORDER — SPIRONOLACTONE 25 MG/1
TABLET ORAL
Qty: 90 TABLET | Refills: 1 | OUTPATIENT
Start: 2021-11-12

## 2021-11-12 NOTE — PROGRESS NOTES
"     Office Note      Date: 2021  Patient Name: Trace Escalante  MRN: [id number]  : 1960    Chief Complaint   Patient presents with   • Diabetes       History of Present Illness:   Trace Escalante is a 61 y.o. male who presents today for follow up on type 2 diabetes, diagnosed in .  Known diabetic complications: nephropathy and retinopathy.  Current diabetic medications include Novolog with meals.  No basal insulin.  Past diabetic medications include metformin.  Current monitoring regimen: testing 3-4 times per day  Home blood sugar records: fasting range 115-150, lunchtime <150 but occasionally up to 170s, dinner <150, bedtime <150.  Any episodes of hypoglycemia? no  Current diet: in general, a \"healthy\" diet    Current exercise: 6 days per week, cardio 3 days/week and weights 3 days/week.  Feet: No sores or other issues.  Foot exam up to date.  Eye exam current (within one year): yes, . Mild non-proliferative retinopathy - resolved per patient.  ACE inhibitor/ARB: No.  Statin: Yes, rosuvastatin.  He had a very large salivary gland stone that he was able to pull out of the gland himself.    Subjective      Review of Systems:   Review of Systems   Constitutional: Negative.    Cardiovascular: Negative.    Gastrointestinal: Negative.    Endocrine: Negative.        The following portions of the patient's history were reviewed and updated as appropriate: allergies, current medications, past family history, past medical history, past social history, past surgical history and problem list.    Objective     Vitals:    21 1059   BP: 120/74   Pulse: 65   SpO2: 97%   Weight: 120 kg (265 lb)   Height: 190.5 cm (75\")   PainSc: 0-No pain     Body mass index is 33.12 kg/m².    Physical Exam  Vitals reviewed.   Constitutional:       General: He is not in acute distress.  Neurological:      Mental Status: He is alert and oriented to person, place, and time.   Psychiatric:         Mood and Affect: Affect " normal.         HEMOGLOBIN A1C  Lab Results   Component Value Date    HGBA1C 7.3 11/12/2021    HGBA1C 7.1 08/06/2021    HGBA1C 7.3 02/23/2021     GLUCOSE  Lab Results   Component Value Date    POCGLU 140 (A) 11/12/2021     CMP  Lab Results   Component Value Date    GLUCOSE 95 09/17/2021    BUN 41 (H) 09/17/2021    CREATININE 2.54 (H) 09/17/2021    EGFRIFNONA 26 (L) 09/17/2021    EGFRIFAFRI 30 (L) 09/17/2021    BCR 16 09/17/2021     09/17/2021    K 4.1 09/17/2021    CO2 22 09/17/2021    CALCIUM 8.7 09/17/2021    PROTENTOTREF 6.3 09/17/2021    ALBUMIN 4.1 09/17/2021    LABGLOBREF 2.2 09/17/2021    BILITOT 0.4 09/17/2021    ALKPHOS 86 09/17/2021    AST 25 09/17/2021    ALT 29 09/17/2021     LIPID PANEL  Lab Results   Component Value Date    CHLPL 177 09/17/2021    TRIG 77 09/17/2021    HDL 51 09/17/2021     (H) 09/17/2021     THYROID  Lab Results   Component Value Date    TSH 1.152 11/13/2017       Current Outpatient Medications   Medication Instructions   • allopurinol (ZYLOPRIM) 300 MG tablet TAKE 1/2 TABLET BY MOUTH EVERYDAY   • amLODIPine (NORVASC) 10 mg, Oral, Daily   • aspirin 81 mg, Oral, Daily   • B-D UF III MINI PEN NEEDLES 31G X 5 MM misc Use 3 per day.   • carvedilol (COREG) 12.5 MG tablet TAKE 1 TABLET BY MOUTH TWICE A DAY   • cloNIDine (CATAPRES) 0.2 mg, As Needed   • fluticasone (FLONASE) 50 MCG/ACT nasal spray 2 sprays, Each Nare, Daily   • furosemide (LASIX) 40 mg, Oral, Daily, May take an additional tablet as needed for 3 lbs weight gain   • glucose blood (Accu-Chek Sepideh Plus) test strip CHECK SUGARS THREE TIMES PER DAY AND AS NEEDED   • glucose monitor monitoring kit Check sugars four times per day   • hydrALAZINE (APRESOLINE) 100 mg, Oral, 2 Times Daily   • isosorbide mononitrate (IMDUR) 60 MG 24 hr tablet TAKE 1 TABLET BY MOUTH EVERY DAY   • Multiple Vitamin (MULTI VITAMIN PO) 1 tablet, Oral, Daily   • NovoLOG FlexPen 5-10 Units, Subcutaneous, 3 Times Daily With Meals   • rosuvastatin  (CRESTOR) 10 MG tablet TAKE 1 TABLET BY MOUTH EVERY DAY AT NIGHT   • spironolactone (ALDACTONE) 25 mg, Oral, Daily       Assessment / Plan      Assessment & Plan:  1. Type 2 diabetes mellitus with hyperglycemia, with long-term current use of insulin ([facility])  A1c increased a little.  He will continue to work on nutritious dietary choices and regular exercise.  Continue Novolog with meals.  We discussed insulin adjustments.  Advised not to take correction doses of Novolog at bedtime.  BP okay.  - POC Glycosylated Hemoglobin (Hb A1C)  - POC Glucose, Blood    2. Type 2 diabetes mellitus with chronic kidney disease, with long-term current use of insulin, unspecified CKD stage ([facility])  Continue follow-up with nephrology.      Return in about 3 months (around 2/12/2022) for recheck with A1c. He was advised to contact the office with any interval questions or concerns.    VIKI Middleton  Endocrinology  11/12/2021

## 2021-11-15 RX ORDER — FUROSEMIDE 40 MG/1
40 TABLET ORAL DAILY
Qty: 135 TABLET | Refills: 1 | Status: SHIPPED | OUTPATIENT
Start: 2021-11-15 | End: 2022-03-28

## 2021-11-15 RX ORDER — ISOSORBIDE MONONITRATE 60 MG/1
TABLET, EXTENDED RELEASE ORAL
Qty: 90 TABLET | Refills: 3 | Status: SHIPPED | OUTPATIENT
Start: 2021-11-15 | End: 2022-08-18

## 2021-11-15 RX ORDER — CARVEDILOL 12.5 MG/1
TABLET ORAL
Qty: 180 TABLET | Refills: 3 | Status: SHIPPED | OUTPATIENT
Start: 2021-11-15 | End: 2022-11-11

## 2021-11-22 RX ORDER — SPIRONOLACTONE 25 MG/1
25 TABLET ORAL DAILY
Qty: 90 TABLET | Refills: 1 | Status: SHIPPED | OUTPATIENT
Start: 2021-11-22 | End: 2022-08-18 | Stop reason: SDUPTHER

## 2021-11-24 RX ORDER — SPIRONOLACTONE 25 MG/1
TABLET ORAL
Qty: 90 TABLET | Refills: 1 | Status: SHIPPED | OUTPATIENT
Start: 2021-11-24 | End: 2022-08-18

## 2022-01-13 DIAGNOSIS — H69.83 DYSFUNCTION OF BOTH EUSTACHIAN TUBES: ICD-10-CM

## 2022-01-14 RX ORDER — FLUTICASONE PROPIONATE 50 MCG
SPRAY, SUSPENSION (ML) NASAL
Qty: 48 ML | Refills: 2 | Status: SHIPPED | OUTPATIENT
Start: 2022-01-14 | End: 2022-12-29

## 2022-03-18 ENCOUNTER — OFFICE VISIT (OUTPATIENT)
Dept: FAMILY MEDICINE CLINIC | Facility: CLINIC | Age: 62
End: 2022-03-18

## 2022-03-18 VITALS
WEIGHT: 261 LBS | BODY MASS INDEX: 32.45 KG/M2 | TEMPERATURE: 97.1 F | DIASTOLIC BLOOD PRESSURE: 80 MMHG | RESPIRATION RATE: 18 BRPM | SYSTOLIC BLOOD PRESSURE: 136 MMHG | HEART RATE: 70 BPM | HEIGHT: 75 IN

## 2022-03-18 DIAGNOSIS — I10 ESSENTIAL HYPERTENSION: Primary | ICD-10-CM

## 2022-03-18 DIAGNOSIS — E78.2 MIXED HYPERLIPIDEMIA: ICD-10-CM

## 2022-03-18 PROCEDURE — 99213 OFFICE O/P EST LOW 20 MIN: CPT | Performed by: FAMILY MEDICINE

## 2022-03-18 RX ORDER — ROSUVASTATIN CALCIUM 5 MG/1
5 TABLET, COATED ORAL
Qty: 90 TABLET | Refills: 1 | Status: SHIPPED | OUTPATIENT
Start: 2022-03-18

## 2022-03-18 NOTE — PROGRESS NOTES
"Chief Complaint  Hypertension (6 month f/u ) and Hyperlipidemia (Taking 5 mg a day and doesn't take everyday due to cramps at night. )    Subjective          Trace Escalante presents to [facility]  History of Present Illness    Hypertension  His blood pressure today is 136/80 mmHg. He denies any chest pain, trouble breathing, or swelling in his legs. He continues to follow up with cardiology once a year. He continues to take all of his medications without any changes.    Hyperlipidemia  He continues to take Crestor 5 mg daily without any side effects. He has been cutting his 10 mg Crestor in half. He has been on Lipitor in the past and had side effects such as achiness. His cholesterol was checked in 09/2021 and was within normal limits.    Chronic kidney disease  He continues to follow up with nephrology and has an appointment in mid 04/2022.     Aneudy  We reviewed his results today. He has been on a diet and he does not think he is getting enough fiber in his diet. He is not necessarily having constipation, but he feels like he is not evacuating his bowel movements enough.     Diabetes  He continues to follow up with endocrinology. His blood glucose levels have been stable.    Review of Systems   Cardiovascular: Negative.    Gastrointestinal: Negative.    All other systems reviewed and are negative.       Objective       Vital Signs:   /80   Pulse 70   Temp 97.1 °F (36.2 °C)   Resp 18   Ht 190.5 cm (75\")   Wt 118 kg (261 lb)   BMI 32.62 kg/m²     Physical Exam  Vitals and nursing note reviewed.   Constitutional:       General: He is not in acute distress.     Appearance: Normal appearance. He is well-developed. He is not ill-appearing.   HENT:      Head: Normocephalic and atraumatic.      Right Ear: Hearing, tympanic membrane, ear canal and external ear normal.      Left Ear: Hearing, tympanic membrane, ear canal and external ear normal.      Nose: Nose normal. No " congestion or rhinorrhea.      Mouth/Throat:      Mouth: Mucous membranes are moist.      Pharynx: No oropharyngeal exudate or posterior oropharyngeal erythema.   Eyes:      General: Lids are normal.      Conjunctiva/sclera: Conjunctivae normal.      Pupils: Pupils are equal, round, and reactive to light.   Neck:      Thyroid: No thyromegaly.   Cardiovascular:      Rate and Rhythm: Normal rate and regular rhythm.      Heart sounds: Normal heart sounds. No murmur heard.    No friction rub.   Pulmonary:      Effort: Pulmonary effort is normal. No respiratory distress.      Breath sounds: Normal breath sounds. No wheezing or rales.   Abdominal:      General: Bowel sounds are normal. There is no distension.      Palpations: Abdomen is soft. There is no mass.      Tenderness: There is no abdominal tenderness. There is no guarding or rebound.   Musculoskeletal:      Cervical back: Normal range of motion and neck supple.      Right lower leg: No edema.      Left lower leg: No edema.   Skin:     General: Skin is warm and dry.   Neurological:      General: No focal deficit present.      Mental Status: He is alert.   Psychiatric:         Mood and Affect: Mood normal.         Speech: Speech normal.         Behavior: Behavior normal.          Result Review :                     Assessment and Plan    Diagnoses and all orders for this visit:    1. Essential hypertension (Primary)    2. Mixed hyperlipidemia  -     rosuvastatin (CRESTOR) 5 MG tablet; Take 1 tablet by mouth every night at bedtime.  Dispense: 90 tablet; Refill: 1          DISCUSSION    1. Hypertension  - Blood pressure is stable on current medications. He will continue this. Continue to follow up with cardiology. He has blood work that will be done through nephrology next month.    2. Hyperlipidemia  - He is having some aches and pains with the Crestor 10 mg, so he has only been taking half dose. We will change up to 5 mg daily. He has been feeling better on that.  Getting blood work done in 04/2022 with nephrology.    Patient was also informed that his Cologuard was negative.      Follow Up   Return in about 6 months (around 9/18/2022).    Patient was given instructions and counseling regarding his condition or for health maintenance advice. Please see specific information pulled into the AVS if appropriate.     Transcribed from ambient dictation for Jose Ramon Allen MD by Shannon Gacria  03/18/22   15:05 EDT    Patient verbalized consent to the visit recording.  I have personally performed the services described in this document as transcribed by the above individual, and it is both accurate and complete.  Jose Ramon Allen MD  3/19/2022  13:45 EDT        Jose Ramon Allen MD

## 2022-03-27 DIAGNOSIS — M10.9 GOUT, UNSPECIFIED CAUSE, UNSPECIFIED CHRONICITY, UNSPECIFIED SITE: ICD-10-CM

## 2022-03-28 RX ORDER — FUROSEMIDE 40 MG/1
40 TABLET ORAL DAILY
Qty: 135 TABLET | Refills: 0 | Status: SHIPPED | OUTPATIENT
Start: 2022-03-28 | End: 2022-07-26

## 2022-03-28 RX ORDER — ALLOPURINOL 300 MG/1
TABLET ORAL
Qty: 45 TABLET | Refills: 2 | Status: SHIPPED | OUTPATIENT
Start: 2022-03-28 | End: 2022-10-27

## 2022-05-09 RX ORDER — INSULIN ASPART 100 [IU]/ML
INJECTION, SOLUTION INTRAVENOUS; SUBCUTANEOUS
Qty: 15 ML | Refills: 1 | Status: SHIPPED | OUTPATIENT
Start: 2022-05-09 | End: 2022-09-28

## 2022-05-17 DIAGNOSIS — I42.8 NONISCHEMIC CARDIOMYOPATHY: ICD-10-CM

## 2022-05-17 DIAGNOSIS — I10 ESSENTIAL HYPERTENSION: Primary | ICD-10-CM

## 2022-05-17 DIAGNOSIS — I48.0 PAROXYSMAL ATRIAL FIBRILLATION: ICD-10-CM

## 2022-05-17 DIAGNOSIS — I50.22 CHRONIC SYSTOLIC CONGESTIVE HEART FAILURE: ICD-10-CM

## 2022-07-22 ENCOUNTER — OFFICE VISIT (OUTPATIENT)
Dept: ENDOCRINOLOGY | Facility: CLINIC | Age: 62
End: 2022-07-22

## 2022-07-22 VITALS
HEART RATE: 71 BPM | WEIGHT: 266 LBS | BODY MASS INDEX: 33.07 KG/M2 | SYSTOLIC BLOOD PRESSURE: 126 MMHG | HEIGHT: 75 IN | DIASTOLIC BLOOD PRESSURE: 80 MMHG

## 2022-07-22 DIAGNOSIS — E11.65 TYPE 2 DIABETES MELLITUS WITH HYPERGLYCEMIA, WITH LONG-TERM CURRENT USE OF INSULIN: Primary | Chronic | ICD-10-CM

## 2022-07-22 DIAGNOSIS — Z79.4 TYPE 2 DIABETES MELLITUS WITH HYPERGLYCEMIA, WITH LONG-TERM CURRENT USE OF INSULIN: Primary | Chronic | ICD-10-CM

## 2022-07-22 LAB
EXPIRATION DATE: ABNORMAL
EXPIRATION DATE: NORMAL
GLUCOSE BLDC GLUCOMTR-MCNC: 141 MG/DL (ref 70–130)
HBA1C MFR BLD: 7 %
Lab: ABNORMAL
Lab: NORMAL

## 2022-07-22 PROCEDURE — 82947 ASSAY GLUCOSE BLOOD QUANT: CPT | Performed by: PHYSICIAN ASSISTANT

## 2022-07-22 PROCEDURE — 99213 OFFICE O/P EST LOW 20 MIN: CPT | Performed by: PHYSICIAN ASSISTANT

## 2022-07-22 PROCEDURE — 83036 HEMOGLOBIN GLYCOSYLATED A1C: CPT | Performed by: PHYSICIAN ASSISTANT

## 2022-07-22 RX ORDER — FLURBIPROFEN SODIUM 0.3 MG/ML
SOLUTION/ DROPS OPHTHALMIC
Qty: 300 EACH | Refills: 3 | Status: SHIPPED | OUTPATIENT
Start: 2022-07-22

## 2022-07-22 NOTE — PROGRESS NOTES
"     Office Note      Date: 2022  Patient Name: Trace Escalante  MRN: [id number]  : 1960    Chief Complaint   Patient presents with   • Diabetes     History of Present Illness:   Trace Escalante is a 61 y.o. male who presents today for follow up on type 2 diabetes.  Diabetes was diagnosed in ~.  Known diabetic complications: nephropathy and retinopathy.  Current diabetic medications: Novolog.  He is taking 4-6 units with meals.  He de  Past diabetic medications: oral medications/metformin.  He is testing FSBG 3-4 times per day.  Fasting readings 120s-140s, then around 150 range before meals later in the day, occasionally higher.  He denies any hypoglycemia, lowest BG in the 90s.  Current diet: in general, a \"healthy\" diet.  Current exercise: 6 days per week, gym 3 days, walking/jogging, mowing this time of year with push mower.  Feet: No sores or other issues.  Foot exam today.  Last eye exam: 10/29/2021. History of mild non-proliferative retinopathy - resolved.  ACE inhibitor/ARB: No.  Statin: Rosuvastatin.  He reports family members graduated in May.  Diet not as good during celebrations.  CKD - followed by nephrology.    Subjective      Review of Systems   Constitutional: Negative.   Cardiovascular: Negative.    Endocrine: Negative.    Gastrointestinal: Negative.        Past Medical History:   Diagnosis Date   • Benign essential hypertension    • CKD (chronic kidney disease) stage 3, GFR 30-59 ml/min ([facility])    • Gout    • Hypercholesterolemia    • Hyperlipidemia    • Hypertension    • Insulin dependent diabetes mellitus    • Kidney stones    • Long term current use of insulin ([facility])    • NICM (nonischemic cardiomyopathy) ([facility])    • Obesity    • ALYSSA (obstructive sleep apnea)    • Renal disorder     due to type 2 diabetes mellitus-Abstracted from [facility]   • Retinopathy     due to type 2 diabetes mellitus-Abstracted from [facility]    • Simple goiter    • Type 2 diabetes mellitus       Past Surgical " "History:   Procedure Laterality Date   • MEDIAL COLLATERAL LIGAMENT REPAIR, KNEE Right 1978   • CARDIAC CATHETERIZATION N/A 11/15/2017    Procedure: Coronary angiography;  Surgeon: Elke Marcum MD;  Location: [facility] CATH INVASIVE LOCATION;  Service:    • PATELLA TENDON REPAIR Left 3/13/2018    Procedure: PATELLA TENDON REPAIR LEFT;  Surgeon: Blake Mcbride MD;  Location: [facility] OR;  Service: Orthopedics   • KNEE SURGERY Left     Patellar tendon repair - Reed     The following portions of the patient's history were reviewed and updated as appropriate: allergies, current medications, past family history, past medical history, past social history, past surgical history and problem list.    Objective     Vitals:    07/22/22 1123   BP: 126/80   Pulse: 71   Weight: 121 kg (266 lb)   Height: 190.5 cm (75\")   Body mass index is 33.25 kg/m².    Physical Exam  Vitals reviewed.   Constitutional:       General: He is not in acute distress.  Cardiovascular:      Pulses:           Dorsalis pedis pulses are 2+ on the right side and 2+ on the left side.        Posterior tibial pulses are 2+ on the right side and 2+ on the left side.   Musculoskeletal:      Right foot: No deformity.      Left foot: No deformity.   Feet:      Right foot:      Protective Sensation: 8 sites tested. 8 sites sensed.      Skin integrity: Skin integrity normal.      Toenail Condition: Right toenails are abnormally thick.      Left foot:      Protective Sensation: 8 sites tested. 8 sites sensed.      Skin integrity: Skin integrity normal.      Toenail Condition: Left toenails are abnormally thick. Fungal disease present.     Comments: Vibration sensation diminished.  Diabetic Foot Exam Performed and Monofilament Test Performed  Neurological:      Mental Status: He is alert and oriented to person, place, and time.   Psychiatric:         Mood and Affect: Affect normal.         HEMOGLOBIN A1C  Lab Results   Component Value Date    HGBA1C 7.0 07/22/2022    " HGBA1C 7.3 11/12/2021    HGBA1C 7.1 08/06/2021     GLUCOSE  Lab Results   Component Value Date    POCGLU 141 (A) 07/22/2022       Current Outpatient Medications   Medication Instructions   • allopurinol (ZYLOPRIM) 300 MG tablet TAKE 1/2 TABLET BY MOUTH EVERYDAY   • amLODIPine (NORVASC) 10 mg, Oral, Daily   • aspirin 81 mg, Oral, Daily   • B-D UF III MINI PEN NEEDLES 31G X 5 MM misc Use 3 per day.   • carvedilol (COREG) 12.5 MG tablet TAKE 1 TABLET BY MOUTH TWICE A DAY   • cloNIDine (CATAPRES) 0.2 mg, As Needed   • fluticasone (FLONASE) 50 MCG/ACT nasal spray SPRAY 2 SPRAYS INTO EACH NOSTRIL EVERY DAY   • furosemide (LASIX) 40 mg, Oral, Daily, May take an additional tablet as needed for 3 lbs weight gain   • glucose blood (Accu-Chek Sepideh Plus) test strip CHECK SUGARS THREE TIMES PER DAY AND AS NEEDED   • glucose monitor monitoring kit Check sugars four times per day   • hydrALAZINE (APRESOLINE) 100 mg, Oral, 2 Times Daily   • isosorbide mononitrate (IMDUR) 60 MG 24 hr tablet TAKE 1 TABLET BY MOUTH EVERY DAY   • Multiple Vitamin (MULTI VITAMIN PO) 1 tablet, Oral, Daily   • NovoLOG FlexPen 100 UNIT/ML solution pen-injector sc pen INJECT 5-10 UNITS UNDRE THE SKIN INTO THE APPROPRIATE AREA AS DIRECTED 3 TIMES A DAY WITH MEALS   • rosuvastatin (CRESTOR) 5 mg, Oral, Every Night at Bedtime   • spironolactone (ALDACTONE) 25 MG tablet TAKE 1 TABLET BY MOUTH EVERY DAY   • spironolactone (ALDACTONE) 25 mg, Oral, Daily       Assessment / Plan      Assessment & Plan:  1. Type 2 diabetes mellitus with hyperglycemia, with long-term current use of insulin ([facility])  A1c okay.  Continue Novolog with meals.  Encouraged nutritious dietary choices, moderation of carbohydrates, avoidance of added sugar, regular exercise and weight loss.   BP appears controlled.  Continue statin.  Lipids checked with PCP.  - POC Glucose, Blood  - POC Glycosylated Hemoglobin (Hb A1C)  - B-D UF III MINI PEN NEEDLES 31G X 5 MM misc; Use 3 per day.  Dispense:  300 each; Refill: 3  - glucose blood (Accu-Chek Sepideh Plus) test strip; CHECK SUGARS THREE TIMES PER DAY AND AS NEEDED  Dispense: 300 each; Refill: 3      Return in about 3 months (around 10/22/2022) for recheck with A1c. He was advised to contact the office with any interval questions or concerns.    VIKI Middleton  Endocrinology  07/22/2022

## 2022-07-26 RX ORDER — FUROSEMIDE 40 MG/1
40 TABLET ORAL DAILY
Qty: 135 TABLET | Refills: 0 | Status: SHIPPED | OUTPATIENT
Start: 2022-07-26 | End: 2022-08-18

## 2022-08-05 RX ORDER — AMLODIPINE BESYLATE 10 MG/1
TABLET ORAL
Qty: 90 TABLET | Refills: 3 | Status: SHIPPED | OUTPATIENT
Start: 2022-08-05

## 2022-08-08 RX ORDER — SPIRONOLACTONE 25 MG/1
TABLET ORAL
Qty: 90 TABLET | Refills: 1 | OUTPATIENT
Start: 2022-08-08

## 2022-08-18 RX ORDER — SPIRONOLACTONE 25 MG/1
TABLET ORAL
Qty: 90 TABLET | Refills: 1 | Status: SHIPPED | OUTPATIENT
Start: 2022-08-18 | End: 2022-12-29 | Stop reason: SDUPTHER

## 2022-08-18 RX ORDER — ISOSORBIDE MONONITRATE 60 MG/1
TABLET, EXTENDED RELEASE ORAL
Qty: 90 TABLET | Refills: 3 | Status: SHIPPED | OUTPATIENT
Start: 2022-08-18

## 2022-08-18 RX ORDER — FUROSEMIDE 40 MG/1
40 TABLET ORAL DAILY
Qty: 135 TABLET | Refills: 0 | Status: SHIPPED | OUTPATIENT
Start: 2022-08-18 | End: 2022-11-11

## 2022-09-16 ENCOUNTER — OFFICE VISIT (OUTPATIENT)
Dept: FAMILY MEDICINE CLINIC | Facility: CLINIC | Age: 62
End: 2022-09-16

## 2022-09-16 VITALS
BODY MASS INDEX: 32.2 KG/M2 | HEART RATE: 61 BPM | RESPIRATION RATE: 18 BRPM | DIASTOLIC BLOOD PRESSURE: 70 MMHG | SYSTOLIC BLOOD PRESSURE: 122 MMHG | HEIGHT: 75 IN | OXYGEN SATURATION: 97 % | WEIGHT: 259 LBS | TEMPERATURE: 98.6 F

## 2022-09-16 DIAGNOSIS — R10.9 INTERMITTENT ABDOMINAL PAIN: ICD-10-CM

## 2022-09-16 DIAGNOSIS — E78.2 MIXED HYPERLIPIDEMIA: ICD-10-CM

## 2022-09-16 DIAGNOSIS — I10 ESSENTIAL HYPERTENSION: Primary | ICD-10-CM

## 2022-09-16 PROCEDURE — 99213 OFFICE O/P EST LOW 20 MIN: CPT | Performed by: FAMILY MEDICINE

## 2022-09-16 NOTE — PROGRESS NOTES
Chief Complaint  6 month F/U  , Hypertension, and Abdominal Pain (Infrequent dull pain lower rt , bowels are different after pain, constipation some times loose.)    Subjective          Trace Escalante presents to [facility]  History of Present Illness      Abdominal pain  - The patient reports intermittent, dull, abdominal pain in the lower right quadrant. He denies tenderness and states the pain has come on twice since his last visit approximately 6 months ago. He denies any current pain in his abdomen.     Change in bowels  - He shares that he has noticed his bowels have been occasionally loose and states that he does not take MiraLAX each week. He normally uses MiraLAX when he feels his stool is going to be harder. He notes that he typically has indigestion after eating red meat, but this is very infrequent. He has not had a colonoscopy but has done Cologuard in 2021.     Hypertension  - The patient shares that he normally has a stable blood pressure when seen here or at his cardiologist, but when he sees his nephrologist, Dr. Juarez, he presents hypertensive. He is suspicious that Dr. Juarez's equipment is faulty and would like to take less medication if he can. He denies any shortness of breath or chest pain.     Blood glucose  - The patient's A1c was 7% in 07/2022.     Edema  - He reports intermittent swelling in his bilateral lower extremities that normally recedes over night.     Weight  - The patient has lost 6 lbs. since 11/2021.     Health maintenance  - The patient states that he has an appointment with his cardiologist on 09/19/2022 for annual echocardiogram and evaluation. He denies needing refills and is fully vaccinated for COVID-19.     Review of Systems   HENT: Negative.    Respiratory: Negative for shortness of breath.    Cardiovascular: Positive for leg swelling (sl and better in the am).   Gastrointestinal:        See hpi          Objective  "      Vital Signs:   /70   Pulse 61   Temp 98.6 °F (37 °C)   Resp 18   Ht 190.5 cm (75\")   Wt 117 kg (259 lb)   SpO2 97%   BMI 32.37 kg/m²     Physical Exam  Vitals and nursing note reviewed.   Constitutional:       General: He is not in acute distress.     Appearance: Normal appearance. He is well-developed. He is not ill-appearing.   HENT:      Head: Normocephalic and atraumatic.      Right Ear: Hearing, tympanic membrane, ear canal and external ear normal.      Left Ear: Hearing, tympanic membrane, ear canal and external ear normal.      Nose: Nose normal. No congestion or rhinorrhea.      Mouth/Throat:      Mouth: Mucous membranes are moist.      Pharynx: No oropharyngeal exudate or posterior oropharyngeal erythema.   Eyes:      General: Lids are normal.      Conjunctiva/sclera: Conjunctivae normal.      Pupils: Pupils are equal, round, and reactive to light.   Neck:      Thyroid: No thyromegaly.   Cardiovascular:      Rate and Rhythm: Normal rate and regular rhythm.      Heart sounds: Normal heart sounds. No murmur heard.    No friction rub.   Pulmonary:      Effort: Pulmonary effort is normal. No respiratory distress.      Breath sounds: Normal breath sounds. No wheezing or rales.   Abdominal:      General: Bowel sounds are normal. There is no distension.      Palpations: Abdomen is soft. There is no mass.      Tenderness: There is no abdominal tenderness. There is no guarding or rebound.   Musculoskeletal:      Right lower leg: No edema.      Left lower leg: No edema.   Skin:     General: Skin is warm and dry.   Neurological:      General: No focal deficit present.      Mental Status: He is alert.   Psychiatric:         Mood and Affect: Mood normal.         Speech: Speech normal.         Behavior: Behavior normal.          Result Review :                     Assessment and Plan    Diagnoses and all orders for this visit:    1. Essential hypertension (Primary)    2. Mixed hyperlipidemia    3. " Intermittent abdominal pain          DISCUSSION    Hypertension.  Blood pressure is controlled with current medications.  He also sees cardiology for his cardiomyopathy and paroxysmal atrial fibrillation and chronic systolic heart failure.  He will continue to follow-up with cardiology.    Hyperlipidemia.  Again follow-up with cardiology.    Intermittent abdominal pain.  Suspect may be due to some periodic constipation.  Recommend use the MiraLAX little more frequently and see if that helps.  If it does not he will let me know.    Continue to follow-up with nephrology.  Blood work is done through cardiology, nephrology, and endocrinology.    No prescriptions were given today.    Follow-up in 1 year since he is seeing multiple specialists.    Recommend COVID booster.    Follow Up   Return in about 1 year (around 9/16/2023).    Patient was given instructions and counseling regarding his condition or for health maintenance advice. Please see specific information pulled into the AVS if appropriate.       Transcribed from ambient dictation for Jose Ramon Allen MD by César Cotton.  09/16/22   13:44 EDT    Patient verbalized consent to the visit recording.  I have personally performed the services described in this document as transcribed by the above individual, and it is both accurate and complete.  Joes Ramon Allen MD  9/18/2022  14:52 EDT

## 2022-09-19 ENCOUNTER — HOSPITAL ENCOUNTER (OUTPATIENT)
Dept: CARDIOLOGY | Facility: HOSPITAL | Age: 62
Discharge: HOME | End: 2022-09-19
Admitting: INTERNAL MEDICINE

## 2022-09-19 ENCOUNTER — OFFICE VISIT (OUTPATIENT)
Dept: CARDIOLOGY | Facility: CLINIC | Age: 62
End: 2022-09-19

## 2022-09-19 VITALS
DIASTOLIC BLOOD PRESSURE: 85 MMHG | OXYGEN SATURATION: 98 % | HEIGHT: 75 IN | SYSTOLIC BLOOD PRESSURE: 148 MMHG | WEIGHT: 262 LBS | BODY MASS INDEX: 32.58 KG/M2 | HEART RATE: 69 BPM

## 2022-09-19 VITALS — HEIGHT: 75 IN | BODY MASS INDEX: 32.07 KG/M2 | WEIGHT: 257.94 LBS

## 2022-09-19 DIAGNOSIS — I42.8 NONISCHEMIC CARDIOMYOPATHY: ICD-10-CM

## 2022-09-19 DIAGNOSIS — I10 ESSENTIAL HYPERTENSION: ICD-10-CM

## 2022-09-19 DIAGNOSIS — I50.22 CHRONIC SYSTOLIC CONGESTIVE HEART FAILURE: ICD-10-CM

## 2022-09-19 DIAGNOSIS — I48.0 PAROXYSMAL ATRIAL FIBRILLATION: ICD-10-CM

## 2022-09-19 DIAGNOSIS — I50.22 CHRONIC SYSTOLIC CONGESTIVE HEART FAILURE: Primary | ICD-10-CM

## 2022-09-19 DIAGNOSIS — E78.2 MIXED HYPERLIPIDEMIA: ICD-10-CM

## 2022-09-19 LAB
BH CV ECHO MEAS - AI P1/2T: 671.8 MSEC
BH CV ECHO MEAS - AO MAX PG: 9.5 MMHG
BH CV ECHO MEAS - AO MEAN PG: 6.5 MMHG
BH CV ECHO MEAS - AO ROOT DIAM: 3.4 CM
BH CV ECHO MEAS - AO V2 MAX: 153.8 CM/SEC
BH CV ECHO MEAS - AO V2 VTI: 35.5 CM
BH CV ECHO MEAS - AVA(I,D): 2 CM2
BH CV ECHO MEAS - EDV(CUBED): 198.1 ML
BH CV ECHO MEAS - EDV(MOD-SP2): 253 ML
BH CV ECHO MEAS - EDV(MOD-SP4): 280 ML
BH CV ECHO MEAS - EF(MOD-BP): 42 %
BH CV ECHO MEAS - EF(MOD-SP2): 37.5 %
BH CV ECHO MEAS - EF(MOD-SP4): 45.4 %
BH CV ECHO MEAS - ESV(CUBED): 93.7 ML
BH CV ECHO MEAS - ESV(MOD-SP2): 158 ML
BH CV ECHO MEAS - ESV(MOD-SP4): 153 ML
BH CV ECHO MEAS - FS: 22.1 %
BH CV ECHO MEAS - IVS/LVPW: 0.93 CM
BH CV ECHO MEAS - IVSD: 1.39 CM
BH CV ECHO MEAS - LA DIMENSION: 4.8 CM
BH CV ECHO MEAS - LAT PEAK E' VEL: 5.2 CM/SEC
BH CV ECHO MEAS - LV DIASTOLIC VOL/BSA (35-75): 114.3 CM2
BH CV ECHO MEAS - LV MASS(C)D: 386.7 GRAMS
BH CV ECHO MEAS - LV MAX PG: 4.1 MMHG
BH CV ECHO MEAS - LV MEAN PG: 2.47 MMHG
BH CV ECHO MEAS - LV SYSTOLIC VOL/BSA (12-30): 62.5 CM2
BH CV ECHO MEAS - LV V1 MAX: 101 CM/SEC
BH CV ECHO MEAS - LV V1 VTI: 22.9 CM
BH CV ECHO MEAS - LVIDD: 5.8 CM
BH CV ECHO MEAS - LVIDS: 4.5 CM
BH CV ECHO MEAS - LVOT AREA: 3.1 CM2
BH CV ECHO MEAS - LVOT DIAM: 1.99 CM
BH CV ECHO MEAS - LVPWD: 1.5 CM
BH CV ECHO MEAS - MED PEAK E' VEL: 4.4 CM/SEC
BH CV ECHO MEAS - MV A MAX VEL: 47.6 CM/SEC
BH CV ECHO MEAS - MV DEC TIME: 0.14 MSEC
BH CV ECHO MEAS - MV E MAX VEL: 90.8 CM/SEC
BH CV ECHO MEAS - MV E/A: 1.91
BH CV ECHO MEAS - PA ACC TIME: 0.08 SEC
BH CV ECHO MEAS - PA PR(ACCEL): 41 MMHG
BH CV ECHO MEAS - PA V2 MAX: 106.9 CM/SEC
BH CV ECHO MEAS - PI END-D VEL: 216.3 CM/SEC
BH CV ECHO MEAS - RAP SYSTOLE: 8 MMHG
BH CV ECHO MEAS - RVSP: 43 MMHG
BH CV ECHO MEAS - SI(MOD-SP2): 38.8 ML/M2
BH CV ECHO MEAS - SI(MOD-SP4): 51.9 ML/M2
BH CV ECHO MEAS - SV(LVOT): 71.1 ML
BH CV ECHO MEAS - SV(MOD-SP2): 95 ML
BH CV ECHO MEAS - SV(MOD-SP4): 127 ML
BH CV ECHO MEAS - TAPSE (>1.6): 2.48 CM
BH CV ECHO MEAS - TR MAX PG: 35.5 MMHG
BH CV ECHO MEAS - TR MAX VEL: 297.7 CM/SEC
BH CV ECHO MEASUREMENTS AVERAGE E/E' RATIO: 18.92
BH CV VAS BP LEFT ARM: NORMAL MMHG
BH CV XLRA - RV BASE: 5.4 CM
BH CV XLRA - RV LENGTH: 8.2 CM
BH CV XLRA - RV MID: 3.6 CM
BH CV XLRA - TDI S': 15.4 CM/SEC
LEFT ATRIUM VOLUME INDEX: 45.7 ML/M2
LV EF 2D ECHO EST: 40 %
MAXIMAL PREDICTED HEART RATE: 158 BPM
STRESS TARGET HR: 134 BPM

## 2022-09-19 PROCEDURE — 93306 TTE W/DOPPLER COMPLETE: CPT

## 2022-09-19 PROCEDURE — 99214 OFFICE O/P EST MOD 30 MIN: CPT | Performed by: INTERNAL MEDICINE

## 2022-09-19 PROCEDURE — 93306 TTE W/DOPPLER COMPLETE: CPT | Performed by: INTERNAL MEDICINE

## 2022-09-19 NOTE — PROGRESS NOTES
[facility]  Subjective:     Encounter Date: 09/19/2022      Patient ID: Trace Escalante is a 62 y.o. male.    Chief Complaint: Chronic systolic congestive heart failure (CMS/HCC)      PROBLEM LIST:  1. Congestive heart failure:   a. BHL, 11/12/2017:  with CCS class II chest discomfort/NYHA class III dyspnea.   b. Echo, 11/13/2017: LVEF 40%.   c. LHC, 11/15/2017: Normal coronary arteries.   d. Echo, 04/11/2018: LVEF 41-45%. Cardiac valves normal.   e. Echo, 9/19/2022.  LVEF 40%.  2. Atrial fibrillation:   a. Cardiac event monitor, 30d, 05/11/2018: 2% a-fib, 2 episodes of 5 beat NSVT.  3. Hypertension.   4. Hyperlipidemia.   5. Type 2 diabetes mellitus; uncontrolled, hemoglobin A1c was 8.3%.   6. Obstructive sleep apnea; compliant with CPAP.   7. Moderate obesity; BMI 38.   8. Chronic kidney disease; stage III, baseline creatinine 2.0.   9. Murmur.   10. Hypokalemia.   11. Nephrolithiasis.   12. Gout.   13. Surgical history:   a. MCL repair, right (1978).  b. Left patella tendon repair (2018).      History of Present Illness  Trace Escalante returns today for 12-month follow up with a history of congestive heart failure, atrial fibrillation, and cardiac risk factors. Since last visit, patient has been doing well overall from a cardiovascular standpoint. On occasion, he will retain water in which he is able to hear himself wheeze. He takes an extra Lasix when this happens. He has also lost weight since last visit and his goal weight is 250 lb. Patient denies chest pain, shortness of breath, orthopnea, palpitations, edema, dizziness, and syncope.     Allergies   Allergen Reactions   • Amoxicillin Rash     Faint rash near end of Rx   • Augmentin [Amoxicillin-Pot Clavulanate] Rash         Current Outpatient Medications:   •  allopurinol (ZYLOPRIM) 300 MG tablet, TAKE 1/2 TABLET BY MOUTH EVERYDAY, Disp: 45 tablet, Rfl: 2  •  amLODIPine (NORVASC) 10 MG tablet, TAKE  1 TABLET BY MOUTH EVERY DAY, Disp: 90 tablet, Rfl: 3  •  aspirin 81 MG EC tablet, Take 81 mg by mouth Daily., Disp: , Rfl:   •  B-D UF III MINI PEN NEEDLES 31G X 5 MM misc, Use 3 per day., Disp: 300 each, Rfl: 3  •  carvedilol (COREG) 12.5 MG tablet, TAKE 1 TABLET BY MOUTH TWICE A DAY, Disp: 180 tablet, Rfl: 3  •  CloNIDine (CATAPRES) 0.2 MG tablet, 0.2 mg As Needed., Disp: , Rfl:   •  fluticasone (FLONASE) 50 MCG/ACT nasal spray, SPRAY 2 SPRAYS INTO EACH NOSTRIL EVERY DAY, Disp: 48 mL, Rfl: 2  •  furosemide (LASIX) 40 MG tablet, TAKE 1 TABLET BY MOUTH DAILY. MAY TAKE AN ADDITIONAL TABLET AS NEEDED FOR 3 LBS WEIGHT GAIN, Disp: 135 tablet, Rfl: 0  •  glucose blood (Accu-Chek Sepideh Plus) test strip, CHECK SUGARS THREE TIMES PER DAY AND AS NEEDED, Disp: 300 each, Rfl: 3  •  glucose monitor monitoring kit, Check sugars four times per day, Disp: 1 each, Rfl: 0  •  hydrALAZINE (APRESOLINE) 100 MG tablet, Take 100 mg by mouth 2 (Two) Times a Day., Disp: , Rfl:   •  isosorbide mononitrate (IMDUR) 60 MG 24 hr tablet, TAKE 1 TABLET BY MOUTH EVERY DAY, Disp: 90 tablet, Rfl: 3  •  Multiple Vitamin (MULTI VITAMIN PO), Take 1 tablet by mouth Daily., Disp: , Rfl:   •  NovoLOG FlexPen 100 UNIT/ML solution pen-injector sc pen, INJECT 5-10 UNITS UNDRE THE SKIN INTO THE APPROPRIATE AREA AS DIRECTED 3 TIMES A DAY WITH MEALS, Disp: 15 mL, Rfl: 1  •  rosuvastatin (CRESTOR) 5 MG tablet, Take 1 tablet by mouth every night at bedtime., Disp: 90 tablet, Rfl: 1  •  spironolactone (ALDACTONE) 25 MG tablet, TAKE 1 TABLET BY MOUTH EVERY DAY, Disp: 90 tablet, Rfl: 1    The following portions of the patient's history were reviewed and updated as appropriate: allergies, current medications, past family history, past medical history, past social history, past surgical history and problem list.    Review of Systems   Constitutional: Negative.   Cardiovascular: Negative for chest pain, dyspnea on exertion, leg swelling, palpitations and syncope.  "  Respiratory: Negative.  Negative for shortness of breath.    Hematologic/Lymphatic: Negative for bleeding problem. Does not bruise/bleed easily.   Skin: Negative for rash.   Musculoskeletal: Negative for muscle weakness and myalgias.   Gastrointestinal: Negative for heartburn, nausea and vomiting.   Neurological: Negative for dizziness, light-headedness, loss of balance and numbness.          Objective:     Vitals:    09/19/22 1436   BP: 148/85   BP Location: Left arm   Patient Position: Sitting   Pulse: 69   SpO2: 98%   Weight: 119 kg (262 lb)   Height: 190.5 cm (75\")         Constitutional:       Appearance: Well-developed.   Neck:      Thyroid: No thyromegaly.      Vascular: No carotid bruit or JVD.   Pulmonary:      Breath sounds: Normal breath sounds.   Cardiovascular:      Regular rhythm.      No gallop. No S3 and S4 gallop.   Edema:     Peripheral edema absent.   Abdominal:      General: Bowel sounds are normal.      Palpations: Abdomen is soft. There is no abdominal mass.      Tenderness: There is no abdominal tenderness.   Skin:     General: Skin is warm and dry.      Findings: No rash.   Neurological:      Mental Status: Alert and oriented to person, place, and time.         Lab Review:  Lab Results   Component Value Date    GLUCOSE 95 09/17/2021    BUN 41 (H) 09/17/2021    CREATININE 2.54 (H) 09/17/2021    EGFRIFNONA 26 (L) 09/17/2021    EGFRIFAFRI 30 (L) 09/17/2021    BCR 16 09/17/2021    K 4.1 09/17/2021    CO2 22 09/17/2021    CALCIUM 8.7 09/17/2021    ALBUMIN 4.1 09/17/2021    ALKPHOS 86 09/17/2021    AST 25 09/17/2021    ALT 29 09/17/2021     Lab Results   Component Value Date    CHLPL 177 09/17/2021    TRIG 77 09/17/2021    HDL 51 09/17/2021     (H) 09/17/2021      Lab Results   Component Value Date    HGBA1C 7.0 07/22/2022        Procedures        Assessment:   Diagnoses and all orders for this visit:    1. Chronic systolic congestive heart failure (HCC) (Primary)    2. Paroxysmal atrial " fibrillation (HCC)    3. Essential hypertension    4. Mixed hyperlipidemia        Impression:  1. Cardiomyopathy, presumed hypertension.. Stable functional class I.  Euvolemic. On aspirin and hydralazine/Imdur.  2. Atrial fibrillation. Stable and asymptomatic.   3. Hypertension. Well controlled. On amlodipine, clonidine, carvedilol, and hydralazine.  4. Hyperlipidemia. Well controlled.  on 09/17/2021. On rosuvastatin.  5. Stage III-4 chronic kidney disease.  (This is why patient is not on ACE/ARB/Arni)    Plan:  1. Stable cardiac status.   2. Continue current medications.  3. Revisit in 12 MO, or sooner as needed.    Scribed for Loki De La Fuente MD by Amee Vail. 9/19/2022 15:13 EDT    Loki De La Fuente MD      Please note that portions of this note may have been completed with a voice recognition program. Efforts were made to edit the dictations, but occasionally words are mistranscribed.

## 2022-09-28 RX ORDER — INSULIN ASPART 100 [IU]/ML
INJECTION, SOLUTION INTRAVENOUS; SUBCUTANEOUS
Qty: 15 ML | Refills: 3 | Status: SHIPPED | OUTPATIENT
Start: 2022-09-28

## 2022-10-25 DIAGNOSIS — M10.9 GOUT, UNSPECIFIED CAUSE, UNSPECIFIED CHRONICITY, UNSPECIFIED SITE: ICD-10-CM

## 2022-10-27 RX ORDER — ALLOPURINOL 300 MG/1
TABLET ORAL
Qty: 45 TABLET | Refills: 3 | Status: SHIPPED | OUTPATIENT
Start: 2022-10-27

## 2022-11-11 ENCOUNTER — OFFICE VISIT (OUTPATIENT)
Dept: ENDOCRINOLOGY | Facility: CLINIC | Age: 62
End: 2022-11-11
Payer: COMMERCIAL

## 2022-11-11 VITALS
HEART RATE: 70 BPM | BODY MASS INDEX: 32.92 KG/M2 | WEIGHT: 264.8 LBS | SYSTOLIC BLOOD PRESSURE: 138 MMHG | OXYGEN SATURATION: 97 % | DIASTOLIC BLOOD PRESSURE: 82 MMHG | HEIGHT: 75 IN

## 2022-11-11 DIAGNOSIS — E11.65 TYPE 2 DIABETES MELLITUS WITH HYPERGLYCEMIA, WITH LONG-TERM CURRENT USE OF INSULIN: Primary | Chronic | ICD-10-CM

## 2022-11-11 DIAGNOSIS — Z79.4 TYPE 2 DIABETES MELLITUS WITH HYPERGLYCEMIA, WITH LONG-TERM CURRENT USE OF INSULIN: Primary | Chronic | ICD-10-CM

## 2022-11-11 LAB
EXPIRATION DATE: ABNORMAL
EXPIRATION DATE: NORMAL
GLUCOSE BLDC GLUCOMTR-MCNC: 228 MG/DL (ref 70–130)
HBA1C MFR BLD: 7.6 %
Lab: ABNORMAL
Lab: NORMAL

## 2022-11-11 PROCEDURE — 82947 ASSAY GLUCOSE BLOOD QUANT: CPT | Performed by: PHYSICIAN ASSISTANT

## 2022-11-11 PROCEDURE — 83036 HEMOGLOBIN GLYCOSYLATED A1C: CPT | Performed by: PHYSICIAN ASSISTANT

## 2022-11-11 PROCEDURE — 99213 OFFICE O/P EST LOW 20 MIN: CPT | Performed by: PHYSICIAN ASSISTANT

## 2022-11-11 RX ORDER — FUROSEMIDE 40 MG/1
40 TABLET ORAL DAILY
Qty: 135 TABLET | Refills: 0 | Status: SHIPPED | OUTPATIENT
Start: 2022-11-11 | End: 2023-03-31

## 2022-11-11 RX ORDER — CARVEDILOL 12.5 MG/1
TABLET ORAL
Qty: 180 TABLET | Refills: 3 | Status: SHIPPED | OUTPATIENT
Start: 2022-11-11

## 2022-12-28 DIAGNOSIS — H69.83 DYSFUNCTION OF BOTH EUSTACHIAN TUBES: ICD-10-CM

## 2022-12-29 RX ORDER — SPIRONOLACTONE 25 MG/1
TABLET ORAL
Qty: 90 TABLET | Refills: 1 | OUTPATIENT
Start: 2022-12-29

## 2022-12-29 RX ORDER — SPIRONOLACTONE 25 MG/1
25 TABLET ORAL DAILY
Qty: 90 TABLET | Refills: 1 | Status: SHIPPED | OUTPATIENT
Start: 2022-12-29

## 2022-12-29 RX ORDER — FLUTICASONE PROPIONATE 50 MCG
SPRAY, SUSPENSION (ML) NASAL
Qty: 48 ML | Refills: 2 | Status: SHIPPED | OUTPATIENT
Start: 2022-12-29

## 2022-12-29 NOTE — TELEPHONE ENCOUNTER
We will follow-up currently scheduled in the heart valve center.  Will route to [facility] for review and management as needed.

## 2023-03-06 RX ORDER — HYDRALAZINE HYDROCHLORIDE 100 MG/1
TABLET, FILM COATED ORAL
Qty: 180 TABLET | Refills: 1 | Status: SHIPPED | OUTPATIENT
Start: 2023-03-06

## 2023-03-31 ENCOUNTER — OFFICE VISIT (OUTPATIENT)
Dept: ENDOCRINOLOGY | Facility: CLINIC | Age: 63
End: 2023-03-31
Payer: COMMERCIAL

## 2023-03-31 ENCOUNTER — TELEPHONE (OUTPATIENT)
Dept: FAMILY MEDICINE CLINIC | Facility: CLINIC | Age: 63
End: 2023-03-31
Payer: COMMERCIAL

## 2023-03-31 VITALS
SYSTOLIC BLOOD PRESSURE: 150 MMHG | HEART RATE: 73 BPM | OXYGEN SATURATION: 97 % | HEIGHT: 75 IN | DIASTOLIC BLOOD PRESSURE: 90 MMHG | WEIGHT: 259 LBS | BODY MASS INDEX: 32.2 KG/M2

## 2023-03-31 DIAGNOSIS — Z79.4 TYPE 2 DIABETES MELLITUS WITH HYPERGLYCEMIA, WITH LONG-TERM CURRENT USE OF INSULIN: Primary | Chronic | ICD-10-CM

## 2023-03-31 DIAGNOSIS — E11.65 TYPE 2 DIABETES MELLITUS WITH HYPERGLYCEMIA, WITH LONG-TERM CURRENT USE OF INSULIN: Primary | Chronic | ICD-10-CM

## 2023-03-31 LAB
EXPIRATION DATE: NORMAL
EXPIRATION DATE: NORMAL
GLUCOSE BLDC GLUCOMTR-MCNC: 103 MG/DL (ref 70–130)
HBA1C MFR BLD: 7 %
Lab: NORMAL
Lab: NORMAL

## 2023-03-31 PROCEDURE — 82947 ASSAY GLUCOSE BLOOD QUANT: CPT | Performed by: PHYSICIAN ASSISTANT

## 2023-03-31 PROCEDURE — 99213 OFFICE O/P EST LOW 20 MIN: CPT | Performed by: PHYSICIAN ASSISTANT

## 2023-03-31 PROCEDURE — 83036 HEMOGLOBIN GLYCOSYLATED A1C: CPT | Performed by: PHYSICIAN ASSISTANT

## 2023-03-31 RX ORDER — FUROSEMIDE 40 MG/1
40 TABLET ORAL DAILY
Qty: 135 TABLET | Refills: 0 | Status: SHIPPED | OUTPATIENT
Start: 2023-03-31

## 2023-03-31 NOTE — TELEPHONE ENCOUNTER
Caller: Trace Escalante    Relationship: Self    Best call back number [phone]    What orders are you requesting (i.e. lab or imaging):  NEW CPAP SUPPLIES     In what timeframe would the patient need to come in:   AS SOON AS POSSIBLE     Where will you receive your lab/imaging services:     [facility]  [email or web address]  [address] [place] [address]   PHONE: [phone]    Additional notes:   PATIENT WOULD LIKE FOR A ORDER TO BE SENT TO [facility] FOR HIM TO BE ABLE TO GET NEW CPAP SUPPLIES

## 2023-03-31 NOTE — PROGRESS NOTES
Office Note      Date: 2023  Patient Name: Trace Escalante  MRN: [id number]  : 1960    Chief Complaint   Patient presents with   • Diabetes       History of Present Illness  Trace Escalante is a 62 y.o. male who presents today for follow up on type 2 diabetes.   Diabetes was diagnosed in ~.  Known diabetic complications: nephropathy and retinopathy.  Current diabetic medications: Novolog.  Past diabetic medications: Oral medication/metformin, basal insulin.  Typically takes Novolog 5 units with meals, but will adjust up or down 1-2 units.  Testing FSBG 3-4 times per day.  Fasting typically around 140s.  He reports rare hypoglycemia.  Lowest was 59.  Feet: Foot exam up to date.  Last eye exam: 2022 - mild retinopathy in right eye - f/u in 2023.  History of mild non-proliferative retinopathy - resolved .  ACE inhibitor/ARB: No, nephrology.  Statin: Rosuvastatin.  He is followed by nephrology for CKD.    Review of systems  As noted in HPI.    Past Medical History:   Diagnosis Date   • CKD (chronic kidney disease) stage 3, GFR 30-59 ml/min ([facility])    • Gout    • Hypercholesterolemia    • Hyperlipidemia    • Hypertension    • Kidney stones    • Long term current use of insulin ([facility])    • NICM (nonischemic cardiomyopathy) ([facility])    • Obesity    • ALYSSA (obstructive sleep apnea)    • Renal disorder     due to type 2 diabetes mellitus-Abstracted from [place]   • Retinopathy     due to type 2 diabetes mellitus-Abstracted from [place]    • Simple goiter    • Type 2 diabetes mellitus       Past Surgical History:   Procedure Laterality Date   • MEDIAL COLLATERAL LIGAMENT REPAIR, KNEE Right    • CARDIAC CATHETERIZATION N/A 11/15/2017    Procedure: Coronary angiography;  Surgeon: Elke Marcum MD;  Location: [email or web address] CATH INVASIVE LOCATION;  Service:    • PATELLA TENDON REPAIR Left 3/13/2018    Procedure: PATELLA TENDON REPAIR LEFT;  Surgeon: Blake Mcbride MD;  Location: [email or web address] OR;   "Service: Orthopedics   • KNEE SURGERY Left     Patellar tendon repair - [facility]     The following portions of the patient's history were reviewed and updated as appropriate: allergies, current medications, past family history, past medical history, past social history, past surgical history and problem list.    Vitals:  /90   Pulse 73   Ht 190.5 cm (75\")   Wt 117 kg (259 lb)   SpO2 97%   BMI 32.37 kg/m²     Physical Exam  Vitals reviewed.   Constitutional:       General: He is not in acute distress.  Neurological:      Mental Status: He is alert and oriented to person, place, and time.   Psychiatric:         Mood and Affect: Affect normal.       Labs/Imaging    Hemoglobin A1c  Lab Results   Component Value Date    HGBA1C 7.0 03/31/2023    HGBA1C 7.6 11/11/2022    HGBA1C 7.0 07/22/2022     Glucose  Lab Results   Component Value Date    POCGLU 103 03/31/2023       Current Outpatient Medications   Medication Instructions   • allopurinol (ZYLOPRIM) 300 MG tablet TAKE 1/2 TABLET BY MOUTH EVERYDAY   • amLODIPine (NORVASC) 10 MG tablet TAKE 1 TABLET BY MOUTH EVERY DAY   • aspirin 81 mg, Oral, Daily   • B-D UF III MINI PEN NEEDLES 31G X 5 MM misc Use 3 per day.   • carvedilol (COREG) 12.5 MG tablet TAKE 1 TABLET BY MOUTH TWICE A DAY   • cloNIDine (CATAPRES) 0.2 mg, Oral, Nightly   • fluticasone (FLONASE) 50 MCG/ACT nasal spray SPRAY 2 SPRAYS INTO EACH NOSTRIL EVERY DAY   • furosemide (LASIX) 40 mg, Oral, Daily, May take an additional tablet as needed for 3 lbs weight gain   • glucose blood (Accu-Chek Sepideh Plus) test strip CHECK SUGARS THREE TIMES PER DAY AND AS NEEDED   • glucose monitor monitoring kit Check sugars four times per day   • hydrALAZINE (APRESOLINE) 100 MG tablet TAKE 1 TABLET BY MOUTH TWICE A DAY   • isosorbide mononitrate (IMDUR) 60 MG 24 hr tablet TAKE 1 TABLET BY MOUTH EVERY DAY   • Multiple Vitamin (MULTI VITAMIN PO) 1 tablet, Oral, Daily   • NovoLOG FlexPen 100 UNIT/ML solution pen-injector sc " pen INJECT 5-10 UNITS UNDRE THE SKIN INTO THE APPROPRIATE AREA AS DIRECTED 3 TIMES A DAY WITH MEALS   • rosuvastatin (CRESTOR) 5 mg, Oral, Every Night at Bedtime   • spironolactone (ALDACTONE) 25 mg, Oral, Daily       Assessment & Plan  1. Type 2 diabetes mellitus with hyperglycemia, with long-term current use of insulin (HCC)  Diabetes control has improved.  A1c at goal 7.0% today.  Continue Novolog with meals.  Encouraged healthy dietary choices, regular exercise, weight loss.  We discussed GLP-1 RA.  Will consider this option.  - POC Glucose, Blood  - POC Glycosylated Hemoglobin (Hb A1C)      Return in about 3 months (around 6/30/2023) for next scheduled follow up. He was advised to contact the office with any interval questions or concerns.    VIKI Middleton  Endocrinology  03/31/2023

## 2023-04-26 RX ORDER — FUROSEMIDE 40 MG/1
40 TABLET ORAL DAILY
Qty: 135 TABLET | Refills: 0 | Status: SHIPPED | OUTPATIENT
Start: 2023-04-26

## 2023-07-24 ENCOUNTER — TELEPHONE (OUTPATIENT)
Dept: ENDOCRINOLOGY | Facility: CLINIC | Age: 63
End: 2023-07-24
Payer: COMMERCIAL

## 2023-07-24 NOTE — TELEPHONE ENCOUNTER
----- Message from VIKI Garcia sent at 7/21/2023 12:53 PM EDT -----  Please call for copy of last 2 sets of labs with nephrology - Dr. Matt Juarez ([place]).  Thank you.

## 2023-08-03 RX ORDER — SPIRONOLACTONE 25 MG/1
TABLET ORAL
Qty: 90 TABLET | Refills: 0 | Status: SHIPPED | OUTPATIENT
Start: 2023-08-03

## 2023-08-09 RX ORDER — FUROSEMIDE 40 MG/1
40 TABLET ORAL DAILY
Qty: 135 TABLET | Refills: 0 | Status: SHIPPED | OUTPATIENT
Start: 2023-08-09

## 2023-09-05 RX ORDER — HYDRALAZINE HYDROCHLORIDE 100 MG/1
TABLET, FILM COATED ORAL
Qty: 180 TABLET | Refills: 1 | Status: SHIPPED | OUTPATIENT
Start: 2023-09-05

## 2023-09-15 ENCOUNTER — OFFICE VISIT (OUTPATIENT)
Dept: FAMILY MEDICINE CLINIC | Facility: CLINIC | Age: 63
End: 2023-09-15
Payer: COMMERCIAL

## 2023-09-15 VITALS
TEMPERATURE: 98 F | DIASTOLIC BLOOD PRESSURE: 78 MMHG | BODY MASS INDEX: 31.73 KG/M2 | SYSTOLIC BLOOD PRESSURE: 132 MMHG | HEIGHT: 75 IN | HEART RATE: 83 BPM | WEIGHT: 255.2 LBS | OXYGEN SATURATION: 98 % | RESPIRATION RATE: 18 BRPM

## 2023-09-15 DIAGNOSIS — Z00.00 WELL ADULT EXAM: Primary | ICD-10-CM

## 2023-09-15 DIAGNOSIS — N18.4 CHRONIC RENAL IMPAIRMENT, STAGE 4 (SEVERE): ICD-10-CM

## 2023-09-15 DIAGNOSIS — I50.22 CHRONIC SYSTOLIC CONGESTIVE HEART FAILURE: ICD-10-CM

## 2023-09-15 DIAGNOSIS — L98.9 SKIN LESION: ICD-10-CM

## 2023-09-15 DIAGNOSIS — E78.2 MIXED HYPERLIPIDEMIA: Chronic | ICD-10-CM

## 2023-09-15 DIAGNOSIS — I10 ESSENTIAL HYPERTENSION: Chronic | ICD-10-CM

## 2023-09-15 DIAGNOSIS — Z12.5 PROSTATE CANCER SCREENING: ICD-10-CM

## 2023-09-15 RX ORDER — ISOSORBIDE MONONITRATE 60 MG/1
TABLET, EXTENDED RELEASE ORAL
Qty: 90 TABLET | Refills: 3 | Status: SHIPPED | OUTPATIENT
Start: 2023-09-15

## 2023-09-15 RX ORDER — SPIRONOLACTONE 25 MG/1
TABLET ORAL
Qty: 90 TABLET | Refills: 0 | Status: SHIPPED | OUTPATIENT
Start: 2023-09-15

## 2023-09-15 RX ORDER — AMLODIPINE BESYLATE 10 MG/1
TABLET ORAL
Qty: 90 TABLET | Refills: 3 | Status: SHIPPED | OUTPATIENT
Start: 2023-09-15

## 2023-09-15 NOTE — TELEPHONE ENCOUNTER
Rx Refill Note  Requested Prescriptions     Pending Prescriptions Disp Refills    amLODIPine (NORVASC) 10 MG tablet [Pharmacy Med Name: AMLODIPINE BESYLATE 10 MG TAB] 90 tablet 3     Sig: TAKE 1 TABLET BY MOUTH EVERY DAY      Last office visit with prescribing clinician: Visit date not found   Last telemedicine visit with prescribing clinician: Visit date not found   Next office visit with prescribing clinician: Visit date not found                         Would you like a call back once the refill request has been completed: [] Yes [] No    If the office needs to give you a call back, can they leave a voicemail: [] Yes [] No    Larissa Montero MA  09/15/23, 10:18 EDT

## 2023-09-15 NOTE — PROGRESS NOTES
Chief Complaint  Annual Exam (Physical-spot on left side may need referral to dermatologist-open care gaps)    Subjective          Trace Escalante presents to [facility]  History of Present Illness    Trace Escalante is a 63-year-old male who presents today for his yearly checkup.    Constipation  He is concerned about his gut health. It can go from constipation to diarrhea. He has increased his fiber intake and eats oatmeal a couple of times. He eats smoothies, nuts, fruit, and plenty of vegetables. He uses MiraLAX twice a week. He does not feel like it is related to taking the MiraLAX. If he takes the MiraLAX at night, he will have a bowel movement the next morning, but it will not be diarrhea. The diarrhea is random. He was having a lot of gas, but then he realized that he has been eating cabbage, Brussel sprouts, broccoli, and asparagus. He is not going a week without a bowel movement. He is not having pain.    Chronic systolic congestive heart failure  The patient denies any issues with his heart. He gets his annual next month. He has shortness of breath when he has too much salt and he is retaining water. He weighs himself every day. He is no longer taking cholesterol medication.    Chronic renal impairment  The patient is still seeing the nephrologist. His kidney function slightly improved the last time Dr. Juarez saw him. His next appointment is in a couple of months.    Hyperlipidemia  The patient is no longer taking his cholesterol medication. He has been off of it for over 6 months. He did not get his cholesterol checked the last time.    Skin lesion  He has a spot on his left lateral abdomen. It has not been there for a year. Sometimes it gets dry. It is in his waist band area. He is not sure if he was irritating it. It is getting bigger. He had bumps on the edge one time and then it smoothed out. He tries to make sure he keeps it moisturized. He has not seen a  "dermatologist. He denies any family history of skin cancer. Referred him to Dr. Golden.    Diabetes  The patient sees an endocrinologist, Dr Mohr. His last A1c was 7.5 on 07/21/2023.    Health maintenance  The patient's last eye exam was 2 to 3 months ago in [facility] and saw Dr Macdonald. During the exam, they noticed something and then they did some additional scan and based on that, they recommended him to see a retina specialist. He does not have a dentist that he goes to. He denies any numbness or tingling of his lower extremities or swelling. He gets his flu shots. He has never had a shingles vaccine. He will be due for a pneumonia vaccine at age 63-year-old and RSV. He will also get the new COVID-19 vaccine when it comes out this fall. 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 He weighs himself daily.    He has eaten today, 09/15/2023. He had a smoothie.    Family history  The patient's half-sister had cancer. His sister had some mini strokes. His father and brother just experienced a stroke. He denies any family history of skin cancer.          Review of Systems   Respiratory:  Positive for shortness of breath (if more salt and retains water. + weighs self daily).    Cardiovascular:  Negative for chest pain.      Objective       Vital Signs:   /78   Pulse 83   Temp 98 °F (36.7 °C)   Resp 18   Ht 190.5 cm (75\")   Wt 116 kg (255 lb 3.2 oz)   SpO2 98%   BMI 31.90 kg/m²     Physical Exam  Vitals and nursing note reviewed.   Constitutional:       General: He is not in acute distress.     Appearance: Normal appearance. He is well-developed. He is not ill-appearing.   HENT:      Head: Normocephalic and atraumatic.      Right Ear: Hearing, tympanic membrane, ear canal and external ear normal.      Left Ear: Hearing, tympanic membrane, ear canal and external ear normal.      Nose: Nose normal. No congestion or rhinorrhea.      Mouth/Throat:      Mouth: Mucous membranes are " moist.      Pharynx: No oropharyngeal exudate or posterior oropharyngeal erythema.   Eyes:      General: Lids are normal.      Conjunctiva/sclera: Conjunctivae normal.      Pupils: Pupils are equal, round, and reactive to light.   Neck:      Thyroid: No thyromegaly.   Cardiovascular:      Rate and Rhythm: Normal rate and regular rhythm.      Heart sounds: Normal heart sounds. No murmur heard.    No friction rub.   Pulmonary:      Effort: Pulmonary effort is normal. No respiratory distress.      Breath sounds: Normal breath sounds. No wheezing or rales.   Abdominal:      General: Bowel sounds are normal. There is no distension.      Palpations: Abdomen is soft. There is no mass.      Tenderness: There is no abdominal tenderness. There is no guarding or rebound.   Musculoskeletal:      Right lower leg: No edema.      Left lower leg: No edema.   Skin:     General: Skin is warm and dry.   Neurological:      General: No focal deficit present.      Mental Status: He is alert.   Psychiatric:         Mood and Affect: Mood normal.         Speech: Speech normal.         Behavior: Behavior normal.      Left lateral abdomen towards the belt line is a 7 mm irregular dark pigmented lesion with irregular borders.  Some lighter areas on the edges.    Result Review :                     Assessment and Plan    Diagnoses and all orders for this visit:    1. Well adult exam (Primary)    2. Chronic systolic congestive heart failure    3. Essential hypertension  -     CBC & Differential; Future  -     Comprehensive Metabolic Panel; Future    4. Mixed hyperlipidemia  -     Comprehensive Metabolic Panel; Future  -     Lipid Panel With / Chol / HDL Ratio; Future    5. Chronic renal impairment, stage 4 (severe)  -     CBC & Differential; Future  -     Comprehensive Metabolic Panel; Future    6. Prostate cancer screening  -     PSA Screen; Future    7. Skin lesion  -     Ambulatory Referral to Dermatology          DISCUSSION    - Continue  routine health maintenance including routine dentistry, eye exam, safety, seatbelt use, exercise, and proper nutrition.    Chronic systolic congestive heart failure.  - Continue follow-up with cardiology.    Hypertension.  - Blood pressure is stable at 132/78 mmHg.   - Continue amlodipine, clonidine, and spironolactone.    Hyperlipidemia.  - He is off rosuvastatin due to muscle aches.   - Recheck CMP and lipid panel and he will do that fasting in the near future.    Chronic renal impairment.  - Continue follow up with nephrology.    Liver Function Test  - Needs to have test for liver fuction.    Prostate cancer screening.  - He will check PSA as well.    Skin lesion, left lateral abdomen.  - Approximately 7 mm in size with irregular borders and dark color.   - Referred to dermatology for evaluation. Explained that it could be some kind of skin cancer. He expressed understanding.    Gut health  - I advised him to get the over-the-counter probiotics.    Recommend the following vaccinations: Influenza vaccine and shingles vaccine. He will also consider RSV. He will be due for a pneumonia vaccine at age 65-year-old, but he will qualify to get that earlier if he wishes. In addition, a Tdap should also be considered. He will also get the new COVID-19 vaccine when it comes out this fall.    Follow up in 1 year for well check.            Follow Up   Return in about 1 year (around 9/15/2024) for Annual physical.    Patient was given instructions and counseling regarding his condition or for health maintenance advice. Please see specific information pulled into the AVS if appropriate.       Jose Ramon Allen MD        Transcribed from ambient dictation for Jose Ramon Allen MD by Geraldine Momin.  09/15/23   13:02 EDT    Patient or patient representative verbalized consent to the visit recording.  I have personally performed the services described in this document as transcribed by the above individual, and it is both accurate and  complete.  Jose Ramon Allen MD  9/15/2023  15:01 EDT

## 2023-09-19 DIAGNOSIS — Z79.4 TYPE 2 DIABETES MELLITUS WITH HYPERGLYCEMIA, WITH LONG-TERM CURRENT USE OF INSULIN: Chronic | ICD-10-CM

## 2023-09-19 DIAGNOSIS — E11.65 TYPE 2 DIABETES MELLITUS WITH HYPERGLYCEMIA, WITH LONG-TERM CURRENT USE OF INSULIN: Chronic | ICD-10-CM

## 2023-09-19 RX ORDER — FLURBIPROFEN SODIUM 0.3 MG/ML
SOLUTION/ DROPS OPHTHALMIC
Qty: 300 EACH | Refills: 3 | Status: SHIPPED | OUTPATIENT
Start: 2023-09-19

## 2023-09-25 ENCOUNTER — OFFICE VISIT (OUTPATIENT)
Dept: CARDIOLOGY | Facility: CLINIC | Age: 63
End: 2023-09-25

## 2023-09-25 VITALS
DIASTOLIC BLOOD PRESSURE: 70 MMHG | BODY MASS INDEX: 32.45 KG/M2 | WEIGHT: 261 LBS | HEIGHT: 75 IN | SYSTOLIC BLOOD PRESSURE: 130 MMHG | OXYGEN SATURATION: 97 % | HEART RATE: 50 BPM

## 2023-09-25 DIAGNOSIS — I48.0 PAROXYSMAL ATRIAL FIBRILLATION: ICD-10-CM

## 2023-09-25 DIAGNOSIS — E78.2 MIXED HYPERLIPIDEMIA: Chronic | ICD-10-CM

## 2023-09-25 DIAGNOSIS — I50.22 CHRONIC SYSTOLIC CONGESTIVE HEART FAILURE: Primary | ICD-10-CM

## 2023-09-25 DIAGNOSIS — I10 ESSENTIAL HYPERTENSION: Chronic | ICD-10-CM

## 2023-09-25 PROCEDURE — 99214 OFFICE O/P EST MOD 30 MIN: CPT | Performed by: INTERNAL MEDICINE

## 2023-09-25 RX ORDER — ROSUVASTATIN CALCIUM 10 MG/1
10 TABLET, COATED ORAL
COMMUNITY

## 2023-09-25 NOTE — PROGRESS NOTES
[facility]  Subjective:     Encounter Date: 09/25/2023      Patient ID: Trace Escalante is a 63 y.o. male.    Chief Complaint: Chronic systolic congestive heart failure      PROBLEM LIST:  Congestive heart failure:   [facility], 11/12/2017:  with CCS class II chest discomfort/NYHA class III dyspnea.   Echo, 11/13/2017: LVEF 40%.   [facility], 11/15/2017: Normal coronary arteries.   Echo, 04/11/2018: LVEF 41-45%. Cardiac valves normal.   Echo, 08/17/2020: EF 45%. Mild AR/MR/TR.  Echo, 9/19/2022.  LVEF 40%.  Atrial fibrillation:   Cardiac event monitor, 30d, 05/11/2018: 2% a-fib, 2 episodes of 5 beat NSVT.  Hypertension.   Hyperlipidemia.   Type 2 diabetes mellitus; uncontrolled, hemoglobin A1c was 8.3%.   Obstructive sleep apnea; compliant with CPAP.   Moderate obesity; BMI 38.   Chronic kidney disease; stage III, baseline creatinine 2.0.   Murmur.   Hypokalemia.   Nephrolithiasis.   Gout.   Surgical history:   MCL repair, right (1978).  Left patella tendon repair (2018).      History of Present Illness  Trace Escalante returns today for a one year follow up with a history of congestive heart failure, atrial fibrillation, and cardiac risk factors. Since last visit, patient has been doing well overall from a cardiovascular standpoint. He states his HGBA1C has recently measured at 7. Patient reports fluctuating weight due to football season and other events. He states his PCP lowered his cholesterol medication to 5 mg from 10 mg then to Crestor but he continues to have leg cramps so he will be getting a cholesterol panel this Friday. Patient stays busy and active by walking 3 days daily, lifting weights, and yard work. He denies chest pain, shortness of breath, orthopnea, palpitations, edema, dizziness, and syncope.         Allergies   Allergen Reactions    Amoxicillin Rash     Faint rash near end of Rx    Augmentin [Amoxicillin-Pot Clavulanate] Rash         Current  Outpatient Medications:     allopurinol (ZYLOPRIM) 300 MG tablet, TAKE 1/2 TABLET BY MOUTH EVERYDAY, Disp: 45 tablet, Rfl: 3    amLODIPine (NORVASC) 10 MG tablet, TAKE 1 TABLET BY MOUTH EVERY DAY, Disp: 90 tablet, Rfl: 3    aspirin 81 MG EC tablet, Take 1 tablet by mouth Daily., Disp: , Rfl:     B-D UF III MINI PEN NEEDLES 31G X 5 MM misc, Use 3 per day., Disp: 300 each, Rfl: 3    carvedilol (COREG) 12.5 MG tablet, TAKE 1 TABLET BY MOUTH TWICE A DAY, Disp: 180 tablet, Rfl: 3    CloNIDine (CATAPRES) 0.2 MG tablet, Take 1 tablet by mouth Every Night., Disp: , Rfl:     fluticasone (FLONASE) 50 MCG/ACT nasal spray, SPRAY 2 SPRAYS INTO EACH NOSTRIL EVERY DAY, Disp: 48 mL, Rfl: 2    furosemide (LASIX) 40 MG tablet, TAKE 1 TABLET BY MOUTH DAILY. MAY TAKE AN ADDITIONAL TABLET AS NEEDED FOR 3 LBS WEIGHT GAIN, Disp: 135 tablet, Rfl: 0    glucose blood (Accu-Chek Sepideh Plus) test strip, CHECK SUGARS THREE TIMES PER DAY AND AS NEEDED, Disp: 300 each, Rfl: 3    glucose monitor monitoring kit, Check sugars four times per day, Disp: 1 each, Rfl: 0    hydrALAZINE (APRESOLINE) 100 MG tablet, TAKE 1 TABLET BY MOUTH TWICE A DAY, Disp: 180 tablet, Rfl: 1    isosorbide mononitrate (IMDUR) 60 MG 24 hr tablet, TAKE 1 TABLET BY MOUTH EVERY DAY, Disp: 90 tablet, Rfl: 3    Multiple Vitamin (MULTI VITAMIN PO), Take 1 tablet by mouth Daily., Disp: , Rfl:     NovoLOG FlexPen 100 UNIT/ML solution pen-injector sc pen, INJECT 5-10 UNITS UNDRE THE SKIN INTO THE APPROPRIATE AREA AS DIRECTED 3 TIMES A DAY WITH MEALS, Disp: 15 mL, Rfl: 3    rosuvastatin (CRESTOR) 10 MG tablet, Take 1 tablet by mouth every night at bedtime., Disp: , Rfl:     spironolactone (ALDACTONE) 25 MG tablet, TAKE 1 TABLET BY MOUTH EVERY DAY, Disp: 90 tablet, Rfl: 0    The following portions of the patient's history were reviewed and updated as appropriate: allergies, current medications, past family history, past medical history, past social history, past surgical history and  "problem list.    Review of Systems   Constitutional: Negative.   Cardiovascular:  Negative for chest pain, dyspnea on exertion, leg swelling, palpitations and syncope.   Respiratory: Negative.  Negative for shortness of breath.    Hematologic/Lymphatic: Negative for bleeding problem. Does not bruise/bleed easily.   Skin:  Negative for rash.   Musculoskeletal:  Negative for muscle weakness and myalgias.   Gastrointestinal:  Negative for heartburn, nausea and vomiting.   Neurological:  Negative for dizziness, light-headedness, loss of balance and numbness.        Objective:     Vitals:    09/25/23 1418   BP: 130/70   BP Location: Left arm   Patient Position: Sitting   Cuff Size: Adult   Pulse: 50   SpO2: 97%   Weight: 118 kg (261 lb)   Height: 190.5 cm (75\")         Vitals reviewed.   Constitutional:       Appearance: Well-developed and not in distress.   Neck:      Thyroid: No thyromegaly.      Vascular: No carotid bruit or JVD.   Pulmonary:      Breath sounds: Normal breath sounds.   Cardiovascular:      Regular rhythm.      No gallop. No S3 and S4 gallop.   Pulses:     Intact distal pulses.      Carotid: 2+ bilaterally.     Radial: 2+ bilaterally.  Edema:     Peripheral edema absent.   Abdominal:      General: Bowel sounds are normal.      Palpations: Abdomen is soft. There is no abdominal mass.      Tenderness: There is no abdominal tenderness.   Musculoskeletal:         General: No deformity.      Extremities: No clubbing present.Skin:     General: Skin is warm and dry.      Findings: No rash.   Neurological:      Mental Status: Alert and oriented to person, place, and time.       Lab Review:  Lab date: 07/07/2023  CMP: Glu 137, BUN 46, Creat 2.91, eGFR 23, Na 140, K 4.2, Cl 106, CO2 22, Ca 8.7, Alk Phos 89, AST 24, ALT 32  CBC: WBC 6.5, RBC 4.07, HGB 12.2, HCT 36.5, MCV 90, MCH 30,     Procedures          Assessment:   Diagnoses and all orders for this visit:    1. Chronic systolic congestive heart " failure (Primary)    2. Paroxysmal atrial fibrillation    3. Essential hypertension    4. Mixed hyperlipidemia        Impression:  1. Chronic systolic congestive heart failure.,  Last LVEF 40%.  Currently euvolemic.  Functional class I.  Stable and asymptomatic. Continue on aspirin 81 mg for antiplatelet therapy. Continue on Imdur 60 mg daily for chest pain.     2. Paroxysmal atrial fibrillation. Stable and asymptomatic.     3. Essential hypertension. Well controlled. Continue on Lasix 40 mg daily for fluid retention. Continue on amlodipine 10 mg daily for hypertension. Continue on carvedilol 12.5 mg BID for hypertension. Continue on spironolactone 25 mg daily for fluid retention and hypertension.     4. Mixed hyperlipidemia. No labs for review. Continue on rosuvastatin 10 mg daily for hyperlipidemia.     5.  Nonischemic cardiomyopathy.  Last LVEF 40%.  Presumed hypertension.  On max tolerated GDMT.  No ACE ARB or Arni due to renal failure.  On hydralazine isosorbide.    6.  Class IV kidney disease    Plan:  Stable cardiac status.   Patient was encouraged to continue to be active and have a healthy diet.  Coenzyme Q10 discussed for leg cramps in the setting of statin therapy.  If continues to have significant muscle cramps on statin, with the continue the lower dose as tolerated.  With normal coronary arteries would not pursue PCSK9 or Leqvio at this time.  Could consider Zetia..  Continue current medications.  Revisit in 12 MO, or sooner as needed.      Scribed for Loki De La Fuente MD by Jonnie Yi. 9/25/2023 14:38 EDT    Loki De La Fuente MD    Please note that portions of this note may have been completed with a voice recognition program. Efforts were made to edit the dictations, but occasionally words are mistranscribed.

## 2023-09-29 DIAGNOSIS — I10 ESSENTIAL HYPERTENSION: Chronic | ICD-10-CM

## 2023-09-29 DIAGNOSIS — Z12.5 PROSTATE CANCER SCREENING: ICD-10-CM

## 2023-09-29 DIAGNOSIS — N18.4 CHRONIC RENAL IMPAIRMENT, STAGE 4 (SEVERE): ICD-10-CM

## 2023-09-29 DIAGNOSIS — E78.2 MIXED HYPERLIPIDEMIA: Chronic | ICD-10-CM

## 2023-09-29 LAB
ALBUMIN SERPL-MCNC: 4 G/DL (ref 3.5–5.2)
ALBUMIN/GLOB SERPL: 1.5 G/DL
ALP SERPL-CCNC: 77 U/L (ref 39–117)
ALT SERPL-CCNC: 17 U/L (ref 1–41)
AST SERPL-CCNC: 21 U/L (ref 1–40)
BASOPHILS # BLD AUTO: 0.1 10*3/MM3 (ref 0–0.2)
BASOPHILS NFR BLD AUTO: 1.7 % (ref 0–1.5)
BILIRUB SERPL-MCNC: 0.6 MG/DL (ref 0–1.2)
BUN SERPL-MCNC: 44 MG/DL (ref 8–23)
BUN/CREAT SERPL: 14.4 (ref 7–25)
CALCIUM SERPL-MCNC: 9.3 MG/DL (ref 8.6–10.5)
CHLORIDE SERPL-SCNC: 104 MMOL/L (ref 98–107)
CHOLEST SERPL-MCNC: 176 MG/DL (ref 0–200)
CHOLEST/HDLC SERPL: 3.03 {RATIO}
CO2 SERPL-SCNC: 25.5 MMOL/L (ref 22–29)
CREAT SERPL-MCNC: 3.05 MG/DL (ref 0.76–1.27)
EGFRCR SERPLBLD CKD-EPI 2021: 22.2 ML/MIN/1.73
EOSINOPHIL # BLD AUTO: 0.62 10*3/MM3 (ref 0–0.4)
EOSINOPHIL NFR BLD AUTO: 10.5 % (ref 0.3–6.2)
ERYTHROCYTE [DISTWIDTH] IN BLOOD BY AUTOMATED COUNT: 14.2 % (ref 12.3–15.4)
GLOBULIN SER CALC-MCNC: 2.7 GM/DL
GLUCOSE SERPL-MCNC: 182 MG/DL (ref 65–99)
HCT VFR BLD AUTO: 39 % (ref 37.5–51)
HDLC SERPL-MCNC: 58 MG/DL (ref 40–60)
HGB BLD-MCNC: 12.6 G/DL (ref 13–17.7)
IMM GRANULOCYTES # BLD AUTO: 0.01 10*3/MM3 (ref 0–0.05)
IMM GRANULOCYTES NFR BLD AUTO: 0.2 % (ref 0–0.5)
LDLC SERPL CALC-MCNC: 106 MG/DL (ref 0–100)
LYMPHOCYTES # BLD AUTO: 1.09 10*3/MM3 (ref 0.7–3.1)
LYMPHOCYTES NFR BLD AUTO: 18.4 % (ref 19.6–45.3)
MCH RBC QN AUTO: 30.1 PG (ref 26.6–33)
MCHC RBC AUTO-ENTMCNC: 32.3 G/DL (ref 31.5–35.7)
MCV RBC AUTO: 93.3 FL (ref 79–97)
MONOCYTES # BLD AUTO: 0.62 10*3/MM3 (ref 0.1–0.9)
MONOCYTES NFR BLD AUTO: 10.5 % (ref 5–12)
NEUTROPHILS # BLD AUTO: 3.49 10*3/MM3 (ref 1.7–7)
NEUTROPHILS NFR BLD AUTO: 58.7 % (ref 42.7–76)
NRBC BLD AUTO-RTO: 0 /100 WBC (ref 0–0.2)
PLATELET # BLD AUTO: 238 10*3/MM3 (ref 140–450)
POTASSIUM SERPL-SCNC: 4.2 MMOL/L (ref 3.5–5.2)
PROT SERPL-MCNC: 6.7 G/DL (ref 6–8.5)
PSA SERPL-MCNC: 0.77 NG/ML (ref 0–4)
RBC # BLD AUTO: 4.18 10*6/MM3 (ref 4.14–5.8)
SODIUM SERPL-SCNC: 140 MMOL/L (ref 136–145)
TRIGL SERPL-MCNC: 60 MG/DL (ref 0–150)
VLDLC SERPL CALC-MCNC: 12 MG/DL (ref 5–40)
WBC # BLD AUTO: 5.93 10*3/MM3 (ref 3.4–10.8)

## 2023-11-01 DIAGNOSIS — Z79.4 TYPE 2 DIABETES MELLITUS WITH HYPERGLYCEMIA, WITH LONG-TERM CURRENT USE OF INSULIN: Chronic | ICD-10-CM

## 2023-11-01 DIAGNOSIS — E11.65 TYPE 2 DIABETES MELLITUS WITH HYPERGLYCEMIA, WITH LONG-TERM CURRENT USE OF INSULIN: Chronic | ICD-10-CM

## 2023-11-01 RX ORDER — BLOOD SUGAR DIAGNOSTIC
STRIP MISCELLANEOUS
Qty: 300 EACH | Refills: 3 | Status: SHIPPED | OUTPATIENT
Start: 2023-11-01

## 2023-11-01 RX ORDER — FUROSEMIDE 40 MG/1
40 TABLET ORAL DAILY
Qty: 135 TABLET | Refills: 3 | Status: SHIPPED | OUTPATIENT
Start: 2023-11-01

## 2023-11-06 RX ORDER — INSULIN ASPART 100 [IU]/ML
INJECTION, SOLUTION INTRAVENOUS; SUBCUTANEOUS
Qty: 27 ML | Refills: 0 | Status: SHIPPED | OUTPATIENT
Start: 2023-11-06

## 2023-11-06 NOTE — TELEPHONE ENCOUNTER
Rx Refill Note  Requested Prescriptions      No prescriptions requested or ordered in this encounter        Last office visit with prescribing clinician: 7/21/23     Next office visit with prescribing clinician: 12/8/2023       Cynthia Baker (Jodi)  11/06/23, 08:05 EST

## 2023-12-07 DIAGNOSIS — M10.9 GOUT, UNSPECIFIED CAUSE, UNSPECIFIED CHRONICITY, UNSPECIFIED SITE: ICD-10-CM

## 2023-12-07 RX ORDER — SPIRONOLACTONE 25 MG/1
TABLET ORAL
Qty: 90 TABLET | Refills: 3 | Status: SHIPPED | OUTPATIENT
Start: 2023-12-07

## 2023-12-07 RX ORDER — CARVEDILOL 12.5 MG/1
TABLET ORAL
Qty: 180 TABLET | Refills: 3 | Status: SHIPPED | OUTPATIENT
Start: 2023-12-07

## 2023-12-08 ENCOUNTER — OFFICE VISIT (OUTPATIENT)
Dept: ENDOCRINOLOGY | Facility: CLINIC | Age: 63
End: 2023-12-08
Payer: COMMERCIAL

## 2023-12-08 VITALS
SYSTOLIC BLOOD PRESSURE: 120 MMHG | BODY MASS INDEX: 32.45 KG/M2 | DIASTOLIC BLOOD PRESSURE: 74 MMHG | HEIGHT: 75 IN | HEART RATE: 89 BPM | OXYGEN SATURATION: 97 % | WEIGHT: 261 LBS

## 2023-12-08 DIAGNOSIS — E11.65 TYPE 2 DIABETES MELLITUS WITH HYPERGLYCEMIA, WITH LONG-TERM CURRENT USE OF INSULIN: Primary | Chronic | ICD-10-CM

## 2023-12-08 DIAGNOSIS — E78.2 MIXED HYPERLIPIDEMIA: Chronic | ICD-10-CM

## 2023-12-08 DIAGNOSIS — Z79.4 TYPE 2 DIABETES MELLITUS WITH HYPERGLYCEMIA, WITH LONG-TERM CURRENT USE OF INSULIN: Primary | Chronic | ICD-10-CM

## 2023-12-08 LAB
EXPIRATION DATE: ABNORMAL
EXPIRATION DATE: ABNORMAL
GLUCOSE BLDC GLUCOMTR-MCNC: 146 MG/DL (ref 70–130)
HBA1C MFR BLD: 7.6 % (ref 4.5–5.7)
Lab: ABNORMAL
Lab: ABNORMAL

## 2023-12-08 RX ORDER — EZETIMIBE 10 MG/1
10 TABLET ORAL DAILY
Qty: 90 TABLET | Refills: 0 | Status: SHIPPED | OUTPATIENT
Start: 2023-12-08 | End: 2024-12-07

## 2023-12-08 RX ORDER — ALLOPURINOL 300 MG/1
TABLET ORAL
Qty: 45 TABLET | Refills: 1 | Status: SHIPPED | OUTPATIENT
Start: 2023-12-08

## 2023-12-08 NOTE — PROGRESS NOTES
Office Note      Date: 2023  Patient Name: Trace Escalante  MRN: [id number]  : 1960    Chief Complaint   Patient presents with    Diabetes       History of Present Illness  Trace Esaclante is a 63 y.o. male who presents today for follow up on type 2 diabetes.   Diabetes was diagnosed in ~.  Known diabetic complications: nephropathy and retinopathy.  Current diabetic medications: Novolog.  Past diabetic medications: Oral medication/metformin, basal insulin.  Feet: No tingling, burning, numbness or pain in feet.  Follows with podiatry   Last eye exam: 2023.  History of mild non-proliferative retinopathy.   ACE inhibitor/ARB: No.    Off crestor - didn't tolerate due to muscle cramps  Did tolerate multiple statins in the past  Is open to try zetia  Takes Novolog with higher carb meals and higher sugar  Not on basal insulin - reports most fasting BG readings are 100-140  Has Dexcom at home and will start using    Review of systems  As noted in HPI.    Past Medical History:   Diagnosis Date    CKD (chronic kidney disease) stage 3, GFR 30-59 ml/min     Gout     Hypercholesterolemia     Hyperlipidemia     Hypertension     Kidney stones     Long term current use of insulin     NICM (nonischemic cardiomyopathy)     Obesity     ALYSSA (obstructive sleep apnea)     Renal disorder     due to type 2 diabetes mellitus    Retinopathy     due to type 2 diabetes mellitus    Simple goiter     Type 2 diabetes mellitus       Past Surgical History:   Procedure Laterality Date    MEDIAL COLLATERAL LIGAMENT REPAIR, KNEE Right     CARDIAC CATHETERIZATION N/A 11/15/2017    Procedure: Coronary angiography;  Surgeon: Elke Marcum MD;  [facility]:  [facility];  Service:     PATELLA TENDON REPAIR Left 3/13/2018    Procedure: PATELLA TENDON REPAIR LEFT;  Surgeon: Blake Mcbride MD;  Location:  [facility] OR;  Service: Orthopedics    KNEE SURGERY Left     Patellar tendon repair - Reed     The  "following portions of the patient's history were reviewed and updated as appropriate: allergies, current medications, past family history, past medical history, past social history, past surgical history, and problem list.    Vitals:  /74   Pulse 89   Ht 190.5 cm (75\")   Wt 118 kg (261 lb)   SpO2 97%   BMI 32.62 kg/m²     Physical Exam  Vitals reviewed.   Constitutional:       General: He is not in acute distress.     Appearance: He is well-developed. He is not diaphoretic.   Neck:      Thyroid: No thyromegaly.      Vascular: No JVD.      Trachea: No tracheal deviation.   Cardiovascular:      Rate and Rhythm: Normal rate and regular rhythm.      Pulses:           Dorsalis pedis pulses are 2+ on the right side and 2+ on the left side.        Posterior tibial pulses are 2+ on the right side and 2+ on the left side.      Heart sounds: Normal heart sounds. No murmur heard.  Pulmonary:      Effort: Pulmonary effort is normal. No respiratory distress.      Breath sounds: Normal breath sounds. No wheezing.   Musculoskeletal:         General: No tenderness.      Cervical back: Neck supple.      Right foot: No deformity or Charcot foot.      Left foot: No deformity or Charcot foot.   Feet:      Right foot:      Protective Sensation: 5 sites tested.  5 sites sensed.      Skin integrity: No ulcer, blister, skin breakdown, erythema, warmth or callus.      Left foot:      Protective Sensation: 5 sites tested.  5 sites sensed.      Skin integrity: No ulcer, blister, skin breakdown, erythema, warmth or callus.      Comments: Diabetic Foot Exam Performed and Monofilament Test Performed      Skin:     General: Skin is warm and dry.      Findings: No erythema or rash.   Neurological:      Mental Status: He is alert and oriented to person, place, and time.   Psychiatric:         Mood and Affect: Affect normal.         Behavior: Behavior normal.         Thought Content: Thought content normal.         Judgment: Judgment " normal.         Labs/Imaging    Hemoglobin A1c  Lab Results   Component Value Date    HGBA1C 7.6 (A) 12/08/2023    HGBA1C 7.4 07/21/2023    HGBA1C 7.0 03/31/2023     Glucose  Lab Results   Component Value Date    POCGLU 146 (A) 12/08/2023       Current Outpatient Medications   Medication Instructions    allopurinol (ZYLOPRIM) 300 MG tablet TAKE 1/2 TABLET BY MOUTH EVERYDAY    amLODIPine (NORVASC) 10 MG tablet TAKE 1 TABLET BY MOUTH EVERY DAY    aspirin 81 mg, Oral, Daily    B-D UF III MINI PEN NEEDLES 31G X 5 MM misc Use 3 per day.    carvedilol (COREG) 12.5 MG tablet TAKE 1 TABLET BY MOUTH TWICE A DAY    cloNIDine (CATAPRES) 0.2 mg, Oral, Nightly    ezetimibe (ZETIA) 10 mg, Oral, Daily    fluticasone (FLONASE) 50 MCG/ACT nasal spray SPRAY 2 SPRAYS INTO EACH NOSTRIL EVERY DAY    furosemide (LASIX) 40 mg, Oral, Daily, May take an additional tablet as needed for 3 lbs weight gain    glucose blood (Accu-Chek Sepideh Plus) test strip CHECK SUGARS THREE TIMES PER DAY AND AS NEEDED DX E11.65 ON INSULIN    glucose monitor monitoring kit Check sugars four times per day    hydrALAZINE (APRESOLINE) 100 MG tablet TAKE 1 TABLET BY MOUTH TWICE A DAY    isosorbide mononitrate (IMDUR) 60 MG 24 hr tablet TAKE 1 TABLET BY MOUTH EVERY DAY    Multiple Vitamin (MULTI VITAMIN PO) 1 tablet, Oral, Daily    NovoLOG FlexPen 100 UNIT/ML solution pen-injector sc pen Inject 5-10 units 3 times daily before meals    spironolactone (ALDACTONE) 25 MG tablet TAKE 1 TABLET BY MOUTH EVERY DAY       Assessment & Plan    1. Type 2 diabetes mellitus with hyperglycemia, with long-term current use of insulin  A1c increased from 7.4 to 7.6  Medications limited to insulin due to CKD 4  Resume CGM use  He will continue work on diet, exercise, weight loss  Continue Novolog for high sugars/higher carb meals  Based on reported fasting BGs basal insulin not necessary  Labs are utd  Foot exam updated today  Eye exam utd  - POC Glycosylated Hemoglobin (Hb A1C)  -  POC Glucose, Blood    2. Mixed hyperlipidemia  Did not tolerate multiple statin. Will try Zetia.  - ezetimibe (Zetia) 10 MG tablet; Take 1 tablet by mouth Daily.  Dispense: 90 tablet; Refill: 0      Return in about 4 months (around 4/8/2024). He was advised to contact the office with any interval questions or concerns.    Estela Portillo PA-C  Endocrinology  12/08/2023

## 2023-12-10 DIAGNOSIS — H69.93 DYSFUNCTION OF BOTH EUSTACHIAN TUBES: ICD-10-CM

## 2023-12-13 RX ORDER — FLUTICASONE PROPIONATE 50 MCG
SPRAY, SUSPENSION (ML) NASAL
Qty: 48 ML | Refills: 2 | Status: SHIPPED | OUTPATIENT
Start: 2023-12-13

## 2024-02-07 DIAGNOSIS — E78.2 MIXED HYPERLIPIDEMIA: Chronic | ICD-10-CM

## 2024-02-07 RX ORDER — EZETIMIBE 10 MG/1
10 TABLET ORAL DAILY
Qty: 90 TABLET | Refills: 1 | Status: SHIPPED | OUTPATIENT
Start: 2024-02-07

## 2024-02-07 NOTE — TELEPHONE ENCOUNTER
Rx Refill Note  Requested Prescriptions     Pending Prescriptions Disp Refills    ezetimibe (ZETIA) 10 MG tablet [Pharmacy Med Name: EZETIMIBE 10 MG TABLET] 90 tablet 0     Sig: TAKE 1 TABLET BY MOUTH EVERY DAY      Last office visit with prescribing clinician: 12/8/2023   Last telemedicine visit with prescribing clinician: Visit date not found   Next office visit with prescribing clinician: 5/24/2024                         Would you like a call back once the refill request has been completed: [] Yes [] No    If the office needs to give you a call back, can they leave a voicemail: [] Yes [] No    Shawna Santo MA  02/07/24, 16:03 EST

## 2024-02-09 RX ORDER — HYDRALAZINE HYDROCHLORIDE 100 MG/1
TABLET, FILM COATED ORAL
Qty: 180 TABLET | Refills: 1 | Status: SHIPPED | OUTPATIENT
Start: 2024-02-09

## 2024-02-12 ENCOUNTER — TELEPHONE (OUTPATIENT)
Dept: CARDIOLOGY | Facility: CLINIC | Age: 64
End: 2024-02-12
Payer: COMMERCIAL

## 2024-02-12 ENCOUNTER — LAB (OUTPATIENT)
Dept: LAB | Facility: HOSPITAL | Age: 64
End: 2024-02-12
Payer: COMMERCIAL

## 2024-02-12 ENCOUNTER — CLINICAL SUPPORT (OUTPATIENT)
Dept: CARDIOLOGY | Facility: CLINIC | Age: 64
End: 2024-02-12
Payer: COMMERCIAL

## 2024-02-12 VITALS — DIASTOLIC BLOOD PRESSURE: 86 MMHG | HEART RATE: 124 BPM | SYSTOLIC BLOOD PRESSURE: 148 MMHG | OXYGEN SATURATION: 96 %

## 2024-02-12 DIAGNOSIS — I48.92 ATRIAL FLUTTER WITH RAPID VENTRICULAR RESPONSE: ICD-10-CM

## 2024-02-12 DIAGNOSIS — I48.92 ATRIAL FLUTTER WITH RAPID VENTRICULAR RESPONSE: Primary | ICD-10-CM

## 2024-02-12 DIAGNOSIS — I42.9 CARDIOMYOPATHY, UNSPECIFIED TYPE: ICD-10-CM

## 2024-02-12 DIAGNOSIS — I10 ESSENTIAL HYPERTENSION: ICD-10-CM

## 2024-02-12 DIAGNOSIS — I48.0 PAROXYSMAL ATRIAL FIBRILLATION: Primary | ICD-10-CM

## 2024-02-12 LAB
ALBUMIN SERPL-MCNC: 4.3 G/DL (ref 3.5–5.2)
ALBUMIN/GLOB SERPL: 1.9 G/DL
ALP SERPL-CCNC: 91 U/L (ref 39–117)
ALT SERPL W P-5'-P-CCNC: 30 U/L (ref 1–41)
ANION GAP SERPL CALCULATED.3IONS-SCNC: 9 MMOL/L (ref 5–15)
AST SERPL-CCNC: 25 U/L (ref 1–40)
BILIRUB SERPL-MCNC: 0.3 MG/DL (ref 0–1.2)
BUN SERPL-MCNC: 51 MG/DL (ref 8–23)
BUN/CREAT SERPL: 14.7 (ref 7–25)
CALCIUM SPEC-SCNC: 9.2 MG/DL (ref 8.6–10.5)
CHLORIDE SERPL-SCNC: 105 MMOL/L (ref 98–107)
CO2 SERPL-SCNC: 26 MMOL/L (ref 22–29)
CREAT SERPL-MCNC: 3.48 MG/DL (ref 0.76–1.27)
DEPRECATED RDW RBC AUTO: 46 FL (ref 37–54)
EGFRCR SERPLBLD CKD-EPI 2021: 18.9 ML/MIN/1.73
ERYTHROCYTE [DISTWIDTH] IN BLOOD BY AUTOMATED COUNT: 13.7 % (ref 12.3–15.4)
GLOBULIN UR ELPH-MCNC: 2.3 GM/DL
GLUCOSE SERPL-MCNC: 111 MG/DL (ref 65–99)
HCT VFR BLD AUTO: 39 % (ref 37.5–51)
HGB BLD-MCNC: 12.6 G/DL (ref 13–17.7)
MAGNESIUM SERPL-MCNC: 2 MG/DL (ref 1.6–2.4)
MCH RBC QN AUTO: 30 PG (ref 26.6–33)
MCHC RBC AUTO-ENTMCNC: 32.3 G/DL (ref 31.5–35.7)
MCV RBC AUTO: 92.9 FL (ref 79–97)
PLATELET # BLD AUTO: 211 10*3/MM3 (ref 140–450)
PMV BLD AUTO: 11.5 FL (ref 6–12)
POTASSIUM SERPL-SCNC: 4.2 MMOL/L (ref 3.5–5.2)
PROT SERPL-MCNC: 6.6 G/DL (ref 6–8.5)
RBC # BLD AUTO: 4.2 10*6/MM3 (ref 4.14–5.8)
SODIUM SERPL-SCNC: 140 MMOL/L (ref 136–145)
TSH SERPL DL<=0.05 MIU/L-ACNC: 1.21 UIU/ML (ref 0.27–4.2)
WBC NRBC COR # BLD AUTO: 5.7 10*3/MM3 (ref 3.4–10.8)

## 2024-02-12 PROCEDURE — 83735 ASSAY OF MAGNESIUM: CPT

## 2024-02-12 PROCEDURE — 93000 ELECTROCARDIOGRAM COMPLETE: CPT | Performed by: NURSE PRACTITIONER

## 2024-02-12 PROCEDURE — 80053 COMPREHEN METABOLIC PANEL: CPT

## 2024-02-12 PROCEDURE — 36415 COLL VENOUS BLD VENIPUNCTURE: CPT

## 2024-02-12 PROCEDURE — 99214 OFFICE O/P EST MOD 30 MIN: CPT | Performed by: NURSE PRACTITIONER

## 2024-02-12 PROCEDURE — 84443 ASSAY THYROID STIM HORMONE: CPT

## 2024-02-12 PROCEDURE — 85027 COMPLETE CBC AUTOMATED: CPT

## 2024-02-13 DIAGNOSIS — I48.92 ATRIAL FLUTTER WITH RAPID VENTRICULAR RESPONSE: Primary | ICD-10-CM

## 2024-02-16 ENCOUNTER — HOSPITAL ENCOUNTER (OUTPATIENT)
Dept: CARDIOLOGY | Facility: HOSPITAL | Age: 64
Discharge: HOME | End: 2024-02-16
Attending: INTERNAL MEDICINE | Admitting: INTERNAL MEDICINE
Payer: COMMERCIAL

## 2024-02-16 VITALS
HEART RATE: 92 BPM | BODY MASS INDEX: 33.46 KG/M2 | HEIGHT: 73 IN | WEIGHT: 252.43 LBS | OXYGEN SATURATION: 100 % | DIASTOLIC BLOOD PRESSURE: 94 MMHG | SYSTOLIC BLOOD PRESSURE: 133 MMHG

## 2024-02-16 DIAGNOSIS — I48.92 ATRIAL FLUTTER WITH RAPID VENTRICULAR RESPONSE: ICD-10-CM

## 2024-02-16 LAB
QT INTERVAL: 374 MS
QTC INTERVAL: 462 MS

## 2024-02-16 PROCEDURE — 93321 DOPPLER ECHO F-UP/LMTD STD: CPT

## 2024-02-16 PROCEDURE — 93325 DOPPLER ECHO COLOR FLOW MAPG: CPT

## 2024-02-16 PROCEDURE — 25010000002 MIDAZOLAM PER 1 MG: Performed by: INTERNAL MEDICINE

## 2024-02-16 PROCEDURE — 93312 ECHO TRANSESOPHAGEAL: CPT

## 2024-02-16 PROCEDURE — 92960 CARDIOVERSION ELECTRIC EXT: CPT

## 2024-02-16 PROCEDURE — 93005 ELECTROCARDIOGRAM TRACING: CPT | Performed by: NURSE PRACTITIONER

## 2024-02-16 RX ORDER — SODIUM CHLORIDE 9 MG/ML
40 INJECTION, SOLUTION INTRAVENOUS AS NEEDED
Status: DISCONTINUED | OUTPATIENT
Start: 2024-02-16 | End: 2024-02-16 | Stop reason: HOSPADM

## 2024-02-16 RX ORDER — FENTANYL CITRATE 50 UG/ML
50-100 INJECTION, SOLUTION INTRAMUSCULAR; INTRAVENOUS ONCE AS NEEDED
Status: DISCONTINUED | OUTPATIENT
Start: 2024-02-16 | End: 2024-02-16 | Stop reason: HOSPADM

## 2024-02-16 RX ORDER — FLUMAZENIL 0.1 MG/ML
0.5 INJECTION INTRAVENOUS ONCE AS NEEDED
Status: DISCONTINUED | OUTPATIENT
Start: 2024-02-16 | End: 2024-02-16 | Stop reason: HOSPADM

## 2024-02-16 RX ORDER — MIDAZOLAM HYDROCHLORIDE 1 MG/ML
INJECTION INTRAMUSCULAR; INTRAVENOUS
Status: COMPLETED | OUTPATIENT
Start: 2024-02-16 | End: 2024-02-16

## 2024-02-16 RX ORDER — ONDANSETRON 2 MG/ML
4 INJECTION INTRAMUSCULAR; INTRAVENOUS EVERY 6 HOURS PRN
Status: DISCONTINUED | OUTPATIENT
Start: 2024-02-16 | End: 2024-02-16 | Stop reason: HOSPADM

## 2024-02-16 RX ORDER — NALOXONE HCL 0.4 MG/ML
0.4 VIAL (ML) INJECTION ONCE AS NEEDED
Status: DISCONTINUED | OUTPATIENT
Start: 2024-02-16 | End: 2024-02-16 | Stop reason: HOSPADM

## 2024-02-16 RX ORDER — ROSUVASTATIN CALCIUM 10 MG/1
10 TABLET, COATED ORAL DAILY
Qty: 30 TABLET | Refills: 11 | Status: SHIPPED | OUTPATIENT
Start: 2024-02-16

## 2024-02-16 RX ORDER — MIDAZOLAM HYDROCHLORIDE 1 MG/ML
2-8 INJECTION INTRAMUSCULAR; INTRAVENOUS ONCE AS NEEDED
Status: DISCONTINUED | OUTPATIENT
Start: 2024-02-16 | End: 2024-02-16 | Stop reason: HOSPADM

## 2024-02-16 RX ORDER — NITROGLYCERIN 0.4 MG/1
0.4 TABLET SUBLINGUAL
Status: DISCONTINUED | OUTPATIENT
Start: 2024-02-16 | End: 2024-02-16 | Stop reason: HOSPADM

## 2024-02-16 RX ORDER — SODIUM CHLORIDE 0.9 % (FLUSH) 0.9 %
10 SYRINGE (ML) INJECTION EVERY 12 HOURS SCHEDULED
Status: DISCONTINUED | OUTPATIENT
Start: 2024-02-16 | End: 2024-02-16 | Stop reason: HOSPADM

## 2024-02-16 RX ORDER — SODIUM CHLORIDE 0.9 % (FLUSH) 0.9 %
1-10 SYRINGE (ML) INJECTION AS NEEDED
Status: DISCONTINUED | OUTPATIENT
Start: 2024-02-16 | End: 2024-02-16 | Stop reason: HOSPADM

## 2024-02-16 RX ADMIN — MIDAZOLAM HYDROCHLORIDE 2 MG: 1 INJECTION, SOLUTION INTRAMUSCULAR; INTRAVENOUS at 12:59

## 2024-02-16 RX ADMIN — MIDAZOLAM HYDROCHLORIDE 2 MG: 1 INJECTION, SOLUTION INTRAMUSCULAR; INTRAVENOUS at 13:04

## 2024-02-16 RX ADMIN — METHOHEXITAL SODIUM 20 MG: 500 INJECTION, POWDER, LYOPHILIZED, FOR SOLUTION INTRAMUSCULAR; INTRAVENOUS; RECTAL at 13:02

## 2024-02-16 RX ADMIN — METHOHEXITAL SODIUM 20 MG: 500 INJECTION, POWDER, LYOPHILIZED, FOR SOLUTION INTRAMUSCULAR; INTRAVENOUS; RECTAL at 13:06

## 2024-02-16 RX ADMIN — METHOHEXITAL SODIUM 20 MG: 500 INJECTION, POWDER, LYOPHILIZED, FOR SOLUTION INTRAMUSCULAR; INTRAVENOUS; RECTAL at 12:59

## 2024-03-06 ENCOUNTER — PREP FOR SURGERY (OUTPATIENT)
Dept: OTHER | Facility: HOSPITAL | Age: 64
End: 2024-03-06
Payer: COMMERCIAL

## 2024-03-06 DIAGNOSIS — I48.19 ATRIAL FIBRILLATION, PERSISTENT: Primary | ICD-10-CM

## 2024-03-06 RX ORDER — SODIUM CHLORIDE 0.9 % (FLUSH) 0.9 %
10 SYRINGE (ML) INJECTION EVERY 12 HOURS SCHEDULED
OUTPATIENT
Start: 2024-03-06

## 2024-03-06 RX ORDER — SODIUM CHLORIDE 9 MG/ML
40 INJECTION, SOLUTION INTRAVENOUS AS NEEDED
OUTPATIENT
Start: 2024-03-06

## 2024-03-06 RX ORDER — NITROGLYCERIN 0.4 MG/1
0.4 TABLET SUBLINGUAL
OUTPATIENT
Start: 2024-03-06

## 2024-03-06 RX ORDER — ONDANSETRON 2 MG/ML
4 INJECTION INTRAMUSCULAR; INTRAVENOUS EVERY 6 HOURS PRN
OUTPATIENT
Start: 2024-03-06

## 2024-03-06 RX ORDER — SODIUM CHLORIDE 0.9 % (FLUSH) 0.9 %
1-10 SYRINGE (ML) INJECTION AS NEEDED
OUTPATIENT
Start: 2024-03-06

## 2024-03-15 ENCOUNTER — HOSPITAL ENCOUNTER (OUTPATIENT)
Dept: CARDIOLOGY | Facility: HOSPITAL | Age: 64
Discharge: HOME | End: 2024-03-15
Attending: INTERNAL MEDICINE | Admitting: INTERNAL MEDICINE
Payer: COMMERCIAL

## 2024-03-15 ENCOUNTER — APPOINTMENT (OUTPATIENT)
Dept: CARDIOLOGY | Facility: HOSPITAL | Age: 64
End: 2024-03-15
Payer: COMMERCIAL

## 2024-03-15 VITALS
OXYGEN SATURATION: 96 % | HEART RATE: 77 BPM | TEMPERATURE: 97.7 F | WEIGHT: 262.35 LBS | SYSTOLIC BLOOD PRESSURE: 144 MMHG | DIASTOLIC BLOOD PRESSURE: 95 MMHG | BODY MASS INDEX: 32.62 KG/M2 | RESPIRATION RATE: 16 BRPM | HEIGHT: 75 IN

## 2024-03-15 DIAGNOSIS — I48.19 ATRIAL FIBRILLATION, PERSISTENT: ICD-10-CM

## 2024-03-15 DIAGNOSIS — I48.92 ATRIAL FLUTTER WITH RAPID VENTRICULAR RESPONSE: ICD-10-CM

## 2024-03-15 LAB
ANION GAP SERPL CALCULATED.3IONS-SCNC: 13 MMOL/L (ref 5–15)
ASCENDING AORTA: 4.2 CM
BH CV ECHO MEAS - AI P1/2T: 473.9 MSEC
BH CV ECHO MEAS - AO MAX PG: 3.8 MMHG
BH CV ECHO MEAS - AO MEAN PG: 2 MMHG
BH CV ECHO MEAS - AO ROOT AREA (BSA CORRECTED): 1.5 CM2
BH CV ECHO MEAS - AO ROOT DIAM: 3.6 CM
BH CV ECHO MEAS - AO V2 MAX: 97.2 CM/SEC
BH CV ECHO MEAS - AO V2 VTI: 16.3 CM
BH CV ECHO MEAS - AVA(I,D): 4.4 CM2
BH CV ECHO MEAS - EDV(CUBED): 250 ML
BH CV ECHO MEAS - EDV(MOD-SP2): 244 ML
BH CV ECHO MEAS - EDV(MOD-SP4): 243 ML
BH CV ECHO MEAS - EF(MOD-BP): 32.7 %
BH CV ECHO MEAS - EF(MOD-SP2): 29.5 %
BH CV ECHO MEAS - EF(MOD-SP4): 35 %
BH CV ECHO MEAS - ESV(CUBED): 140.6 ML
BH CV ECHO MEAS - ESV(MOD-SP2): 172 ML
BH CV ECHO MEAS - ESV(MOD-SP4): 158 ML
BH CV ECHO MEAS - FS: 17.5 %
BH CV ECHO MEAS - IVS/LVPW: 0.92 CM
BH CV ECHO MEAS - IVSD: 1.2 CM
BH CV ECHO MEAS - LA DIMENSION: 6 CM
BH CV ECHO MEAS - LAT PEAK E' VEL: 5.9 CM/SEC
BH CV ECHO MEAS - LV DIASTOLIC VOL/BSA (35-75): 98.9 CM2
BH CV ECHO MEAS - LV MASS(C)D: 359.5 GRAMS
BH CV ECHO MEAS - LV MAX PG: 3.4 MMHG
BH CV ECHO MEAS - LV MEAN PG: 2 MMHG
BH CV ECHO MEAS - LV SYSTOLIC VOL/BSA (12-30): 64.3 CM2
BH CV ECHO MEAS - LV V1 MAX: 91.7 CM/SEC
BH CV ECHO MEAS - LV V1 VTI: 17.4 CM
BH CV ECHO MEAS - LVIDD: 6.3 CM
BH CV ECHO MEAS - LVIDS: 5.2 CM
BH CV ECHO MEAS - LVOT AREA: 4.2 CM2
BH CV ECHO MEAS - LVOT DIAM: 2.3 CM
BH CV ECHO MEAS - LVPWD: 1.3 CM
BH CV ECHO MEAS - MED PEAK E' VEL: 3.3 CM/SEC
BH CV ECHO MEAS - MV A MAX VEL: 30.5 CM/SEC
BH CV ECHO MEAS - MV DEC SLOPE: 625 CM/SEC2
BH CV ECHO MEAS - MV DEC TIME: 0.13 SEC
BH CV ECHO MEAS - MV E MAX VEL: 83.3 CM/SEC
BH CV ECHO MEAS - MV E/A: 2.7
BH CV ECHO MEAS - PA ACC TIME: 0.1 SEC
BH CV ECHO MEAS - PI END-D VEL: 198 CM/SEC
BH CV ECHO MEAS - RAP SYSTOLE: 8 MMHG
BH CV ECHO MEAS - RVSP: 52 MMHG
BH CV ECHO MEAS - SI(MOD-SP2): 29.3 ML/M2
BH CV ECHO MEAS - SI(MOD-SP4): 34.6 ML/M2
BH CV ECHO MEAS - SV(LVOT): 72.3 ML
BH CV ECHO MEAS - SV(MOD-SP2): 72 ML
BH CV ECHO MEAS - SV(MOD-SP4): 85 ML
BH CV ECHO MEAS - TAPSE (>1.6): 0.95 CM
BH CV ECHO MEAS - TR MAX PG: 44 MMHG
BH CV ECHO MEAS - TR MAX VEL: 298.5 CM/SEC
BH CV ECHO MEASUREMENTS AVERAGE E/E' RATIO: 18.11
BH CV XLRA - RV BASE: 5.8 CM
BH CV XLRA - RV LENGTH: 9.2 CM
BH CV XLRA - RV MID: 4 CM
BH CV XLRA - TDI S': 8.7 CM/SEC
BUN SERPL-MCNC: 46 MG/DL (ref 8–23)
BUN/CREAT SERPL: 14.3 (ref 7–25)
CALCIUM SPEC-SCNC: 8.3 MG/DL (ref 8.6–10.5)
CHLORIDE SERPL-SCNC: 105 MMOL/L (ref 98–107)
CO2 SERPL-SCNC: 22 MMOL/L (ref 22–29)
CREAT SERPL-MCNC: 3.22 MG/DL (ref 0.76–1.27)
DEPRECATED RDW RBC AUTO: 47.7 FL (ref 37–54)
EGFRCR SERPLBLD CKD-EPI 2021: 20.8 ML/MIN/1.73
ERYTHROCYTE [DISTWIDTH] IN BLOOD BY AUTOMATED COUNT: 14.1 % (ref 12.3–15.4)
GLUCOSE SERPL-MCNC: 221 MG/DL (ref 65–99)
HCT VFR BLD AUTO: 34.2 % (ref 37.5–51)
HGB BLD-MCNC: 11.3 G/DL (ref 13–17.7)
LEFT ATRIUM VOLUME INDEX: 47.6 ML/M2
LV EF 2D ECHO EST: 35 %
MCH RBC QN AUTO: 30.2 PG (ref 26.6–33)
MCHC RBC AUTO-ENTMCNC: 33 G/DL (ref 31.5–35.7)
MCV RBC AUTO: 91.4 FL (ref 79–97)
PLATELET # BLD AUTO: 179 10*3/MM3 (ref 140–450)
PMV BLD AUTO: 11.3 FL (ref 6–12)
POTASSIUM SERPL-SCNC: 4 MMOL/L (ref 3.5–5.2)
QT INTERVAL: 414 MS
QTC INTERVAL: 434 MS
RBC # BLD AUTO: 3.74 10*6/MM3 (ref 4.14–5.8)
SODIUM SERPL-SCNC: 140 MMOL/L (ref 136–145)
WBC NRBC COR # BLD AUTO: 7.9 10*3/MM3 (ref 3.4–10.8)

## 2024-03-15 PROCEDURE — 80048 BASIC METABOLIC PNL TOTAL CA: CPT | Performed by: INTERNAL MEDICINE

## 2024-03-15 PROCEDURE — 25010000002 MIDAZOLAM PER 1 MG: Performed by: INTERNAL MEDICINE

## 2024-03-15 PROCEDURE — 85027 COMPLETE CBC AUTOMATED: CPT | Performed by: INTERNAL MEDICINE

## 2024-03-15 PROCEDURE — 92960 CARDIOVERSION ELECTRIC EXT: CPT

## 2024-03-15 PROCEDURE — 93005 ELECTROCARDIOGRAM TRACING: CPT | Performed by: INTERNAL MEDICINE

## 2024-03-15 PROCEDURE — 93306 TTE W/DOPPLER COMPLETE: CPT | Performed by: INTERNAL MEDICINE

## 2024-03-15 PROCEDURE — 93306 TTE W/DOPPLER COMPLETE: CPT

## 2024-03-15 PROCEDURE — 25010000002 SULFUR HEXAFLUORIDE MICROSPH 60.7-25 MG RECONSTITUTED SUSPENSION: Performed by: INTERNAL MEDICINE

## 2024-03-15 PROCEDURE — 92960 CARDIOVERSION ELECTRIC EXT: CPT | Performed by: INTERNAL MEDICINE

## 2024-03-15 RX ORDER — NALOXONE HCL 0.4 MG/ML
0.4 VIAL (ML) INJECTION ONCE AS NEEDED
Status: DISCONTINUED | OUTPATIENT
Start: 2024-03-15 | End: 2024-03-15 | Stop reason: HOSPADM

## 2024-03-15 RX ORDER — FENTANYL CITRATE 50 UG/ML
50-100 INJECTION, SOLUTION INTRAMUSCULAR; INTRAVENOUS ONCE AS NEEDED
Status: DISCONTINUED | OUTPATIENT
Start: 2024-03-15 | End: 2024-03-15 | Stop reason: HOSPADM

## 2024-03-15 RX ORDER — ONDANSETRON 2 MG/ML
4 INJECTION INTRAMUSCULAR; INTRAVENOUS EVERY 6 HOURS PRN
Status: DISCONTINUED | OUTPATIENT
Start: 2024-03-15 | End: 2024-03-15 | Stop reason: HOSPADM

## 2024-03-15 RX ORDER — FLUMAZENIL 0.1 MG/ML
0.5 INJECTION INTRAVENOUS ONCE AS NEEDED
Status: DISCONTINUED | OUTPATIENT
Start: 2024-03-15 | End: 2024-03-15 | Stop reason: HOSPADM

## 2024-03-15 RX ORDER — SODIUM CHLORIDE 9 MG/ML
40 INJECTION, SOLUTION INTRAVENOUS AS NEEDED
Status: DISCONTINUED | OUTPATIENT
Start: 2024-03-15 | End: 2024-03-15 | Stop reason: HOSPADM

## 2024-03-15 RX ORDER — SODIUM CHLORIDE 0.9 % (FLUSH) 0.9 %
1-10 SYRINGE (ML) INJECTION AS NEEDED
Status: DISCONTINUED | OUTPATIENT
Start: 2024-03-15 | End: 2024-03-15 | Stop reason: HOSPADM

## 2024-03-15 RX ORDER — NITROGLYCERIN 0.4 MG/1
0.4 TABLET SUBLINGUAL
Status: DISCONTINUED | OUTPATIENT
Start: 2024-03-15 | End: 2024-03-15 | Stop reason: HOSPADM

## 2024-03-15 RX ORDER — MIDAZOLAM HYDROCHLORIDE 1 MG/ML
2-8 INJECTION INTRAMUSCULAR; INTRAVENOUS ONCE AS NEEDED
Status: DISCONTINUED | OUTPATIENT
Start: 2024-03-15 | End: 2024-03-15 | Stop reason: HOSPADM

## 2024-03-15 RX ORDER — MIDAZOLAM HYDROCHLORIDE 1 MG/ML
INJECTION INTRAMUSCULAR; INTRAVENOUS
Status: COMPLETED | OUTPATIENT
Start: 2024-03-15 | End: 2024-03-15

## 2024-03-15 RX ORDER — SODIUM CHLORIDE 0.9 % (FLUSH) 0.9 %
10 SYRINGE (ML) INJECTION EVERY 12 HOURS SCHEDULED
Status: DISCONTINUED | OUTPATIENT
Start: 2024-03-15 | End: 2024-03-15 | Stop reason: HOSPADM

## 2024-03-15 RX ADMIN — SULFUR HEXAFLUORIDE 3 ML: KIT at 14:35

## 2024-03-15 RX ADMIN — MIDAZOLAM HYDROCHLORIDE 2 MG: 1 INJECTION, SOLUTION INTRAMUSCULAR; INTRAVENOUS at 12:18

## 2024-03-15 RX ADMIN — METHOHEXITAL SODIUM 20 MG: 500 INJECTION, POWDER, LYOPHILIZED, FOR SOLUTION INTRAMUSCULAR; INTRAVENOUS; RECTAL at 12:18

## 2024-03-15 NOTE — H&P
[facility] at [facility]   History and physical      Patient Care Team:  Jose Ramon Allen MD as PCP - General (Family Medicine)  Larry, Kt Gutierrez MD as Consulting Physician (Nephrology)  Jenny Torres PA as Physician Assistant (Endocrinology)  Rakel Atwood APRN as Nurse Practitioner (Cardiology)  Loki De La Fuente MD as Consulting Physician (Cardiology)      PROBLEM LIST:  Congestive heart failure:   [facility], 11/12/2017:  with CCS class II chest discomfort/NYHA class III dyspnea.   Echo, 11/13/2017: LVEF 40%.   [facility], 11/15/2017: Normal coronary arteries.   Echo, 04/11/2018: LVEF 41-45%. Cardiac valves normal.   Echo, 08/17/2020: EF 45%. Mild AR/MR/TR.  Echo, 9/19/2022.  LVEF 40%.  Atrial fibrillation:   Cardiac event monitor, 30d, 05/11/2018: 2% a-fib, 2 episodes of 5 beat NSVT.  2/2024 atrial flutter with variable response/RVR  CHADSVASC 3.  2/16/24 attempted ALINA/ECV, however unable to pass ALINA probe.  Hypertension.   Hyperlipidemia.   Type 2 diabetes mellitus; uncontrolled, hemoglobin A1c was 8.3%.   Obstructive sleep apnea; compliant with CPAP.   Moderate obesity; BMI 38.   Chronic kidney disease; stage III, baseline creatinine 2.0.   Murmur.   Hypokalemia.   Nephrolithiasis.   Gout.   Surgical history:   MCL repair, right (1978).  Left patella tendon repair (2018).      Allergies   Allergen Reactions    Amoxicillin Rash     Faint rash near end of Rx    Augmentin [Amoxicillin-Pot Clavulanate] Rash           Current Facility-Administered Medications:     fentaNYL citrate (PF) (SUBLIMAZE) injection  mcg,  mcg, Intravenous, Once PRN, Loki De La Fuente MD    flumazenil (ROMAZICON) injection 0.5 mg, 0.5 mg, Intravenous, Once PRN, Loki De La Fuente MD    methohexital (BREVITAL) injection  mg,  mg, Intravenous, Once PRN, Loki De La Fuente MD    midazolam (VERSED) injection 2-8 mg, 2-8 mg, Intravenous, Once PRN, Loki De La Fuente MD    naloxone (NARCAN) injection  0.4 mg, 0.4 mg, Intravenous, Once PRN, Loki De La Fuente MD    nitroglycerin (NITROSTAT) SL tablet 0.4 mg, 0.4 mg, Sublingual, Q5 Min PRN, Rakel Atwood APRN    ondansetron (ZOFRAN) injection 4 mg, 4 mg, Intravenous, Q6H PRN, Rakel Atwood APRN    sodium chloride 0.9 % flush 1-10 mL, 1-10 mL, Intravenous, PRN, Rakel Atwood APRN    sodium chloride 0.9 % flush 10 mL, 10 mL, Intravenous, Q12H, Rakel Atwood APRN    sodium chloride 0.9 % infusion 40 mL, 40 mL, Intravenous, PRN, Rakel Atwood APRN         Medications Prior to Admission   Medication Sig Dispense Refill Last Dose    allopurinol (ZYLOPRIM) 300 MG tablet TAKE 1/2 TABLET BY MOUTH EVERYDAY 45 tablet 1     amLODIPine (NORVASC) 10 MG tablet TAKE 1 TABLET BY MOUTH EVERY DAY (Patient taking differently: Take 1 tablet by mouth Daily.) 90 tablet 3 3/15/2024    apixaban (ELIQUIS) 5 MG tablet tablet Take 1 tablet by mouth 2 (Two) Times a Day. 60 tablet 11 3/15/2024    aspirin 81 MG EC tablet Take 1 tablet by mouth Daily.   3/15/2024    B-D UF III MINI PEN NEEDLES 31G X 5 MM misc Use 3 per day. 300 each 3     carvedilol (COREG) 12.5 MG tablet TAKE 1 TABLET BY MOUTH TWICE A DAY (Patient taking differently: Take 1 tablet by mouth 2 (Two) Times a Day With Meals.) 180 tablet 3     CloNIDine (CATAPRES) 0.2 MG tablet Take 1 tablet by mouth Every Night.       ezetimibe (ZETIA) 10 MG tablet TAKE 1 TABLET BY MOUTH EVERY DAY 90 tablet 1     fluticasone (FLONASE) 50 MCG/ACT nasal spray SPRAY 2 SPRAYS INTO EACH NOSTRIL EVERY DAY 48 mL 2     furosemide (LASIX) 40 MG tablet TAKE 1 TABLET BY MOUTH DAILY. MAY TAKE AN ADDITIONAL TABLET AS NEEDED FOR 3 LBS WEIGHT GAIN 135 tablet 3     glucose blood (Accu-Chek Sepideh Plus) test strip CHECK SUGARS THREE TIMES PER DAY AND AS NEEDED DX E11.65 ON INSULIN 300 each 3     glucose monitor monitoring kit Check sugars four times per day 1 each 0     hydrALAZINE (APRESOLINE) 100 MG tablet TAKE 1 TABLET BY MOUTH TWICE A DAY  (Patient taking differently: Take 1 tablet by mouth 2 (Two) Times a Day.) 180 tablet 1     isosorbide mononitrate (IMDUR) 60 MG 24 hr tablet TAKE 1 TABLET BY MOUTH EVERY DAY (Patient taking differently: Take 1 tablet by mouth Daily.) 90 tablet 3     Multiple Vitamin (MULTI VITAMIN PO) Take 1 tablet by mouth Daily.       NovoLOG FlexPen 100 UNIT/ML solution pen-injector sc pen Inject 5-10 units 3 times daily before meals 27 mL 0 3/14/2024    spironolactone (ALDACTONE) 25 MG tablet TAKE 1 TABLET BY MOUTH EVERY DAY (Patient taking differently: Take 1 tablet by mouth Daily.) 90 tablet 3          Subjective .   History of present illness:    Patient is a 63-year-old -American male who we are seeing today for external cardioversion secondary to atrial flutter with variable block.  Patient was originally seen in the office about a month ago.  At that time he was symptomatic with palpitations.  He was scheduled to undergo ALINA and cardioversion but this was unable to be performed as the ALINA probe was unable to be passed.  Patient was released, placed on continued anticoagulation and brought back today for ECV only.  Patient notes no significant change in symptoms.  Has not noted his palpitations as much.      Social History     Socioeconomic History    Marital status: Single     Spouse name: N/A    Number of children: 2    Years of education: College   Tobacco Use    Smoking status: Never    Smokeless tobacco: Never   Vaping Use    Vaping status: Never Used   Substance and Sexual Activity    Alcohol use: Yes     Comment: 1 drink weekly or every 2 weeks    Drug use: No    Sexual activity: Yes     Partners: Female     Family History   Problem Relation Age of Onset    Heart disease Mother     Stroke Mother     Diabetes Father     Hypertension Father     Heart disease Father     Heart attack Father     Diabetes Sister     Thyroid cancer Sister     Kidney disease Brother     Stroke Brother     Stroke Brother   "        Review of Systems:  Review of Systems   Constitutional: Negative for fever and malaise/fatigue.   HENT:  Negative for nosebleeds.    Eyes:  Negative for redness and visual disturbance.   Cardiovascular:  Positive for irregular heartbeat. Negative for orthopnea, palpitations and paroxysmal nocturnal dyspnea.   Respiratory:  Negative for cough, snoring, sputum production and wheezing.    Hematologic/Lymphatic: Negative for bleeding problem.   Skin:  Negative for flushing, itching and rash.   Musculoskeletal:  Positive for falls. Negative for joint pain and muscle cramps.   Gastrointestinal:  Negative for abdominal pain, diarrhea, heartburn, nausea and vomiting.   Genitourinary:  Negative for hematuria.   Neurological:  Negative for excessive daytime sleepiness, dizziness, headaches, tremors and weakness.   Psychiatric/Behavioral:  Negative for substance abuse. The patient is not nervous/anxious.               Objective   Vitals:  /88 (BP Location: Left arm, Patient Position: Sitting)   Pulse 91   Temp 97.7 °F (36.5 °C) (Temporal)   Resp 16   Ht 190.5 cm (75\")   Wt 119 kg (261 lb 6.4 oz)   SpO2 97%   BMI 32.67 kg/m²    No intake or output data in the 24 hours ending 03/15/24 1154    Vitals reviewed.   Constitutional:       Appearance: Healthy appearance. Well-developed and not in distress.   Neck:      Vascular: No JVD.      Trachea: No tracheal deviation.   Pulmonary:      Effort: Pulmonary effort is normal.      Breath sounds: Normal breath sounds.   Cardiovascular:      Normal rate. Regular rhythm.      Murmurs: There is no murmur.   Pulses:     Intact distal pulses.   Edema:     Peripheral edema present.     Ankle: bilateral 1+ edema of the ankle.  Abdominal:      General: Bowel sounds are normal.      Tenderness: There is no abdominal tenderness.   Musculoskeletal:         General: No deformity. Skin:     General: Skin is warm and dry.   Neurological:      Mental Status: Alert and oriented to " "person, place, and time.              Results Review:  I reviewed the patient's new clinical results.  Results from last 7 days   Lab Units 03/15/24  1133   WBC 10*3/mm3 7.90   HEMOGLOBIN g/dL 11.3*   HEMATOCRIT % 34.2*   PLATELETS 10*3/mm3 179           Invalid input(s): \"LABALBU\", \"PROT\"          No results found for: \"TROPONINT\"                    Tele: Atrial flutter with variable block          Assessment & Plan     Atrial flutter with variable block.  New diagnosis.  Noted February of this year.  Patient anticoagulated with Eliquis.  Nonischemic cardiomyopathy  CKD  Hypertension  Dyslipidemia  Diabetes      Plan:    Will proceed to ECV today.  Will check echocardiogram today.  Further recommendations to follow the above.    SALLY Aldana       Dictated utilizing Dragon dictation    "

## 2024-03-19 LAB
QT INTERVAL: 414 MS
QTC INTERVAL: 434 MS

## 2024-03-27 DIAGNOSIS — M10.9 GOUT, UNSPECIFIED CAUSE, UNSPECIFIED CHRONICITY, UNSPECIFIED SITE: ICD-10-CM

## 2024-03-27 RX ORDER — ALLOPURINOL 300 MG/1
150 TABLET ORAL DAILY
Qty: 45 TABLET | Refills: 1 | Status: SHIPPED | OUTPATIENT
Start: 2024-03-27

## 2024-04-04 NOTE — PROGRESS NOTES
Subjective:     Encounter Date:04/05/2024    Primary Care Physician: Jose Ramon Allen MD      Patient ID: Trace Escalante is a 63 y.o. male.    Chief Complaint:<9>Chronic systolic congestive heart failure      PROBLEM LIST:  Congestive heart failure:   BHL, 11/12/2017:  with CCS class II chest discomfort/NYHA class III dyspnea.   Echo, 11/13/2017: LVEF 40%.   LHC, 11/15/2017: Normal coronary arteries.   Echo, 04/11/2018: LVEF 41-45%. Cardiac valves normal.   Echo, 08/17/2020: EF 45%. Mild AR/MR/TR.  Echo, 9/19/2022.  LVEF 40%.  3/15/2024 echo EF 31 to 35%.  Mild to moderate AR, mild MR.  RVSP 52 mmHg.  Atrial fibrillation:   Cardiac event monitor, 30d, 05/11/2018: 2% a-fib, 2 episodes of 5 beat NSVT.  2/2024 atrial flutter with variable response/RVR  CHADSVASC 3.  2/16/24 attempted ALINA/ECV, however unable to pass ALINA probe.  3/15/2024 ECV to sinus rhythm.  Hypertension.   Hyperlipidemia.   Type 2 diabetes mellitus; uncontrolled, hemoglobin A1c was 8.3%.   Obstructive sleep apnea; compliant with CPAP.   Moderate obesity; BMI 38.   Chronic kidney disease; stage III, baseline creatinine 2.0.   Murmur.   Hypokalemia.   Nephrolithiasis.   Gout.   Surgical history:   MCL repair, right (1978).  Left patella tendon repair (2018).      Allergies   Allergen Reactions    Amoxicillin Rash     Faint rash near end of Rx    Augmentin [Amoxicillin-Pot Clavulanate] Rash         Current Outpatient Medications:     allopurinol (ZYLOPRIM) 300 MG tablet, TAKE 1/2 TABLET BY MOUTH DAILY, Disp: 45 tablet, Rfl: 1    amLODIPine (NORVASC) 10 MG tablet, TAKE 1 TABLET BY MOUTH EVERY DAY (Patient taking differently: Take 1 tablet by mouth Daily.), Disp: 90 tablet, Rfl: 3    apixaban (ELIQUIS) 5 MG tablet tablet, Take 1 tablet by mouth 2 (Two) Times a Day., Disp: 60 tablet, Rfl: 11    aspirin 81 MG EC tablet, Take 1 tablet by mouth Daily., Disp: , Rfl:     B-D UF III MINI PEN NEEDLES 31G X 5 MM misc, Use 3 per day., Disp: 300 each,  Rfl: 3    carvedilol (COREG) 12.5 MG tablet, TAKE 1 TABLET BY MOUTH TWICE A DAY (Patient taking differently: Take 1 tablet by mouth 2 (Two) Times a Day With Meals.), Disp: 180 tablet, Rfl: 3    CloNIDine (CATAPRES) 0.2 MG tablet, Take 1 tablet by mouth Every Night., Disp: , Rfl:     ezetimibe (ZETIA) 10 MG tablet, TAKE 1 TABLET BY MOUTH EVERY DAY, Disp: 90 tablet, Rfl: 1    fluticasone (FLONASE) 50 MCG/ACT nasal spray, SPRAY 2 SPRAYS INTO EACH NOSTRIL EVERY DAY, Disp: 48 mL, Rfl: 2    furosemide (LASIX) 40 MG tablet, TAKE 1 TABLET BY MOUTH DAILY. MAY TAKE AN ADDITIONAL TABLET AS NEEDED FOR 3 LBS WEIGHT GAIN, Disp: 135 tablet, Rfl: 3    glucose blood (Accu-Chek Sepideh Plus) test strip, CHECK SUGARS THREE TIMES PER DAY AND AS NEEDED DX E11.65 ON INSULIN, Disp: 300 each, Rfl: 3    glucose monitor monitoring kit, Check sugars four times per day, Disp: 1 each, Rfl: 0    hydrALAZINE (APRESOLINE) 100 MG tablet, TAKE 1 TABLET BY MOUTH TWICE A DAY (Patient taking differently: Take 1 tablet by mouth 2 (Two) Times a Day.), Disp: 180 tablet, Rfl: 1    isosorbide mononitrate (IMDUR) 60 MG 24 hr tablet, TAKE 1 TABLET BY MOUTH EVERY DAY (Patient taking differently: Take 1 tablet by mouth Daily.), Disp: 90 tablet, Rfl: 3    Multiple Vitamin (MULTI VITAMIN PO), Take 1 tablet by mouth Daily., Disp: , Rfl:     multivitamin with minerals (Centrum Silver) tablet tablet, Orally, Disp: , Rfl:     NovoLOG FlexPen 100 UNIT/ML solution pen-injector sc pen, Inject 5-10 units 3 times daily before meals, Disp: 27 mL, Rfl: 0    spironolactone (ALDACTONE) 25 MG tablet, TAKE 1 TABLET BY MOUTH EVERY DAY (Patient taking differently: Take 1 tablet by mouth Daily.), Disp: 90 tablet, Rfl: 3        History of Present Illness    Patient is a 63-year-old male who is being seen today for hospital follow-up status post recent external cardioversion.  Since last being seen patient notes that for a few days he had some shortness of breath and intermittent  "mildly elevated heart rates.  However, over the last couple weeks notes that he has overall been back to baseline.  He is currently performing roughly 2 jobs.  Working roughly 13 hours/day.  He denies any chest pain, pressure, tightness.  Denies any significant increase of shortness of breath.  No reported syncope, presyncope, or worsening edema.  By EKG today is maintaining sinus rhythm.    The following portions of the patient's history were reviewed and updated as appropriate: allergies, current medications, past family history, past medical history, past social history, past surgical history and problem list.      Social History     Tobacco Use    Smoking status: Never     Passive exposure: Never    Smokeless tobacco: Never   Vaping Use    Vaping status: Never Used   Substance Use Topics    Alcohol use: Yes     Comment: social    Drug use: No         ROS       Objective:   /70 (BP Location: Left arm, Patient Position: Sitting, Cuff Size: Adult)   Pulse 69   Ht 190.5 cm (75\")   Wt 116 kg (256 lb)   SpO2 97%   BMI 32.00 kg/m²         Vitals reviewed.   Constitutional:       Appearance: Well-developed and not in distress.   Neck:      Vascular: No JVD.      Trachea: No tracheal deviation.   Pulmonary:      Effort: Pulmonary effort is normal.      Breath sounds: Wheezing (right sided) present.   Cardiovascular:      Normal rate. Regular rhythm.      Murmurs: There is no murmur.   Edema:     Peripheral edema absent.   Abdominal:      General: Bowel sounds are normal.      Tenderness: There is no abdominal tenderness.   Musculoskeletal:         General: No deformity. Skin:     General: Skin is warm and dry.   Neurological:      Mental Status: Alert and oriented to person, place, and time.           ECG 12 Lead    Date/Time: 4/5/2024 9:47 AM  Performed by: Rakel Atwood APRN    Authorized by: Rakel Atwood APRN  Comparison: compared with previous ECG from 3/15/2024  Similar to previous ECG  Rhythm: " sinus rhythm  Conduction: 1st degree AV block  Other findings: non-specific ST-T wave changes                Assessment:   Assessment & Plan      Diagnoses and all orders for this visit:    1. Atrial flutter with rapid ventricular response (Primary), status post recent ECV.  Maintaining sinus rhythm.  On beta-blocker.  Anticoagulated with Eliquis.    2. Essential hypertension, controlled.  On ARB and amlodipine..    3. Chronic heart failure with preserved ejection fraction (HFpEF), stable.  EF mildly decreased at time of cardioversion.  Likely due to atrial fibrillation with RVR.  On Aldactone.      Plan:  Patient is overall stable from cardiac standpoint.  Will have patient discontinue aspirin, given no history of coronary disease.  Continue other current cardiac medications.  Plan for revisit in 6 months with repeat echocardiography to follow-up on LVEF.       Rakel ZAVALA             Dictated utilizing Dragon dictation

## 2024-04-05 ENCOUNTER — PATIENT ROUNDING (BHMG ONLY) (OUTPATIENT)
Dept: CARDIOLOGY | Facility: CLINIC | Age: 64
End: 2024-04-05
Payer: COMMERCIAL

## 2024-04-05 ENCOUNTER — OFFICE VISIT (OUTPATIENT)
Dept: CARDIOLOGY | Facility: CLINIC | Age: 64
End: 2024-04-05
Payer: COMMERCIAL

## 2024-04-05 VITALS
HEIGHT: 75 IN | SYSTOLIC BLOOD PRESSURE: 122 MMHG | BODY MASS INDEX: 31.83 KG/M2 | HEART RATE: 69 BPM | OXYGEN SATURATION: 97 % | DIASTOLIC BLOOD PRESSURE: 70 MMHG | WEIGHT: 256 LBS

## 2024-04-05 DIAGNOSIS — I48.92 ATRIAL FLUTTER WITH RAPID VENTRICULAR RESPONSE: Primary | ICD-10-CM

## 2024-04-05 DIAGNOSIS — I10 ESSENTIAL HYPERTENSION: ICD-10-CM

## 2024-04-05 DIAGNOSIS — I50.32 CHRONIC HEART FAILURE WITH PRESERVED EJECTION FRACTION (HFPEF): ICD-10-CM

## 2024-04-05 RX ORDER — MULTIVIT WITH MINERALS/LUTEIN
TABLET ORAL
COMMUNITY

## 2024-04-05 NOTE — LETTER
April 5, 2024       No Recipients    Patient: Trace Escalante   YOB: 1960   Date of Visit: 4/5/2024     Dear Jose Ramon Allen MD:       Thank you for referring Trace Escalante to me for evaluation. Below are the relevant portions of my assessment and plan of care.    If you have questions, please do not hesitate to call me. I look forward to following Trace along with you.         Sincerely,        SALLY Aldana        CC:   No Recipients    Rakel Atwood APRN  04/05/24 0948  Sign when Signing Visit  Subjective:     Encounter Date:04/05/2024    Primary Care Physician: Jose Ramon Allen MD      Patient ID: Trace Escalante is a 63 y.o. male.    Chief Complaint:<9>Chronic systolic congestive heart failure      PROBLEM LIST:  Congestive heart failure:   BHL, 11/12/2017:  with CCS class II chest discomfort/NYHA class III dyspnea.   Echo, 11/13/2017: LVEF 40%.   LHC, 11/15/2017: Normal coronary arteries.   Echo, 04/11/2018: LVEF 41-45%. Cardiac valves normal.   Echo, 08/17/2020: EF 45%. Mild AR/MR/TR.  Echo, 9/19/2022.  LVEF 40%.  3/15/2024 echo EF 31 to 35%.  Mild to moderate AR, mild MR.  RVSP 52 mmHg.  Atrial fibrillation:   Cardiac event monitor, 30d, 05/11/2018: 2% a-fib, 2 episodes of 5 beat NSVT.  2/2024 atrial flutter with variable response/RVR  CHADSVASC 3.  2/16/24 attempted ALINA/ECV, however unable to pass ALINA probe.  3/15/2024 ECV to sinus rhythm.  Hypertension.   Hyperlipidemia.   Type 2 diabetes mellitus; uncontrolled, hemoglobin A1c was 8.3%.   Obstructive sleep apnea; compliant with CPAP.   Moderate obesity; BMI 38.   Chronic kidney disease; stage III, baseline creatinine 2.0.   Murmur.   Hypokalemia.   Nephrolithiasis.   Gout.   Surgical history:   MCL repair, right (1978).  Left patella tendon repair (2018).      Allergies   Allergen Reactions   • Amoxicillin Rash     Faint rash near end of Rx   • Augmentin [Amoxicillin-Pot Clavulanate] Rash         Current  Outpatient Medications:   •  allopurinol (ZYLOPRIM) 300 MG tablet, TAKE 1/2 TABLET BY MOUTH DAILY, Disp: 45 tablet, Rfl: 1  •  amLODIPine (NORVASC) 10 MG tablet, TAKE 1 TABLET BY MOUTH EVERY DAY (Patient taking differently: Take 1 tablet by mouth Daily.), Disp: 90 tablet, Rfl: 3  •  apixaban (ELIQUIS) 5 MG tablet tablet, Take 1 tablet by mouth 2 (Two) Times a Day., Disp: 60 tablet, Rfl: 11  •  aspirin 81 MG EC tablet, Take 1 tablet by mouth Daily., Disp: , Rfl:   •  B-D UF III MINI PEN NEEDLES 31G X 5 MM misc, Use 3 per day., Disp: 300 each, Rfl: 3  •  carvedilol (COREG) 12.5 MG tablet, TAKE 1 TABLET BY MOUTH TWICE A DAY (Patient taking differently: Take 1 tablet by mouth 2 (Two) Times a Day With Meals.), Disp: 180 tablet, Rfl: 3  •  CloNIDine (CATAPRES) 0.2 MG tablet, Take 1 tablet by mouth Every Night., Disp: , Rfl:   •  ezetimibe (ZETIA) 10 MG tablet, TAKE 1 TABLET BY MOUTH EVERY DAY, Disp: 90 tablet, Rfl: 1  •  fluticasone (FLONASE) 50 MCG/ACT nasal spray, SPRAY 2 SPRAYS INTO EACH NOSTRIL EVERY DAY, Disp: 48 mL, Rfl: 2  •  furosemide (LASIX) 40 MG tablet, TAKE 1 TABLET BY MOUTH DAILY. MAY TAKE AN ADDITIONAL TABLET AS NEEDED FOR 3 LBS WEIGHT GAIN, Disp: 135 tablet, Rfl: 3  •  glucose blood (Accu-Chek Sepideh Plus) test strip, CHECK SUGARS THREE TIMES PER DAY AND AS NEEDED DX E11.65 ON INSULIN, Disp: 300 each, Rfl: 3  •  glucose monitor monitoring kit, Check sugars four times per day, Disp: 1 each, Rfl: 0  •  hydrALAZINE (APRESOLINE) 100 MG tablet, TAKE 1 TABLET BY MOUTH TWICE A DAY (Patient taking differently: Take 1 tablet by mouth 2 (Two) Times a Day.), Disp: 180 tablet, Rfl: 1  •  isosorbide mononitrate (IMDUR) 60 MG 24 hr tablet, TAKE 1 TABLET BY MOUTH EVERY DAY (Patient taking differently: Take 1 tablet by mouth Daily.), Disp: 90 tablet, Rfl: 3  •  Multiple Vitamin (MULTI VITAMIN PO), Take 1 tablet by mouth Daily., Disp: , Rfl:   •  multivitamin with minerals (Centrum Silver) tablet tablet, Orally, Disp: ,  "Rfl:   •  NovoLOG FlexPen 100 UNIT/ML solution pen-injector sc pen, Inject 5-10 units 3 times daily before meals, Disp: 27 mL, Rfl: 0  •  spironolactone (ALDACTONE) 25 MG tablet, TAKE 1 TABLET BY MOUTH EVERY DAY (Patient taking differently: Take 1 tablet by mouth Daily.), Disp: 90 tablet, Rfl: 3        History of Present Illness    Patient is a 63-year-old male who is being seen today for hospital follow-up status post recent external cardioversion.  Since last being seen patient notes that for a few days he had some shortness of breath and intermittent mildly elevated heart rates.  However, over the last couple weeks notes that he has overall been back to baseline.  He is currently performing roughly 2 jobs.  Working roughly 13 hours/day.  He denies any chest pain, pressure, tightness.  Denies any significant increase of shortness of breath.  No reported syncope, presyncope, or worsening edema.  By EKG today is maintaining sinus rhythm.    The following portions of the patient's history were reviewed and updated as appropriate: allergies, current medications, past family history, past medical history, past social history, past surgical history and problem list.      Social History     Tobacco Use   • Smoking status: Never     Passive exposure: Never   • Smokeless tobacco: Never   Vaping Use   • Vaping status: Never Used   Substance Use Topics   • Alcohol use: Yes     Comment: social   • Drug use: No         ROS       Objective:   /70 (BP Location: Left arm, Patient Position: Sitting, Cuff Size: Adult)   Pulse 69   Ht 190.5 cm (75\")   Wt 116 kg (256 lb)   SpO2 97%   BMI 32.00 kg/m²         Vitals reviewed.   Constitutional:       Appearance: Well-developed and not in distress.   Neck:      Vascular: No JVD.      Trachea: No tracheal deviation.   Pulmonary:      Effort: Pulmonary effort is normal.      Breath sounds: Wheezing (right sided) present.   Cardiovascular:      Normal rate. Regular rhythm.      " Murmurs: There is no murmur.   Edema:     Peripheral edema absent.   Abdominal:      General: Bowel sounds are normal.      Tenderness: There is no abdominal tenderness.   Musculoskeletal:         General: No deformity. Skin:     General: Skin is warm and dry.   Neurological:      Mental Status: Alert and oriented to person, place, and time.           ECG 12 Lead    Date/Time: 4/5/2024 9:47 AM  Performed by: Rakel Atwood APRN    Authorized by: Rakel Atwood APRN  Comparison: compared with previous ECG from 3/15/2024  Similar to previous ECG  Rhythm: sinus rhythm  Conduction: 1st degree AV block  Other findings: non-specific ST-T wave changes                Assessment:   Assessment & Plan      Diagnoses and all orders for this visit:    1. Atrial flutter with rapid ventricular response (Primary), status post recent ECV.  Maintaining sinus rhythm.  On beta-blocker.  Anticoagulated with Eliquis.    2. Essential hypertension, controlled.  On ARB and amlodipine..    3. Chronic heart failure with preserved ejection fraction (HFpEF), stable.  EF mildly decreased at time of cardioversion.  Likely due to atrial fibrillation with RVR.  On Aldactone.      Plan:  Patient is overall stable from cardiac standpoint.  Will have patient discontinue aspirin, given no history of coronary disease.  Continue other current cardiac medications.  Plan for revisit in 6 months with repeat echocardiography to follow-up on LVEF.       Rakel ZAVALA             Dictated utilizing Dragon dictation

## 2024-04-05 NOTE — PROGRESS NOTES
April 5, 2024    Hello, may I speak with Trace Escalante?    My name is Maria Luisa GARCIA.       I am  with [facility]  [address]  [place] [place] [address] [phone].    Before we get started may I verify your date of birth? 1960 Yes    I am calling to officially welcome you to our practice and ask about your recent visit. Is this a good time to talk? yes    Tell me about your visit with us. What things went well?  I was in right away. The nurse was really nice and they double checked my EKG on the computer.        We're always looking for ways to make our patients' experiences even better. Do you have recommendations on ways we may improve?  no    Overall were you satisfied with your first visit to our practice? yes       I appreciate you taking the time to speak with me today. Is there anything else I can do for you? no      Thank you, and have a great day.

## 2024-04-10 RX ORDER — INSULIN ASPART 100 [IU]/ML
INJECTION, SOLUTION INTRAVENOUS; SUBCUTANEOUS
Qty: 27 ML | Refills: 1 | Status: SHIPPED | OUTPATIENT
Start: 2024-04-10

## 2024-05-20 ENCOUNTER — TELEPHONE (OUTPATIENT)
Dept: CARDIOLOGY | Facility: CLINIC | Age: 64
End: 2024-05-20
Payer: COMMERCIAL

## 2024-05-20 NOTE — TELEPHONE ENCOUNTER
Caller: Boris Traceparul Soto    Relationship: Self    Best call back number: [phone]    What is the best time to reach you: ANY    Who are you requesting to speak with (clinical staff, provider,  specific staff member): CLINICAL        What was the call regarding: PATIENT CALLED TO ADVISE THAT HE HAD A CARDIOVERSION ON 3-15-24. PATIENT STATES THAT HIS HEART HAS BEGAN TO INTERMITTENTLY FLUTTER AGAIN WITHIN THE LAST TWO WEEKS. PLEASE CONTACT PATIENT AND ADVISE HOW YOU WOULD LIKE TO PROCEED.    Is it okay if the provider responds through [employer]: PLEASE CALL

## 2024-05-21 ENCOUNTER — OFFICE VISIT (OUTPATIENT)
Dept: FAMILY MEDICINE CLINIC | Facility: CLINIC | Age: 64
End: 2024-05-21
Payer: COMMERCIAL

## 2024-05-21 VITALS
SYSTOLIC BLOOD PRESSURE: 138 MMHG | WEIGHT: 258 LBS | HEIGHT: 75 IN | OXYGEN SATURATION: 94 % | TEMPERATURE: 98 F | BODY MASS INDEX: 32.08 KG/M2 | HEART RATE: 76 BPM | RESPIRATION RATE: 18 BRPM | DIASTOLIC BLOOD PRESSURE: 70 MMHG

## 2024-05-21 DIAGNOSIS — R05.9 COUGH, UNSPECIFIED TYPE: ICD-10-CM

## 2024-05-21 DIAGNOSIS — I48.0 PAROXYSMAL ATRIAL FIBRILLATION: Primary | ICD-10-CM

## 2024-05-21 DIAGNOSIS — U07.1 COVID-19 VIRUS INFECTION: Primary | ICD-10-CM

## 2024-05-21 LAB
EXPIRATION DATE: ABNORMAL
EXPIRATION DATE: NORMAL
FLUAV AG NPH QL: NEGATIVE
FLUBV AG NPH QL: NEGATIVE
INTERNAL CONTROL: ABNORMAL
INTERNAL CONTROL: NORMAL
Lab: ABNORMAL
Lab: NORMAL
SARS-COV-2 AG UPPER RESP QL IA.RAPID: DETECTED

## 2024-05-21 PROCEDURE — 99213 OFFICE O/P EST LOW 20 MIN: CPT | Performed by: FAMILY MEDICINE

## 2024-05-21 PROCEDURE — 87804 INFLUENZA ASSAY W/OPTIC: CPT | Performed by: FAMILY MEDICINE

## 2024-05-21 PROCEDURE — 87426 SARSCOV CORONAVIRUS AG IA: CPT | Performed by: FAMILY MEDICINE

## 2024-05-21 NOTE — PROGRESS NOTES
"Chief Complaint  Cough (X4 days ), Fever, and Generalized Body Aches    Subjective          Trace Escalante presents to [facility]    History of Present Illness  The patient presents for evaluation of COVID-19 infection.    The patient began experiencing symptoms on Saturday, initially presenting as a dry cough, which subsequently evolved into a wet cough. The severity of the cough escalated last night, disrupting his sleep, and currently, the cough is intermittent. Concurrently, he has experienced a low-grade fever, chills, and a headache, which he attributes to the cough. He recently returned from [place], during which he spent time on a plane. His daughter's graduation on Saturday was at the [facility], but he remained asymptomatic. His sister, who attended the graduation, became ill on Tuesday, prompting her to seek medical attention. Upon his return on Thursday night, the patient and his two daughters were in good health, albeit with a severe cough. The patient believes his sister did not self-quarantine, which may have contributed to his exposure. He has no history of COVID-19 infection and does not intend to conceive in the future. His oxygen saturation is typically around 97 percent.        OTHER NOTES:        Review of Systems   HENT:  Positive for congestion.    Respiratory:  Positive for cough. Negative for shortness of breath.    Cardiovascular: Negative.    Gastrointestinal: Negative.    All other systems reviewed and are negative.       Objective       Vital Signs:   /70   Pulse 76   Temp 98 °F (36.7 °C)   Resp 18   Ht 190.5 cm (75\")   Wt 117 kg (258 lb)   SpO2 94%   BMI 32.25 kg/m²     Physical Exam  Vitals and nursing note reviewed.   Constitutional:       Appearance: He is well-developed. He is ill-appearing.   HENT:      Head: Normocephalic and atraumatic.      Right Ear: Tympanic membrane, ear canal and external ear normal.      " Left Ear: Tympanic membrane, ear canal and external ear normal.      Mouth/Throat:      Mouth: Mucous membranes are dry.      Pharynx: No oropharyngeal exudate.   Eyes:      Pupils: Pupils are equal, round, and reactive to light.   Cardiovascular:      Rate and Rhythm: Normal rate and regular rhythm.      Heart sounds: Normal heart sounds.   Pulmonary:      Effort: Pulmonary effort is normal. No respiratory distress.      Breath sounds: Normal breath sounds. No wheezing or rales.   Skin:     General: Skin is warm and dry.   Neurological:      Mental Status: He is alert.   Psychiatric:         Behavior: Behavior normal.            Physical Exam  Vital Signs  The patient's oxygen saturation is 94.    Result Review :            Other Results    Results  Laboratory Studies  GFR was 20. Influenza test was negative.    COVID test is positive             Assessment and Plan    Diagnoses and all orders for this visit:    1. COVID-19 virus infection (Primary)  -     Molnupiravir (LAGEVRIO) 200 MG capsule; Take 4 capsules by mouth Every 12 (Twelve) Hours for 5 days.  Dispense: 40 capsule; Refill: 0  -     POCT Influenza A/B    2. Cough, unspecified type  -     POCT SARS-CoV-2 Antigen VICENTE               DISCUSSION    Assessment & Plan  The patient is here for evaluation.    1. COVID-19 infection.  The patient's COVID-19 test yielded a positive result. His influenza test yielded a negative result. Due to his chronic renal failure, coupled with a low GFR, Paxlovid is contraindicated. Consequently, we will initiate treatment with Lageverio 200 mg, to be taken as 4 pills twice daily for a duration of 5 days. The patient is advised to maintain increased fluid intake and rest. He is instructed to contact the clinic if there is no improvement in his condition. In the event of escalating shortness of breath or chest pain, the patient is advised to seek immediate medical attention at the emergency room. The patient has expressed  understanding of this plan.    OTC symptomatic relief as needed.        Follow Up   Return if symptoms worsen or fail to improve.    Patient was given instructions and counseling regarding his condition or for health maintenance advice. Please see specific information pulled into the AVS if appropriate.       Jose Ramon Allen MD    Patient or patient representative verbalized consent for the use of Ambient Listening during the visit with  Jose Ramon Allen MD for chart documentation. 5/21/2024  12:23 EDT

## 2024-05-23 ENCOUNTER — TELEPHONE (OUTPATIENT)
Dept: FAMILY MEDICINE CLINIC | Facility: CLINIC | Age: 64
End: 2024-05-23
Payer: COMMERCIAL

## 2024-05-23 NOTE — TELEPHONE ENCOUNTER
"Larissa with [facility] in [place] called asking if we are able to do a EKG in our office so patient doesn't have to drive to there office. Per dr page \"that's fine but he does have covid. just saw him earlier this week for that. so if not urgent, and no active heart palpitations, may want to wait til next week but if symptoms, ok to do here.\"    Relayed message to Larissa she stated that she would call the patient and see what he wanted to do. Order is already in chart if he decided to come here to do.    Larissa [phone]  "

## 2024-06-04 ENCOUNTER — OFFICE VISIT (OUTPATIENT)
Dept: FAMILY MEDICINE CLINIC | Facility: CLINIC | Age: 64
End: 2024-06-04
Payer: COMMERCIAL

## 2024-06-04 VITALS
BODY MASS INDEX: 32.08 KG/M2 | TEMPERATURE: 97.8 F | SYSTOLIC BLOOD PRESSURE: 122 MMHG | RESPIRATION RATE: 16 BRPM | OXYGEN SATURATION: 98 % | HEIGHT: 75 IN | WEIGHT: 258 LBS | HEART RATE: 69 BPM | DIASTOLIC BLOOD PRESSURE: 80 MMHG

## 2024-06-04 DIAGNOSIS — U09.9 POST-COVID SYNDROME: Primary | ICD-10-CM

## 2024-06-04 DIAGNOSIS — R05.8 POST-VIRAL COUGH SYNDROME: ICD-10-CM

## 2024-06-04 PROBLEM — Z79.4: Status: ACTIVE | Noted: 2024-06-02

## 2024-06-04 PROBLEM — H47.393: Status: ACTIVE | Noted: 2024-06-02

## 2024-06-04 PROBLEM — E11.3313: Status: ACTIVE | Noted: 2024-06-02

## 2024-06-04 PROCEDURE — 99213 OFFICE O/P EST LOW 20 MIN: CPT | Performed by: NURSE PRACTITIONER

## 2024-06-04 RX ORDER — AZITHROMYCIN 250 MG/1
TABLET, FILM COATED ORAL
Qty: 6 TABLET | Refills: 0 | Status: SHIPPED | OUTPATIENT
Start: 2024-06-04

## 2024-06-04 RX ORDER — PREDNISONE 20 MG/1
20 TABLET ORAL DAILY
Qty: 3 TABLET | Refills: 0 | Status: SHIPPED | OUTPATIENT
Start: 2024-06-04 | End: 2024-06-07

## 2024-06-04 NOTE — PROGRESS NOTES
Date: 2024   Patient Name: Trace Escalante  : 1960   MRN: [id number]     Chief Complaint:    Chief Complaint   Patient presents with    Cough     Post covid       History of Present Illness: Trace Escalante is a 63 y.o. male who is here today for Cough, congestion with sputum production, wheezing, exhausted, fatigue that has just continued since positive for covid. Positive on 24  No shortness of breath, chest pains, N/V/D, sore throat, headaches, bdoy aches   Not taking any OTC medications   Finished out the Molnupiravir (LAGEVRIO) as prescribed             Review of Systems:   Review of Systems   Constitutional:  Positive for fatigue.   HENT:  Positive for congestion.    Respiratory:  Positive for cough and wheezing.        Past Medical History:   Past Medical History:   Diagnosis Date    CKD (chronic kidney disease) stage 3, GFR 30-59 ml/min     Gout     Hypercholesterolemia     Hyperlipidemia     Hypertension     Kidney stones     Long term current use of insulin     NICM (nonischemic cardiomyopathy)     Obesity     ALYSSA (obstructive sleep apnea)     Renal disorder     due to type 2 diabetes mellitus    Retinopathy     due to type 2 diabetes mellitus    Simple goiter     Type 2 diabetes mellitus        Past Surgical History:   Past Surgical History:   Procedure Laterality Date    CARDIAC CATHETERIZATION N/A 11/15/2017    Procedure: Coronary angiography;  Surgeon: Elke Marcum MD;  Location:  [facility]ATH [facility]VASIVE LOCATION;  Service:     KNEE SURGERY Left     Patellar tendon repair - Reed    MEDIAL COLLATERAL LIGAMENT REPAIR, KNEE Right     PATELLA TENDON REPAIR Left 3/13/2018    Procedure: PATELLA TENDON REPAIR LEFT;  Surgeon: Blake Mcbride MD;  Location: [facility] OR;  Service: Orthopedics       Family History:   Family History   Problem Relation Age of Onset    Heart disease Mother     Stroke Mother     Diabetes Father     Hypertension Father     Heart disease Father      Heart attack Father     Diabetes Sister     Thyroid cancer Sister     Kidney disease Brother     Stroke Brother     Stroke Brother        Social History:   Social History     Socioeconomic History    Marital status: Single     Spouse name: N/A    Number of children: 2    Years of education: College   Tobacco Use    Smoking status: Never     Passive exposure: Never    Smokeless tobacco: Never   Vaping Use    Vaping status: Never Used   Substance and Sexual Activity    Alcohol use: Yes     Comment: social    Drug use: No    Sexual activity: Yes     Partners: Female       Medications:     Current Outpatient Medications:     allopurinol (ZYLOPRIM) 300 MG tablet, TAKE 1/2 TABLET BY MOUTH DAILY, Disp: 45 tablet, Rfl: 1    amLODIPine (NORVASC) 10 MG tablet, TAKE 1 TABLET BY MOUTH EVERY DAY (Patient taking differently: Take 1 tablet by mouth Daily.), Disp: 90 tablet, Rfl: 3    apixaban (ELIQUIS) 5 MG tablet tablet, Take 1 tablet by mouth 2 (Two) Times a Day., Disp: 60 tablet, Rfl: 11    aspirin 81 MG EC tablet, Take 1 tablet by mouth Daily., Disp: , Rfl:     B-D UF III MINI PEN NEEDLES 31G X 5 MM misc, Use 3 per day., Disp: 300 each, Rfl: 3    carvedilol (COREG) 12.5 MG tablet, TAKE 1 TABLET BY MOUTH TWICE A DAY (Patient taking differently: Take 1 tablet by mouth 2 (Two) Times a Day With Meals.), Disp: 180 tablet, Rfl: 3    CloNIDine (CATAPRES) 0.2 MG tablet, Take 1 tablet by mouth Every Night., Disp: , Rfl:     ezetimibe (ZETIA) 10 MG tablet, TAKE 1 TABLET BY MOUTH EVERY DAY, Disp: 90 tablet, Rfl: 1    fluticasone (FLONASE) 50 MCG/ACT nasal spray, SPRAY 2 SPRAYS INTO EACH NOSTRIL EVERY DAY, Disp: 48 mL, Rfl: 2    furosemide (LASIX) 40 MG tablet, TAKE 1 TABLET BY MOUTH DAILY. MAY TAKE AN ADDITIONAL TABLET AS NEEDED FOR 3 LBS WEIGHT GAIN, Disp: 135 tablet, Rfl: 3    glucose blood (Accu-Chek Sepideh Plus) test strip, CHECK SUGARS THREE TIMES PER DAY AND AS NEEDED DX E11.65 ON INSULIN, Disp: 300 each, Rfl: 3    glucose  "monitor monitoring kit, Check sugars four times per day, Disp: 1 each, Rfl: 0    hydrALAZINE (APRESOLINE) 100 MG tablet, TAKE 1 TABLET BY MOUTH TWICE A DAY (Patient taking differently: Take 1 tablet by mouth 2 (Two) Times a Day.), Disp: 180 tablet, Rfl: 1    isosorbide mononitrate (IMDUR) 60 MG 24 hr tablet, TAKE 1 TABLET BY MOUTH EVERY DAY (Patient taking differently: Take 1 tablet by mouth Daily.), Disp: 90 tablet, Rfl: 3    Multiple Vitamin (MULTI VITAMIN PO), Take 1 tablet by mouth Daily., Disp: , Rfl:     multivitamin with minerals (Centrum Silver) tablet tablet, Orally, Disp: , Rfl:     NovoLOG FlexPen 100 UNIT/ML solution pen-injector sc pen, INJECT 5-10 UNITS 3 TIMES DAILY BEFORE MEALS, Disp: 27 mL, Rfl: 1    spironolactone (ALDACTONE) 25 MG tablet, TAKE 1 TABLET BY MOUTH EVERY DAY (Patient taking differently: Take 1 tablet by mouth Daily.), Disp: 90 tablet, Rfl: 3    azithromycin (Zithromax Z-Stefan) 250 MG tablet, Take 2 tablets by mouth on day 1, then 1 tablet daily on days 2-5, Disp: 6 tablet, Rfl: 0    predniSONE (DELTASONE) 20 MG tablet, Take 1 tablet by mouth Daily for 3 days., Disp: 3 tablet, Rfl: 0    Allergies:   Allergies   Allergen Reactions    Amoxicillin Rash     Faint rash near end of Rx    Augmentin [Amoxicillin-Pot Clavulanate] Rash         Physical Exam:  Vital Signs:   Vitals:    06/04/24 1040   BP: 122/80   Pulse: 69   Resp: 16   Temp: 97.8 °F (36.6 °C)   SpO2: 98%   Weight: 117 kg (258 lb)   Height: 190.5 cm (75\")     Body mass index is 32.25 kg/m².     Physical Exam  Vitals and nursing note reviewed.   Constitutional:       Appearance: He is not ill-appearing.   HENT:      Head: Normocephalic and atraumatic.      Right Ear: External ear normal. No middle ear effusion. Tympanic membrane is not erythematous or bulging.      Left Ear: External ear normal.  No middle ear effusion. Tympanic membrane is not erythematous or bulging.      Nose: Nose normal. No nasal tenderness or congestion.      " Right Turbinates: Not enlarged or swollen.      Left Turbinates: Not enlarged or swollen.      Right Sinus: No maxillary sinus tenderness.      Left Sinus: No maxillary sinus tenderness.      Mouth/Throat:      Mouth: Mucous membranes are moist.      Pharynx: No pharyngeal swelling or posterior oropharyngeal erythema.      Tonsils: No tonsillar exudate.   Eyes:      Pupils: Pupils are equal, round, and reactive to light.   Cardiovascular:      Rate and Rhythm: Normal rate and regular rhythm.   Pulmonary:      Effort: Pulmonary effort is normal.      Breath sounds: Normal breath sounds.   Abdominal:      General: Bowel sounds are normal.   Musculoskeletal:      Cervical back: Normal range of motion and neck supple.   Skin:     General: Skin is warm.   Neurological:      General: No focal deficit present.      Mental Status: He is alert and oriented to person, place, and time.   Psychiatric:         Mood and Affect: Mood normal.           Assessment/Plan:   Diagnoses and all orders for this visit:    1. Post-COVID syndrome (Primary)  -     azithromycin (Zithromax Z-Stefan) 250 MG tablet; Take 2 tablets by mouth on day 1, then 1 tablet daily on days 2-5  Dispense: 6 tablet; Refill: 0  -     predniSONE (DELTASONE) 20 MG tablet; Take 1 tablet by mouth Daily for 3 days.  Dispense: 3 tablet; Refill: 0    2. Post-viral cough syndrome  -     azithromycin (Zithromax Z-Stefan) 250 MG tablet; Take 2 tablets by mouth on day 1, then 1 tablet daily on days 2-5  Dispense: 6 tablet; Refill: 0  -     predniSONE (DELTASONE) 20 MG tablet; Take 1 tablet by mouth Daily for 3 days.  Dispense: 3 tablet; Refill: 0       No testing indicated today  Increase fluid intake  Take medication as prescribed in plan  Educated to monitor BG as prednisone may increase BG slightly  Monitor for worsening symptoms  Tylenol and ibuprofen as needed for aches and fever.  Go to the ER for any shortness of breath, chest pains, fever uncontrolled by  medications.      Follow Up:   Return if symptoms worsen or fail to improve.    Ailyn Buckner. SALLY   [facility]

## 2024-06-29 DIAGNOSIS — E78.2 MIXED HYPERLIPIDEMIA: Chronic | ICD-10-CM

## 2024-07-01 RX ORDER — HYDRALAZINE HYDROCHLORIDE 100 MG/1
TABLET, FILM COATED ORAL
Qty: 180 TABLET | Refills: 1 | Status: SHIPPED | OUTPATIENT
Start: 2024-07-01

## 2024-07-01 RX ORDER — EZETIMIBE 10 MG/1
10 TABLET ORAL DAILY
Qty: 30 TABLET | Refills: 0 | Status: SHIPPED | OUTPATIENT
Start: 2024-07-01

## 2024-07-01 NOTE — TELEPHONE ENCOUNTER
Rx Refill Note  Requested Prescriptions     Pending Prescriptions Disp Refills    ezetimibe (ZETIA) 10 MG tablet [Pharmacy Med Name: EZETIMIBE 10 MG TABLET] 30 tablet 0     Sig: TAKE 1 TABLET BY MOUTH EVERY DAY      Last office visit with prescribing clinician: 12/8/2023      Next office visit with prescribing clinician: Visit date not found                  Arnold Kerr MA  07/01/24, 09:03 EDT

## 2024-08-02 RX ORDER — ISOSORBIDE MONONITRATE 60 MG/1
TABLET, EXTENDED RELEASE ORAL
Qty: 90 TABLET | Refills: 3 | Status: SHIPPED | OUTPATIENT
Start: 2024-08-02

## 2024-09-27 ENCOUNTER — OFFICE VISIT (OUTPATIENT)
Dept: FAMILY MEDICINE CLINIC | Facility: CLINIC | Age: 64
End: 2024-09-27
Payer: COMMERCIAL

## 2024-09-27 VITALS
WEIGHT: 249 LBS | HEIGHT: 75 IN | BODY MASS INDEX: 30.96 KG/M2 | TEMPERATURE: 97.8 F | DIASTOLIC BLOOD PRESSURE: 74 MMHG | HEART RATE: 73 BPM | SYSTOLIC BLOOD PRESSURE: 132 MMHG | OXYGEN SATURATION: 96 %

## 2024-09-27 DIAGNOSIS — Z00.00 WELL ADULT EXAM: Primary | ICD-10-CM

## 2024-09-27 DIAGNOSIS — I48.0 PAROXYSMAL ATRIAL FIBRILLATION: ICD-10-CM

## 2024-09-27 DIAGNOSIS — Z12.11 COLON CANCER SCREENING: ICD-10-CM

## 2024-09-27 DIAGNOSIS — I10 ESSENTIAL HYPERTENSION: Chronic | ICD-10-CM

## 2024-09-27 DIAGNOSIS — E78.2 MIXED HYPERLIPIDEMIA: Chronic | ICD-10-CM

## 2024-09-27 DIAGNOSIS — I50.22 CHRONIC SYSTOLIC CONGESTIVE HEART FAILURE: ICD-10-CM

## 2024-09-27 PROCEDURE — 99396 PREV VISIT EST AGE 40-64: CPT | Performed by: FAMILY MEDICINE

## 2024-09-27 RX ORDER — EZETIMIBE 10 MG/1
10 TABLET ORAL DAILY
COMMUNITY
Start: 2024-09-27

## 2024-09-29 DIAGNOSIS — E78.2 MIXED HYPERLIPIDEMIA: Chronic | ICD-10-CM

## 2024-09-30 RX ORDER — SPIRONOLACTONE 25 MG/1
TABLET ORAL
Qty: 90 TABLET | Refills: 1 | Status: SHIPPED | OUTPATIENT
Start: 2024-09-30

## 2024-09-30 RX ORDER — EZETIMIBE 10 MG/1
10 TABLET ORAL DAILY
Qty: 30 TABLET | OUTPATIENT
Start: 2024-09-30

## 2024-09-30 RX ORDER — FUROSEMIDE 40 MG
40 TABLET ORAL DAILY
Qty: 135 TABLET | Refills: 3 | Status: SHIPPED | OUTPATIENT
Start: 2024-09-30

## 2024-09-30 RX ORDER — AMLODIPINE BESYLATE 10 MG/1
TABLET ORAL
Qty: 90 TABLET | Refills: 3 | Status: SHIPPED | OUTPATIENT
Start: 2024-09-30

## 2024-09-30 NOTE — TELEPHONE ENCOUNTER
Rx Refill Note  Requested Prescriptions     Pending Prescriptions Disp Refills    amLODIPine (NORVASC) 10 MG tablet [Pharmacy Med Name: AMLODIPINE BESYLATE 10 MG TAB] 90 tablet 3     Sig: TAKE 1 TABLET BY MOUTH EVERY DAY          Last office visit with prescribing clinician: Visit date not found     Next office visit with prescribing clinician: Visit date not found         Sheryl Lamar MA  09/30/24, 11:53 EDT

## 2024-10-07 PROBLEM — E78.5 DYSLIPIDEMIA: Status: ACTIVE | Noted: 2024-10-07

## 2024-10-08 ENCOUNTER — READMISSION MANAGEMENT (OUTPATIENT)
Dept: CALL CENTER | Facility: HOSPITAL | Age: 64
End: 2024-10-08
Payer: COMMERCIAL

## 2024-10-08 NOTE — OUTREACH NOTE
Prep Survey      Flowsheet Row Responses   [facility]? [facility]   Is LACE score < 7 ? Non-BH Discharge   Eligibility [facility]   Date of Admission 10/05/24   Date of Discharge 10/08/24   Discharge Disposition Home or Self Care   Discharge diagnosis Stage 3 chronic kidney disease, unspecified whether stage 3a or 3b CKD (CMS/HCC) (Primary Dx),   Acute respiratory failure with hypoxia (CMS/[facility], located within 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),   Obesity, Class I, BMI 30-34.9,   Essential hypertension,   Hypertensive crisis,   Acute kidney injury superimposed on CKD (CMS/[facility]AC, located 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),   Leukocytosis, unspecified type,   Fever, unspecified fever cause   Does the patient have one of the following disease processes/diagnoses(primary or secondary)? Other   Does the patient have Home health ordered? No   Is there a DME ordered? No   Prep survey completed? Yes            Mary Jo YUAN - Registered Nurse

## 2024-10-09 ENCOUNTER — TRANSITIONAL CARE MANAGEMENT TELEPHONE ENCOUNTER (OUTPATIENT)
Dept: CALL CENTER | Facility: HOSPITAL | Age: 64
End: 2024-10-09
Payer: COMMERCIAL

## 2024-10-09 NOTE — OUTREACH NOTE
Call Center TCM Note      Flowsheet Row Responses   [facility] patient discharged from? Non-BH   Does the patient have one of the following disease processes/diagnoses(primary or secondary)? Other   TCM attempt successful? No   Unsuccessful attempts Attempt 1  [DOMENIC-Geri Escalante]            Tish Chamorro RN    10/9/2024, 11:07 EDT

## 2024-10-09 NOTE — OUTREACH NOTE
Call Center TCM Note      Flowsheet Row Responses   [facility] patient discharged from? Non-BH   Does the patient have one of the following disease processes/diagnoses(primary or secondary)? Other   TCM attempt successful? No   Unsuccessful attempts Attempt 2            Tish Chamorro RN    10/9/2024, 15:36 EDT

## 2024-10-10 ENCOUNTER — TELEPHONE (OUTPATIENT)
Dept: CARDIOLOGY | Facility: CLINIC | Age: 64
End: 2024-10-10
Payer: COMMERCIAL

## 2024-10-10 ENCOUNTER — TRANSITIONAL CARE MANAGEMENT TELEPHONE ENCOUNTER (OUTPATIENT)
Dept: CALL CENTER | Facility: HOSPITAL | Age: 64
End: 2024-10-10
Payer: COMMERCIAL

## 2024-10-10 ENCOUNTER — TELEPHONE (OUTPATIENT)
Dept: CARDIOLOGY | Facility: CLINIC | Age: 64
End: 2024-10-10

## 2024-10-10 NOTE — TELEPHONE ENCOUNTER
Since we have the dates of his Covid vaccination, okay to print off and immunization form with those dates and sign for him.   none

## 2024-10-10 NOTE — TELEPHONE ENCOUNTER
Caller: Trace Escalante    Relationship to patient: Self    Best call back number: [phone]     Chief complaint:  COUGHING PHLEGM HAS BLOOD IN IT    Type of visit: FOLLOW UP     Requested date: ASAP       Additional notes: PT HAS BEEN TRYING TO REACH HIS CARDIOLOGIST TEAM. PT SAID THE ICU CHANGED THE DOSAGE OF CARVEDILOL AND THE WAY HE TAKES IT. THEY ALSO TOOK HIM OFF THE BLOOD THINNER FOR A WEEK. HE IS WANTING TO MAKE SURE THIS IS OKAY. AND HE IS STILL HAVING CONCERNS ABOUT THE BLEEDING HE IS COUGHING UP.  TRANSFERRED TO THE OFFICE DUE TO ACTIVE SYMPTOMS.

## 2024-10-10 NOTE — OUTREACH NOTE
Call Center TCM Note      Flowsheet Row Responses   [facility] patient discharged from? Non-BH   Does the patient have one of the following disease processes/diagnoses(primary or secondary)? Other   TCM attempt successful? No   Unsuccessful attempts Attempt 3            Victor Hugo Yi RN    10/10/2024, 13:10 EDT

## 2024-10-10 NOTE — TELEPHONE ENCOUNTER
Caller: Trace Escalante    Relationship: Self    Best call back number: [phone]     What is the best time to reach you: ANYTIME    Who are you requesting to speak with (clinical staff, provider,  specific staff member): GEORGES    Do you know the name of the person who called: GEORGES    What was the call regarding:  PT IS RETURNING PHONE CALL.     Is it okay if the provider responds through MyChart:  PLEASE CALL PT, THANK YOU!

## 2024-10-11 ENCOUNTER — LAB (OUTPATIENT)
Facility: HOSPITAL | Age: 64
End: 2024-10-11
Payer: COMMERCIAL

## 2024-10-11 ENCOUNTER — TELEPHONE (OUTPATIENT)
Dept: CARDIOLOGY | Facility: CLINIC | Age: 64
End: 2024-10-11
Payer: COMMERCIAL

## 2024-10-11 DIAGNOSIS — I50.32 CHRONIC HEART FAILURE WITH PRESERVED EJECTION FRACTION (HFPEF): Primary | ICD-10-CM

## 2024-10-11 DIAGNOSIS — I50.32 CHRONIC HEART FAILURE WITH PRESERVED EJECTION FRACTION (HFPEF): ICD-10-CM

## 2024-10-11 LAB
ANION GAP SERPL CALCULATED.3IONS-SCNC: 11 MMOL/L (ref 5–15)
BUN SERPL-MCNC: 64 MG/DL (ref 8–23)
BUN/CREAT SERPL: 16 (ref 7–25)
CALCIUM SPEC-SCNC: 8.8 MG/DL (ref 8.6–10.5)
CHLORIDE SERPL-SCNC: 102 MMOL/L (ref 98–107)
CO2 SERPL-SCNC: 23 MMOL/L (ref 22–29)
CREAT SERPL-MCNC: 4 MG/DL (ref 0.76–1.27)
EGFRCR SERPLBLD CKD-EPI 2021: 15.9 ML/MIN/1.73
GLUCOSE SERPL-MCNC: 154 MG/DL (ref 65–99)
POTASSIUM SERPL-SCNC: 4.6 MMOL/L (ref 3.5–5.2)
SODIUM SERPL-SCNC: 136 MMOL/L (ref 136–145)

## 2024-10-11 PROCEDURE — 36415 COLL VENOUS BLD VENIPUNCTURE: CPT

## 2024-10-11 PROCEDURE — 80048 BASIC METABOLIC PNL TOTAL CA: CPT

## 2024-10-11 NOTE — TELEPHONE ENCOUNTER
Spoke with patient, he is on Lasix 80 mg daily.  Advised to watch and try to limit his fluid and sodium intake.     He reports he see Dr. Juarez for Nephrology and he checks his labs every 3 months.  Asked to obtain labs today so we may determine a plan of care.  Patient agreeable.

## 2024-10-14 ENCOUNTER — TELEPHONE (OUTPATIENT)
Dept: CARDIOLOGY | Facility: CLINIC | Age: 64
End: 2024-10-14
Payer: COMMERCIAL

## 2024-10-14 NOTE — TELEPHONE ENCOUNTER
Spoke with patient, advised of urgent need to f/u with Nephrology.  Understanding verbalized.  He will contact them today.  ----- Message from Rakel Atwood sent at 10/14/2024  9:45 AM EDT -----  Please let patient know that his GFR is 16. I discussed his case with Dr. De La Fuente and we feel that he needs to see nephrology ASAP for volume management. Please schedule first available with Cardiology for now. He is very close to ESRD.

## 2024-10-17 ENCOUNTER — OFFICE VISIT (OUTPATIENT)
Dept: FAMILY MEDICINE CLINIC | Facility: CLINIC | Age: 64
End: 2024-10-17
Payer: COMMERCIAL

## 2024-10-17 VITALS
WEIGHT: 253 LBS | HEIGHT: 75 IN | RESPIRATION RATE: 18 BRPM | SYSTOLIC BLOOD PRESSURE: 130 MMHG | HEART RATE: 70 BPM | BODY MASS INDEX: 31.46 KG/M2 | DIASTOLIC BLOOD PRESSURE: 88 MMHG | TEMPERATURE: 97.5 F

## 2024-10-17 DIAGNOSIS — R04.2 HEMOPTYSIS: ICD-10-CM

## 2024-10-17 DIAGNOSIS — I48.0 PAROXYSMAL ATRIAL FIBRILLATION: ICD-10-CM

## 2024-10-17 DIAGNOSIS — J18.9 PNEUMONIA OF BOTH LOWER LOBES DUE TO INFECTIOUS ORGANISM: Primary | ICD-10-CM

## 2024-10-17 DIAGNOSIS — N18.4 CHRONIC RENAL FAILURE, STAGE 4 (SEVERE): ICD-10-CM

## 2024-10-17 PROCEDURE — 99214 OFFICE O/P EST MOD 30 MIN: CPT | Performed by: FAMILY MEDICINE

## 2024-10-17 RX ORDER — CEFDINIR 300 MG/1
300 CAPSULE ORAL DAILY
Qty: 7 CAPSULE | Refills: 0 | Status: SHIPPED | OUTPATIENT
Start: 2024-10-17

## 2024-10-17 NOTE — PROGRESS NOTES
Chief Complaint  Coughing Up Blood    Subjective          Trace Escalante presents to [facility]    HPI         The patient is a 64-year-old male who presents for follow-up.    On 10/05/2024, he was rushed to the emergency room due to coughing up phlegm with dark tinges of blood, which later turned into fresh blood. He was concerned about potential lung bleeding as he was on a blood thinner. During his hospital visit, he experienced chills, dry heaves, and difficulty breathing, requiring oxygen support. His condition stabilized and he was transferred to the  ICU. By noon, he felt almost normal, but his blood pressure had dropped and he was retaining water. He was diagnosed with pulmonary edema and pneumonia. Despite feeling better, he continued to cough up dark phlegm throughout the day. He was given antibiotics and taken off blood thinners, which seemed to improve his condition. However, two days before his release, his symptoms returned. After his discharge, his coughing lessened, but he noticed dark, sluggish blood in his phlegm upon waking up. He finished his antibiotics last Thursday and reports no fever.    He has a history of COVID-19 infection in mid-May 2024 and feels he has not fully recovered. He noticed increased leg heaviness and swelling in areas not previously affected before his COVID-19 infection.    His clonidine dosage was increased to three times a day, and he was taken off hydralazine and spironolactone. He was advised to take Lasix 80 mg once a day, but he found it more effective to take 40 mg twice a day. He feels he has started to retain water again since his hospital discharge. His GFR was at 16 and creatinine at 4, indicating stage IV kidney disease. He has not restarted Eliquis due to concerns about bleeding. He reports no stomach pain or history of ulcers.    He has experienced atrial flutter and has undergone cardioversion. He does not experience  "chest pain or notice when he has atrial fibrillation. He was previously on low-dose aspirin before starting Eliquis.       OTHER NOTES:          Review of Systems   Constitutional:  Positive for fatigue.   Respiratory:  Positive for cough. Negative for shortness of breath.    Cardiovascular:  Positive for leg swelling. Negative for chest pain.   All other systems reviewed and are negative.       Objective       Vital Signs:   /88   Pulse 70   Temp 97.5 °F (36.4 °C)   Resp 18   Ht 190.5 cm (75\")   Wt 115 kg (253 lb)   BMI 31.62 kg/m²     Physical Exam  Vitals and nursing note reviewed.   Constitutional:       General: He is not in acute distress.     Appearance: Normal appearance. He is well-developed. He is not ill-appearing.   HENT:      Head: Normocephalic and atraumatic.      Right Ear: Tympanic membrane and external ear normal.      Left Ear: Tympanic membrane, ear canal and external ear normal.      Mouth/Throat:      Mouth: Mucous membranes are moist.      Pharynx: No oropharyngeal exudate or posterior oropharyngeal erythema.   Eyes:      Pupils: Pupils are equal, round, and reactive to light.   Cardiovascular:      Rate and Rhythm: Normal rate and regular rhythm.   Pulmonary:      Effort: Pulmonary effort is normal.      Breath sounds: Rales (right base) present. No wheezing or rhonchi.   Musculoskeletal:      Right lower leg: Edema (1-2+) present.      Left lower leg: Edema (1-2+) present.   Skin:     General: Skin is warm and dry.   Neurological:      Mental Status: He is alert.   Psychiatric:         Behavior: Behavior normal.             Crackling sounds are present in the right lung.  Swelling is noted in the legs.       Result Review :            Other Results    Results  Laboratory Studies  Creatinine was 3.56. Blood count dropped from March 11 0.3-9.8. White blood cell count was elevated.    Imaging  CT scan showed bilateral lower lobe and right middle lobe airspace disease, and left lower " lobe concerning for infection. Small fluid collection on the right.             Assessment and Plan    Diagnoses and all orders for this visit:    1. Pneumonia of both lower lobes due to infectious organism (Primary)  -     CBC & Differential  -     cefdinir (OMNICEF) 300 MG capsule; Take 1 capsule by mouth Daily.  Dispense: 7 capsule; Refill: 0    2. Hemoptysis  -     CBC & Differential    3. Chronic renal failure, stage 4 (severe)  -     Comprehensive Metabolic Panel  -     CBC & Differential    4. Paroxysmal atrial fibrillation               DISCUSSION       1.  Hemoptysis  His CT scan revealed bilateral lower lobe and right middle lobe airspace disease, along with a small fluid collection on the right. He reported coughing up dark blood, which has decreased but persists. He was previously treated with Lasix in the hospital and continues to take it at home.  Recommend that he speak with his nephrologist about the Lasix dose given persistent swelling.  He has a message out for him and should be hearing back from him today.  He agrees. His kidney function will be rechecked today.     2. Pneumonia.  The CT scan showed signs of pneumonia. He was previously treated with cefdinir for 4 days and reported improvement but still has some symptoms. He will be prescribed cefdinir, one pill daily for another week.     3. Atrial Fibrillation.  He is currently off Eliquis due to concerns about bleeding. Eliquis will be held for another 3 days, and if there is no further coughing up of blood by Saturday, it can be restarted.     4. Hypertension.  His clonidine dosage has been adjusted to 0.2 mg, taken three times a day. He was advised to continue this regimen.    5. Chronic Kidney Disease Stage IV.  His creatinine level has worsened slightly from 3.56 to 4. His GFR is at 16. Kidney function will be rechecked today.        Call sooner if worsening    Follow Up   Return if symptoms worsen or fail to improve, for follow up depends  on review of labs and testing.    Patient was given instructions and counseling regarding his condition or for health maintenance advice. Please see specific information pulled into the AVS if appropriate.       Jose Ramon Allen MD    Patient or patient representative verbalized consent for the use of Ambient Listening during the visit with  Jose Ramon Allen MD for chart documentation. 10/17/2024  11:41 EDT

## 2024-10-18 LAB
ALBUMIN SERPL-MCNC: 3.6 G/DL (ref 3.9–4.9)
ALP SERPL-CCNC: 100 IU/L (ref 44–121)
ALT SERPL-CCNC: 21 IU/L (ref 0–44)
AST SERPL-CCNC: 18 IU/L (ref 0–40)
BASOPHILS # BLD AUTO: 0.1 X10E3/UL (ref 0–0.2)
BASOPHILS NFR BLD AUTO: 1 %
BILIRUB SERPL-MCNC: 0.3 MG/DL (ref 0–1.2)
BUN SERPL-MCNC: 53 MG/DL (ref 8–27)
BUN/CREAT SERPL: 14 (ref 10–24)
CALCIUM SERPL-MCNC: 8.8 MG/DL (ref 8.6–10.2)
CHLORIDE SERPL-SCNC: 101 MMOL/L (ref 96–106)
CO2 SERPL-SCNC: 24 MMOL/L (ref 20–29)
CREAT SERPL-MCNC: 3.73 MG/DL (ref 0.76–1.27)
EGFRCR SERPLBLD CKD-EPI 2021: 17 ML/MIN/1.73
EOSINOPHIL # BLD AUTO: 0.6 X10E3/UL (ref 0–0.4)
EOSINOPHIL NFR BLD AUTO: 8 %
ERYTHROCYTE [DISTWIDTH] IN BLOOD BY AUTOMATED COUNT: 13.8 % (ref 11.6–15.4)
GLOBULIN SER CALC-MCNC: 2.6 G/DL (ref 1.5–4.5)
GLUCOSE SERPL-MCNC: 273 MG/DL (ref 70–99)
HCT VFR BLD AUTO: 34.3 % (ref 37.5–51)
HGB BLD-MCNC: 10.6 G/DL (ref 13–17.7)
IMM GRANULOCYTES # BLD AUTO: 0 X10E3/UL (ref 0–0.1)
IMM GRANULOCYTES NFR BLD AUTO: 0 %
LYMPHOCYTES # BLD AUTO: 1.1 X10E3/UL (ref 0.7–3.1)
LYMPHOCYTES NFR BLD AUTO: 14 %
MCH RBC QN AUTO: 29.9 PG (ref 26.6–33)
MCHC RBC AUTO-ENTMCNC: 30.9 G/DL (ref 31.5–35.7)
MCV RBC AUTO: 97 FL (ref 79–97)
MONOCYTES # BLD AUTO: 0.6 X10E3/UL (ref 0.1–0.9)
MONOCYTES NFR BLD AUTO: 8 %
NEUTROPHILS # BLD AUTO: 5.3 X10E3/UL (ref 1.4–7)
NEUTROPHILS NFR BLD AUTO: 69 %
PLATELET # BLD AUTO: 296 X10E3/UL (ref 150–450)
POTASSIUM SERPL-SCNC: 4.6 MMOL/L (ref 3.5–5.2)
PROT SERPL-MCNC: 6.2 G/DL (ref 6–8.5)
RBC # BLD AUTO: 3.55 X10E6/UL (ref 4.14–5.8)
SODIUM SERPL-SCNC: 137 MMOL/L (ref 134–144)
WBC # BLD AUTO: 7.7 X10E3/UL (ref 3.4–10.8)

## 2024-10-23 DIAGNOSIS — E78.2 MIXED HYPERLIPIDEMIA: Chronic | ICD-10-CM

## 2024-10-24 RX ORDER — EZETIMIBE 10 MG/1
10 TABLET ORAL DAILY
Qty: 30 TABLET | OUTPATIENT
Start: 2024-10-24

## 2024-10-25 ENCOUNTER — OFFICE VISIT (OUTPATIENT)
Dept: ENDOCRINOLOGY | Facility: CLINIC | Age: 64
End: 2024-10-25
Payer: COMMERCIAL

## 2024-10-25 VITALS
BODY MASS INDEX: 31.88 KG/M2 | SYSTOLIC BLOOD PRESSURE: 124 MMHG | DIASTOLIC BLOOD PRESSURE: 78 MMHG | HEART RATE: 71 BPM | WEIGHT: 256.4 LBS | OXYGEN SATURATION: 97 % | HEIGHT: 75 IN

## 2024-10-25 DIAGNOSIS — Z79.4 TYPE 2 DIABETES MELLITUS WITH HYPERGLYCEMIA, WITH LONG-TERM CURRENT USE OF INSULIN: Primary | Chronic | ICD-10-CM

## 2024-10-25 DIAGNOSIS — E78.2 MIXED HYPERLIPIDEMIA: Chronic | ICD-10-CM

## 2024-10-25 DIAGNOSIS — E11.65 TYPE 2 DIABETES MELLITUS WITH HYPERGLYCEMIA, WITH LONG-TERM CURRENT USE OF INSULIN: Primary | Chronic | ICD-10-CM

## 2024-10-25 LAB
EXPIRATION DATE: ABNORMAL
GLUCOSE BLDC GLUCOMTR-MCNC: 250 MG/DL (ref 70–130)
Lab: ABNORMAL

## 2024-10-25 PROCEDURE — 82947 ASSAY GLUCOSE BLOOD QUANT: CPT | Performed by: PHYSICIAN ASSISTANT

## 2024-10-25 RX ORDER — ACYCLOVIR 400 MG/1
1 TABLET ORAL
Qty: 3 EACH | Refills: 11 | Status: SHIPPED | OUTPATIENT
Start: 2024-10-25

## 2024-10-25 RX ORDER — INSULIN ASPART 100 [IU]/ML
INJECTION, SOLUTION INTRAVENOUS; SUBCUTANEOUS
Qty: 27 ML | Refills: 1 | Status: SHIPPED | OUTPATIENT
Start: 2024-10-25

## 2024-10-25 NOTE — PROGRESS NOTES
Office Note      Date: 10/25/2024  Patient Name: Trace Escalante  MRN: [id number]  : 1960    Chief Complaint   Patient presents with    Diabetes       History of Present Illness  Trace Escalante is a 64 y.o. male who presents today for follow up on type 2 diabetes.   Diabetes was diagnosed in ~.  Known diabetic complications: nephropathy and retinopathy.  Current diabetic medications: Novolog.  Past diabetic medications: Oral medication/metformin, basal insulin.  Last eye exam: 2024.  History of mild non-proliferative retinopathy.   ACE inhibitor/ARB: No.    Has CKD stage 4.   Off crestor - didn't tolerate due to muscle cramps  Did tolerate multiple statins in the past  On zetia.  Takes Novolog with higher carb meals and higher sugar  Not on basal insulin   Has dexcom at home but thinks it it . It was sample.     Recently hoispitalized with PNA.     Review of systems  As noted in HPI.    Past Medical History:   Diagnosis Date    CKD (chronic kidney disease) stage 3, GFR 30-59 ml/min     Gout     Hypercholesterolemia     Hyperlipidemia     Hypertension     Kidney stones     Long term current use of insulin     NICM (nonischemic cardiomyopathy)     Obesity     ALYSSA (obstructive sleep apnea)     Renal disorder     due to type 2 diabetes mellitus    Retinopathy     due to type 2 diabetes mellitus    Simple goiter     Type 2 diabetes mellitus       Past Surgical History:   Procedure Laterality Date    MEDIAL COLLATERAL LIGAMENT REPAIR, KNEE Right     CARDIAC CATHETERIZATION N/A 11/15/2017    Procedure: Coronary angiography;  Surgeon: Elke Marcum MD;  Location:  [facility] CATH INVASIVE LOCATION;  Service:     PATELLA TENDON REPAIR Left 3/13/2018    Procedure: PATELLA TENDON REPAIR LEFT;  Surgeon: Blake Mcbride MD;  Location: [facility] OR;  Service: Orthopedics    KNEE SURGERY Left     Patellar tendon repair - Reed     The following portions of the patient's history were reviewed and  "updated as appropriate: allergies, current medications, past family history, past medical history, past social history, past surgical history, and problem list.    Vitals:  /78 (BP Location: Left arm, Patient Position: Sitting, Cuff Size: Adult)   Pulse 71   Ht 190.5 cm (75\")   Wt 116 kg (256 lb 6.4 oz)   SpO2 97%   BMI 32.05 kg/m²     Physical Exam  Vitals reviewed.   Constitutional:       General: He is not in acute distress.     Appearance: He is well-developed. He is not diaphoretic.   Neck:      Thyroid: No thyromegaly.      Vascular: No JVD.      Trachea: No tracheal deviation.   Cardiovascular:      Rate and Rhythm: Normal rate and regular rhythm.   Pulmonary:      Effort: Pulmonary effort is normal.      Breath sounds: Normal breath sounds.   Musculoskeletal:         General: No tenderness.      Cervical back: Neck supple.   Feet:      Comments:       Skin:     General: Skin is warm and dry.      Findings: No erythema or rash.   Neurological:      Mental Status: He is alert and oriented to person, place, and time.   Psychiatric:         Mood and Affect: Affect normal.         Behavior: Behavior normal.         Thought Content: Thought content normal.         Judgment: Judgment normal.         Labs/Imaging    Hemoglobin A1c  Lab Results   Component Value Date    HGBA1C 7.8 (H) 10/05/2024    HGBA1C 7.6 (A) 12/08/2023    HGBA1C 7.4 07/21/2023     Glucose  Lab Results   Component Value Date    POCGLU 250 (A) 10/25/2024       Current Outpatient Medications   Medication Instructions    allopurinol (ZYLOPRIM) 150 mg, Oral, Daily    amLODIPine (NORVASC) 10 MG tablet TAKE 1 TABLET BY MOUTH EVERY DAY    apixaban (ELIQUIS) 5 mg, Oral, 2 Times Daily    B-D UF III MINI PEN NEEDLES 31G X 5 MM misc Use 3 per day.    carvedilol (COREG) 12.5 MG tablet TAKE 1 TABLET BY MOUTH TWICE A DAY    cefdinir (OMNICEF) 300 mg, Oral, Daily    cloNIDine (CATAPRES) 0.2 mg, Nightly    Continuous Glucose Sensor (Dexcom G7 Sensor) " misc 1 each, Does not apply, Every 10 Days    ezetimibe (ZETIA) 10 mg, Daily    fluticasone (FLONASE) 50 MCG/ACT nasal spray SPRAY 2 SPRAYS INTO EACH NOSTRIL EVERY DAY    furosemide (LASIX) 40 mg, Oral, Daily, May take an additional tablet as needed for 3 lbs weight gain    glucose blood (Accu-Chek Sepideh Plus) test strip CHECK SUGARS THREE TIMES PER DAY AND AS NEEDED DX E11.65 ON INSULIN    glucose monitor monitoring kit Check sugars four times per day    isosorbide mononitrate (IMDUR) 60 MG 24 hr tablet TAKE 1 TABLET BY MOUTH EVERY DAY    Multiple Vitamin (MULTI VITAMIN PO) 1 tablet, Daily    multivitamin with minerals (Centrum Silver) tablet tablet Orally    NovoLOG FlexPen 100 UNIT/ML solution pen-injector sc pen Inject 5-10 units 3 times daily before meals       Assessment & Plan      1. Type 2 diabetes mellitus with hyperglycemia, with long-term current use of insulin  A1c 7.8 10/5/24   during visit today  Medications limited to insulin due to CKD 4  Sending Dexcom G7 to see if covered. If not, will check Sommer 3 plus. Would allow me to assess BG control better.   Continue Novolog for high sugars/higher carb meals  CMP is utd  Lipids ordered   Foot exam will be due next visit  Eye exam utd  - POC Glucose, Blood  - Continuous Glucose Sensor (Dexcom G7 Sensor) misc; Use 1 each Every 10 (Ten) Days.  Dispense: 3 each; Refill: 11  - NovoLOG FlexPen 100 UNIT/ML solution pen-injector sc pen; Inject 5-10 units 3 times daily before meals  Dispense: 27 mL; Refill: 1  - Lipid Panel; Future    2. Mixed hyperlipidemia  Did not tolerate statins. Continue Zetia. Lipids ordered.  - Lipid Panel; Future    Return in about 4 months (around 2/25/2025). He was advised to contact the office with any interval questions or concerns.    Estela Portillo PA-C  Endocrinology  10/25/2024

## 2024-10-28 RX ORDER — EZETIMIBE 10 MG/1
10 TABLET ORAL DAILY
Qty: 90 TABLET | Refills: 1 | Status: SHIPPED | OUTPATIENT
Start: 2024-10-28

## 2024-10-28 NOTE — TELEPHONE ENCOUNTER
Rx Refill Note    Requested Prescriptions     Pending Prescriptions Disp Refills    ezetimibe (ZETIA) 10 MG tablet [Pharmacy Med Name: EZETIMIBE 10 MG TABLET] 30 tablet      Sig: TAKE 1 TABLET BY MOUTH EVERY DAY        Last office visit with prescribing clinician: 10/25/2024         Next office visit with prescribing clinician: 2/28/2025

## 2024-10-30 ENCOUNTER — TELEPHONE (OUTPATIENT)
Dept: FAMILY MEDICINE CLINIC | Facility: CLINIC | Age: 64
End: 2024-10-30
Payer: COMMERCIAL

## 2024-10-30 NOTE — TELEPHONE ENCOUNTER
Please call.  How is he feeling?  Is he having a lot of swelling still?  Any trouble urinating?  Chest pain or shortness of breath?    I believe the main issue at our last visit was about what dose of Lasix to restart given his kidney function.  That is why we wanted him to follow-up or discuss with his nephrologist.  So if his swelling is controlled, then he can stay on his current dose that he is on currently.

## 2024-10-30 NOTE — TELEPHONE ENCOUNTER
Caller: Trace Escalante    Relationship: Self    Best call back number: [phone]    What is the best time to reach you: ANYTIME     Who are you requesting to speak with (clinical staff, provider,  specific staff member): DOCTOR OR NURSE       What was the call regarding: THE PATIENT STATES THAT HE WAS TOLD BY DOCTOR VALENCIA TO FOLLOW UP WITH HIS NEPHROLOGIST HE LEFT A MESSAGE FOR THEM BUT HAS NOT RECEIVED A RESPONSE HE WOULD LIKE TO KNOW WHAT THE DOCTOR WANTS HIM TO DO

## 2024-10-30 NOTE — TELEPHONE ENCOUNTER
Called patient stated little swelling but not bad and not as bad as last time. No trouble urinating no soa or chest pain.

## 2024-10-31 ENCOUNTER — TELEPHONE (OUTPATIENT)
Dept: CARDIOLOGY | Facility: CLINIC | Age: 64
End: 2024-10-31
Payer: COMMERCIAL

## 2024-10-31 NOTE — TELEPHONE ENCOUNTER
Please call.  Since doing okay, stay on same dose of medications and follow-up with cardiology and nephrology as scheduled.

## 2024-11-21 DIAGNOSIS — M10.9 GOUT, UNSPECIFIED CAUSE, UNSPECIFIED CHRONICITY, UNSPECIFIED SITE: ICD-10-CM

## 2024-11-21 RX ORDER — ALLOPURINOL 300 MG/1
150 TABLET ORAL DAILY
Qty: 45 TABLET | Refills: 1 | Status: SHIPPED | OUTPATIENT
Start: 2024-11-21

## 2024-11-21 RX ORDER — CARVEDILOL 12.5 MG/1
TABLET ORAL
Qty: 180 TABLET | Refills: 3 | Status: SHIPPED | OUTPATIENT
Start: 2024-11-21

## 2024-11-21 NOTE — PROGRESS NOTES
Subjective:     Encounter Date:11/22/2024    Primary Care Physician: Jose Ramon Allen MD      Patient ID: Trace Escalante is a 64 y.o. male.    Chief Complaint:Atrial flutter with rapid ventricular response (6-Mo F/U)      PROBLEM LIST:  Congestive heart failure:   [facility], 11/12/2017:  with CCS class II chest discomfort/NYHA class III dyspnea.   Echo, 11/13/2017: LVEF 40%.   LHC, 11/15/2017: Normal coronary arteries.   Echo, 04/11/2018: LVEF 41-45%. Cardiac valves normal.   Echo, 08/17/2020: EF 45%. Mild AR/MR/TR.  Echo, 9/19/2022.  LVEF 40%.  3/15/2024 echo EF 31 to 35%.  Mild to moderate AR, mild MR.  RVSP 52 mmHg.  Atrial fibrillation:   Cardiac event monitor, 30d, 05/11/2018: 2% a-fib, 2 episodes of 5 beat NSVT.  2/2024 atrial flutter with variable response/RVR  CHADSVASC 3.  2/16/24 attempted ALINA/ECV, however unable to pass ALINA probe.  3/15/2024 ECV to sinus rhythm.  Hypertension.   Hyperlipidemia.   Type 2 diabetes mellitus; uncontrolled, hemoglobin A1c was 8.3%.   Obstructive sleep apnea; compliant with CPAP.   Moderate obesity; BMI 38.   Chronic kidney disease; stage III, baseline creatinine 2.0.   Murmur.   Hypokalemia.   Nephrolithiasis.   Gout.   Surgical history:   MCL repair, right (1978).  Left patella tendon repair (2018).      Allergies   Allergen Reactions    Statins Myalgia    Amoxicillin Rash     Faint rash near end of Rx    Augmentin [Amoxicillin-Pot Clavulanate] Rash         Current Outpatient Medications:     allopurinol (ZYLOPRIM) 300 MG tablet, TAKE 1/2 TABLET BY MOUTH DAILY (Patient taking differently: Take 1 tablet by mouth Daily.), Disp: 45 tablet, Rfl: 1    amLODIPine (NORVASC) 10 MG tablet, TAKE 1 TABLET BY MOUTH EVERY DAY, Disp: 90 tablet, Rfl: 3    apixaban (ELIQUIS) 5 MG tablet tablet, Take 1 tablet by mouth 2 (Two) Times a Day., Disp: 60 tablet, Rfl: 11    B-D UF III MINI PEN NEEDLES 31G X 5 MM misc, Use 3 per day., Disp: 300 each, Rfl: 3    bumetanide (BUMEX) 2 MG tablet, 1  tablet Orally Once a day midday for 30 days, Disp: , Rfl:     carvedilol (COREG) 12.5 MG tablet, TAKE 1 TABLET BY MOUTH TWICE A DAY, Disp: 180 tablet, Rfl: 3    CloNIDine (CATAPRES) 0.2 MG tablet, Take 1 tablet by mouth Every Night. (Patient taking differently: Take 0.5 tablets by mouth 3 (Three) Times a Day.), Disp: , Rfl:     Continuous Glucose Sensor (Dexcom G7 Sensor) misc, Use 1 each Every 10 (Ten) Days., Disp: 3 each, Rfl: 11    ezetimibe (ZETIA) 10 MG tablet, TAKE 1 TABLET BY MOUTH EVERY DAY, Disp: 90 tablet, Rfl: 1    fluticasone (FLONASE) 50 MCG/ACT nasal spray, SPRAY 2 SPRAYS INTO EACH NOSTRIL EVERY DAY, Disp: 48 mL, Rfl: 2    glucose blood (Accu-Chek Sepideh Plus) test strip, CHECK SUGARS THREE TIMES PER DAY AND AS NEEDED DX E11.65 ON INSULIN, Disp: 300 each, Rfl: 3    glucose monitor monitoring kit, Check sugars four times per day, Disp: 1 each, Rfl: 0    isosorbide mononitrate (IMDUR) 60 MG 24 hr tablet, TAKE 1 TABLET BY MOUTH EVERY DAY, Disp: 90 tablet, Rfl: 3    multivitamin with minerals (Centrum Silver) tablet tablet, Orally, Disp: , Rfl:     NovoLOG FlexPen 100 UNIT/ML solution pen-injector sc pen, Inject 5-10 units 3 times daily before meals, Disp: 27 mL, Rfl: 1        History of Present Illness    Patient is a 64-year-old -American male who is being seen today for follow-up status post hospitalization last month at [facility] for pneumonia and pulmonary edema.  Patient had echocardiogram performed at that time which showed somewhat mildly more depressed LVEF.  Had noted CKD with significantly decreased GFR.  Follows with his nephrologist.  He saw him yesterday and he transitioned him from Lasix to Bumex given that he is still volume overloaded with significant lower extremity edema.  Patient notes that he had COVID in May of this year and since that time his edema has been much worse.  No reported syncope, presyncope, or chest pain.  Blood pressure is controlled.  Has some occasional brief  "palpitations that he thinks is A-fib.  Only last a few seconds, maybe 1-2 times monthly.    The following portions of the patient's history were reviewed and updated as appropriate: allergies, current medications, past family history, past medical history, past social history, past surgical history and problem list.      Social History     Tobacco Use    Smoking status: Never     Passive exposure: Past    Smokeless tobacco: Never   Vaping Use    Vaping status: Never Used   Substance Use Topics    Alcohol use: Yes     Comment: social    Drug use: No         ROS       Objective:   /78 (BP Location: Right arm, Patient Position: Sitting, Cuff Size: Adult)   Pulse 80   Ht 190.5 cm (75\")   Wt 120 kg (263 lb 12.8 oz)   SpO2 94%   BMI 32.97 kg/m²         Vitals reviewed.   Constitutional:       Appearance: Well-developed and not in distress.   Neck:      Vascular: No JVD.      Trachea: No tracheal deviation.   Pulmonary:      Effort: Pulmonary effort is normal.      Breath sounds: Normal breath sounds.   Cardiovascular:      Normal rate. Regular rhythm.      Murmurs: There is no murmur.   Edema:     Peripheral edema present.     Ankle: bilateral 2+ edema of the ankle.  Musculoskeletal:         General: No deformity. Skin:     General: Skin is warm and dry.   Neurological:      Mental Status: Alert and oriented to person, place, and time.         Procedures          Assessment:   Assessment & Plan      Diagnoses and all orders for this visit:    1. Chronic HFrEF (heart failure with reduced ejection fraction) (Primary), currently volume overloaded.  Loop diuretic changed to Bumex by nephrology yesterday.  Patient is going to start today.  -     Adult Transthoracic Echo Complete W/ Cont if Necessary Per Protocol; Future    2. Paroxysmal atrial fibrillation, occasional, brief palpitations.  Anticoagulated with Eliquis.    3. Essential hypertension, well-controlled.  On beta-blocker, calcium channel blocker, " clonidine.      Plan:  Reviewed with patient given his advanced renal disease that we will defer diuretic management to his nephrologist.  He verbalized understanding.  Reviewed findings of echocardiogram from [facility] with patient.  Suspect that some of his decrease in LVEF could be related to acute illness.  Will repeat this in the near term.  Discussed with patient if LVEF still significantly decreased we will consider ischemic evaluation with cardiac PET.  Discussed with patient do not feel cardiac catheterization is in his best interest given his noted chronic kidney disease.  Would only perform with high risk abnormal stress test.  If EF back to previous would simply continue current medical therapy.  Discussed with patient to continue anticoagulation and contact us if his palpitations become more often or long-lasting.  We are overall somewhat limited in regards to antiarrhythmic therapy secondary to his CKD.  Also, discussed overall benign nature of atrial fibrillation as long as he is on anticoagulation.  Continue current cardiac medications.  Follow-up in 6 months time in the [place] office or sooner if needed.       Rakel ZAVALA             Dictated utilizing Dragon dictation

## 2024-11-22 ENCOUNTER — OFFICE VISIT (OUTPATIENT)
Dept: CARDIOLOGY | Facility: CLINIC | Age: 64
End: 2024-11-22
Payer: COMMERCIAL

## 2024-11-22 VITALS
OXYGEN SATURATION: 94 % | SYSTOLIC BLOOD PRESSURE: 116 MMHG | WEIGHT: 263.8 LBS | DIASTOLIC BLOOD PRESSURE: 78 MMHG | HEART RATE: 80 BPM | BODY MASS INDEX: 32.8 KG/M2 | HEIGHT: 75 IN

## 2024-11-22 DIAGNOSIS — I50.22 CHRONIC HFREF (HEART FAILURE WITH REDUCED EJECTION FRACTION): Primary | ICD-10-CM

## 2024-11-22 DIAGNOSIS — I48.0 PAROXYSMAL ATRIAL FIBRILLATION: ICD-10-CM

## 2024-11-22 DIAGNOSIS — I10 ESSENTIAL HYPERTENSION: ICD-10-CM

## 2024-11-22 RX ORDER — BUMETANIDE 2 MG/1
TABLET ORAL
COMMUNITY
Start: 2024-11-21 | End: 2025-05-20

## 2024-11-22 NOTE — LETTER
November 22, 2024     Jose Ramon Allen MD  [address]  [address]  [place] [address]    Patient: Trace Escalante   YOB: 1960   Date of Visit: 11/22/2024     Dear Jose Ramon Allen MD:       Thank you for referring Trace Escalante to me for evaluation. Below are the relevant portions of my assessment and plan of care.    If you have questions, please do not hesitate to call me. I look forward to following Trace along with you.         Sincerely,        SALLY Aldana        CC: No Recipients    Rakel Atwood APRN  11/22/24 1024  Sign when Signing Visit  Subjective:     Encounter Date:11/22/2024    Primary Care Physician: Jose Ramon Allen MD      Patient ID: Trace Escalante is a 64 y.o. male.    Chief Complaint:Atrial flutter with rapid ventricular response (6-Mo F/U)      PROBLEM LIST:  Congestive heart failure:   [facility], 11/12/2017:  with CCS class II chest discomfort/NYHA class III dyspnea.   Echo, 11/13/2017: LVEF 40%.   LHC, 11/15/2017: Normal coronary arteries.   Echo, 04/11/2018: LVEF 41-45%. Cardiac valves normal.   Echo, 08/17/2020: EF 45%. Mild AR/MR/TR.  Echo, 9/19/2022.  LVEF 40%.  3/15/2024 echo EF 31 to 35%.  Mild to moderate AR, mild MR.  RVSP 52 mmHg.  Atrial fibrillation:   Cardiac event monitor, 30d, 05/11/2018: 2% a-fib, 2 episodes of 5 beat NSVT.  2/2024 atrial flutter with variable response/RVR  CHADSVASC 3.  2/16/24 attempted ALINA/ECV, however unable to pass ALINA probe.  3/15/2024 ECV to sinus rhythm.  Hypertension.   Hyperlipidemia.   Type 2 diabetes mellitus; uncontrolled, hemoglobin A1c was 8.3%.   Obstructive sleep apnea; compliant with CPAP.   Moderate obesity; BMI 38.   Chronic kidney disease; stage III, baseline creatinine 2.0.   Murmur.   Hypokalemia.   Nephrolithiasis.   Gout.   Surgical history:   MCL repair, right (1978).  Left patella tendon repair (2018).      Allergies   Allergen Reactions   • Statins Myalgia   • Amoxicillin Rash     Faint rash  near end of Rx   • Augmentin [Amoxicillin-Pot Clavulanate] Rash         Current Outpatient Medications:   •  allopurinol (ZYLOPRIM) 300 MG tablet, TAKE 1/2 TABLET BY MOUTH DAILY (Patient taking differently: Take 1 tablet by mouth Daily.), Disp: 45 tablet, Rfl: 1  •  amLODIPine (NORVASC) 10 MG tablet, TAKE 1 TABLET BY MOUTH EVERY DAY, Disp: 90 tablet, Rfl: 3  •  apixaban (ELIQUIS) 5 MG tablet tablet, Take 1 tablet by mouth 2 (Two) Times a Day., Disp: 60 tablet, Rfl: 11  •  B-D UF III MINI PEN NEEDLES 31G X 5 MM misc, Use 3 per day., Disp: 300 each, Rfl: 3  •  bumetanide (BUMEX) 2 MG tablet, 1 tablet Orally Once a day midday for 30 days, Disp: , Rfl:   •  carvedilol (COREG) 12.5 MG tablet, TAKE 1 TABLET BY MOUTH TWICE A DAY, Disp: 180 tablet, Rfl: 3  •  CloNIDine (CATAPRES) 0.2 MG tablet, Take 1 tablet by mouth Every Night. (Patient taking differently: Take 0.5 tablets by mouth 3 (Three) Times a Day.), Disp: , Rfl:   •  Continuous Glucose Sensor (Dexcom G7 Sensor) misc, Use 1 each Every 10 (Ten) Days., Disp: 3 each, Rfl: 11  •  ezetimibe (ZETIA) 10 MG tablet, TAKE 1 TABLET BY MOUTH EVERY DAY, Disp: 90 tablet, Rfl: 1  •  fluticasone (FLONASE) 50 MCG/ACT nasal spray, SPRAY 2 SPRAYS INTO EACH NOSTRIL EVERY DAY, Disp: 48 mL, Rfl: 2  •  glucose blood (Accu-Chek Sepideh Plus) test strip, CHECK SUGARS THREE TIMES PER DAY AND AS NEEDED DX E11.65 ON INSULIN, Disp: 300 each, Rfl: 3  •  glucose monitor monitoring kit, Check sugars four times per day, Disp: 1 each, Rfl: 0  •  isosorbide mononitrate (IMDUR) 60 MG 24 hr tablet, TAKE 1 TABLET BY MOUTH EVERY DAY, Disp: 90 tablet, Rfl: 3  •  multivitamin with minerals (Centrum Silver) tablet tablet, Orally, Disp: , Rfl:   •  NovoLOG FlexPen 100 UNIT/ML solution pen-injector sc pen, Inject 5-10 units 3 times daily before meals, Disp: 27 mL, Rfl: 1        History of Present Illness    Patient is a 64-year-old -American male who is being seen today for follow-up status post  "hospitalization last month at [facility] for pneumonia and pulmonary edema.  Patient had echocardiogram performed at that time which showed somewhat mildly more depressed LVEF.  Had noted CKD with significantly decreased GFR.  Follows with his nephrologist.  He saw him yesterday and he transitioned him from Lasix to Bumex given that he is still volume overloaded with significant lower extremity edema.  Patient notes that he had COVID in May of this year and since that time his edema has been much worse.  No reported syncope, presyncope, or chest pain.  Blood pressure is controlled.  Has some occasional brief palpitations that he thinks is A-fib.  Only last a few seconds, maybe 1-2 times monthly.    The following portions of the patient's history were reviewed and updated as appropriate: allergies, current medications, past family history, past medical history, past social history, past surgical history and problem list.      Social History     Tobacco Use   • Smoking status: Never     Passive exposure: Past   • Smokeless tobacco: Never   Vaping Use   • Vaping status: Never Used   Substance Use Topics   • Alcohol use: Yes     Comment: social   • Drug use: No         ROS       Objective:   /78 (BP Location: Right arm, Patient Position: Sitting, Cuff Size: Adult)   Pulse 80   Ht 190.5 cm (75\")   Wt 120 kg (263 lb 12.8 oz)   SpO2 94%   BMI 32.97 kg/m²         Vitals reviewed.   Constitutional:       Appearance: Well-developed and not in distress.   Neck:      Vascular: No JVD.      Trachea: No tracheal deviation.   Pulmonary:      Effort: Pulmonary effort is normal.      Breath sounds: Normal breath sounds.   Cardiovascular:      Normal rate. Regular rhythm.      Murmurs: There is no murmur.   Edema:     Peripheral edema present.     Ankle: bilateral 2+ edema of the ankle.  Musculoskeletal:         General: No deformity. Skin:     General: Skin is warm and dry.   Neurological:      Mental Status: Alert and " oriented to person, place, and time.         Procedures          Assessment:   Assessment & Plan     Diagnoses and all orders for this visit:    1. Chronic HFrEF (heart failure with reduced ejection fraction) (Primary), currently volume overloaded.  Loop diuretic changed to Bumex by nephrology yesterday.  Patient is going to start today.  -     Adult Transthoracic Echo Complete W/ Cont if Necessary Per Protocol; Future    2. Paroxysmal atrial fibrillation, occasional, brief palpitations.  Anticoagulated with Eliquis.    3. Essential hypertension, well-controlled.  On beta-blocker, calcium channel blocker, clonidine.      Plan:  Reviewed with patient given his advanced renal disease that we will defer diuretic management to his nephrologist.  He verbalized understanding.  Reviewed findings of echocardiogram from [facility] with patient.  Suspect that some of his decrease in LVEF could be related to acute illness.  Will repeat this in the near term.  Discussed with patient if LVEF still significantly decreased we will consider ischemic evaluation with cardiac PET.  Discussed with patient do not feel cardiac catheterization is in his best interest given his noted chronic kidney disease.  Would only perform with high risk abnormal stress test.  If EF back to previous would simply continue current medical therapy.  Discussed with patient to continue anticoagulation and contact us if his palpitations become more often or long-lasting.  We are overall somewhat limited in regards to antiarrhythmic therapy secondary to his CKD.  Also, discussed overall benign nature of atrial fibrillation as long as he is on anticoagulation.  Continue current cardiac medications.  Follow-up in 6 months time in the [place] office or sooner if needed.       Rakel ZAVALA             Dictated utilizing Dragon dictation

## 2024-12-16 ENCOUNTER — TELEPHONE (OUTPATIENT)
Dept: ENDOCRINOLOGY | Facility: CLINIC | Age: 64
End: 2024-12-16
Payer: COMMERCIAL

## 2024-12-16 NOTE — TELEPHONE ENCOUNTER
Called the pt and he stated he missed a call from the office. He was transferred to the front to be rescheduled with a provider other than RAMON.

## 2024-12-16 NOTE — TELEPHONE ENCOUNTER
Caller: Trace Escalante    Relationship: Self    Best call back number:   Telephone Information:   Mobile [phone]         What is the best time to reach you: ANYTIME    Who are you requesting to speak with (clinical staff, provider,  specific staff member): CLINICAL STAFF /PROVIDER     What was the call regarding: PT RETURNING CALL, NO TE OR MESSAGE. PLEASE ADVISE.

## 2024-12-26 DIAGNOSIS — M10.9 GOUT, UNSPECIFIED CAUSE, UNSPECIFIED CHRONICITY, UNSPECIFIED SITE: ICD-10-CM

## 2024-12-26 RX ORDER — ALLOPURINOL 300 MG/1
300 TABLET ORAL DAILY
Qty: 30 TABLET | Refills: 1 | Status: SHIPPED | OUTPATIENT
Start: 2024-12-26

## 2024-12-26 NOTE — TELEPHONE ENCOUNTER
Caller: Trace Escalante    Relationship: Self    Best call back number: [phone]    Requested Prescriptions:   Requested Prescriptions     Pending Prescriptions Disp Refills    allopurinol (ZYLOPRIM) 300 MG tablet 45 tablet 1     Sig: Take 0.5 tablets by mouth Daily.        Pharmacy where request should be sent: [facility]/PHARMACY #[id number] [address] AT [place] [address] - [phone] [facility] [phone] FX     Last office visit with prescribing clinician: 10/17/2024   Last telemedicine visit with prescribing clinician: Visit date not found   Next office visit with prescribing clinician: 10/3/2025     Additional details provided by patient: THE PATIENT WAS ADVISED BY HIS NEPHROLOGIST TO INCREASE THE MEDICATION TO ONE PILL A DAY HE HAS RAN OUT OF THE MEDICATION AND THE PHARMACY IS SAYING THAT ITS TOO SOON TO REFILL HE NEEDS A NEW PRESCRIPTION FOR THE ONE PILL A DAY DOSAGE SENT TO THE PHARMACY     Does the patient have less than a 3 day supply:  [x] Yes  [] No    Would you like a call back once the refill request has been completed: [] Yes [x] No    If the office needs to give you a call back, can they leave a voicemail: [] Yes [x] No    Ivette Swift Rep   12/26/24 10:17 EST

## 2025-01-14 ENCOUNTER — HOSPITAL ENCOUNTER (OUTPATIENT)
Dept: CARDIOLOGY | Facility: HOSPITAL | Age: 65
Discharge: HOME | End: 2025-01-14
Admitting: NURSE PRACTITIONER
Payer: COMMERCIAL

## 2025-01-14 DIAGNOSIS — I50.22 CHRONIC HFREF (HEART FAILURE WITH REDUCED EJECTION FRACTION): ICD-10-CM

## 2025-01-14 LAB
BH CV ECHO MEAS - AI P1/2T: 652.9 MSEC
BH CV ECHO MEAS - AO ROOT DIAM: 3.6 CM
BH CV ECHO MEAS - EDV(CUBED): 166.4 ML
BH CV ECHO MEAS - EDV(MOD-SP2): 235 ML
BH CV ECHO MEAS - EDV(MOD-SP4): 275 ML
BH CV ECHO MEAS - EF(MOD-SP2): 32.8 %
BH CV ECHO MEAS - EF(MOD-SP4): 33.1 %
BH CV ECHO MEAS - ESV(CUBED): 92.9 ML
BH CV ECHO MEAS - ESV(MOD-SP2): 158 ML
BH CV ECHO MEAS - ESV(MOD-SP4): 184 ML
BH CV ECHO MEAS - FS: 17.7 %
BH CV ECHO MEAS - IVS/LVPW: 0.79 CM
BH CV ECHO MEAS - IVSD: 1.1 CM
BH CV ECHO MEAS - LA DIMENSION: 5.6 CM
BH CV ECHO MEAS - LAT PEAK E' VEL: 4.5 CM/SEC
BH CV ECHO MEAS - LV DIASTOLIC VOL/BSA (35-75): 111.6 CM2
BH CV ECHO MEAS - LV MASS(C)D: 288.2 GRAMS
BH CV ECHO MEAS - LV MAX PG: 1.93 MMHG
BH CV ECHO MEAS - LV MEAN PG: 0.92 MMHG
BH CV ECHO MEAS - LV SYSTOLIC VOL/BSA (12-30): 74.6 CM2
BH CV ECHO MEAS - LV V1 MAX: 69.4 CM/SEC
BH CV ECHO MEAS - LV V1 VTI: 14.3 CM
BH CV ECHO MEAS - LVIDD: 5.5 CM
BH CV ECHO MEAS - LVIDS: 4.5 CM
BH CV ECHO MEAS - LVOT AREA: 5 CM2
BH CV ECHO MEAS - LVOT DIAM: 2.5 CM
BH CV ECHO MEAS - LVPWD: 1.4 CM
BH CV ECHO MEAS - MED PEAK E' VEL: 3.4 CM/SEC
BH CV ECHO MEAS - MR MAX PG: 59.2 MMHG
BH CV ECHO MEAS - MR MAX VEL: 384.8 CM/SEC
BH CV ECHO MEAS - MR MEAN PG: 41.7 MMHG
BH CV ECHO MEAS - MR MEAN VEL: 306 CM/SEC
BH CV ECHO MEAS - MR VTI: 146.5 CM
BH CV ECHO MEAS - MV DEC SLOPE: 771 CM/SEC2
BH CV ECHO MEAS - MV E MAX VEL: 66.4 CM/SEC
BH CV ECHO MEAS - MV MAX PG: 5.8 MMHG
BH CV ECHO MEAS - MV MEAN PG: 1.72 MMHG
BH CV ECHO MEAS - MV P1/2T: 46.3 MSEC
BH CV ECHO MEAS - MV V2 VTI: 13.6 CM
BH CV ECHO MEAS - MVA(P1/2T): 4.8 CM2
BH CV ECHO MEAS - MVA(VTI): 5.2 CM2
BH CV ECHO MEAS - PA ACC TIME: 0.12 SEC
BH CV ECHO MEAS - PI END-D VEL: 229.7 CM/SEC
BH CV ECHO MEAS - RAP SYSTOLE: 15 MMHG
BH CV ECHO MEAS - RVSP: 54 MMHG
BH CV ECHO MEAS - SV(LVOT): 71.1 ML
BH CV ECHO MEAS - SV(MOD-SP2): 77 ML
BH CV ECHO MEAS - SV(MOD-SP4): 91 ML
BH CV ECHO MEAS - SVI(LVOT): 28.8 ML/M2
BH CV ECHO MEAS - SVI(MOD-SP2): 31.2 ML/M2
BH CV ECHO MEAS - SVI(MOD-SP4): 36.9 ML/M2
BH CV ECHO MEAS - TAPSE (>1.6): 0.73 CM
BH CV ECHO MEAS - TR MAX PG: 39.4 MMHG
BH CV ECHO MEAS - TR MAX VEL: 310.5 CM/SEC
BH CV ECHO MEASUREMENTS AVERAGE E/E' RATIO: 16.81
BH CV XLRA - RV BASE: 5.4 CM
BH CV XLRA - RV LENGTH: 8.1 CM
BH CV XLRA - RV MID: 3.9 CM
BH CV XLRA - TDI S': 10.2 CM/SEC
LV EF 2D ECHO EST: 30 %
LV EF BIPLANE MOD: 31.7 %

## 2025-01-14 PROCEDURE — 93306 TTE W/DOPPLER COMPLETE: CPT

## 2025-01-15 ENCOUNTER — TELEPHONE (OUTPATIENT)
Dept: CARDIOLOGY | Facility: CLINIC | Age: 65
End: 2025-01-15
Payer: COMMERCIAL

## 2025-01-15 DIAGNOSIS — I50.22 CHRONIC HFREF (HEART FAILURE WITH REDUCED EJECTION FRACTION): ICD-10-CM

## 2025-01-15 DIAGNOSIS — I50.22 CHRONIC SYSTOLIC CONGESTIVE HEART FAILURE: Primary | ICD-10-CM

## 2025-01-15 DIAGNOSIS — I42.8 NONISCHEMIC CARDIOMYOPATHY: ICD-10-CM

## 2025-01-15 NOTE — TELEPHONE ENCOUNTER
Left VM requesting  call back to discuss results    ----- Message from Rakel Atwood sent at 1/15/2025 12:39 PM EST -----  Please let patient know that his EF is still a little lower than previous.  At this time would recommend cardiac PET.  Further recommendations to be made after that.

## 2025-01-16 NOTE — TELEPHONE ENCOUNTER
Left VM advising would like to order a stress test.  Requested call back if patient would not like to proceed with testing.

## 2025-01-18 DIAGNOSIS — H69.93 DYSFUNCTION OF BOTH EUSTACHIAN TUBES: ICD-10-CM

## 2025-01-18 DIAGNOSIS — E11.65 TYPE 2 DIABETES MELLITUS WITH HYPERGLYCEMIA, WITH LONG-TERM CURRENT USE OF INSULIN: Chronic | ICD-10-CM

## 2025-01-18 DIAGNOSIS — Z79.4 TYPE 2 DIABETES MELLITUS WITH HYPERGLYCEMIA, WITH LONG-TERM CURRENT USE OF INSULIN: Chronic | ICD-10-CM

## 2025-01-20 RX ORDER — FLURBIPROFEN SODIUM 0.3 MG/ML
SOLUTION/ DROPS OPHTHALMIC
Qty: 300 EACH | Refills: 3 | Status: SHIPPED | OUTPATIENT
Start: 2025-01-20

## 2025-01-20 RX ORDER — FLUTICASONE PROPIONATE 50 MCG
SPRAY, SUSPENSION (ML) NASAL
Qty: 48 G | Refills: 2 | Status: SHIPPED | OUTPATIENT
Start: 2025-01-20

## 2025-01-20 NOTE — TELEPHONE ENCOUNTER
Rx Refill Note  Requested Prescriptions     Pending Prescriptions Disp Refills    B-D UF III MINI PEN NEEDLES 31G X 5 MM misc [Pharmacy Med Name: BD UF MINI PEN NEEDLE [id number]] 300 each 0     Sig: USE 3 TIMES A DAY      Last office visit with prescribing clinician: 10/25/2024     Next office visit with prescribing clinician: Visit date not found   {Itz Mcrae MA  01/20/25, 08:27 EST

## 2025-02-14 ENCOUNTER — HOSPITAL ENCOUNTER (OUTPATIENT)
Dept: CARDIOLOGY | Facility: HOSPITAL | Age: 65
Discharge: HOME | End: 2025-02-14
Payer: COMMERCIAL

## 2025-02-18 ENCOUNTER — TELEPHONE (OUTPATIENT)
Dept: FAMILY MEDICINE CLINIC | Facility: CLINIC | Age: 65
End: 2025-02-18
Payer: COMMERCIAL

## 2025-02-18 NOTE — TELEPHONE ENCOUNTER
Caller: Trace Escalante    Relationship: Self    Best call back number: [phone]    What are you requesting: C-PAP SUPPLIES     If a prescription is needed, what is your preferred pharmacy and phone number:      [facility]  [phone]  FAX#-[phone]

## 2025-02-20 DIAGNOSIS — M10.9 GOUT, UNSPECIFIED CAUSE, UNSPECIFIED CHRONICITY, UNSPECIFIED SITE: ICD-10-CM

## 2025-02-20 RX ORDER — ALLOPURINOL 300 MG/1
300 TABLET ORAL DAILY
Qty: 30 TABLET | Refills: 1 | Status: SHIPPED | OUTPATIENT
Start: 2025-02-20

## 2025-02-24 ENCOUNTER — TELEPHONE (OUTPATIENT)
Dept: FAMILY MEDICINE CLINIC | Facility: CLINIC | Age: 65
End: 2025-02-24
Payer: COMMERCIAL

## 2025-02-24 DIAGNOSIS — R21 RASH: Primary | ICD-10-CM

## 2025-02-24 NOTE — TELEPHONE ENCOUNTER
Caller: Trace Escalante    Relationship: Self    Best call back number: [phone]         What was the call regarding: PATIENT IS WANTING TO KNOW IF DR VALENCIA WOULD WANTS TO SEE HIM FOR NEW RANDOM DARK SPOTS ON HIS SKIN SINCE STARTING A MEDICATION IN OCTOBER OR NOVEMBER OR IF HE WOULD GIVE HIM A REFERRAL TO DERMATOLOGY    Is it okay if the provider responds through MyChart:

## 2025-02-25 NOTE — TELEPHONE ENCOUNTER
Please call.  Hard to say without knowing what it looks like but if he prefers, we can place a referral to dermatology.  Just let me know if he prefers Zora[facility] or Sophia?

## 2025-02-26 DIAGNOSIS — E78.2 MIXED HYPERLIPIDEMIA: Chronic | ICD-10-CM

## 2025-02-26 RX ORDER — EZETIMIBE 10 MG/1
10 TABLET ORAL DAILY
Qty: 90 TABLET | Refills: 1 | Status: SHIPPED | OUTPATIENT
Start: 2025-02-26

## 2025-02-26 RX ORDER — APIXABAN 5 MG/1
5 TABLET, FILM COATED ORAL 2 TIMES DAILY
Qty: 60 TABLET | Refills: 11 | Status: SHIPPED | OUTPATIENT
Start: 2025-02-26

## 2025-02-26 NOTE — TELEPHONE ENCOUNTER
Rx Refill Note  Requested Prescriptions     Pending Prescriptions Disp Refills    ezetimibe (ZETIA) 10 MG tablet [Pharmacy Med Name: EZETIMIBE 10 MG TABLET] 90 tablet 1     Sig: TAKE 1 TABLET BY MOUTH EVERY DAY      Last office visit with prescribing clinician: 10/25/2024   Last telemedicine visit with prescribing clinician: Visit date not found   Next office visit with prescribing clinician: Visit date not found                         Would you like a call back once the refill request has been completed: [] Yes [] No    If the office needs to give you a call back, can they leave a voicemail: [] Yes [] No    Shawna Santo MA  02/26/25, 09:18 EST

## 2025-02-26 NOTE — TELEPHONE ENCOUNTER
Called informed pt. Stated would like referral. No Preference on locate. Just whoever takes his  insurance.

## 2025-02-27 ENCOUNTER — TELEPHONE (OUTPATIENT)
Dept: CARDIOLOGY | Facility: CLINIC | Age: 65
End: 2025-02-27
Payer: COMMERCIAL

## 2025-02-27 NOTE — TELEPHONE ENCOUNTER
Spoke with patient, he reports he still has shortness of breath and lower extremity edema.  This has been going on for a while.  Diuretic is being managed by Nephrology

## 2025-02-28 ENCOUNTER — HOSPITAL ENCOUNTER (OUTPATIENT)
Dept: CARDIOLOGY | Facility: HOSPITAL | Age: 65
Discharge: HOME | End: 2025-02-28
Payer: COMMERCIAL

## 2025-02-28 DIAGNOSIS — I50.22 CHRONIC SYSTOLIC CONGESTIVE HEART FAILURE: ICD-10-CM

## 2025-02-28 DIAGNOSIS — I42.8 NONISCHEMIC CARDIOMYOPATHY: ICD-10-CM

## 2025-02-28 DIAGNOSIS — I48.0 PAROXYSMAL ATRIAL FIBRILLATION: Primary | ICD-10-CM

## 2025-03-14 ENCOUNTER — RESULTS FOLLOW-UP (OUTPATIENT)
Dept: CARDIOLOGY | Facility: HOSPITAL | Age: 65
End: 2025-03-14
Payer: COMMERCIAL

## 2025-03-14 ENCOUNTER — APPOINTMENT (OUTPATIENT)
Dept: CARDIOLOGY | Facility: HOSPITAL | Age: 65
End: 2025-03-14
Payer: COMMERCIAL

## 2025-03-14 ENCOUNTER — HOSPITAL ENCOUNTER (OUTPATIENT)
Dept: CARDIOLOGY | Facility: HOSPITAL | Age: 65
Discharge: HOME | End: 2025-03-14
Payer: COMMERCIAL

## 2025-03-14 VITALS — WEIGHT: 263 LBS | BODY MASS INDEX: 32.7 KG/M2 | HEIGHT: 75 IN

## 2025-03-14 DIAGNOSIS — I42.8 NONISCHEMIC CARDIOMYOPATHY: ICD-10-CM

## 2025-03-14 DIAGNOSIS — I50.22 CHRONIC SYSTOLIC CONGESTIVE HEART FAILURE: ICD-10-CM

## 2025-03-14 DIAGNOSIS — I48.0 PAROXYSMAL ATRIAL FIBRILLATION: ICD-10-CM

## 2025-03-14 LAB
BH CV REST NUCLEAR ISOTOPE DOSE: 9.7 MCI
BH CV STRESS BP STAGE 2: NORMAL
BH CV STRESS BP STAGE 4: NORMAL
BH CV STRESS COMMENTS STAGE 1: NORMAL
BH CV STRESS DOSE REGADENOSON STAGE 1: 0.4
BH CV STRESS DURATION MIN STAGE 1: 1
BH CV STRESS DURATION MIN STAGE 2: 1
BH CV STRESS DURATION MIN STAGE 3: 1
BH CV STRESS DURATION MIN STAGE 4: 1
BH CV STRESS DURATION SEC STAGE 1: 10
BH CV STRESS DURATION SEC STAGE 2: 0
BH CV STRESS DURATION SEC STAGE 3: 0
BH CV STRESS DURATION SEC STAGE 4: 0
BH CV STRESS HR STAGE 1: 81
BH CV STRESS HR STAGE 2: 82
BH CV STRESS HR STAGE 3: 85
BH CV STRESS HR STAGE 4: 81
BH CV STRESS NUCLEAR ISOTOPE DOSE: 32.5 MCI
BH CV STRESS O2 STAGE 1: 95
BH CV STRESS O2 STAGE 2: 97
BH CV STRESS O2 STAGE 3: 100
BH CV STRESS O2 STAGE 4: 99
BH CV STRESS PROTOCOL 1: NORMAL
BH CV STRESS RECOVERY BP: NORMAL MMHG
BH CV STRESS RECOVERY HR: 81 BPM
BH CV STRESS RECOVERY O2: 98 %
BH CV STRESS STAGE 1: 1
BH CV STRESS STAGE 2: 2
BH CV STRESS STAGE 3: 3
BH CV STRESS STAGE 4: 4
MAXIMAL PREDICTED HEART RATE: 156 BPM
PERCENT MAX PREDICTED HR: 55.13 %
SPECT HRT GATED+EF W RNC IV: 32 %
STRESS BASELINE BP: NORMAL MMHG
STRESS BASELINE HR: 82 BPM
STRESS O2 SAT REST: 96 %
STRESS PERCENT HR: 65 %
STRESS POST ESTIMATED WORKLOAD: 1 METS
STRESS POST EXERCISE DUR MIN: 4 MIN
STRESS POST EXERCISE DUR SEC: 0 SEC
STRESS POST O2 SAT PEAK: 100 %
STRESS POST PEAK BP: NORMAL MMHG
STRESS POST PEAK HR: 86 BPM
STRESS TARGET HR: 133 BPM

## 2025-03-14 PROCEDURE — 25010000002 REGADENOSON 0.4 MG/5ML SOLUTION: Performed by: INTERNAL MEDICINE

## 2025-03-14 PROCEDURE — 78452 HT MUSCLE IMAGE SPECT MULT: CPT

## 2025-03-14 PROCEDURE — 93017 CV STRESS TEST TRACING ONLY: CPT

## 2025-03-14 PROCEDURE — 34310000005 TECHNETIUM SESTAMIBI: Performed by: INTERNAL MEDICINE

## 2025-03-14 PROCEDURE — A9500 TC99M SESTAMIBI: HCPCS | Performed by: INTERNAL MEDICINE

## 2025-03-14 RX ORDER — SPIRONOLACTONE 25 MG/1
25 TABLET ORAL DAILY
COMMUNITY

## 2025-03-14 RX ORDER — REGADENOSON 0.08 MG/ML
0.4 INJECTION, SOLUTION INTRAVENOUS ONCE
Status: COMPLETED | OUTPATIENT
Start: 2025-03-14 | End: 2025-03-14

## 2025-03-14 RX ADMIN — REGADENOSON 0.4 MG: 0.08 INJECTION, SOLUTION INTRAVENOUS at 09:22

## 2025-03-14 RX ADMIN — TECHNETIUM TC 99M SESTAMIBI 1 DOSE: 1 INJECTION INTRAVENOUS at 09:25

## 2025-03-14 RX ADMIN — TECHNETIUM TC 99M SESTAMIBI 1 DOSE: 1 INJECTION INTRAVENOUS at 08:00

## 2025-04-14 DIAGNOSIS — M10.9 GOUT, UNSPECIFIED CAUSE, UNSPECIFIED CHRONICITY, UNSPECIFIED SITE: ICD-10-CM

## 2025-04-15 RX ORDER — ALLOPURINOL 300 MG/1
300 TABLET ORAL DAILY
Qty: 30 TABLET | Refills: 2 | Status: SHIPPED | OUTPATIENT
Start: 2025-04-15

## 2025-05-09 ENCOUNTER — TELEPHONE (OUTPATIENT)
Dept: FAMILY MEDICINE CLINIC | Facility: CLINIC | Age: 65
End: 2025-05-09
Payer: COMMERCIAL

## 2025-05-09 DIAGNOSIS — R06.02 SHORTNESS OF BREATH: Primary | ICD-10-CM

## 2025-05-09 NOTE — TELEPHONE ENCOUNTER
PATIENT CAME TO THE OFFICE AND WAS WANTING TO SEE IF DR VALENCIA WOULD WRITE A PRESCRIPTION FOR  COMBIVENT RESPIMAT INHALER 20MCG/100MCG PER ACTUATION, HE USES THIS DAILY AND ITS A SAMPLE THAT  GAVE HIM WHEN HE WAS IN THE HOSPITAL. HE STATES HE USES ONE PUFF A DAY

## 2025-05-12 NOTE — TELEPHONE ENCOUNTER
Please call and confirm the diagnosis.  Was he given this asthma or for COPD?  Is he having worsening trouble with breathing, wheezing, or shortness of breath?

## 2025-05-12 NOTE — TELEPHONE ENCOUNTER
Osman Woodward to call back to confirm if he was given this the inhaler for asthma or for COPD? Is he having worsening trouble with breathing, wheezing, or shortness of breath?

## 2025-05-14 NOTE — TELEPHONE ENCOUNTER
PATIENT HAS AN ACUTE PULMONARY EDEMA FROM COVID, STATED THIS ISNT SOMETHING THAT HE NEEDS ALL THE TIME BUT HE DOES HAVE FLAIR UP FROM TIME TO TIME  AND THE INHALER GIVES HIM SOME RELIEF WHEN NEEDED.

## 2025-05-15 RX ORDER — IPRATROPIUM BROMIDE AND ALBUTEROL 20; 100 UG/1; UG/1
1 SPRAY, METERED RESPIRATORY (INHALATION) 4 TIMES DAILY PRN
Qty: 4 G | Refills: 1 | Status: SHIPPED | OUTPATIENT
Start: 2025-05-15

## 2025-06-17 NOTE — PROGRESS NOTES
Subjective:     Encounter Date:06/18/2025    Primary Care Physician: Jose Ramon Allen MD      Patient ID: Trace Escalante is a 64 y.o. male.    Chief Complaint:No chief complaint on file.      PROBLEM LIST:  Congestive heart failure:   [facility], 11/12/2017:  with CCS class II chest discomfort/NYHA class III dyspnea.   Echo, 11/13/2017: LVEF 40%.   [facility], 11/15/2017: Normal coronary arteries.   Echo, 04/11/2018: LVEF 41-45%. Cardiac valves normal.   Echo, 08/17/2020: EF 45%. Mild AR/MR/TR.  Echo, 9/19/2022.  LVEF 40%.  3/15/2024 echo EF 31 to 35%.  Mild to moderate AR, mild MR.  RVSP 52 mmHg.  1/14/25 ECHO EF 30%. Mild AR. Mild to mod MR. RVSP 54  3/14/2025 MPS with no ischemia. Fixed inferoapical defect noted, possible attenuation artifact.   Atrial fibrillation:   Cardiac event monitor, 30d, 05/11/2018: 2% a-fib, 2 episodes of 5 beat NSVT.  2/2024 atrial flutter with variable response/RVR  CHADSVASC 3.  2/16/24 attempted ALINA/ECV, however unable to pass ALINA probe.  3/15/2024 ECV to sinus rhythm.  Hypertension.   Hyperlipidemia.   Type 2 diabetes mellitus; uncontrolled, hemoglobin A1c was 8.3%.   Obstructive sleep apnea; compliant with CPAP.   Moderate obesity; BMI 38.   Chronic kidney disease; stage IV, baseline creatinine 3.8.   Murmur.   Hypokalemia.   Nephrolithiasis.   Gout.   Surgical history:   MCL repair, right (1978).  Left patella tendon repair (2018).        Allergies   Allergen Reactions    Statins Myalgia    Amoxicillin Rash     Faint rash near end of Rx    Augmentin [Amoxicillin-Pot Clavulanate] Rash         Current Outpatient Medications:     allopurinol (ZYLOPRIM) 300 MG tablet, TAKE 1 TABLET BY MOUTH EVERY DAY, Disp: 30 tablet, Rfl: 2    amLODIPine (NORVASC) 10 MG tablet, TAKE 1 TABLET BY MOUTH EVERY DAY, Disp: 90 tablet, Rfl: 3    B-D UF III MINI PEN NEEDLES 31G X 5 MM misc, USE 3 TIMES A DAY, Disp: 300 each, Rfl: 3    bumetanide (BUMEX) 2 MG tablet, 1 tablet Orally Once a day midday for 30  "days, Disp: , Rfl:     carvedilol (COREG) 12.5 MG tablet, TAKE 1 TABLET BY MOUTH TWICE A DAY, Disp: 180 tablet, Rfl: 3    CloNIDine (CATAPRES) 0.2 MG tablet, Take 1 tablet by mouth Every Night. (Patient taking differently: Take 0.5 tablets by mouth 3 (Three) Times a Day.), Disp: , Rfl:     Continuous Glucose Sensor (Dexcom G7 Sensor) misc, Use 1 each Every 10 (Ten) Days., Disp: 3 each, Rfl: 11    Eliquis 5 MG tablet tablet, TAKE 1 TABLET BY MOUTH TWICE A DAY, Disp: 60 tablet, Rfl: 11    ezetimibe (ZETIA) 10 MG tablet, TAKE 1 TABLET BY MOUTH EVERY DAY, Disp: 90 tablet, Rfl: 1    fluticasone (FLONASE) 50 MCG/ACT nasal spray, SPRAY 2 SPRAYS INTO EACH NOSTRIL EVERY DAY, Disp: 48 g, Rfl: 2    glucose blood (Accu-Chek Sepideh Plus) test strip, CHECK SUGARS THREE TIMES PER DAY AND AS NEEDED DX E11.65 ON INSULIN, Disp: 300 each, Rfl: 3    glucose monitor monitoring kit, Check sugars four times per day, Disp: 1 each, Rfl: 0    ipratropium-albuterol (Combivent Respimat)  MCG/ACT inhaler, Inhale 1 puff 4 (Four) Times a Day As Needed for Wheezing., Disp: 4 g, Rfl: 1    isosorbide mononitrate (IMDUR) 60 MG 24 hr tablet, TAKE 1 TABLET BY MOUTH EVERY DAY, Disp: 90 tablet, Rfl: 3    multivitamin with minerals (Centrum Silver) tablet tablet, Orally, Disp: , Rfl:     NovoLOG FlexPen 100 UNIT/ML solution pen-injector sc pen, Inject 5-10 units 3 times daily before meals, Disp: 27 mL, Rfl: 1    spironolactone (ALDACTONE) 25 MG tablet, Take 1 tablet by mouth Daily., Disp: , Rfl:         History of Present Illness    Patient returns today for 6-month follow-up of nonischemic cardiomyopathy and heart failure.  Since her last visit he feels he is \"80% better\".  Still dyspneic but is able to perform his activities and with some intermittent stoppages.  He does note some increase in weight over the last 2 weeks of approximately 10 to 12 pounds.  Over that time he thinks his dyspnea has worsened, and he has developed some orthopnea and " abdominal fullness.  No chest pain presyncope syncope or tachypalpitations.    The following portions of the patient's history were reviewed and updated as appropriate: allergies, current medications, past family history, past medical history, past social history, past surgical history and problem list.      Social History     Tobacco Use    Smoking status: Never     Passive exposure: Past    Smokeless tobacco: Never   Vaping Use    Vaping status: Never Used   Substance Use Topics    Alcohol use: Yes     Comment: social    Drug use: No         ROS       Objective:   There were no vitals taken for this visit.        Vitals reviewed.   Constitutional:       Appearance: Well-developed and not in distress.   Neck:      Thyroid: No thyromegaly.      Vascular: No carotid bruit or JVD.   Pulmonary:      Breath sounds: Bibasilar Rhonchi present.   Cardiovascular:      Regular rhythm.      No gallop. No S3 and S4 gallop.   Pulses:     Intact distal pulses.      Carotid: 2+ bilaterally.     Radial: 2+ bilaterally.  Edema:     Peripheral edema present.     Pretibial: bilateral 2+ edema of the pretibial area.  Abdominal:      General: Bowel sounds are normal.      Palpations: Abdomen is soft. There is no abdominal mass.      Tenderness: There is no abdominal tenderness.   Musculoskeletal:         General: No deformity.      Extremities: No clubbing present.Skin:     General: Skin is warm and dry.      Findings: No rash.   Neurological:      Mental Status: Alert and oriented to person, place, and time.         Procedures          Assessment:   Assessment & Plan      Diagnoses and all orders for this visit:    1. Chronic systolic congestive heart failure (Primary)      1.  Nonischemic cardiomyopathy last echocardiogram LVEF 31-35%.  Normal stress MPS.  2.  Acute on chronic HFrEF.  Exacerbated by renal failure.  Functional class III.  3.  History of persistent atrial fibrillation currently in sinus rhythm.  On chronic  anticoagulation.  4.  Volume overload  5.  Class IV chronic kidney disease being followed by Dr. Juarez.  5.  Hypertension well-controlled  6.  Dyslipidemia on Zetia but, statin intolerant.    Recommendations:  1.  Patient appears volume overloaded today.  We will defer however management of his volume status to his primary nephrologist is not to worsen his renal failure.  He is scheduled to see him in the next several days.  2.  Meantime discussed avoidance of sodium/hydration.  He will continue his current medical regimen.  3.  Continue current medical therapy.  He is on max GDMT that he can tolerate due to his renal failure.  4.  Regarding his cardiomyopathy, we will simply medical therapy for now.  Likely once he goes on dialysis, suspect his LVEF and volume will be much improved.  5.  Revisit 6 months time or as needed symptom change     Loki De La Fuente MD            Dictated utilizing Dragon dictation

## 2025-06-18 ENCOUNTER — OFFICE VISIT (OUTPATIENT)
Dept: CARDIOLOGY | Facility: CLINIC | Age: 65
End: 2025-06-18
Payer: COMMERCIAL

## 2025-06-18 VITALS
HEIGHT: 75 IN | BODY MASS INDEX: 32.43 KG/M2 | HEART RATE: 76 BPM | OXYGEN SATURATION: 92 % | WEIGHT: 260.8 LBS | DIASTOLIC BLOOD PRESSURE: 80 MMHG | SYSTOLIC BLOOD PRESSURE: 124 MMHG

## 2025-06-18 DIAGNOSIS — I50.22 CHRONIC SYSTOLIC CONGESTIVE HEART FAILURE: Primary | ICD-10-CM

## 2025-06-18 PROCEDURE — 99214 OFFICE O/P EST MOD 30 MIN: CPT | Performed by: INTERNAL MEDICINE

## 2025-06-27 DIAGNOSIS — E11.65 TYPE 2 DIABETES MELLITUS WITH HYPERGLYCEMIA, WITH LONG-TERM CURRENT USE OF INSULIN: Chronic | ICD-10-CM

## 2025-06-27 DIAGNOSIS — Z79.4 TYPE 2 DIABETES MELLITUS WITH HYPERGLYCEMIA, WITH LONG-TERM CURRENT USE OF INSULIN: Chronic | ICD-10-CM

## 2025-06-30 DIAGNOSIS — Z79.4 TYPE 2 DIABETES MELLITUS WITH HYPERGLYCEMIA, WITH LONG-TERM CURRENT USE OF INSULIN: Chronic | ICD-10-CM

## 2025-06-30 DIAGNOSIS — E11.65 TYPE 2 DIABETES MELLITUS WITH HYPERGLYCEMIA, WITH LONG-TERM CURRENT USE OF INSULIN: Chronic | ICD-10-CM

## 2025-06-30 RX ORDER — BLOOD SUGAR DIAGNOSTIC
STRIP MISCELLANEOUS
Refills: 3 | OUTPATIENT
Start: 2025-06-30

## 2025-06-30 NOTE — TELEPHONE ENCOUNTER
Caller: Trace Escalante    Relationship: Self    Best call back number: [phone]    Requested Prescriptions:   Requested Prescriptions     Pending Prescriptions Disp Refills    glucose blood (Accu-Chek Sepideh Plus) test strip 300 each 3     Sig: CHECK SUGARS THREE TIMES PER DAY AND AS NEEDED DX E11.65 ON INSULIN        Pharmacy where request should be sent:  [id number] - [address] AT [address] [phone] [facility] [phone] FX [[id number]]     Last office visit with prescribing clinician: 10/25/2024   Last telemedicine visit with prescribing clinician: Visit date not found   Next office visit with prescribing clinician: Visit date not found     Additional details provided by patient:     Does the patient have less than a 3 day supply:  [] Yes  [x] No    Would you like a call back once the refill request has been completed: [] Yes [] No    If the office needs to give you a call back, can they leave a voicemail: [] Yes [] No    Ivette Tidwell Rep   06/30/25 15:21 EDT

## 2025-06-30 NOTE — TELEPHONE ENCOUNTER
Rx Refill Note  Requested Prescriptions     Pending Prescriptions Disp Refills    Accu-Chek Sepideh Plus test strip [Pharmacy Med Name: ACCU-CHEK SEPIDEH PLUS TEST STRP]  3     Sig: CHECK SUGARS THREE TIMES PER DAY AND AS NEEDED DX E11.65 ON INSULIN      Last office visit with prescribing clinician: 10/25/2024      Next office visit with prescribing clinician: Visit date not found       Denied. Patient needs appointment  {  Elaine Myers MA  06/30/25, 10:22 EDT

## 2025-07-01 ENCOUNTER — OFFICE VISIT (OUTPATIENT)
Age: 65
End: 2025-07-01
Payer: COMMERCIAL

## 2025-07-01 VITALS
OXYGEN SATURATION: 97 % | HEART RATE: 75 BPM | SYSTOLIC BLOOD PRESSURE: 122 MMHG | HEIGHT: 75 IN | WEIGHT: 264 LBS | DIASTOLIC BLOOD PRESSURE: 76 MMHG | BODY MASS INDEX: 32.83 KG/M2

## 2025-07-01 DIAGNOSIS — Z79.4 TYPE 2 DIABETES MELLITUS WITH HYPERGLYCEMIA, WITH LONG-TERM CURRENT USE OF INSULIN: Primary | ICD-10-CM

## 2025-07-01 DIAGNOSIS — E11.65 TYPE 2 DIABETES MELLITUS WITH HYPERGLYCEMIA, WITH LONG-TERM CURRENT USE OF INSULIN: Primary | ICD-10-CM

## 2025-07-01 DIAGNOSIS — E78.2 MIXED HYPERLIPIDEMIA: ICD-10-CM

## 2025-07-01 LAB
EXPIRATION DATE: ABNORMAL
EXPIRATION DATE: ABNORMAL
GLUCOSE BLDC GLUCOMTR-MCNC: 395 MG/DL (ref 70–130)
HBA1C MFR BLD: 10.1 % (ref 4.5–5.7)
Lab: ABNORMAL
Lab: ABNORMAL

## 2025-07-01 PROCEDURE — 80061 LIPID PANEL: CPT | Performed by: PHYSICIAN ASSISTANT

## 2025-07-01 RX ORDER — BLOOD SUGAR DIAGNOSTIC
STRIP MISCELLANEOUS
Qty: 300 EACH | Refills: 3 | Status: SHIPPED | OUTPATIENT
Start: 2025-07-01 | End: 2025-07-01 | Stop reason: SDUPTHER

## 2025-07-01 RX ORDER — FLURBIPROFEN SODIUM 0.3 MG/ML
SOLUTION/ DROPS OPHTHALMIC
Qty: 300 EACH | Refills: 3 | Status: SHIPPED | OUTPATIENT
Start: 2025-07-01

## 2025-07-01 RX ORDER — INSULIN ASPART 100 [IU]/ML
INJECTION, SOLUTION INTRAVENOUS; SUBCUTANEOUS
Qty: 27 ML | Refills: 1 | Status: SHIPPED | OUTPATIENT
Start: 2025-07-01

## 2025-07-01 RX ORDER — BLOOD SUGAR DIAGNOSTIC
STRIP MISCELLANEOUS
Qty: 300 EACH | Refills: 3 | Status: SHIPPED | OUTPATIENT
Start: 2025-07-01

## 2025-07-01 NOTE — PROGRESS NOTES
Office Note      Date: 2025  Patient Name: Trace Escalante  MRN: [id number]  : 1960    Chief Complaint   Patient presents with    Diabetes       History of Present Illness  Trace Escalante is a 64 y.o. male who presents today for follow up on type 2 diabetes.   Diabetes was diagnosed in ~.  Known diabetic complications: nephropathy and retinopathy.   Current diabetic medications: Novolog.  Past diabetic medications: Oral medication/metformin, basal insulin.  Last eye exam: 10/2024  History of mild non-proliferative retinopathy.   ACE inhibitor/ARB: No.    Has CKD stage 4.   Off crestor - didn't tolerate due to muscle cramps  Did tolerate multiple statins in the past  On zetia.  Takes Novolog with higher carb meals and higher glucose readings  Checks glucose multiple times a day  Not on basal insulin  Saw podiatry for ingrown toenail, left foot, healing/treating nail fungus as well.     Admits he hasn't been following diabetic diet as much recently and glucose has been higher. Forgot lunch time dose today, glucose in office today 395    Review of systems  Edema, soa off/on, on diuretics managed by nephrology    Past Medical History:   Diagnosis Date    Arrhythmia Childhood    Atrial fibrillation     CHF (congestive heart failure) 2017    CKD (chronic kidney disease) stage 3, GFR 30-59 ml/min     Congenital heart disease 2017    Coronary artery disease 2017    COVID-19 2024    After-effects lasted for a few months afterwards. Lower respiratory issues    Gout     Hypercholesterolemia     Hyperlipidemia     Hypertension     Kidney stones     Long term current use of insulin     NICM (nonischemic cardiomyopathy)     Obesity     ALYSSA (obstructive sleep apnea)     Pulmonary edema 10/2024    Renal disorder     due to type 2 diabetes mellitus    Retinopathy     due to type 2 diabetes mellitus    Simple goiter     Type 2 diabetes mellitus       Past Surgical History:  "  Procedure Laterality Date    MEDIAL COLLATERAL LIGAMENT REPAIR, KNEE Right 1978    CARDIAC CATHETERIZATION N/A 11/15/2017    Procedure: Coronary angiography;  Surgeon: Elke Marcum MD;  Location:  [facility] CATH INVASIVE LOCATION;  Service:     PATELLA TENDON REPAIR Left 3/13/2018    Procedure: PATELLA TENDON REPAIR LEFT;  Surgeon: Blake Mcbride MD;  Location:  [facility] OR;  Service: Orthopedics    KNEE SURGERY Left     Patellar tendon repair - Reed     The following portions of the patient's history were reviewed and updated as appropriate: allergies, current medications, past family history, past medical history, past social history, past surgical history, and problem list.    Vitals:  /76 (BP Location: Left arm, Patient Position: Sitting, Cuff Size: Adult)   Pulse 75   Ht 190.5 cm (75\")   Wt 120 kg (264 lb)   SpO2 97%   BMI 33.00 kg/m²     Physical Exam  Constitutional:       Appearance: Normal appearance. He is obese.   HENT:      Head: Normocephalic and atraumatic.      Nose: Nose normal.   Eyes:      Conjunctiva/sclera: Conjunctivae normal.   Cardiovascular:      Rate and Rhythm: Normal rate.   Pulmonary:      Effort: Pulmonary effort is normal.   Skin:     General: Skin is warm and dry.   Neurological:      General: No focal deficit present.      Mental Status: He is alert and oriented to person, place, and time. Mental status is at baseline.   Psychiatric:         Mood and Affect: Mood normal.         Behavior: Behavior normal.         Thought Content: Thought content normal.         Judgment: Judgment normal.         Labs/Imaging    Hemoglobin A1c  Lab Results   Component Value Date    HGBA1C 10.1 (A) 07/01/2025    HGBA1C 7.8 (H) 10/05/2024    HGBA1C 7.6 (A) 12/08/2023     Glucose  Lab Results   Component Value Date    POCGLU 395 (A) 07/01/2025       Current Outpatient Medications   Medication Instructions    allopurinol (ZYLOPRIM) 300 mg, Oral, Daily    amLODIPine (NORVASC) 10 MG tablet TAKE 1 " TABLET BY MOUTH EVERY DAY    B-D UF III MINI PEN NEEDLES 31G X 5 MM misc USE 3 TIMES A DAY    bumetanide (BUMEX) 2 MG tablet Take 1 tablet by mouth Daily.    carvedilol (COREG) 12.5 MG tablet TAKE 1 TABLET BY MOUTH TWICE A DAY    cloNIDine (CATAPRES) 0.2 mg, Nightly    Continuous Glucose Sensor (Dexcom G7 Sensor) misc 1 each, Not Applicable, Every 10 Days    Eliquis 5 mg, Oral, 2 Times Daily    ezetimibe (ZETIA) 10 mg, Oral, Daily    fluticasone (FLONASE) 50 MCG/ACT nasal spray SPRAY 2 SPRAYS INTO EACH NOSTRIL EVERY DAY    glucose blood (Accu-Chek Sepideh Plus) test strip CHECK SUGARS THREE TIMES PER DAY AND AS NEEDED DX E11.65 ON INSULIN    glucose monitor monitoring kit Check sugars four times per day    ipratropium-albuterol (Combivent Respimat)  MCG/ACT inhaler 1 puff, Inhalation, 4 Times Daily PRN    isosorbide mononitrate (IMDUR) 60 MG 24 hr tablet TAKE 1 TABLET BY MOUTH EVERY DAY    multivitamin with minerals (Centrum Silver) tablet tablet Orally    NovoLOG FlexPen 100 UNIT/ML solution pen-injector sc pen Inject 5-10 units 3 times daily before meals    spironolactone (ALDACTONE) 25 mg, Daily       Assessment & Plan      1. Type 2 diabetes mellitus with hyperglycemia, with long-term current use of insulin  Diabetes is worsening A1c 10.1 up from 7.8 last visit, uncontrolled  Medications limited to insulin due to CKD 4  Patient is resistant to start basal insulin again  He would like to change his diet and see if his blood glucose levels improve  If not, he will consider adding basal insulin at next visit.  Patient agrees to restart CGM, so blood glucose trends can be monitored at next visit  Continue Novolog three times a day  CMP is utd  Lipids ordered   Foot exam next visit  Eye exam utd      2. Mixed hyperlipidemia  Did not tolerate statins. Continue Zetia. Lipids ordered.  - Lipid Panel; Future    No follow-ups on file. He was advised to contact the office with any interval questions or  concerns.    Tae Case PA-C  Endocrinology  07/01/2025

## 2025-07-02 LAB
CHOLEST SERPL-MCNC: 115 MG/DL (ref 0–200)
HDLC SERPL-MCNC: 55 MG/DL (ref 40–60)
LDLC SERPL CALC-MCNC: 48 MG/DL (ref 0–100)
LDLC/HDLC SERPL: 0.91 {RATIO}
TRIGL SERPL-MCNC: 50 MG/DL (ref 0–150)
VLDLC SERPL-MCNC: 12 MG/DL (ref 5–40)

## 2025-07-14 DIAGNOSIS — M10.9 GOUT, UNSPECIFIED CAUSE, UNSPECIFIED CHRONICITY, UNSPECIFIED SITE: ICD-10-CM

## 2025-07-14 RX ORDER — ALLOPURINOL 300 MG/1
300 TABLET ORAL DAILY
Qty: 90 TABLET | Refills: 0 | Status: SHIPPED | OUTPATIENT
Start: 2025-07-14

## (undated) DEVICE — ST EXT IV SMARTSITE 2VLV SP M LL 5ML IV1

## (undated) DEVICE — CATH DIAG EXPO M/ PK 5F FL4/FR4 PIG

## (undated) DEVICE — GW INQWIRE FC PTFE STD J/1.5 .035 260

## (undated) DEVICE — HOLDER: Brand: DEROYAL

## (undated) DEVICE — MEDI-VAC YANKAUER SUCTION HANDLE W/BULBOUS TIP: Brand: CARDINAL HEALTH

## (undated) DEVICE — PAD,ABDOMINAL,8"X7.5",STERILE,LF,1/PK: Brand: MEDLINE

## (undated) DEVICE — GOWN,REINF,POLY,ECL,PP SLV,3XL,XLONG: Brand: MEDLINE

## (undated) DEVICE — ANTIBACTERIAL UNDYED BRAIDED (POLYGLACTIN 910), SYNTHETIC ABSORBABLE SUTURE: Brand: COATED VICRYL

## (undated) DEVICE — 3M™ WARMING BLANKET, UPPER BODY, 10 PER CASE, 42268: Brand: BAIR HUGGER™

## (undated) DEVICE — BNDG ELAS CO-FLEX SLF ADHR 4IN5YD LF STRL

## (undated) DEVICE — SPNG GZ WOVN 4X4IN 12PLY 10/BX STRL

## (undated) DEVICE — CANNULA,OXY,ADULT,SUPERSOFT,W/7'TUB,UC: Brand: MEDLINE

## (undated) DEVICE — 1.2 RAP-PAC C LATEX FREE: Brand: RAP-PAC

## (undated) DEVICE — MODEL AT P54, P/N 700608-035KIT CONTENTS: HAND CONTROLLER, 3-WAY HIGH-PRESSURE STOPCOCK WITH ROTATING END AND PREMIUM HIGH-PRESSURE TUBING: Brand: ANGIOTOUCH® KIT

## (undated) DEVICE — BNDG ELAS W/CLIP 6IN 10YD LF STRL

## (undated) DEVICE — PAD GRND REM POLYHESIVE A/ DISP

## (undated) DEVICE — KT PUMP PAIN ONQ CBLOC SELCTAFLO 400ML

## (undated) DEVICE — HEWSON SUTURE RETRIEVER: Brand: HEWSON SUTURE RETRIEVER

## (undated) DEVICE — AIRWY SZ11

## (undated) DEVICE — PK EXTREM LOWR 10

## (undated) DEVICE — SUT ETHLN 3/0 FS1 30IN 669H

## (undated) DEVICE — CVR HNDL LT SURG ACCSSRY BLU STRL

## (undated) DEVICE — DEV COMP RAD PRELUDESYNC 24CM

## (undated) DEVICE — SYS SKIN CLS DERMABOND PRINEO W/22CM MESH TP

## (undated) DEVICE — MEDI-VAC NON-CONDUCTIVE SUCTION TUBING: Brand: CARDINAL HEALTH

## (undated) DEVICE — ENCORE® LATEX MICRO SIZE 8.5, STERILE LATEX POWDER-FREE SURGICAL GLOVE: Brand: ENCORE

## (undated) DEVICE — GLIDESHEATH SLENDER STAINLESS STEEL KIT: Brand: GLIDESHEATH SLENDER

## (undated) DEVICE — CATH DIAG EXPO .045 AL1 5F 100CM

## (undated) DEVICE — SST TWIST DRILL, STANDARD, 2.4MM DIA. X 127MM: Brand: MICROAIRE®

## (undated) DEVICE — CATH DIAG EXPO .045 FL3  5F 100CM

## (undated) DEVICE — MODEL BT2000 P/N 700287-012KIT CONTENTS: MANIFOLD WITH SALINE AND CONTRAST PORTS, SALINE TUBING WITH SPIKE AND HAND SYRINGE, TRANSDUCER: Brand: BT2000 AUTOMATED MANIFOLD KIT

## (undated) DEVICE — UNDERCAST PADDING: Brand: DEROYAL

## (undated) DEVICE — PK CATH CARD 10

## (undated) DEVICE — 3M™ STERI-DRAPE™ INSTRUMENT POUCH 1018: Brand: STERI-DRAPE™

## (undated) DEVICE — BRACE KN XACT/ROM TELESCP ADJ UNIV